# Patient Record
Sex: MALE | Race: BLACK OR AFRICAN AMERICAN | NOT HISPANIC OR LATINO | Employment: OTHER | URBAN - METROPOLITAN AREA
[De-identification: names, ages, dates, MRNs, and addresses within clinical notes are randomized per-mention and may not be internally consistent; named-entity substitution may affect disease eponyms.]

---

## 2017-01-11 ENCOUNTER — OFFICE VISIT (OUTPATIENT)
Dept: PODIATRY | Facility: CLINIC | Age: 72
End: 2017-01-11
Payer: MEDICARE

## 2017-01-11 VITALS
BODY MASS INDEX: 21.14 KG/M2 | HEIGHT: 70 IN | WEIGHT: 147.69 LBS | HEART RATE: 114 BPM | DIASTOLIC BLOOD PRESSURE: 77 MMHG | SYSTOLIC BLOOD PRESSURE: 138 MMHG

## 2017-01-11 DIAGNOSIS — B35.1 DERMATOPHYTOSIS OF NAIL: Primary | ICD-10-CM

## 2017-01-11 DIAGNOSIS — E11.42 DM TYPE 2 WITH DIABETIC PERIPHERAL NEUROPATHY: ICD-10-CM

## 2017-01-11 PROCEDURE — 99213 OFFICE O/P EST LOW 20 MIN: CPT | Mod: 25,S$PBB,, | Performed by: PODIATRIST

## 2017-01-11 PROCEDURE — 11721 DEBRIDE NAIL 6 OR MORE: CPT | Mod: Q9,S$PBB,, | Performed by: PODIATRIST

## 2017-01-11 PROCEDURE — 99213 OFFICE O/P EST LOW 20 MIN: CPT | Mod: PBBFAC,PO,25 | Performed by: PODIATRIST

## 2017-01-11 PROCEDURE — 99999 PR PBB SHADOW E&M-EST. PATIENT-LVL III: CPT | Mod: PBBFAC,,, | Performed by: PODIATRIST

## 2017-01-11 PROCEDURE — 11721 DEBRIDE NAIL 6 OR MORE: CPT | Mod: Q9,PBBFAC,PO | Performed by: PODIATRIST

## 2017-01-11 NOTE — MR AVS SNAPSHOT
[facility] - Podiatry  [address] [facility] [place] [address]  Phone: [phone]  Fax: [phone]                  Santy Anderson   2017 4:20 PM   Office Visit    Description:  Male : 1945   Provider:  Radhika Parr DPM   Department:  [facility] - Podiatry           Reason for Visit     Routine Foot Care                To Do List           Future Appointments        Provider Department Dept Phone    2017 7:55 AM LABORATORY, [facility] [phone]    2017 8:00 AM [facility] [phone]    2017 8:30 AM SPECIMEN, [facility] [phone]    2017 10:00 AM Otto Lara MD [facility] Hemotology Oncology [phone]    2017 7:40 AM Joanie Peterson MD [facility] Internal Medicine [phone]      Goals (5 Years of Data)     None      [facility]     [facility] -  Assistance  Registered nurses in the [facility] provide clinical advisement, health education, appointment booking, and other advisory services.  Call for this free service at [phone].             Medications           Message regarding Medications     Verify the changes and/or additions to your medication regime listed below are the same as discussed with your clinician today.  If any of these changes or additions are incorrect, please notify your healthcare provider.             Verify that the below list of medications is an accurate representation of the medications you are currently taking.  If none reported, the list may be blank. If incorrect, please contact your healthcare provider. Carry this list with you in case of emergency.           Current Medications     naproxen (NAPROSYN) 500 MG tablet Take 1 tablet (500 mg total) by mouth 2 (two) times daily with meals.    pantoprazole (PROTONIX) 20 MG tablet Take 2 tablets (40 mg total) by mouth 2 (two) times daily before meals.    potassium  "chloride SA (K-DUR,KLOR-CON) 20 MEQ tablet Take 1 tablet (20 mEq total) by mouth 2 (two) times daily.    sucralfate (CARAFATE) 1 gram tablet Take 1 tablet (1 g total) by mouth 4 (four) times daily before meals and nightly.    tramadol (ULTRAM) 50 mg tablet Take 50 mg by mouth every 6 (six) hours as needed for Pain.    VITAMIN D2 50,000 unit capsule TAKE ONE CAPSULE BY MOUTH EVERY WEEK           Clinical Reference Information           Vital Signs - Last Recorded  Most recent update: 1/11/2017  4:24 PM by Sara Sherwood MA    BP Pulse Ht Wt BMI    138/77 (BP Location: Left arm, Patient Position: Sitting, BP Method: Automatic) (!) 114 5' 9.5" (1.765 m) 67 kg (147 lb 11.3 oz) 21.5 kg/m2      Blood Pressure          Most Recent Value    BP  138/77      Allergies as of 1/11/2017     No Known Allergies      Immunizations Administered on Date of Encounter - 1/11/2017     None      "

## 2017-01-11 NOTE — PROGRESS NOTES
"PODIATRY NOTE    CHIEF COMPLAINT  Chief Complaint   Patient presents with    Routine Foot Care     at risk foot/nail care PCP Dr. Camacho 11/30/16        HPI  Santy Anderson is a 69 y.o. male w/ PMH of DM2, Malignant neoplasm, hx of traumatic tibial fracture RIGHT Leg, hx of Compartment syndrome w/ fasciotomy RIGHT , HPLD, and other medical problems, who presents today for follow up diabetic foot care. Pt denies any new problems. He relates he has painful toenails due to increased pressure aggravated by shoes and weight bearing. Pain is relieved with routine nail debridements.    REVIEW OF SYSTEMS  General: Denies any fever or chills  Chest: Denies shortness of breath, wheezing, coughing, or sputum production  Heart: Denies chest pain.  As noted above and per history of current illness above, otherwise negative in the remainder of the 14 systems.     PHYSICAL EXAM  Vitals:    01/11/17 1622   BP: 138/77   Pulse: (!) 114   Weight: 67 kg (147 lb 11.3 oz)   Height: 5' 9.5" (1.765 m)       GEN:  This patient is well-developed, well-nourished and appears stated age, well-oriented to person, place and time, and cooperative and pleasant on today's visit.      LOWER EXTREMITY  Vascular:   · DP pedal pulse 1/4 b/l, PT pedal pulse 1/4 b/l  · Skin temperature warm to COOL from prox to distally  · CFT <5 secs b/l  · There is generalized edema non pitting noted b/l       Dermatologic:   · Thickened, dystrophic, elongated toenails with subungal debris 1-5 b/l.   · No open skin lesions noted  · No erythema or drainage noted b/l.  · Webspaces are C/D/I B/L.  · There is diffuse or localized hyperkeratotic tissue noted plantar foot b/l.   · Skin texture and turgor dry/shiny  · There is no pedal hair growth noted    Neurologic:  · Vibratory sensation diminished at level of Hallux IPJ b/l    · Protective sensation absent at 9/10 sites upon examination with Nehalem Weinsten 5.07 g monofilament.   · Propioception intact at 1st MTPJ b/l. "   · Achilles and patellar deep tendon reflexes intact  · Babinski reflex absent b/l. Light touch and sharp/dull sensation intact b/l.    Musculoskeletal/Orthopedic:  · No symptomatic structural abnormalities noted.   · Muscle strength is 5/5 for foot inverters, everters, plantarflexors, and dorsiflexors. Muscle tone is normal.  · Ankle joint ROM decreased with knee extended, mild increase with knee flexed b/l, no pain or crepitus throughout ROM of AJ.    ASSESSMENT  1. Dermatophytosis of nail  2. DM with neurological manifestations      Plan  -Discuss presenting problems, etiology, pathologic processes and management options with patient today.   -With patient's permission, nails were aggressively reduced and debrided x 10 to their soft tissue attachment mechanically and with electric , removing all offending nail and debris. Patient relates relief following the procedure. Patient will continue to monitor the areas daily, inspect feet, wear protective shoe gear when ambulatory, moisturizer to maintain skin integrity  -RTC in 2-3 months for C      Future Appointments  Date Time Provider Department Center   1/25/2017 7:55 AM LABORATORY, [facility] LAB [facility]   1/25/2017 8:00 AM [facility] BMD1 [facility] DEXAB[facility]   1/25/2017 8:30 AM SPECIMEN, [facility] SPECLAB [facility]   1/25/2017 10:00 AM Otto Lara MD [facility] HEM ONC [facility]   2/1/2017 7:40 AM Joanie Peterson MD [facility] IM [facility]   4/12/2017 8:00 AM Radhika Parr DPM [facility] POD [facility]           Report Electronically Signed By:  Radhika Parr DPM   Podiatric Medicine & Surgery  [facility]  1/11/2017

## 2017-01-25 ENCOUNTER — OFFICE VISIT (OUTPATIENT)
Dept: HEMATOLOGY/ONCOLOGY | Facility: CLINIC | Age: 72
End: 2017-01-25
Payer: MEDICARE

## 2017-01-25 ENCOUNTER — LAB VISIT (OUTPATIENT)
Dept: LAB | Facility: HOSPITAL | Age: 72
End: 2017-01-25
Attending: INTERNAL MEDICINE
Payer: MEDICARE

## 2017-01-25 VITALS
OXYGEN SATURATION: 98 % | SYSTOLIC BLOOD PRESSURE: 110 MMHG | DIASTOLIC BLOOD PRESSURE: 74 MMHG | HEIGHT: 70 IN | HEART RATE: 98 BPM | BODY MASS INDEX: 21.27 KG/M2 | WEIGHT: 148.56 LBS | TEMPERATURE: 98 F

## 2017-01-25 DIAGNOSIS — E87.6 HYPOKALEMIA: ICD-10-CM

## 2017-01-25 DIAGNOSIS — M47.816 SPONDYLOSIS OF LUMBAR REGION WITHOUT MYELOPATHY OR RADICULOPATHY: ICD-10-CM

## 2017-01-25 DIAGNOSIS — Z29.9 PREVENTIVE MEASURE: ICD-10-CM

## 2017-01-25 DIAGNOSIS — Z12.5 ENCOUNTER FOR SCREENING FOR MALIGNANT NEOPLASM OF PROSTATE: ICD-10-CM

## 2017-01-25 DIAGNOSIS — M51.36 DDD (DEGENERATIVE DISC DISEASE), LUMBAR: ICD-10-CM

## 2017-01-25 DIAGNOSIS — C34.32 MALIGNANT NEOPLASM OF LOWER LOBE OF LEFT LUNG: Primary | ICD-10-CM

## 2017-01-25 DIAGNOSIS — E11.9 TYPE 2 DIABETES MELLITUS WITHOUT COMPLICATION, UNSPECIFIED LONG TERM INSULIN USE STATUS: ICD-10-CM

## 2017-01-25 DIAGNOSIS — E55.9 VITAMIN D DEFICIENCY: ICD-10-CM

## 2017-01-25 LAB
25(OH)D3+25(OH)D2 SERPL-MCNC: 20 NG/ML
ALBUMIN SERPL BCP-MCNC: 3.4 G/DL
ALP SERPL-CCNC: 64 U/L
ALT SERPL W/O P-5'-P-CCNC: 12 U/L
ANION GAP SERPL CALC-SCNC: 16 MMOL/L
ANION GAP SERPL CALC-SCNC: 16 MMOL/L
AST SERPL-CCNC: 39 U/L
BASOPHILS # BLD AUTO: 0.06 K/UL
BASOPHILS NFR BLD: 1 %
BILIRUB SERPL-MCNC: 1.5 MG/DL
BUN SERPL-MCNC: 7 MG/DL
BUN SERPL-MCNC: 7 MG/DL
CALCIUM SERPL-MCNC: 9.6 MG/DL
CALCIUM SERPL-MCNC: 9.6 MG/DL
CHLORIDE SERPL-SCNC: 98 MMOL/L
CHLORIDE SERPL-SCNC: 98 MMOL/L
CHOLEST/HDLC SERPL: 2.1 {RATIO}
CO2 SERPL-SCNC: 22 MMOL/L
CO2 SERPL-SCNC: 22 MMOL/L
COMPLEXED PSA SERPL-MCNC: 1.4 NG/ML
CREAT SERPL-MCNC: 0.8 MG/DL
CREAT SERPL-MCNC: 0.8 MG/DL
DIFFERENTIAL METHOD: ABNORMAL
EOSINOPHIL # BLD AUTO: 0.2 K/UL
EOSINOPHIL NFR BLD: 4 %
ERYTHROCYTE [DISTWIDTH] IN BLOOD BY AUTOMATED COUNT: 18.3 %
EST. GFR  (AFRICAN AMERICAN): >60 ML/MIN/1.73 M^2
EST. GFR  (AFRICAN AMERICAN): >60 ML/MIN/1.73 M^2
EST. GFR  (NON AFRICAN AMERICAN): >60 ML/MIN/1.73 M^2
EST. GFR  (NON AFRICAN AMERICAN): >60 ML/MIN/1.73 M^2
GLUCOSE SERPL-MCNC: 105 MG/DL
GLUCOSE SERPL-MCNC: 105 MG/DL
HCT VFR BLD AUTO: 41.2 %
HDL/CHOLESTEROL RATIO: 46.5 %
HDLC SERPL-MCNC: 101 MG/DL
HDLC SERPL-MCNC: 217 MG/DL
HGB BLD-MCNC: 13.8 G/DL
LDLC SERPL CALC-MCNC: 101.8 MG/DL
LYMPHOCYTES # BLD AUTO: 1.8 K/UL
LYMPHOCYTES NFR BLD: 30.3 %
MAGNESIUM SERPL-MCNC: 1.7 MG/DL
MCH RBC QN AUTO: 34.2 PG
MCHC RBC AUTO-ENTMCNC: 33.5 %
MCV RBC AUTO: 102 FL
MONOCYTES # BLD AUTO: 0.8 K/UL
MONOCYTES NFR BLD: 13.6 %
NEUTROPHILS # BLD AUTO: 3.1 K/UL
NEUTROPHILS NFR BLD: 51.1 %
NONHDLC SERPL-MCNC: 116 MG/DL
PLATELET # BLD AUTO: 150 K/UL
PMV BLD AUTO: 9.1 FL
POTASSIUM SERPL-SCNC: 3.7 MMOL/L
POTASSIUM SERPL-SCNC: 3.7 MMOL/L
PROT SERPL-MCNC: 7.7 G/DL
RBC # BLD AUTO: 4.04 M/UL
SODIUM SERPL-SCNC: 136 MMOL/L
SODIUM SERPL-SCNC: 136 MMOL/L
TRIGL SERPL-MCNC: 71 MG/DL
TSH SERPL DL<=0.005 MIU/L-ACNC: 2.38 UIU/ML
WBC # BLD AUTO: 5.97 K/UL

## 2017-01-25 PROCEDURE — 99214 OFFICE O/P EST MOD 30 MIN: CPT | Mod: S$PBB,,, | Performed by: INTERNAL MEDICINE

## 2017-01-25 PROCEDURE — 99999 PR PBB SHADOW E&M-EST. PATIENT-LVL III: CPT | Mod: PBBFAC,,, | Performed by: INTERNAL MEDICINE

## 2017-01-25 PROCEDURE — 99213 OFFICE O/P EST LOW 20 MIN: CPT | Mod: PBBFAC,PO | Performed by: INTERNAL MEDICINE

## 2017-01-25 NOTE — PROGRESS NOTES
Subjective:       Patient ID: Santy Anderson is a 71 y.o. male.    Chief Complaint: Results; Pain; and Lung Cancer    HPI 71-year-old male history of resected non-small cell lung carcinoma patient is complaining of back pain today he is using a walker he states that this is happened to him in the past his last MRI of his lumbar spine was in 2015 which revealed degenerative changes patient has difficulty ambulating states this occurs on and off    Past Medical History   Diagnosis Date    Diabetes mellitus, type 2     Gastric mass     Hyperlipidemia     Lung cancer      Family History   Problem Relation Age of Onset    Diabetes Mother     Hypertension Mother      Social History     Social History    Marital status:      Spouse name: N/A    Number of children: N/A    Years of education: N/A     Occupational History    Not on file.     Social History Main Topics    Smoking status: Former Smoker     Packs/day: 0.50     Years: 25.00     Quit date: 10/16/1988    Smokeless tobacco: Never Used    Alcohol use 9.0 oz/week     1 Glasses of wine, 14 Cans of beer per week      Comment: socailly    Drug use: No    Sexual activity: Not on file     Other Topics Concern    Not on file     Social History Narrative     Past Surgical History   Procedure Laterality Date    Knee surgery      Hand surgery      Femur surgery      Colonoscopy      Left lower lobectomy      Laparotomy for trauma (negative)  1979    Gastrectomy      Esophagogastroduodenoscopy         Labs:  Lab Results   Component Value Date    WBC 5.97 01/25/2017    HGB 13.8 (L) 01/25/2017    HCT 41.2 01/25/2017     (H) 01/25/2017     01/25/2017     BMP  Lab Results   Component Value Date     01/25/2017     01/25/2017    K 3.7 01/25/2017    K 3.7 01/25/2017    CL 98 01/25/2017    CL 98 01/25/2017    CO2 22 (L) 01/25/2017    CO2 22 (L) 01/25/2017    BUN 7 (L) 01/25/2017    BUN 7 (L) 01/25/2017    CREATININE 0.8  01/25/2017    CREATININE 0.8 01/25/2017    CALCIUM 9.6 01/25/2017    CALCIUM 9.6 01/25/2017    ANIONGAP 16 01/25/2017    ANIONGAP 16 01/25/2017    ESTGFRAFRICA >60 01/25/2017    ESTGFRAFRICA >60 01/25/2017    EGFRNONAA >60 01/25/2017    EGFRNONAA >60 01/25/2017     Lab Results   Component Value Date    ALT 12 01/25/2017    AST 39 01/25/2017    ALKPHOS 64 01/25/2017    BILITOT 1.5 (H) 01/25/2017       Lab Results   Component Value Date    IRON 194 (H) 05/22/2015    TIBC 312 05/22/2015    FERRITIN 128 05/22/2015     Lab Results   Component Value Date    [id number] 370 09/23/2016     Lab Results   Component Value Date    FOLATE 8.8 09/23/2016     Lab Results   Component Value Date    TSH 1.413 06/22/2016         Review of Systems   Constitutional: Positive for activity change and fatigue. Negative for appetite change, chills, diaphoresis, fever and unexpected weight change.   HENT: Negative for congestion, dental problem, drooling, ear discharge, ear pain, facial swelling, hearing loss, mouth sores, nosebleeds, postnasal drip, rhinorrhea, sinus pressure, sneezing, sore throat, tinnitus, trouble swallowing and voice change.    Eyes: Negative for photophobia, pain, discharge, redness, itching and visual disturbance.   Respiratory: Negative for apnea, cough, choking, chest tightness, shortness of breath, wheezing and stridor.    Cardiovascular: Negative for chest pain, palpitations and leg swelling.   Gastrointestinal: Negative for abdominal distention, abdominal pain, anal bleeding, blood in stool, constipation, diarrhea, nausea, rectal pain and vomiting.   Endocrine: Negative for cold intolerance, heat intolerance, polydipsia, polyphagia and polyuria.   Genitourinary: Negative for decreased urine volume, difficulty urinating, discharge, dysuria, enuresis, flank pain, frequency, genital sores, hematuria, penile pain, penile swelling, scrotal swelling, testicular pain and urgency.   Musculoskeletal: Positive for  arthralgias, back pain and gait problem. Negative for joint swelling, myalgias, neck pain and neck stiffness.   Skin: Negative for color change, pallor, rash and wound.   Allergic/Immunologic: Negative for environmental allergies, food allergies and immunocompromised state.   Neurological: Positive for weakness. Negative for dizziness, tremors, seizures, syncope, facial asymmetry, speech difficulty, light-headedness, numbness and headaches.   Hematological: Negative for adenopathy. Does not bruise/bleed easily.   Psychiatric/Behavioral: Negative for agitation, behavioral problems, confusion, decreased concentration, dysphoric mood, hallucinations, self-injury, sleep disturbance and suicidal ideas. The patient is not nervous/anxious and is not hyperactive.        Objective:      Physical Exam   Constitutional: He is oriented to person, place, and time. He appears well-developed and well-nourished. No distress.   HENT:   Head: Normocephalic.   Right Ear: External ear normal.   Left Ear: External ear normal.   Nose: Nose normal. Right sinus exhibits no maxillary sinus tenderness and no frontal sinus tenderness. Left sinus exhibits no maxillary sinus tenderness and no frontal sinus tenderness.   Mouth/Throat: Oropharynx is clear and moist. No oropharyngeal exudate.   Eyes: EOM and lids are normal. Pupils are equal, round, and reactive to light. Right eye exhibits no discharge. Left eye exhibits no discharge. Right conjunctiva is not injected. Right conjunctiva has no hemorrhage. Left conjunctiva is not injected. Left conjunctiva has no hemorrhage. No scleral icterus. Right eye exhibits normal extraocular motion. Left eye exhibits normal extraocular motion.   Neck: Normal range of motion. Neck supple. No JVD present. No tracheal deviation present. No thyromegaly present.   Cardiovascular: Normal rate, regular rhythm and normal heart sounds.    Pulmonary/Chest: Effort normal and breath sounds normal. No stridor. No  respiratory distress.   Abdominal: Soft. Bowel sounds are normal. He exhibits no mass. There is no hepatosplenomegaly, splenomegaly or hepatomegaly. There is no tenderness.   Musculoskeletal: Normal range of motion. He exhibits no edema or tenderness.   Lymphadenopathy:        Head (right side): No posterior auricular and no occipital adenopathy present.        Head (left side): No posterior auricular and no occipital adenopathy present.     He has no cervical adenopathy.        Right cervical: No superficial cervical, no deep cervical and no posterior cervical adenopathy present.       Left cervical: No superficial cervical, no deep cervical and no posterior cervical adenopathy present.     He has no axillary adenopathy.        Right: No supraclavicular adenopathy present.        Left: No supraclavicular adenopathy present.   Neurological: He is alert and oriented to person, place, and time. He has normal strength. No cranial nerve deficit. Coordination abnormal.   Skin: Skin is dry. No rash noted. He is not diaphoretic. No cyanosis or erythema. Nails show no clubbing.   Psychiatric: He has a normal mood and affect. His behavior is normal. Judgment and thought content normal. Cognition and memory are normal.   Vitals reviewed.          Assessment:       1. Malignant neoplasm of lower lobe of left lung    2. DDD (degenerative disc disease), lumbar    3. Spondylosis of lumbar region without myelopathy or radiculopathy            Plan:     very different findings patient has difficulty in terms of ambulation at this point recommend the patient have MRI of LS spine to rule out malignant cause as well as any nonmalignant cause return after MRI for review; back pain for the last several weeks and perhaps month with progression

## 2017-01-26 LAB
ESTIMATED AVG GLUCOSE: 82 MG/DL
HBA1C MFR BLD HPLC: 4.5 %

## 2017-01-31 ENCOUNTER — TELEPHONE (OUTPATIENT)
Dept: RADIOLOGY | Facility: HOSPITAL | Age: 72
End: 2017-01-31

## 2017-02-01 ENCOUNTER — OFFICE VISIT (OUTPATIENT)
Dept: HEMATOLOGY/ONCOLOGY | Facility: CLINIC | Age: 72
End: 2017-02-01
Payer: MEDICARE

## 2017-02-01 ENCOUNTER — HOSPITAL ENCOUNTER (OUTPATIENT)
Dept: RADIOLOGY | Facility: HOSPITAL | Age: 72
Discharge: HOME | End: 2017-02-01
Attending: INTERNAL MEDICINE
Payer: MEDICARE

## 2017-02-01 ENCOUNTER — OFFICE VISIT (OUTPATIENT)
Dept: INTERNAL MEDICINE | Facility: CLINIC | Age: 72
End: 2017-02-01
Payer: MEDICARE

## 2017-02-01 VITALS
OXYGEN SATURATION: 98 % | DIASTOLIC BLOOD PRESSURE: 62 MMHG | RESPIRATION RATE: 18 BRPM | HEIGHT: 70 IN | HEART RATE: 101 BPM | BODY MASS INDEX: 21.18 KG/M2 | WEIGHT: 147.94 LBS | TEMPERATURE: 98 F | SYSTOLIC BLOOD PRESSURE: 134 MMHG

## 2017-02-01 VITALS
SYSTOLIC BLOOD PRESSURE: 118 MMHG | TEMPERATURE: 97 F | DIASTOLIC BLOOD PRESSURE: 64 MMHG | HEIGHT: 70 IN | OXYGEN SATURATION: 97 % | WEIGHT: 148.56 LBS | BODY MASS INDEX: 21.27 KG/M2 | HEART RATE: 108 BPM

## 2017-02-01 DIAGNOSIS — E11.9 TYPE 2 DIABETES MELLITUS WITHOUT COMPLICATION, UNSPECIFIED LONG TERM INSULIN USE STATUS: Primary | ICD-10-CM

## 2017-02-01 DIAGNOSIS — K20.80 ESOPHAGITIS, LOS ANGELES GRADE D: ICD-10-CM

## 2017-02-01 DIAGNOSIS — E55.9 VITAMIN D DEFICIENCY: Primary | ICD-10-CM

## 2017-02-01 DIAGNOSIS — C34.32 MALIGNANT NEOPLASM OF LOWER LOBE OF LEFT LUNG: ICD-10-CM

## 2017-02-01 DIAGNOSIS — Z00.00 ENCOUNTER FOR PREVENTIVE HEALTH EXAMINATION: ICD-10-CM

## 2017-02-01 DIAGNOSIS — M47.816 SPONDYLOSIS OF LUMBAR REGION WITHOUT MYELOPATHY OR RADICULOPATHY: ICD-10-CM

## 2017-02-01 DIAGNOSIS — M47.816 SPONDYLOSIS OF LUMBAR REGION WITHOUT MYELOPATHY OR RADICULOPATHY: Primary | ICD-10-CM

## 2017-02-01 DIAGNOSIS — E55.9 VITAMIN D DEFICIENCY: ICD-10-CM

## 2017-02-01 DIAGNOSIS — M51.36 DDD (DEGENERATIVE DISC DISEASE), LUMBAR: ICD-10-CM

## 2017-02-01 DIAGNOSIS — M81.0 OP (OSTEOPOROSIS): ICD-10-CM

## 2017-02-01 PROCEDURE — 99214 OFFICE O/P EST MOD 30 MIN: CPT | Mod: S$PBB,,, | Performed by: INTERNAL MEDICINE

## 2017-02-01 PROCEDURE — 99999 PR PBB SHADOW E&M-EST. PATIENT-LVL III: CPT | Mod: PBBFAC,,, | Performed by: INTERNAL MEDICINE

## 2017-02-01 PROCEDURE — 72158 MRI LUMBAR SPINE W/O & W/DYE: CPT | Mod: 26,,, | Performed by: RADIOLOGY

## 2017-02-01 PROCEDURE — 99213 OFFICE O/P EST LOW 20 MIN: CPT | Mod: PBBFAC,27,PO | Performed by: INTERNAL MEDICINE

## 2017-02-01 RX ORDER — GADOBUTROL 604.72 MG/ML
6.5 INJECTION INTRAVENOUS
Status: COMPLETED | OUTPATIENT
Start: 2017-02-01 | End: 2017-02-01

## 2017-02-01 RX ORDER — ERGOCALCIFEROL 1.25 MG/1
50000 CAPSULE ORAL
Qty: 8 CAPSULE | Refills: 6 | Status: SHIPPED | OUTPATIENT
Start: 2017-02-02 | End: 2017-11-13 | Stop reason: SDUPTHER

## 2017-02-01 RX ORDER — SODIUM, POTASSIUM,MAG SULFATES 17.5-3.13G
1 SOLUTION, RECONSTITUTED, ORAL ORAL DAILY PRN
Qty: 1 BOTTLE | Refills: 0 | Status: SHIPPED | OUTPATIENT
Start: 2017-02-01 | End: 2017-03-06

## 2017-02-01 RX ORDER — ERGOCALCIFEROL 1.25 MG/1
50000 CAPSULE ORAL
Qty: 8 CAPSULE | Refills: 6 | Status: CANCELLED | OUTPATIENT
Start: 2017-02-02

## 2017-02-01 RX ADMIN — GADOBUTROL 6.5 ML: 604.72 INJECTION INTRAVENOUS at 02:02

## 2017-02-01 NOTE — PROGRESS NOTES
Subjective:       Patient ID: Santy Anderson is a 71 y.o. male.    Chief Complaint: Results and Pain    HPI 71-year-old male returns after MRI of spine previous history of early resected lung cancer persistent back pain with myelopathy    Past Medical History   Diagnosis Date    Diabetes mellitus, type 2     Gastric mass     Hyperlipidemia     Lung cancer      Family History   Problem Relation Age of Onset    Diabetes Mother     Hypertension Mother      Social History     Social History    Marital status:      Spouse name: N/A    Number of children: N/A    Years of education: N/A     Occupational History    Not on file.     Social History Main Topics    Smoking status: Former Smoker     Packs/day: 0.50     Years: 25.00     Quit date: 10/16/1988    Smokeless tobacco: Never Used    Alcohol use 9.0 oz/week     1 Glasses of wine, 14 Cans of beer per week      Comment: socailly    Drug use: No    Sexual activity: Not on file     Other Topics Concern    Not on file     Social History Narrative     Past Surgical History   Procedure Laterality Date    Knee surgery      Hand surgery      Femur surgery      Colonoscopy      Left lower lobectomy      Laparotomy for trauma (negative)  1979    Gastrectomy      Esophagogastroduodenoscopy         Labs:  Lab Results   Component Value Date    WBC 5.97 01/25/2017    HGB 13.8 (L) 01/25/2017    HCT 41.2 01/25/2017     (H) 01/25/2017     01/25/2017     BMP  Lab Results   Component Value Date     01/25/2017     01/25/2017    K 3.7 01/25/2017    K 3.7 01/25/2017    CL 98 01/25/2017    CL 98 01/25/2017    CO2 22 (L) 01/25/2017    CO2 22 (L) 01/25/2017    BUN 7 (L) 01/25/2017    BUN 7 (L) 01/25/2017    CREATININE 0.8 01/25/2017    CREATININE 0.8 01/25/2017    CALCIUM 9.6 01/25/2017    CALCIUM 9.6 01/25/2017    ANIONGAP 16 01/25/2017    ANIONGAP 16 01/25/2017    ESTGFRAFRICA >60 01/25/2017    ESTGFRAFRICA >60 01/25/2017    EGFRNONAA  >60 01/25/2017    EGFRNONAA >60 01/25/2017     Lab Results   Component Value Date    ALT 12 01/25/2017    AST 39 01/25/2017    ALKPHOS 64 01/25/2017    BILITOT 1.5 (H) 01/25/2017       Lab Results   Component Value Date    IRON 194 (H) 05/22/2015    TIBC 312 05/22/2015    FERRITIN 128 05/22/2015     Lab Results   Component Value Date    [id number] 370 09/23/2016     Lab Results   Component Value Date    FOLATE 8.8 09/23/2016     Lab Results   Component Value Date    TSH 2.376 01/25/2017         Review of Systems   Constitutional: Negative for activity change, appetite change, chills, diaphoresis, fatigue, fever and unexpected weight change.   HENT: Negative for congestion, dental problem, drooling, ear discharge, ear pain, facial swelling, hearing loss, mouth sores, nosebleeds, postnasal drip, rhinorrhea, sinus pressure, sneezing, sore throat, tinnitus, trouble swallowing and voice change.    Eyes: Negative for photophobia, pain, discharge, redness, itching and visual disturbance.   Respiratory: Negative for apnea, cough, choking, chest tightness, shortness of breath, wheezing and stridor.    Cardiovascular: Negative for chest pain, palpitations and leg swelling.   Gastrointestinal: Negative for abdominal distention, abdominal pain, anal bleeding, blood in stool, constipation, diarrhea, nausea, rectal pain and vomiting.   Endocrine: Negative for cold intolerance, heat intolerance, polydipsia, polyphagia and polyuria.   Genitourinary: Negative for decreased urine volume, difficulty urinating, discharge, dysuria, enuresis, flank pain, frequency, genital sores, hematuria, penile pain, penile swelling, scrotal swelling, testicular pain and urgency.   Musculoskeletal: Positive for back pain and gait problem. Negative for arthralgias, joint swelling, myalgias, neck pain and neck stiffness.   Skin: Negative for color change, pallor, rash and wound.   Allergic/Immunologic: Negative for environmental allergies, food  allergies and immunocompromised state.   Neurological: Negative for dizziness, tremors, seizures, syncope, facial asymmetry, speech difficulty, weakness, light-headedness, numbness and headaches.   Hematological: Negative for adenopathy. Does not bruise/bleed easily.   Psychiatric/Behavioral: Negative for agitation, behavioral problems, confusion, decreased concentration, dysphoric mood, hallucinations, self-injury, sleep disturbance and suicidal ideas. The patient is not nervous/anxious and is not hyperactive.        Objective:      Physical Exam   Constitutional: He is oriented to person, place, and time. He appears well-developed and well-nourished. No distress.   HENT:   Head: Normocephalic.   Right Ear: External ear normal.   Left Ear: External ear normal.   Nose: Nose normal. Right sinus exhibits no maxillary sinus tenderness and no frontal sinus tenderness. Left sinus exhibits no maxillary sinus tenderness and no frontal sinus tenderness.   Mouth/Throat: Oropharynx is clear and moist. No oropharyngeal exudate.   Eyes: EOM and lids are normal. Pupils are equal, round, and reactive to light. Right eye exhibits no discharge. Left eye exhibits no discharge. Right conjunctiva is not injected. Right conjunctiva has no hemorrhage. Left conjunctiva is not injected. Left conjunctiva has no hemorrhage. No scleral icterus. Right eye exhibits normal extraocular motion. Left eye exhibits normal extraocular motion.   Neck: Normal range of motion. Neck supple. No JVD present. No tracheal deviation present. No thyromegaly present.   Cardiovascular: Normal rate, regular rhythm and normal heart sounds.    Pulmonary/Chest: Effort normal and breath sounds normal. No stridor. No respiratory distress.   Abdominal: Soft. Bowel sounds are normal. He exhibits no mass. There is no hepatosplenomegaly, splenomegaly or hepatomegaly. There is no tenderness.   Musculoskeletal: Normal range of motion. He exhibits no edema or tenderness.    Lymphadenopathy:        Head (right side): No posterior auricular and no occipital adenopathy present.        Head (left side): No posterior auricular and no occipital adenopathy present.     He has no cervical adenopathy.        Right cervical: No superficial cervical, no deep cervical and no posterior cervical adenopathy present.       Left cervical: No superficial cervical, no deep cervical and no posterior cervical adenopathy present.     He has no axillary adenopathy.        Right: No supraclavicular adenopathy present.        Left: No supraclavicular adenopathy present.   Neurological: He is alert and oriented to person, place, and time. He has normal strength. No cranial nerve deficit. Coordination abnormal.   Skin: Skin is dry. No rash noted. He is not diaphoretic. No cyanosis or erythema. Nails show no clubbing.   Psychiatric: He has a normal mood and affect. His behavior is normal. Judgment and thought content normal. Cognition and memory are normal.   Vitals reviewed.          Assessment:       1. Spondylosis of lumbar region without myelopathy or radiculopathy    2. Malignant neoplasm of lower lobe of left lung            Plan:         reviewed results of MRI demonstrating no evidence of malignancy will refer to Dr. Thanh Johnson for further evaluation reviewed imaging studies personally with patient

## 2017-02-01 NOTE — PROGRESS NOTES
"Subjective:       Patient ID: Santy Anderson is a 71 y.o. male.    Chief Complaint: Annual Exam and Follow-up    HPI Comments: Here for f/u medical problems and preventive exam.  To have MRI spine due to pain, Dr. Lara.  Lost 10# in past couple years.  DM has resolved.  Energy good.  Exercise restricted due to pain low back.  No f/c/sw/cough.  Wound on RLE has healed, no more wound clinic.  No cp/sob/palp.  BMs and urine normal.  Taking weekly vit D.  No reflux sx.  Swallowing is good.  BMD hi fx risk hip, rec bphos.    HM: ref fluvax, 6/16 abenmh18, 6/16 TDaP, 1/17 BMD hi fx risk hip rec bisphos rep 2y, unk Cscope, 6/16 HCV neg, 7/16 Eye doc at [facility].            Review of Systems   Constitutional: Negative for appetite change, chills, diaphoresis, fatigue and fever.   HENT: Negative for congestion, ear pain, rhinorrhea and sinus pressure.    Respiratory: Negative for cough and shortness of breath.    Cardiovascular: Negative for chest pain and palpitations.   Gastrointestinal: Negative for abdominal distention, abdominal pain, blood in stool, constipation, diarrhea, nausea and vomiting.   Genitourinary: Negative for difficulty urinating, dysuria, frequency, hematuria and urgency.   Musculoskeletal: Negative for arthralgias.   Skin: Negative for rash.   Neurological: Negative for dizziness and headaches.   Psychiatric/Behavioral: The patient is not nervous/anxious.        Objective:     Visit Vitals    /64    Pulse 108    Temp 97.4 °F (36.3 °C) (Tympanic)    Ht 5' 9.5" (1.765 m)    Wt 67.4 kg (148 lb 9.4 oz)    SpO2 97%    BMI 21.63 kg/m2       Physical Exam   Constitutional: He is oriented to person, place, and time. He appears well-developed and well-nourished.   HENT:   Right Ear: External ear normal. Tympanic membrane is not injected.   Left Ear: External ear normal. Tympanic membrane is not injected.   Mouth/Throat: Oropharynx is clear and moist.   Eyes: Conjunctivae are normal.   Neck: Normal " range of motion. Neck supple. No thyromegaly present.   Cardiovascular: Normal rate, regular rhythm and intact distal pulses.  Exam reveals no gallop and no friction rub.    No murmur heard.  Pulses:       Dorsalis pedis pulses are 2+ on the right side, and 2+ on the left side.        Posterior tibial pulses are 2+ on the right side, and 2+ on the left side.   Pulmonary/Chest: Effort normal and breath sounds normal. He has no wheezes. He has no rales.   Abdominal: Soft. Bowel sounds are normal. He exhibits no mass. There is no tenderness.   Musculoskeletal: He exhibits no edema.   Feet:   Right Foot:   Protective Sensation: 10 sites tested. 10 sites sensed.   Skin Integrity: Negative for ulcer, blister, skin breakdown, erythema, warmth, callus or dry skin.   Left Foot:   Protective Sensation: 10 sites tested. 10 sites sensed.   Skin Integrity: Negative for ulcer, blister, skin breakdown, erythema, warmth, callus or dry skin.   Lymphadenopathy:     He has no cervical adenopathy.   Neurological: He is alert and oriented to person, place, and time.   Psychiatric: He has a normal mood and affect.     Results for JUAN TANG (MRN [id number]) as of 2/1/2017 12:48   Ref. Range 1/25/2017 07:37 1/25/2017 07:45 1/25/2017 07:45   WBC Latest Ref Range: 3.90 - 12.70 K/uL  5.97    RBC Latest Ref Range: 4.60 - 6.20 M/uL  4.04 (L)    Hemoglobin Latest Ref Range: 14.0 - 18.0 g/dL  13.8 (L)    Hematocrit Latest Ref Range: 40.0 - 54.0 %  41.2    MCV Latest Ref Range: 82 - 98 fL  102 (H)    MCH Latest Ref Range: 27.0 - 31.0 pg  34.2 (H)    MCHC Latest Ref Range: 32.0 - 36.0 %  33.5    RDW Latest Ref Range: 11.5 - 14.5 %  18.3 (H)    Platelets Latest Ref Range: 150 - 350 K/uL  150    MPV Latest Ref Range: 9.2 - 12.9 fL  9.1 (L)    Gran% Latest Ref Range: 38.0 - 73.0 %  51.1    Gran # Latest Ref Range: 1.8 - 7.7 K/uL  3.1    Lymph% Latest Ref Range: 18.0 - 48.0 %  30.3    Lymph # Latest Ref Range: 1.0 - 4.8 K/uL  1.8    Mono% Latest Ref  Range: 4.0 - 15.0 %  13.6    Mono # Latest Ref Range: 0.3 - 1.0 K/uL  0.8    Eosinophil% Latest Ref Range: 0.0 - 8.0 %  4.0    Eos # Latest Ref Range: 0.0 - 0.5 K/uL  0.2    Basophil% Latest Ref Range: 0.0 - 1.9 %  1.0    Baso # Latest Ref Range: 0.00 - 0.20 K/uL  0.06    Sodium Latest Ref Range: 136 - 145 mmol/L  136 136   Potassium Latest Ref Range: 3.5 - 5.1 mmol/L  3.7 3.7   Chloride Latest Ref Range: 95 - 110 mmol/L  98 98   CO2 Latest Ref Range: 23 - 29 mmol/L  22 (L) 22 (L)   Anion Gap Latest Ref Range: 8 - 16 mmol/L  16 16   BUN, Bld Latest Ref Range: 8 - 23 mg/dL  7 (L) 7 (L)   Creatinine Latest Ref Range: 0.5 - 1.4 mg/dL  0.8 0.8   eGFR if non African American Latest Ref Range: >60 mL/min/1.73 m^2  >60 >60   eGFR if  Latest Ref Range: >60 mL/min/1.73 m^2  >60 >60   Glucose Latest Ref Range: 70 - 110 mg/dL  105 105   Calcium Latest Ref Range: 8.7 - 10.5 mg/dL  9.6 9.6   Magnesium Latest Ref Range: 1.6 - 2.6 mg/dL  1.7    Alkaline Phosphatase Latest Ref Range: 55 - 135 U/L   64   Total Protein Latest Ref Range: 6.0 - 8.4 g/dL   7.7   Albumin Latest Ref Range: 3.5 - 5.2 g/dL   3.4 (L)   Total Bilirubin Latest Ref Range: 0.1 - 1.0 mg/dL   1.5 (H)   AST Latest Ref Range: 10 - 40 U/L   39   ALT Latest Ref Range: 10 - 44 U/L   12   Triglycerides Latest Ref Range: 30 - 150 mg/dL  71    Cholesterol Latest Ref Range: 120 - 199 mg/dL  217 (H)    HDL Latest Ref Range: 40 - 75 mg/dL  101 (H)    LDL Cholesterol Latest Ref Range: 63.0 - 159.0 mg/dL  101.8    Total Cholesterol/HDL Ratio Latest Ref Range: 2.0 - 5.0   2.1    Vit D, 25-Hydroxy Latest Ref Range: 30 - 96 ng/mL  20 (L)    Hemoglobin A1C Latest Ref Range: 4.5 - 6.2 %  4.5    Estimated Avg Glucose Latest Ref Range: 68 - 131 mg/dL  82    TSH Latest Ref Range: 0.400 - 4.000 uIU/mL  2.376    PSA, SCREEN Latest Ref Range: 0.00 - 4.00 ng/mL  1.4    Microalbum.,U,Random Latest Units: ug/mL 44.0     Microalb Creat Ratio Latest Ref Range: 0.0 - 30.0 ug/mg  22.4       Assessment:       1. Type 2 diabetes mellitus without complication, unspecified long term insulin use status    2. Vitamin D deficiency    3. Malignant neoplasm of lower lobe of left lung    4. OP (osteoporosis)    5. Esophagitis, Laclede grade D    6. Encounter for preventive health examination        Plan:       Santy was seen today for annual exam and follow-up.    Diagnoses and all orders for this visit:    Type 2 diabetes mellitus without complication, unspecified long term insulin use status- resolved with wt loss.    Vitamin D deficiency- incr to twice weekly, recheck 5mo.  -     ergocalciferol (VITAMIN D2) 50,000 unit Cap; Take 1 capsule (50,000 Units total) by mouth twice a week.  -     Vitamin D; Future    Malignant neoplasm of lower lobe of left lung- per Dr. Lara.    OP (osteoporosis)- discussed bisphos, needs vit D replaced first.  -     ergocalciferol (VITAMIN D2) 50,000 unit Cap; Take 1 capsule (50,000 Units total) by mouth twice a week.  -     Vitamin D; Future    Esophagitis, Laclede grade D- on PPI.    Encounter for preventive health examination- utd.  Sched Cscope.    RTC 5 mo.

## 2017-02-01 NOTE — MR AVS SNAPSHOT
[facility] Internal Medicine  [address]  [place] [address]  Phone: [phone]  Fax: [phone]                  Santy Anderson   2017 5:40 PM   Office Visit    Description:  Male : 1945   Provider:  Joanie Peterson MD   Department:  [facility] - Internal Medicine           Reason for Visit     Annual Exam     Follow-up           Diagnoses this Visit        Comments    Type 2 diabetes mellitus without complication, unspecified long term insulin use status    -  Primary     Vitamin D deficiency         Malignant neoplasm of lower lobe of left lung         OP (osteoporosis)         Esophagitis, Nelson grade D         Encounter for preventive health examination                To Do List           Future Appointments        Provider Department Dept Phone    2017 2:00 PM [facility] MRI [facility] [phone]    2017 3:40 PM Otto Lara MD [facility] Hemotology Oncology [phone]    2017 5:40 PM Joanie Peterson MD [facility] Internal Medicine [phone]    2017 8:00 AM Radhika Parr DPM [facility] Podiatry [phone]    2017 1:20 PM Joanie Peterson MD [facility] Internal Medicine [phone]      Goals (5 Years of Data)     None      Follow-Up and Disposition     Return in about 5 months (around 2017).    Follow-up and Disposition History       These Medications        Disp Refills Start End    sodium,potassium,mag sulfates (SUPREP BOWEL PREP KIT) 17.5-3.13-1.6 gram SolR 1 Bottle 0 2017     Take 1 kit by mouth daily as needed. - Oral    Pharmacy: [facility] [place] - [place]  [address] Ph #: [phone]       ergocalciferol (VITAMIN D2) 50,000 unit Cap 8 capsule 6 2017     Take 1 capsule (50,000 Units total) by mouth twice a week. - Oral    Pharmacy: [facility] - [place] - [address]  Ph #: [phone]       Notes to Pharmacy: Generic For:DRISDOL    [id number] [facility]     [facility] Nurse Care Line - 24/7 Assistance  Registered nurses in the [facility] provide clinical advisement, health education, appointment booking, and other advisory services.  Call for this free service at [phone].             Medications           Message regarding Medications     Verify the changes and/or additions to your medication regime listed below are the same as discussed with your clinician today.  If any of these changes or additions are incorrect, please notify your healthcare provider.        START taking these NEW medications        Refills    sodium,potassium,mag sulfates (SUPREP BOWEL PREP KIT) 17.5-3.13-1.6 gram SolR 0    Sig: Take 1 kit by mouth daily as needed.    Class: Normal    Route: Oral      CHANGE how you are taking these medications     Start Taking Instead of    ergocalciferol (VITAMIN D2) 50,000 unit Cap VITAMIN D2 50,000 unit capsule    Dosage:  Take 1 capsule (50,000 Units total) by mouth twice a week. Dosage:  TAKE ONE CAPSULE BY MOUTH EVERY WEEK    Reason for Change:  Reorder     Starting on: 2/2/2017            Verify that the below list of medications is an accurate representation of the medications you are currently taking.  If none reported, the list may be blank. If incorrect, please contact your healthcare provider. Carry this list with you in case of emergency.           Current Medications     ergocalciferol (VITAMIN D2) 50,000 unit Cap Starting on Feb 02, 2017. Take 1 capsule (50,000 Units total) by mouth twice a week.    naproxen (NAPROSYN) 500 MG tablet Take 1 tablet (500 mg total) by mouth 2 (two) times daily with meals.    pantoprazole (PROTONIX) 20 MG tablet Take 2 tablets (40 mg total) by mouth 2 (two) times daily before meals.    potassium chloride SA (K-DUR,KLOR-CON) 20 MEQ tablet Take 1 tablet (20 mEq total) by mouth 2 (two) times daily.    sodium,potassium,mag sulfates (SUPREP BOWEL PREP KIT) 17.5-3.13-1.6 gram SolR Take 1 kit  "by mouth daily as needed.    sucralfate (CARAFATE) 1 gram tablet Take 1 tablet (1 g total) by mouth 4 (four) times daily before meals and nightly.    tramadol (ULTRAM) 50 mg tablet Take 50 mg by mouth every 6 (six) hours as needed for Pain.           Clinical Reference Information           Vital Signs - Last Recorded  Most recent update: 2/1/2017 12:41 PM by Kat Cortes LPN    BP Pulse Temp Ht Wt SpO2    118/64 108 97.4 °F (36.3 °C) (Tympanic) 5' 9.5" (1.765 m) 67.4 kg (148 lb 9.4 oz) 97%    BMI                21.63 kg/m2          Blood Pressure          Most Recent Value    BP  118/64      Allergies as of 2/1/2017     No Known Allergies      Immunizations Administered on Date of Encounter - 2/1/2017     None      Orders Placed During Today's Visit      Normal Orders This Visit    Case request GI: COLONOSCOPY       "

## 2017-02-13 ENCOUNTER — OFFICE VISIT (OUTPATIENT)
Dept: PAIN MEDICINE | Facility: CLINIC | Age: 72
End: 2017-02-13
Payer: MEDICARE

## 2017-02-13 VITALS
SYSTOLIC BLOOD PRESSURE: 135 MMHG | HEIGHT: 69 IN | BODY MASS INDEX: 21.77 KG/M2 | HEART RATE: 99 BPM | WEIGHT: 147 LBS | DIASTOLIC BLOOD PRESSURE: 78 MMHG

## 2017-02-13 DIAGNOSIS — M51.36 DDD (DEGENERATIVE DISC DISEASE), LUMBAR: ICD-10-CM

## 2017-02-13 DIAGNOSIS — M54.16 BILATERAL LUMBAR RADICULOPATHY: Primary | ICD-10-CM

## 2017-02-13 DIAGNOSIS — M47.817 LUMBOSACRAL SPONDYLOSIS WITHOUT MYELOPATHY: Primary | ICD-10-CM

## 2017-02-13 DIAGNOSIS — M47.817 SPONDYLOSIS OF LUMBOSACRAL REGION WITHOUT MYELOPATHY OR RADICULOPATHY: ICD-10-CM

## 2017-02-13 PROCEDURE — 99999 PR PBB SHADOW E&M-EST. PATIENT-LVL III: CPT | Mod: PBBFAC,,, | Performed by: ANESTHESIOLOGY

## 2017-02-13 PROCEDURE — 99213 OFFICE O/P EST LOW 20 MIN: CPT | Mod: PBBFAC,PO | Performed by: ANESTHESIOLOGY

## 2017-02-13 PROCEDURE — 99204 OFFICE O/P NEW MOD 45 MIN: CPT | Mod: S$PBB,,, | Performed by: ANESTHESIOLOGY

## 2017-02-13 NOTE — PROGRESS NOTES
Chief Pain Complaint:  Low Back Pain, Bilateral Leg Pain    History of Present Illness:  This patient is a 71 y.o. male who presents today complaining of the above noted pain/s. The patient describes this pain as follows.    - duration of pain: pain increased x 2 weeks   - timing: intermittent   - character: stabbing, aching  - radiating, dermatomal: extends into bilateral lower extremities, not strictly dermatomal based on reporting  - antecedent trauma, prior spinal surgery: no prior trauma, no prior spinal surgery   - pertinent negatives: No fever, No chills, No weight loss, No bladder dysfunction, No bowel dysfunction, No saddle anesthesia  - pertinent positives: generalized nonspecific Lower Extremity weakness bilaterally, this is not acute   - medications, other therapies tried (physical therapy, injections):     >> Tramadol    >> Has previously undergone Physical Therapy, this was years prior    >> Has previously undergone spinal injection/s at [facility]      IMAGING / Labs / Studies (reviewed on 2/13/2017):      Results for orders placed during the hospital encounter of 03/11/15   MRI Lumbar Spine Without Contrast    Narrative Technique:  Multiplanar, multisequence MR images were performed of the lumbar spine obtained without contrast.   Comparison: None.   Results:  Lumbar spine alignment is within normal limits. The vertebral body heights are well maintained, with no fracture.  There are multiple fatty deposits versus benign bone hemangioma scattered throughout the lumbosacral spine.  There is disk desiccation   noted at the L3-4 and L4-5 levels.  No significant displacement are noted at these levels.    The conus is normal in appearance, and terminates at the L1-L2 level.  The adjacent soft tissue structures show no significant abnormalities.    L1-L2: There is no focal disc herniation. No significant central canal or neural foraminal narrowing.  L2-L3: There is no focal disc herniation. No  significant central canal or neural foraminal narrowing.  L3-L4: Mild diffuse disk bulge resulting in no central canal narrowing.  There is at least mild narrowing of the left neural foraminal canal and minimal narrowing of the inferior recess of the right neural foraminal canal.   L4-L5: There is no focal disc herniation. Mild bilateral facet arthropathy resulting in mild to moderate narrowing of the left neural foraminal canal.  There is minimal narrowing of the right neural foraminal canal.  L5-S1: There is bilateral facet arthropathy left greater than the right resulting in no significant canal narrowing.  There is mild narrowing of the left neural foraminal canal.              Results for orders placed during the hospital encounter of 02/01/17   MRI Lumbar Spine W WO Cont    Narrative Technique:  Multiplanar, multisequence MR images were performed of the lumbar spine obtained with and without contrast.  6.5 cc of Gadavist was administered.  Comparison: Study from 03/11/2015   Results:  Lumbar spine alignment is within normal limits. The vertebral body heights are well maintained, with no fracture.  There are multiple fatty deposits versus bone hemangiomas scattered throughout the lumbosacral spine.  No signs of osseous metastatic disease.    The conus medullaris terminates at approximately the mid body of L1.  There is a cyst extending off the lower pole of the left kidney that measures up to 1.6 cm.  There is disc desiccation noted throughout the lumbar spine and most prominent at the L3-L4 and L4-L5 levels.  Incidental note made of a small lipoma of the filum terminale.  L1-L2: No significant central canal or neural foraminal narrowing.  L2-L3: No significant central canal or neural foraminal narrowing.  L3-L4: Mild diffuse disc bulge resulting in no central canal narrowing.  The left neural foraminal canal appears to be moderately narrowed.  The right neural foraminal canal inferior recess is mildly  "narrowed.  L4-L5:  Mild bilateral facet arthropathy and mild diffuse disc bulge resulting in no significant central canal narrowing.  There is moderate to severe narrowing of the left neural foraminal canal with some effacement of the exiting left L4 nerve root and mild to moderate narrowing of the right neural foraminal canal with abutment of the exiting L4 nerve root.  No significant effacement.  L5-S1:  No significant canal narrowing.  There is mild narrowing of either neural foraminal canal.  Bilateral facet arthropathy is noted left greater than the right.           Results for orders placed during the hospital encounter of 02/11/15   X-Ray Lumbar Spine Complete 5 View    Narrative Comparison: None   Findings:  Generalized osteopenia and degenerative change noted.  Multilevel varying degrees of loss of disc height and facet arthropathy.  No acute fracture or subluxation.  No spondylolysis or spondylolisthesis.  Multilevel marginal spurring.  Pedicles and SI joints appear intact.  Vascular calcifications and postsurgical changes noted.         Review of Systems:  CONSTITUTIONAL: patient denies any fever, chills, or weight loss  SKIN: patient denies any rash or itching  RESPIRATORY: patient denies having any shortness of breath  GASTROINTESTINAL: patient denies having any diarrhea, constipation, or bowel incontinence  GENITOURINARY: patient denies having any abnormal bladder function    MUSCULOSKELETAL:  - patient complains of the above noted pain/s (see chief pain complaint)    NEUROLOGICAL:   - pain as above  - strength in Lower extremities is intact, BILATERALLY  - sensation in Upper extremities is intact, BILATERALLY  - patient denies any loss of bowel or bladder control      PSYCHIATRIC: patient denies any suicidal or homicidal ideations      Physical Exam:  Visit Vitals    /78    Pulse 99    Ht 5' 9" (1.753 m)    Wt 66.7 kg (147 lb)    BMI 21.71 kg/m2    (reviewed on 2/13/2017)  General: alert " and oriented, in no apparent distress  Gait: antalgic gait, using walker  Skin: No rashes, No discoloration, No obvious lesions  HEENT: EOMI  Respiratory: respirations nonlabored    Musculoskeletal:  - Any pain on flexion, extension, rotation:    >> pain on extension and rotation  - Straight Leg Raise:     >> LEFT :: negative    >> RIGHT :: negative    - Any tenderness to palpation across paraspinal muscles, joints, bursae:     >> across lumbar paraspinals    Neuro:  - Extremity Strength:     >> LEFT :: decreased globally    >> RIGHT :: decreased globally  - Extremity Reflexes:    >> LEFT  :: 2+    >> RIGHT :: 2+  - Sensory (sensation to light touch):    >> LEFT :: intact    >> RIGHT :: intact     Psych:  Mood and affect is appropriate      Assessment:  Lumbar Radiculopathy  Lumbar Spondylosis  Lumbar DDD    Plan:  Patient with a history of lung cancer status post resection who presents complaining of low back and leg pain, left > right.  MRI reviewed, L4/5 is the most problematic area, detailed above.  He has had success with spinal injections in the past which he had at the neuromedical center.  I will request these records today.  I will order a bilateral L4 tfesi.  I will also order physical therapy, patient certainly has once his pain is better controlled.  He takes tramadol infrequently.  I discussed medications, higher dosing, he does not want to take higher doses at this time.  Imaging / studies reviewed, detailed above.  I discussed in detail the risks, benefits, and alternatives to any and all potential treatment options.  All questions and concerns were fully addressed today in clinic.    >>PDI:   2/13/2017 :: 51

## 2017-02-21 ENCOUNTER — TELEPHONE (OUTPATIENT)
Dept: PAIN MEDICINE | Facility: CLINIC | Age: 72
End: 2017-02-21

## 2017-02-21 NOTE — TELEPHONE ENCOUNTER
----- Message from Ginger Wilburn sent at 2/21/2017  2:02 PM CST -----  Contact: Pt   Pt called and stated he was returning the nurse's call. He can be reached at [phone] ([email or web address]).       Thanks,  TF

## 2017-02-24 ENCOUNTER — HOSPITAL ENCOUNTER (OUTPATIENT)
Dept: RADIOLOGY | Facility: HOSPITAL | Age: 72
Discharge: HOME | End: 2017-02-24
Attending: ANESTHESIOLOGY | Admitting: ANESTHESIOLOGY
Payer: MEDICARE

## 2017-02-24 ENCOUNTER — HOSPITAL ENCOUNTER (OUTPATIENT)
Facility: HOSPITAL | Age: 72
Discharge: HOME | End: 2017-02-24
Attending: ANESTHESIOLOGY | Admitting: ANESTHESIOLOGY
Payer: MEDICARE

## 2017-02-24 ENCOUNTER — SURGERY (OUTPATIENT)
Age: 72
End: 2017-02-24

## 2017-02-24 VITALS
BODY MASS INDEX: 21.19 KG/M2 | OXYGEN SATURATION: 97 % | SYSTOLIC BLOOD PRESSURE: 122 MMHG | HEIGHT: 70 IN | DIASTOLIC BLOOD PRESSURE: 88 MMHG | WEIGHT: 148 LBS | HEART RATE: 93 BPM

## 2017-02-24 DIAGNOSIS — M54.16 BILATERAL LUMBAR RADICULOPATHY: Primary | ICD-10-CM

## 2017-02-24 DIAGNOSIS — M54.16 LUMBAR RADICULOPATHY: ICD-10-CM

## 2017-02-24 DIAGNOSIS — M54.16 BILATERAL LUMBAR RADICULOPATHY: ICD-10-CM

## 2017-02-24 PROCEDURE — 64483 NJX AA&/STRD TFRM EPI L/S 1: CPT | Mod: 50,,, | Performed by: ANESTHESIOLOGY

## 2017-02-24 PROCEDURE — 64483 NJX AA&/STRD TFRM EPI L/S 1: CPT | Mod: 50

## 2017-02-24 PROCEDURE — 25000003 PHARM REV CODE 250: Performed by: ANESTHESIOLOGY

## 2017-02-24 PROCEDURE — 99152 MOD SED SAME PHYS/QHP 5/>YRS: CPT | Mod: ,,, | Performed by: ANESTHESIOLOGY

## 2017-02-24 PROCEDURE — 25000003 PHARM REV CODE 250

## 2017-02-24 PROCEDURE — 63600175 PHARM REV CODE 636 W HCPCS: Performed by: ANESTHESIOLOGY

## 2017-02-24 PROCEDURE — 63600175 PHARM REV CODE 636 W HCPCS

## 2017-02-24 PROCEDURE — 25500020 PHARM REV CODE 255

## 2017-02-24 RX ORDER — LIDOCAINE HYDROCHLORIDE 20 MG/ML
INJECTION, SOLUTION INFILTRATION; PERINEURAL
Status: DISCONTINUED | OUTPATIENT
Start: 2017-02-24 | End: 2017-02-24 | Stop reason: HOSPADM

## 2017-02-24 RX ORDER — DEXAMETHASONE SODIUM PHOSPHATE 4 MG/ML
INJECTION, SOLUTION INTRA-ARTICULAR; INTRALESIONAL; INTRAMUSCULAR; INTRAVENOUS; SOFT TISSUE
Status: DISCONTINUED | OUTPATIENT
Start: 2017-02-24 | End: 2017-02-24 | Stop reason: HOSPADM

## 2017-02-24 RX ORDER — MIDAZOLAM HYDROCHLORIDE 1 MG/ML
INJECTION, SOLUTION INTRAMUSCULAR; INTRAVENOUS
Status: DISCONTINUED | OUTPATIENT
Start: 2017-02-24 | End: 2017-02-24 | Stop reason: HOSPADM

## 2017-02-24 RX ORDER — FENTANYL CITRATE 50 UG/ML
INJECTION, SOLUTION INTRAMUSCULAR; INTRAVENOUS
Status: DISCONTINUED | OUTPATIENT
Start: 2017-02-24 | End: 2017-02-24 | Stop reason: HOSPADM

## 2017-02-24 RX ADMIN — DEXAMETHASONE SODIUM PHOSPHATE 20 MG: 4 INJECTION, SOLUTION INTRA-ARTICULAR; INTRALESIONAL; INTRAMUSCULAR; INTRAVENOUS; SOFT TISSUE at 09:02

## 2017-02-24 RX ADMIN — MIDAZOLAM HYDROCHLORIDE 1 MG: 1 INJECTION, SOLUTION INTRAMUSCULAR; INTRAVENOUS at 09:02

## 2017-02-24 RX ADMIN — FENTANYL CITRATE 50 MCG: 50 INJECTION, SOLUTION INTRAMUSCULAR; INTRAVENOUS at 09:02

## 2017-02-24 RX ADMIN — LIDOCAINE HYDROCHLORIDE 10 ML: 20 INJECTION, SOLUTION INFILTRATION; PERINEURAL at 09:02

## 2017-02-24 NOTE — PLAN OF CARE
Problem: Patient Care Overview  Goal: Plan of Care Review  Outcome: Outcome(s) achieved Date Met:  02/24/17  Patient discharged home in stable condition via wheelchair with ride. Verbalized understanding of discharge instructions. Patient voiced no complaints at this time. Patient stood at side of bed, walked steps with no new motor or sensory deficits. Neurologically intact.

## 2017-02-24 NOTE — PLAN OF CARE
Problem: Pain, Acute (Adult)  Goal: Identify Related Risk Factors and Signs and Symptoms  Related risk factors and signs and symptoms are identified upon initiation of Human Response Clinical Practice Guideline (CPG)  Outcome: Ongoing (interventions implemented as appropriate)  Pt c/o r lower back radiating down leg. Pt friend in waiting area.

## 2017-02-24 NOTE — INTERVAL H&P NOTE
The patient has been examined and the H&P has been reviewed:    I concur with the findings and no changes have occurred since H&P was written.    Anesthesia/Surgery risks, benefits and alternative options discussed and understood by patient/family.          Active Hospital Problems    Diagnosis  POA    Lumbar radiculopathy [M54.16]  Yes     Priority: High      Resolved Hospital Problems    Diagnosis Date Resolved POA   No resolved problems to display.

## 2017-02-24 NOTE — H&P (VIEW-ONLY)
Chief Pain Complaint:  Low Back Pain, Bilateral Leg Pain    History of Present Illness:  This patient is a 71 y.o. male who presents today complaining of the above noted pain/s. The patient describes this pain as follows.    - duration of pain: pain increased x 2 weeks   - timing: intermittent   - character: stabbing, aching  - radiating, dermatomal: extends into bilateral lower extremities, not strictly dermatomal based on reporting  - antecedent trauma, prior spinal surgery: no prior trauma, no prior spinal surgery   - pertinent negatives: No fever, No chills, No weight loss, No bladder dysfunction, No bowel dysfunction, No saddle anesthesia  - pertinent positives: generalized nonspecific Lower Extremity weakness bilaterally, this is not acute   - medications, other therapies tried (physical therapy, injections):     >> Tramadol    >> Has previously undergone Physical Therapy, this was years prior    >> Has previously undergone spinal injection/s at [facility]      IMAGING / Labs / Studies (reviewed on 2/13/2017):      Results for orders placed during the hospital encounter of 03/11/15   MRI Lumbar Spine Without Contrast    Narrative Technique:  Multiplanar, multisequence MR images were performed of the lumbar spine obtained without contrast.   Comparison: None.   Results:  Lumbar spine alignment is within normal limits. The vertebral body heights are well maintained, with no fracture.  There are multiple fatty deposits versus benign bone hemangioma scattered throughout the lumbosacral spine.  There is disk desiccation   noted at the L3-4 and L4-5 levels.  No significant displacement are noted at these levels.    The conus is normal in appearance, and terminates at the L1-L2 level.  The adjacent soft tissue structures show no significant abnormalities.    L1-L2: There is no focal disc herniation. No significant central canal or neural foraminal narrowing.  L2-L3: There is no focal disc herniation. No  significant central canal or neural foraminal narrowing.  L3-L4: Mild diffuse disk bulge resulting in no central canal narrowing.  There is at least mild narrowing of the left neural foraminal canal and minimal narrowing of the inferior recess of the right neural foraminal canal.   L4-L5: There is no focal disc herniation. Mild bilateral facet arthropathy resulting in mild to moderate narrowing of the left neural foraminal canal.  There is minimal narrowing of the right neural foraminal canal.  L5-S1: There is bilateral facet arthropathy left greater than the right resulting in no significant canal narrowing.  There is mild narrowing of the left neural foraminal canal.              Results for orders placed during the hospital encounter of 02/01/17   MRI Lumbar Spine W WO Cont    Narrative Technique:  Multiplanar, multisequence MR images were performed of the lumbar spine obtained with and without contrast.  6.5 cc of Gadavist was administered.  Comparison: Study from 03/11/2015   Results:  Lumbar spine alignment is within normal limits. The vertebral body heights are well maintained, with no fracture.  There are multiple fatty deposits versus bone hemangiomas scattered throughout the lumbosacral spine.  No signs of osseous metastatic disease.    The conus medullaris terminates at approximately the mid body of L1.  There is a cyst extending off the lower pole of the left kidney that measures up to 1.6 cm.  There is disc desiccation noted throughout the lumbar spine and most prominent at the L3-L4 and L4-L5 levels.  Incidental note made of a small lipoma of the filum terminale.  L1-L2: No significant central canal or neural foraminal narrowing.  L2-L3: No significant central canal or neural foraminal narrowing.  L3-L4: Mild diffuse disc bulge resulting in no central canal narrowing.  The left neural foraminal canal appears to be moderately narrowed.  The right neural foraminal canal inferior recess is mildly  "narrowed.  L4-L5:  Mild bilateral facet arthropathy and mild diffuse disc bulge resulting in no significant central canal narrowing.  There is moderate to severe narrowing of the left neural foraminal canal with some effacement of the exiting left L4 nerve root and mild to moderate narrowing of the right neural foraminal canal with abutment of the exiting L4 nerve root.  No significant effacement.  L5-S1:  No significant canal narrowing.  There is mild narrowing of either neural foraminal canal.  Bilateral facet arthropathy is noted left greater than the right.           Results for orders placed during the hospital encounter of 02/11/15   X-Ray Lumbar Spine Complete 5 View    Narrative Comparison: None   Findings:  Generalized osteopenia and degenerative change noted.  Multilevel varying degrees of loss of disc height and facet arthropathy.  No acute fracture or subluxation.  No spondylolysis or spondylolisthesis.  Multilevel marginal spurring.  Pedicles and SI joints appear intact.  Vascular calcifications and postsurgical changes noted.         Review of Systems:  CONSTITUTIONAL: patient denies any fever, chills, or weight loss  SKIN: patient denies any rash or itching  RESPIRATORY: patient denies having any shortness of breath  GASTROINTESTINAL: patient denies having any diarrhea, constipation, or bowel incontinence  GENITOURINARY: patient denies having any abnormal bladder function    MUSCULOSKELETAL:  - patient complains of the above noted pain/s (see chief pain complaint)    NEUROLOGICAL:   - pain as above  - strength in Lower extremities is intact, BILATERALLY  - sensation in Upper extremities is intact, BILATERALLY  - patient denies any loss of bowel or bladder control      PSYCHIATRIC: patient denies any suicidal or homicidal ideations      Physical Exam:  Visit Vitals    /78    Pulse 99    Ht 5' 9" (1.753 m)    Wt 66.7 kg (147 lb)    BMI 21.71 kg/m2    (reviewed on 2/13/2017)  General: alert " and oriented, in no apparent distress  Gait: antalgic gait, using walker  Skin: No rashes, No discoloration, No obvious lesions  HEENT: EOMI  Respiratory: respirations nonlabored    Musculoskeletal:  - Any pain on flexion, extension, rotation:    >> pain on extension and rotation  - Straight Leg Raise:     >> LEFT :: negative    >> RIGHT :: negative    - Any tenderness to palpation across paraspinal muscles, joints, bursae:     >> across lumbar paraspinals    Neuro:  - Extremity Strength:     >> LEFT :: decreased globally    >> RIGHT :: decreased globally  - Extremity Reflexes:    >> LEFT  :: 2+    >> RIGHT :: 2+  - Sensory (sensation to light touch):    >> LEFT :: intact    >> RIGHT :: intact     Psych:  Mood and affect is appropriate      Assessment:  Lumbar Radiculopathy  Lumbar Spondylosis  Lumbar DDD    Plan:  Patient with a history of lung cancer status post resection who presents complaining of low back and leg pain, left > right.  MRI reviewed, L4/5 is the most problematic area, detailed above.  He has had success with spinal injections in the past which he had at the [facility].  I will request these records today.  I will order a bilateral L4 tfesi.  I will also order physical therapy, patient certainly has once his pain is better controlled.  He takes tramadol infrequently.  I discussed medications, higher dosing, he does not want to take higher doses at this time.  Imaging / studies reviewed, detailed above.  I discussed in detail the risks, benefits, and alternatives to any and all potential treatment options.  All questions and concerns were fully addressed today in clinic.    >>PDI:   2/13/2017 :: 51

## 2017-02-24 NOTE — OP NOTE
"Procedure: Lumbar Transforaminal Epidural Steroid Injection under Fluorsocopic Guidance (supraneural approach)    Level: L4/5     Side: Bilateral    PROCEDURE DATE: 2/24/2017    Pre-operative Diagnosis: Lumbar Radiculopathy  Post-operative Diagnosis: Lumbar Radiculopathy    Provider: Thanh Johnson MD  Assistant(s): None    Anesthesia: Local, IV Sedation    >> 1 mg of VERSED    >> 50 mcg of FENTANYL     Indication: Low back pain with radiculopathy consistent with distribution of targeted nerve. Symptoms unresponsive to conservative treatments. Fluoroscopy was used to optimize visualization of needle placement and to maximize safety.     Procedure Description / Technique:  The patient was seen and identified in the preoperative area. Risks, benefits, complications, and alternatives were discussed with the patient. The patient agreed to proceed with the procedure and signed the consent. The site and side of the procedure was identified and marked. An IV was started. The patient was taken to the procedural suite.    The patient was positioned in prone orientation on procedure table and a pillow was placed under the abdomen to reduce lumbar lordosis. A time out was performed prior to any intervention. The procedure, site, side, and allergies were stated and agreed to by all present. The lumbosacral area was widely prepped with ChloraPrep. The procedural site was draped in usual sterile fashion. Vital signs were closely monitored throughout this procedure. Conscious sedation was used for this procedure to decrease patient anxiety.    The target area was visualized under fluoroscopy. The cephalocaudal angle of the fluoroscope was adjusted as to align the vertebral end plates. The fluoroscopic arm was rotated ipsilaterally to an angle of approximately 30 degrees until the "nickie dog" outline came into view and the tip of the inferior superior articular process pointed towards the midline, 6:00 position of the above " "pedicle. A 25 gauge 3.5 inch spinal needle was directed towards the "chin" of the "nickie dog" (adjacent to the pars interarticularis and inferior to the pedicle). The needle was advanced until OS was met at the inferior border of the pedicle / pars interface. The needle was adjusted so that it would pass inferior to the osseous border. The fluoroscope was then placed in the lateral position and the needle was slowly advanced until it rested in the posterior 1/3rd of the vertebral foramen. AP fluoroscopy was checked and the needle tip rested at the 6:00 position under the pedicle. No paresthesia was elicited during needle placement. With the needle tip in its final position, gentle aspiration was negative for blood and CSF. Omnipaque 240 (1 to 2 mL) was injected under live fluoroscopy. Microbore tubing was used for injection. There was no pain or paresthesia on injection. The contrast clearly delineated the targeted nerve root on AP fluoroscopy. No vascular uptake was seen. A solution containing 3 mL of 1% PF Lidocaine and 2 mL of Dexamethasone (10 mg/mL) was mixed and 2 mL was injected slowly at each level targeted. There was minimal resistance on injection. No pain or paresthesia was elicited on injection. The stylet was replaced and the needle was withdrawn intact. This procedure was performed for each of the above indicated levels.     Description of Findings: Not applicable    Prosthetic devices, grafts, tissues, or devices implanted: None    Specimen Removed: No    Estimated Blood Loss: minimal    COMPLICATIONS: None    DISPOSITION / PLANS: The patient was transferred to the recovery area in a stable condition for observation. The patient was reexamined prior to discharge. There was no evidence of acute neurologic injury following the procedure.  Patient was discharged from the recovery room after meeting discharge criteria. Home discharge instructions were given to the patient by the staff.    "

## 2017-02-24 NOTE — DISCHARGE SUMMARY
[facility]  Discharge Note       Description of Procedure: Lumbar Transforaminal Epidural Steroid Injection under Fluoroscopic Guidance    Procedure Date: 2/24/2017    Admit Date: 2/24/2017  Discharge Date: 2/24/2017     Attending Physician: Thanh Johnson   Discharge Provider: Thanh Johnson    Preoperative Diagnosis:   Active Hospital Problems    Diagnosis  POA    Lumbar radiculopathy [M54.16]  Yes     Priority: High      Resolved Hospital Problems    Diagnosis Date Resolved POA   No resolved problems to display.     Postoperative Diagnosis:   Active Hospital Problems    Diagnosis  POA    Lumbar radiculopathy [M54.16]  Yes     Priority: High      Resolved Hospital Problems    Diagnosis Date Resolved POA   No resolved problems to display.       Discharged Condition: Stable    Hospital Course: Patient was admitted for an outpatient procedure. The procedure was tolerated well with no complications.    Final Diagnoses: Same as principal problem.    Disposition: Home, self-care.    Follow up/Patient Instructions:  Follow-up in clinic in 2-3 weeks or as needed if not having pain or any pain related issues.    Medications: No medications were prescribed today. The patient was advised to resume normal medication regimen without change.  Specific information was provided regarding restarting any anticoagulants.    Discharge Procedure Orders (must include Diet, Follow-up, Activity):  Light activity for the remainder of the day, resume normal activity tomorrow. Resume normal diet. Follow-up in clinic in 2-3 weeks or as needed.

## 2017-02-26 ENCOUNTER — HOSPITAL ENCOUNTER (EMERGENCY)
Facility: HOSPITAL | Age: 72
Discharge: HOME | End: 2017-02-26
Attending: EMERGENCY MEDICINE
Payer: MEDICARE

## 2017-02-26 VITALS
BODY MASS INDEX: 21.92 KG/M2 | WEIGHT: 148 LBS | RESPIRATION RATE: 18 BRPM | OXYGEN SATURATION: 100 % | HEART RATE: 94 BPM | DIASTOLIC BLOOD PRESSURE: 65 MMHG | HEIGHT: 69 IN | TEMPERATURE: 98 F | SYSTOLIC BLOOD PRESSURE: 104 MMHG

## 2017-02-26 DIAGNOSIS — E86.0 DEHYDRATION: ICD-10-CM

## 2017-02-26 DIAGNOSIS — R10.9 ABDOMINAL PAIN, UNSPECIFIED LOCATION: ICD-10-CM

## 2017-02-26 DIAGNOSIS — R31.9 URINARY TRACT INFECTION WITH HEMATURIA, SITE UNSPECIFIED: Primary | ICD-10-CM

## 2017-02-26 DIAGNOSIS — N39.0 URINARY TRACT INFECTION WITH HEMATURIA, SITE UNSPECIFIED: Primary | ICD-10-CM

## 2017-02-26 LAB
ALBUMIN SERPL BCP-MCNC: 3.6 G/DL
ALP SERPL-CCNC: 42 U/L
ALT SERPL W/O P-5'-P-CCNC: 14 U/L
ANION GAP SERPL CALC-SCNC: 10 MMOL/L
AST SERPL-CCNC: 35 U/L
BACTERIA #/AREA URNS HPF: ABNORMAL /HPF
BASOPHILS # BLD AUTO: 0.01 K/UL
BASOPHILS NFR BLD: 0.1 %
BILIRUB SERPL-MCNC: 1.4 MG/DL
BILIRUB UR QL STRIP: NEGATIVE
BUN SERPL-MCNC: 44 MG/DL
CALCIUM SERPL-MCNC: 9.2 MG/DL
CHLORIDE SERPL-SCNC: 99 MMOL/L
CLARITY UR: CLEAR
CO2 SERPL-SCNC: 30 MMOL/L
COLOR UR: YELLOW
CREAT SERPL-MCNC: 1 MG/DL
DIFFERENTIAL METHOD: ABNORMAL
EOSINOPHIL # BLD AUTO: 0 K/UL
EOSINOPHIL NFR BLD: 0 %
ERYTHROCYTE [DISTWIDTH] IN BLOOD BY AUTOMATED COUNT: 18.3 %
EST. GFR  (AFRICAN AMERICAN): >60 ML/MIN/1.73 M^2
EST. GFR  (NON AFRICAN AMERICAN): >60 ML/MIN/1.73 M^2
GLUCOSE SERPL-MCNC: 132 MG/DL
GLUCOSE UR QL STRIP: NEGATIVE
HCT VFR BLD AUTO: 34.7 %
HGB BLD-MCNC: 11.8 G/DL
HGB UR QL STRIP: NEGATIVE
KETONES UR QL STRIP: NEGATIVE
LEUKOCYTE ESTERASE UR QL STRIP: ABNORMAL
LIPASE SERPL-CCNC: 4 U/L
LYMPHOCYTES # BLD AUTO: 1.5 K/UL
LYMPHOCYTES NFR BLD: 8.2 %
MCH RBC QN AUTO: 34.3 PG
MCHC RBC AUTO-ENTMCNC: 34 %
MCV RBC AUTO: 101 FL
MICROSCOPIC COMMENT: ABNORMAL
MONOCYTES # BLD AUTO: 2.1 K/UL
MONOCYTES NFR BLD: 11.4 %
NEUTROPHILS # BLD AUTO: 14.6 K/UL
NEUTROPHILS NFR BLD: 80.7 %
NITRITE UR QL STRIP: POSITIVE
PH UR STRIP: 8 [PH] (ref 5–8)
PLATELET # BLD AUTO: 165 K/UL
PLATELET BLD QL SMEAR: ABNORMAL
PMV BLD AUTO: 9.5 FL
POTASSIUM SERPL-SCNC: 3.2 MMOL/L
PROT SERPL-MCNC: 7.4 G/DL
PROT UR QL STRIP: NEGATIVE
RBC # BLD AUTO: 3.44 M/UL
RBC #/AREA URNS HPF: 2 /HPF (ref 0–4)
SODIUM SERPL-SCNC: 139 MMOL/L
SP GR UR STRIP: 1.01 (ref 1–1.03)
URN SPEC COLLECT METH UR: ABNORMAL
UROBILINOGEN UR STRIP-ACNC: NEGATIVE EU/DL
WBC # BLD AUTO: 18.1 K/UL
WBC #/AREA URNS HPF: 2 /HPF (ref 0–5)

## 2017-02-26 PROCEDURE — 63600175 PHARM REV CODE 636 W HCPCS: Performed by: EMERGENCY MEDICINE

## 2017-02-26 PROCEDURE — 96374 THER/PROPH/DIAG INJ IV PUSH: CPT

## 2017-02-26 PROCEDURE — 99284 EMERGENCY DEPT VISIT MOD MDM: CPT | Mod: 25

## 2017-02-26 PROCEDURE — 80053 COMPREHEN METABOLIC PANEL: CPT

## 2017-02-26 PROCEDURE — 25000003 PHARM REV CODE 250: Performed by: EMERGENCY MEDICINE

## 2017-02-26 PROCEDURE — 96361 HYDRATE IV INFUSION ADD-ON: CPT

## 2017-02-26 PROCEDURE — C9113 INJ PANTOPRAZOLE SODIUM, VIA: HCPCS | Performed by: EMERGENCY MEDICINE

## 2017-02-26 PROCEDURE — 85025 COMPLETE CBC W/AUTO DIFF WBC: CPT

## 2017-02-26 PROCEDURE — 81000 URINALYSIS NONAUTO W/SCOPE: CPT

## 2017-02-26 PROCEDURE — 83690 ASSAY OF LIPASE: CPT

## 2017-02-26 RX ORDER — POTASSIUM CHLORIDE 20 MEQ/1
40 TABLET, EXTENDED RELEASE ORAL
Status: COMPLETED | OUTPATIENT
Start: 2017-02-26 | End: 2017-02-26

## 2017-02-26 RX ORDER — NITROFURANTOIN 25; 75 MG/1; MG/1
100 CAPSULE ORAL 2 TIMES DAILY
Qty: 10 CAPSULE | Refills: 0 | Status: SHIPPED | OUTPATIENT
Start: 2017-02-26 | End: 2017-03-03

## 2017-02-26 RX ORDER — PANTOPRAZOLE SODIUM 40 MG/10ML
40 INJECTION, POWDER, LYOPHILIZED, FOR SOLUTION INTRAVENOUS
Status: COMPLETED | OUTPATIENT
Start: 2017-02-26 | End: 2017-02-26

## 2017-02-26 RX ADMIN — POTASSIUM CHLORIDE 40 MEQ: 1500 TABLET, EXTENDED RELEASE ORAL at 04:02

## 2017-02-26 RX ADMIN — SODIUM CHLORIDE 1000 ML: 0.9 INJECTION, SOLUTION INTRAVENOUS at 04:02

## 2017-02-26 RX ADMIN — PANTOPRAZOLE SODIUM 40 MG: 40 INJECTION, POWDER, FOR SOLUTION INTRAVENOUS at 03:02

## 2017-02-26 RX ADMIN — LIDOCAINE HYDROCHLORIDE 50 ML: 20 SOLUTION ORAL; TOPICAL at 03:02

## 2017-02-26 NOTE — ED NOTES
Pt to bed # 15 ambulatory with walker. Pt with wife. Pt AAOx4. Skin warm and dry. Nails pink with cap refill <2sec. BBS Clear and =. Abd flat and nontender at present to palpation. States pain to abd moves depending on which side he lays on.

## 2017-02-26 NOTE — ED AVS SNAPSHOT
[facility] -   [address]  [place] [address]               Santy Anderson   2017  2:55 PM   ED    Description:  Male : 1945   Department:  [facility]           Your Care was Coordinated By:     Provider Role From To    Jay Schulz Jr., MD Attending Provider 17 1458 --      Reason for Visit     Back Pain           Diagnoses this Visit        Comments    Urinary tract infection with hematuria, site unspecified    -  Primary     Abdominal pain, unspecified location         Dehydration           ED Disposition     ED Disposition Condition Comment    Discharge             To Do List           Follow-up Information     Follow up with Joanie Simms MD. Call in 2 days.    Specialty:  Internal Medicine    Contact information:    [address]  [place] [address] [phone]         These Medications        Disp Refills Start End    nitrofurantoin, macrocrystal-monohydrate, (MACROBID) 100 MG capsule 10 capsule 0 2017 3/3/2017    Take 1 capsule (100 mg total) by mouth 2 (two) times daily. - Oral    Pharmacy: [facility] - [place], [place] - [address]  Ph #: [phone]         [facility]     [facility] -  Assistance  Registered nurses in the [facility] provide clinical advisement, health education, appointment booking, and other advisory services.  Call for this free service at [phone].             Medications           Message regarding Medications     Verify the changes and/or additions to your medication regime listed below are the same as discussed with your clinician today.  If any of these changes or additions are incorrect, please notify your healthcare provider.        START taking these NEW medications        Refills    nitrofurantoin, macrocrystal-monohydrate, (MACROBID) 100 MG capsule 0    Sig: Take 1 capsule (100 mg total) by mouth 2 (two)  times daily.    Class: Print    Route: Oral      These medications were administered today        Dose Freq    GI cocktail (mylanta 30 mL, lidocaine 2 % viscous 10 mL, dicyclomine 10 mL) 50 mL  ED 1 Time    Sig: Take by mouth ED 1 Time.    Class: Normal    Route: Oral    pantoprazole injection 40 mg 40 mg ED 1 Time    Sig: Inject 40 mg into the vein ED 1 Time.    Class: Normal    Route: Intravenous    sodium chloride 0.9% bolus 1,000 mL 1,000 mL ED 1 Time    Sig: Inject 1,000 mLs into the vein ED 1 Time.    Class: Normal    Route: Intravenous    potassium chloride SA CR tablet 40 mEq 40 mEq ED 1 Time    Sig: Take 2 tablets (40 mEq total) by mouth ED 1 Time.    Class: Normal    Route: Oral           Verify that the below list of medications is an accurate representation of the medications you are currently taking.  If none reported, the list may be blank. If incorrect, please contact your healthcare provider. Carry this list with you in case of emergency.           Current Medications     ergocalciferol (VITAMIN D2) 50,000 unit Cap Take 1 capsule (50,000 Units total) by mouth twice a week.    naproxen (NAPROSYN) 500 MG tablet Take 1 tablet (500 mg total) by mouth 2 (two) times daily with meals.    pantoprazole (PROTONIX) 20 MG tablet Take 2 tablets (40 mg total) by mouth 2 (two) times daily before meals.    potassium chloride SA (K-DUR,KLOR-CON) 20 MEQ tablet Take 1 tablet (20 mEq total) by mouth 2 (two) times daily.    sodium,potassium,mag sulfates (SUPREP BOWEL PREP KIT) 17.5-3.13-1.6 gram SolR Take 1 kit by mouth daily as needed.    sucralfate (CARAFATE) 1 gram tablet Take 1 tablet (1 g total) by mouth 4 (four) times daily before meals and nightly.    tramadol (ULTRAM) 50 mg tablet Take 50 mg by mouth every 6 (six) hours as needed for Pain.    nitrofurantoin, macrocrystal-monohydrate, (MACROBID) 100 MG capsule Take 1 capsule (100 mg total) by mouth 2 (two) times daily.    pantoprazole injection 40 mg Inject 40 mg  into the vein ED 1 Time.           Clinical Reference Information           Your Vitals Were     BP                   152/71 (BP Location: Right arm, Patient Position: Sitting)           Allergies as of 2/26/2017     No Known Allergies      Immunizations Administered on Date of Encounter - 2/26/2017     None      ED Micro, Lab, POCT     Start Ordered       Status Ordering Provider    02/26/17 1514 02/26/17 1513  CBC W/ AUTO DIFFERENTIAL  STAT      Final result     02/26/17 1514 02/26/17 1513  Comp. Metabolic Panel  STAT      Final result     02/26/17 1514 02/26/17 1513  Lipase  Once      Final result     02/26/17 1514 02/26/17 1513  Urinalysis - Clean Catch  STAT      Final result     02/26/17 1513 02/26/17 1513  Urinalysis Microscopic  Once      Final result       ED Imaging Orders     Start Ordered       Status Ordering Provider    02/26/17 1514 02/26/17 1513  X-ray chest PA and lateral  1 time imaging     Comments:  Abdominal pain    Final result     02/26/17 1514 02/26/17 1513  X-Ray Abdomen Flat And Erect  1 time imaging      Final result         Discharge Instructions         Abdominal Pain    Abdominal pain is pain in the stomach or belly area. Everyone has this pain from time to time. In many cases it goes away on its own. But abdominal pain can sometimes be due to a serious problem, such as appendicitis. So its important to know when to seek help.  Causes of abdominal pain  There are many possible causes of abdominal pain. Common causes in adults include:  · Constipation, diarrhea, or gas  · Stomach acid flowing back up into the esophagus (acid reflux or heartburn)  · Severe acid reflux, called GERD (gastroesophageal reflux disease)  · A sore in the lining of the stomach or small intestine (peptic ulcer)  · Inflammation of the gallbladder, liver, or pancreas  · Gallstones or kidney stones  · Appendicitis   · Intestinal blockage   · An internal organ pushing through a muscle or other tissue  (hernia)  · Urinary tract infections  · In women, menstrual cramps, fibroids, or endometriosis  · Inflammation or infection of the intestines  Diagnosing the cause of abdominal pain  Your healthcare provider will do a physical exam help find the cause of your pain. If needed, tests will be ordered. Belly pain has many possible causes. So it can be hard to find the reason for your pain. Giving details about your pain can help. Tell your provider where and when you feel the pain, and what makes it better or worse. Also let your provider know if you have other symptoms such as:  · Fever  · Tiredness  · Upset stomach (nausea)  · Vomiting  · Changes in bathroom habits  Treating abdominal pain  Some causes of pain need emergency medical treatment right away. These include appendicitis or a bowel blockage. Other problems can be treated with rest, fluids, or medicines. Your healthcare provider can give you specific instructions for treatment or self-care based on what is causing your pain.  If you have vomiting or diarrhea, sip water or other clear fluids. When you are ready to eat solid foods again, start with small amounts of easy-to-digest, low-fat foods. These include apple sauce, toast, or crackers.   When to seek medical care  Call 911 or go to the hospital right away if you:  · Cant pass stool and are vomiting  · Are vomiting blood or have bloody diarrhea or black, tarry diarrhea  · Have chest, neck, or shoulder pain  · Feel like you might pass out  · Have pain in your shoulder blades with nausea  · Have sudden, severe belly pain  · Have new, severe pain unlike any you have felt before  · Have a belly that is rigid, hard, and tender to touch  Call your healthcare provider if you have:  · Pain for more than 5 days  · Bloating for more than 2 days  · Diarrhea for more than 5 days  · A fever of 100.4°F (38.0°C) or higher, or as directed by your provider  · Pain that gets worse  · Weight loss for no reason  · Continued  lack of appetite  · Blood in your stool  How to prevent abdominal pain  Here are some tips to help prevent abdominal pain:  · Eat smaller amounts of food at one time.  · Avoid greasy, fried, or other high-fat foods.  · Avoid foods that give you gas.  · Exercise regularly.  · Drink plenty of fluids.  To help prevent GERD symptoms:  · Quit smoking.  · Reduce alcohol and certain foods that increase stomach acid.  · Avoid aspirin and over-the-counter pain and fever medicines (NSAIDS or nonsteroidal anti-inflammatory drugs), if possible  · Lose extra weight.  · Finish eating at least 2 hours before you go to bed or lie down.  · Raise the head of your bed.  Date Last Reviewed: 7/1/2016 © 2000-2016 [employer]. [address] [place] [address], [place] [address]. All rights reserved. This information is not intended as a substitute for professional medical care. Always follow your healthcare professional's instructions.          Dehydration    The human body is comprised largely of water. If you lose more fluids than you take in, you can become dehydrated. This means there are not enough fluids in your body for it to function right. Mild dehydration can cause weakness, confusion, or muscle cramps. In extreme cases, it can lead to brain damage and even death. That's why prompt treatment is crucial.  Risk factors  Anyone can become dehydrated. But infants, children, and older adults are at greatest risk. You are most likely to lose fluids with severe vomiting, diarrhea, or a fever. Exercising or working hard--especially in hot weather--can also cause excess fluid loss.  What to do  Drinking liquids is the best way to prevent dehydration. Water is best, but juice or frozen pops can also help. Your doctor may suggest electrolyte solutions for sick infants and young children.  When to go to the emergency room (ER)  Go to an ER right away for these symptoms:  Adults  · Very dark urine and little urine output  · Dizziness,  weakness, confusion, fainting  Children  · Sunken eyes  · Little or no urine output (for infants, no wet diaper in 8 hours)  · Very dark urine  · Skin that doesn't bounce back quickly when pinched  · Crying without tears  What to expect in the ER  Your blood pressure, temperature, and heart rate will be checked. You may have blood or urine tests. The main treatment for dehydration is fluids. You may be given these to drink. Or, you may receive them through a vein in your arm. You also may be treated for diarrhea, vomiting, or a high fever.   Date Last Reviewed: 7/18/2015 © 2000-2016 [employer]. [address] [place] [address], [place] [address]. All rights reserved. This information is not intended as a substitute for professional medical care. Always follow your healthcare professional's instructions.          Urinary Tract Infections in Men    Urinary tract infections (UTIs) are most often caused by bacteria (germs) that invade the urinary tract. The bacteria may come from outside the body. Or they may travel from the skin outside of rectum into the urethra. Pain in or around the urinary tract is a common symptom for most UTIs. But the only way to know for sure if you have a UTI is to have a urinalysis and urine culture.   Types of UTIs  · Cystitis: This is a bladder infection and is often linked to a blockage from an enlarged prostate. You may have an urgent or frequent need to urinate, and bloody urine. Treatment includes antibiotics and medications to relax or shrink the prostate. Sometimes, surgery is needed.  · Urethritis: This is an infection of the urethra. You may have a discharge from the urethra or burning when you urinate. You may also have pain in the urethra or penis. It is treated with antibiotics.  · Prostatitis: This is an inflammation or infection of the prostate. You may have an urgent or frequent need to urinate, fever, or burning when you urinate. Or you may have a tender prostate, or a  vague feeling of pressure. Prostatitis is treated with a range of medications, depending on the cause.  · Pyelonephritis: This is a kidney infection. If not treated, it can be serious and damage your kidneys. In severe cases you may be hospitalized. You may have a fever and upper back pain.  Treating a UTI  · Medications: Most UTIs are treated with antibiotics. These kill the bacteria. The length of time you need to take them depends on the type of infection. Take antibiotics exactly as directed until all of the medication is gone. If you don't, the infection may not go away and may become harder to treat. For certain types of UTIs, you may be given other medications to help treat your symptoms.  · Lifestyle changes: The lifestyle changes below will help get rid of your current infection. They may also help prevent future UTIs.  ¨ Drink plenty of fluids such as water, juice, or other caffeine-free drinks. This helps flush bacteria out of your system.  ¨ Empty your bladder when you feel the urge to urinate and before going to sleep. Urine that stays in your bladder promotes infection.  ¨ Use condoms during sex. These help prevent UTIs caused by sexually transmitted bacteria.  ¨ Keep follow-up appointments with your health care provider. He or she can may do tests to make sure the infection has cleared. If necessary, additional treatment can be started.  · Other treatment: Most UTIs respond to medication. But sometimes a procedure or surgery is needed. This can treat an enlarged prostate, or remove a kidney stone or other blockage. Surgery may also treat problems caused by scarring or long-term infections.  Date Last Reviewed: 9/8/2014 © 2000-2016 [employer]. [address] [place] [address], [place] [address]. All rights reserved. This information is not intended as a substitute for professional medical care. Always follow your healthcare professional's instructions.          Your Scheduled Appointments     Mar  20, 2017  9:20 AM CDT   Established Patient Visit with Lulú Estes PA-C   [facility] ([facility])    [address] [id number] [phone]            Mar 21, 2017  2:00 PM CDT   Established Patient Visit with Otto Lara MD   [facility]ot[facility] ([facility])    [address] [address] [phone]            Apr 12, 2017  8:00 AM CDT   Established Patient Visit with Radhika Parr DPM   [facility] - Podiatry ([facility])    [address] [address] [phone]            Jul 05, 2017  1:20 PM CDT   Established Patient Visit with Joanie Peterson MD   [facility] Internal Medicine ([facility])    [address] [id number] [phone]              Your Future Surgeries/Procedures     Mar 06, 2017   Surgery with Stoney Hilario MD   [facility] ([facility])    [address]  [facility] [address] [phone]              Smoking Cessation     If you would like to quit smoking:   You may be eligible for free services if you are a [place] resident and started smoking cigarettes before September 1, 1988.  Call the Smoking Cessation Trust ([facility]) toll free at (127) 338-0155 or (552) 956-9674.   Call 1-800-QUIT-NOW if you do not meet the above criteria.             [facility] complies with applicable Federal civil rights laws and does not discriminate on the basis of race, color, national origin, age, disability, or sex.        Language Assistance Services     ATTENTION: Language assistance services are available, free of charge. Please call [phone].      ATENCIÓN: Si habla español, tiene a vick disposición servicios gratuitos de asistencia lingüística. Llame al [phone].     CHÚ Ý: N?u b?n nói Ti?ng Vi?t, có các d?ch v? h? tr? ngôn ng? mi?n phí dành cho b?n. G?i s? [phone].

## 2017-02-26 NOTE — ED PROVIDER NOTES
SCRIBE #1 NOTE: I, Les Yeboah, am scribing for, and in the presence of, Jay Schulz Jr., MD. I have scribed the entire note.      History      Chief Complaint   Patient presents with    Back Pain     back pain, weakness and chills after a back procedure friday       Review of patient's allergies indicates:  No Known Allergies     HPI   HPI    2/26/2017, 3:23 PM   History obtained from the relative and patient      History of Present Illness: Santy Anderson is a 71 y.o. male patient who presents to the Emergency Department for abdominal pain which onset today. The pain is generalized, described as occasional radiating upwards. It is constant and moderate in severity. The pain is worsened positionally. The pt also reports lumbar back pain s/p Lumbar Transforaminal Epidural Steroid Injection performed on 2/24/17. The pain is constant and moderate in severity. He denies any radiation of pain. He c/o associated chills and fatigue following the procedure. Patient denies any headache, dizziness, light-headedness, weakness, numbness, CP, SOB, fever, N/V/D, constipation, bloody stool or any other sx at this time. No further complaints or concerns at this time.     Arrival mode: Personal vehicle    PCP: Joanie Simms MD       Past Medical History:  Past Medical History:   Diagnosis Date    Arthritis     Diabetes mellitus, type 2     Gastric mass     GERD (gastroesophageal reflux disease)     Hyperlipidemia     Lung cancer        Past Surgical History:  Past Surgical History:   Procedure Laterality Date    COLONOSCOPY      ESOPHAGOGASTRODUODENOSCOPY      FEMUR SURGERY      GASTRECTOMY      HAND SURGERY      KNEE SURGERY      laparotomy for trauma (negative)  1979    left lower lobectomy           Family History:  Family History   Problem Relation Age of Onset    Diabetes Mother     Hypertension Mother        Social History:  Social History     Social History Main Topics    Smoking status: Former  Smoker     Packs/day: 0.50     Years: 25.00     Quit date: 10/16/1988    Smokeless tobacco: Never Used    Alcohol use 9.0 oz/week     1 Glasses of wine, 14 Cans of beer per week      Comment: socailly    Drug use: No    Sexual activity: Not on file       ROS   Review of Systems   Constitutional: Positive for chills and fatigue. Negative for fever.   HENT: Negative for congestion and sore throat.    Respiratory: Negative for cough and shortness of breath.    Cardiovascular: Negative for chest pain.   Gastrointestinal: Positive for abdominal pain (diffuse). Negative for blood in stool, constipation, diarrhea, nausea and vomiting.   Genitourinary: Negative for dysuria.   Musculoskeletal: Positive for back pain (lumbar).   Skin: Negative for rash.   Neurological: Negative for dizziness, weakness, light-headedness, numbness and headaches.   Hematological: Does not bruise/bleed easily.     Physical Exam    Initial Vitals   BP Pulse Resp Temp SpO2   02/26/17 1454 02/26/17 1454 02/26/17 1454 02/26/17 1454 02/26/17 1454   152/71 115 18 98 °F (36.7 °C) 100 %      Physical Exam  Nursing Notes and Vital Signs Reviewed.  Constitutional: Patient is in no acute distress. Awake and alert. Well-developed and well-nourished.  Head: Atraumatic. Normocephalic.  Eyes: PERRL. EOM intact. Conjunctivae are not pale. No scleral icterus.  ENT: Mucous membranes are moist. Oropharynx is clear and symmetric.    Neck: Supple. Full ROM. No lymphadenopathy.  Cardiovascular: Regular rate. Regular rhythm. No murmurs, rubs, or gallops. Distal pulses are 2+ and symmetric.  Pulmonary/Chest: No respiratory distress. Clear to auscultation bilaterally. No wheezing, rales, or rhonchi.  Abdominal: Soft and non-distended.  There is mild suprapubic tenderness.  No rebound, guarding, or rigidity. Good bowel sounds.  Genitourinary: No CVA tenderness  Musculoskeletal: Moves all extremities. No obvious deformities. No edema. No calf tenderness.  Back: No  "obvious deformities. No T or L spine tenderness.   Skin: Warm and dry.  Neurological:  Alert, awake, and appropriate.  Normal speech.  No acute focal neurological deficits are appreciated.  Psychiatric: Normal affect. Good eye contact. Appropriate in content.    ED Course    Procedures  ED Vital Signs:  Vitals:    02/26/17 1454   BP: (!) 152/71   Pulse: (!) 115   Resp: 18   Temp: 98 °F (36.7 °C)   TempSrc: Oral   SpO2: 100%   Weight: 67.1 kg (148 lb)   Height: 5' 9" (1.753 m)       Abnormal Lab Results:  Labs Reviewed   CBC W/ AUTO DIFFERENTIAL - Abnormal; Notable for the following:        Result Value    WBC 18.10 (*)     RBC 3.44 (*)     Hemoglobin 11.8 (*)     Hematocrit 34.7 (*)      (*)     MCH 34.3 (*)     RDW 18.3 (*)     Gran # 14.6 (*)     Mono # 2.1 (*)     Gran% 80.7 (*)     Lymph% 8.2 (*)     All other components within normal limits   COMPREHENSIVE METABOLIC PANEL - Abnormal; Notable for the following:     Potassium 3.2 (*)     CO2 30 (*)     Glucose 132 (*)     BUN, Bld 44 (*)     Total Bilirubin 1.4 (*)     Alkaline Phosphatase 42 (*)     All other components within normal limits   URINALYSIS - Abnormal; Notable for the following:     Nitrite, UA Positive (*)     Leukocytes, UA Trace (*)     All other components within normal limits   URINALYSIS MICROSCOPIC - Abnormal; Notable for the following:     Bacteria, UA Few (*)     All other components within normal limits   LIPASE        All Lab Results:  Results for orders placed or performed during the hospital encounter of 02/26/17   CBC W/ AUTO DIFFERENTIAL   Result Value Ref Range    WBC 18.10 (H) 3.90 - 12.70 K/uL    RBC 3.44 (L) 4.60 - 6.20 M/uL    Hemoglobin 11.8 (L) 14.0 - 18.0 g/dL    Hematocrit 34.7 (L) 40.0 - 54.0 %     (H) 82 - 98 fL    MCH 34.3 (H) 27.0 - 31.0 pg    MCHC 34.0 32.0 - 36.0 %    RDW 18.3 (H) 11.5 - 14.5 %    Platelets 165 150 - 350 K/uL    MPV 9.5 9.2 - 12.9 fL    Gran # 14.6 (H) 1.8 - 7.7 K/uL    Lymph # 1.5 1.0 - " 4.8 K/uL    Mono # 2.1 (H) 0.3 - 1.0 K/uL    Eos # 0.0 0.0 - 0.5 K/uL    Baso # 0.01 0.00 - 0.20 K/uL    Gran% 80.7 (H) 38.0 - 73.0 %    Lymph% 8.2 (L) 18.0 - 48.0 %    Mono% 11.4 4.0 - 15.0 %    Eosinophil% 0.0 0.0 - 8.0 %    Basophil% 0.1 0.0 - 1.9 %    Platelet Estimate Appears normal     Differential Method Automated    Comp. Metabolic Panel   Result Value Ref Range    Sodium 139 136 - 145 mmol/L    Potassium 3.2 (L) 3.5 - 5.1 mmol/L    Chloride 99 95 - 110 mmol/L    CO2 30 (H) 23 - 29 mmol/L    Glucose 132 (H) 70 - 110 mg/dL    BUN, Bld 44 (H) 8 - 23 mg/dL    Creatinine 1.0 0.5 - 1.4 mg/dL    Calcium 9.2 8.7 - 10.5 mg/dL    Total Protein 7.4 6.0 - 8.4 g/dL    Albumin 3.6 3.5 - 5.2 g/dL    Total Bilirubin 1.4 (H) 0.1 - 1.0 mg/dL    Alkaline Phosphatase 42 (L) 55 - 135 U/L    AST 35 10 - 40 U/L    ALT 14 10 - 44 U/L    Anion Gap 10 8 - 16 mmol/L    eGFR if African American >60 >60 mL/min/1.73 m^2    eGFR if non African American >60 >60 mL/min/1.73 m^2   Lipase   Result Value Ref Range    Lipase 4 4 - 60 U/L   Urinalysis - Clean Catch   Result Value Ref Range    Specimen UA Urine, Catheterized     Color, UA Yellow Yellow, Straw, Tresa    Appearance, UA Clear Clear    pH, UA 8.0 5.0 - 8.0    Specific Gravity, UA 1.010 1.005 - 1.030    Protein, UA Negative Negative    Glucose, UA Negative Negative    Ketones, UA Negative Negative    Bilirubin (UA) Negative Negative    Occult Blood UA Negative Negative    Nitrite, UA Positive (A) Negative    Urobilinogen, UA Negative <2.0 EU/dL    Leukocytes, UA Trace (A) Negative   Urinalysis Microscopic   Result Value Ref Range    RBC, UA 2 0 - 4 /hpf    WBC, UA 2 0 - 5 /hpf    Bacteria, UA Few (A) None-Occ /hpf    Microscopic Comment SEE COMMENT          Imaging Results:  Imaging Results         X-Ray Abdomen Flat And Erect (Final result) Result time:  02/26/17 15:43:12    Final result by Thanh Greenberg MD (02/26/17 15:43:12)    Impression:     Nonobstructive bowel gas  pattern.          Electronically signed by: RICK GREENBERG  Date:     02/26/17  Time:    15:43     Narrative:    Flat and erect abdominal x-ray 2 views    Indication: Generalized abdominal pain.    Findings: Nonobstructive bowel gas pattern with air-filled nondilated colonic loops with a few colonic air-fluid levels without differentials following a upright imaging.  Postoperative left chest.  Right lung base is clear.  Surgical clips mid left abdomen.  Pelvic phleboliths.  Atherosclerosis.            X-ray chest PA and lateral (Final result) Result time:  02/26/17 15:40:04    Final result by Rick Greenberg MD (02/26/17 15:40:04)    Impression:     Stable postoperative chest.          Electronically signed by: RICK GREENBERG  Date:     02/26/17  Time:    15:40     Narrative:    Chest x-ray 2 views    Indication: Lung cancer.  Generalized abdominal pain.    Findings: Comparison study 9/20/2016.  No change.  Postoperative left chest with lobectomy, chronic consolidation upper left chest with elevated hemidiaphragm and overall decreased volume of hemithorax relative to the right.  The right lung is clear.  Mediastinal structures are mildly shifted towards the left.  Normal size heart.  No vascular congestion.  Left chest surgical clips.  Mild degenerative spine with leftward curvature upper thoracic spine.             MRI Lumbar Spine W WO Cont, reviewed by Otto Lara on 2/1/2017 4:00 PM    Impression: 1.) No evidence to suggest metastatic disease.              2.) Degenerative changes as discussed above.          The Emergency Provider reviewed the vital signs and test results, which are outlined above.    ED Discussion     6:28 PM: Discussed with pt all pertinent ED information and results. Discussed pt dx and plan of tx. Gave pt all f/u and return to the ED instructions. All questions and concerns were addressed at this time. Pt expresses understanding of information and instructions, and is comfortable with plan to  discharge. Pt is stable for discharge.    ED Medication(s):  Medications   GI cocktail (mylanta 30 mL, lidocaine 2 % viscous 10 mL, dicyclomine 10 mL) 50 mL (50 mLs Oral Given 2/26/17 1535)   pantoprazole injection 40 mg (40 mg Intravenous Given 2/26/17 1536)   sodium chloride 0.9% bolus 1,000 mL (0 mLs Intravenous Stopped 2/26/17 1805)   potassium chloride SA CR tablet 40 mEq (40 mEq Oral Given 2/26/17 1637)       New Prescriptions    NITROFURANTOIN, MACROCRYSTAL-MONOHYDRATE, (MACROBID) 100 MG CAPSULE    Take 1 capsule (100 mg total) by mouth 2 (two) times daily.       Follow-up Information     Follow up with Joanie Simms MD. Call in 2 days.    Specialty:  Internal Medicine    Contact information:    [address]  [place] [address] [phone]              Medical Decision Making    Medical Decision Making:   Clinical Tests:   Lab Tests: Ordered and Reviewed  The following lab test(s) were unremarkable: Lipase  Radiological Study: Ordered and Reviewed           Scribe Attestation:   Scribe #1: I performed the above scribed service and the documentation accurately describes the services I performed. I attest to the accuracy of the note.    Attending:   Physician Attestation Statement for Scribe #1: I, Jay Schulz Jr., MD, personally performed the services described in this documentation, as scribed by Les Yeboah, in my presence, and it is both accurate and complete.          Clinical Impression       ICD-10-CM ICD-9-CM   1. Urinary tract infection with hematuria, site unspecified N39.0 599.0    R31.9    2. Abdominal pain, unspecified location R10.9 789.00   3. Dehydration E86.0 276.51       Disposition:   Disposition: Discharged  Condition: Stable       Jay Scuhlz Jr., MD  02/26/17 [id number]

## 2017-02-27 ENCOUNTER — TELEPHONE (OUTPATIENT)
Dept: INTERNAL MEDICINE | Facility: CLINIC | Age: 72
End: 2017-02-27

## 2017-02-27 ENCOUNTER — TELEPHONE (OUTPATIENT)
Dept: GASTROENTEROLOGY | Facility: CLINIC | Age: 72
End: 2017-02-27

## 2017-02-27 NOTE — DISCHARGE INSTRUCTIONS
Abdominal Pain    Abdominal pain is pain in the stomach or belly area. Everyone has this pain from time to time. In many cases it goes away on its own. But abdominal pain can sometimes be due to a serious problem, such as appendicitis. So its important to know when to seek help.  Causes of abdominal pain  There are many possible causes of abdominal pain. Common causes in adults include:  · Constipation, diarrhea, or gas  · Stomach acid flowing back up into the esophagus (acid reflux or heartburn)  · Severe acid reflux, called GERD (gastroesophageal reflux disease)  · A sore in the lining of the stomach or small intestine (peptic ulcer)  · Inflammation of the gallbladder, liver, or pancreas  · Gallstones or kidney stones  · Appendicitis   · Intestinal blockage   · An internal organ pushing through a muscle or other tissue (hernia)  · Urinary tract infections  · In women, menstrual cramps, fibroids, or endometriosis  · Inflammation or infection of the intestines  Diagnosing the cause of abdominal pain  Your healthcare provider will do a physical exam help find the cause of your pain. If needed, tests will be ordered. Belly pain has many possible causes. So it can be hard to find the reason for your pain. Giving details about your pain can help. Tell your provider where and when you feel the pain, and what makes it better or worse. Also let your provider know if you have other symptoms such as:  · Fever  · Tiredness  · Upset stomach (nausea)  · Vomiting  · Changes in bathroom habits  Treating abdominal pain  Some causes of pain need emergency medical treatment right away. These include appendicitis or a bowel blockage. Other problems can be treated with rest, fluids, or medicines. Your healthcare provider can give you specific instructions for treatment or self-care based on what is causing your pain.  If you have vomiting or diarrhea, sip water or other clear fluids. When you are ready to eat solid foods again,  start with small amounts of easy-to-digest, low-fat foods. These include apple sauce, toast, or crackers.   When to seek medical care  Call 911 or go to the hospital right away if you:  · Cant pass stool and are vomiting  · Are vomiting blood or have bloody diarrhea or black, tarry diarrhea  · Have chest, neck, or shoulder pain  · Feel like you might pass out  · Have pain in your shoulder blades with nausea  · Have sudden, severe belly pain  · Have new, severe pain unlike any you have felt before  · Have a belly that is rigid, hard, and tender to touch  Call your healthcare provider if you have:  · Pain for more than 5 days  · Bloating for more than 2 days  · Diarrhea for more than 5 days  · A fever of 100.4°F (38.0°C) or higher, or as directed by your provider  · Pain that gets worse  · Weight loss for no reason  · Continued lack of appetite  · Blood in your stool  How to prevent abdominal pain  Here are some tips to help prevent abdominal pain:  · Eat smaller amounts of food at one time.  · Avoid greasy, fried, or other high-fat foods.  · Avoid foods that give you gas.  · Exercise regularly.  · Drink plenty of fluids.  To help prevent GERD symptoms:  · Quit smoking.  · Reduce alcohol and certain foods that increase stomach acid.  · Avoid aspirin and over-the-counter pain and fever medicines (NSAIDS or nonsteroidal anti-inflammatory drugs), if possible  · Lose extra weight.  · Finish eating at least 2 hours before you go to bed or lie down.  · Raise the head of your bed.  Date Last Reviewed: 7/1/2016  © 9454-0919 [employer]. [address] [place] [address], [place] [address]. All rights reserved. This information is not intended as a substitute for professional medical care. Always follow your healthcare professional's instructions.          Dehydration    The human body is comprised largely of water. If you lose more fluids than you take in, you can become dehydrated. This means there are not enough fluids  in your body for it to function right. Mild dehydration can cause weakness, confusion, or muscle cramps. In extreme cases, it can lead to brain damage and even death. That's why prompt treatment is crucial.  Risk factors  Anyone can become dehydrated. But infants, children, and older adults are at greatest risk. You are most likely to lose fluids with severe vomiting, diarrhea, or a fever. Exercising or working hard--especially in hot weather--can also cause excess fluid loss.  What to do  Drinking liquids is the best way to prevent dehydration. Water is best, but juice or frozen pops can also help. Your doctor may suggest electrolyte solutions for sick infants and young children.  When to go to the emergency room (ER)  Go to an ER right away for these symptoms:  Adults  · Very dark urine and little urine output  · Dizziness, weakness, confusion, fainting  Children  · Sunken eyes  · Little or no urine output (for infants, no wet diaper in 8 hours)  · Very dark urine  · Skin that doesn't bounce back quickly when pinched  · Crying without tears  What to expect in the ER  Your blood pressure, temperature, and heart rate will be checked. You may have blood or urine tests. The main treatment for dehydration is fluids. You may be given these to drink. Or, you may receive them through a vein in your arm. You also may be treated for diarrhea, vomiting, or a high fever.   Date Last Reviewed: 7/18/2015  © 8990-9013 [employer]. [address] [place] [address]. All rights reserved. This information is not intended as a substitute for professional medical care. Always follow your healthcare professional's instructions.          Urinary Tract Infections in Men    Urinary tract infections (UTIs) are most often caused by bacteria (germs) that invade the urinary tract. The bacteria may come from outside the body. Or they may travel from the skin outside of rectum into the urethra. Pain in or around the  urinary tract is a common symptom for most UTIs. But the only way to know for sure if you have a UTI is to have a urinalysis and urine culture.   Types of UTIs  · Cystitis: This is a bladder infection and is often linked to a blockage from an enlarged prostate. You may have an urgent or frequent need to urinate, and bloody urine. Treatment includes antibiotics and medications to relax or shrink the prostate. Sometimes, surgery is needed.  · Urethritis: This is an infection of the urethra. You may have a discharge from the urethra or burning when you urinate. You may also have pain in the urethra or penis. It is treated with antibiotics.  · Prostatitis: This is an inflammation or infection of the prostate. You may have an urgent or frequent need to urinate, fever, or burning when you urinate. Or you may have a tender prostate, or a vague feeling of pressure. Prostatitis is treated with a range of medications, depending on the cause.  · Pyelonephritis: This is a kidney infection. If not treated, it can be serious and damage your kidneys. In severe cases you may be hospitalized. You may have a fever and upper back pain.  Treating a UTI  · Medications: Most UTIs are treated with antibiotics. These kill the bacteria. The length of time you need to take them depends on the type of infection. Take antibiotics exactly as directed until all of the medication is gone. If you don't, the infection may not go away and may become harder to treat. For certain types of UTIs, you may be given other medications to help treat your symptoms.  · Lifestyle changes: The lifestyle changes below will help get rid of your current infection. They may also help prevent future UTIs.  ¨ Drink plenty of fluids such as water, juice, or other caffeine-free drinks. This helps flush bacteria out of your system.  ¨ Empty your bladder when you feel the urge to urinate and before going to sleep. Urine that stays in your bladder promotes  infection.  ¨ Use condoms during sex. These help prevent UTIs caused by sexually transmitted bacteria.  ¨ Keep follow-up appointments with your health care provider. He or she can may do tests to make sure the infection has cleared. If necessary, additional treatment can be started.  · Other treatment: Most UTIs respond to medication. But sometimes a procedure or surgery is needed. This can treat an enlarged prostate, or remove a kidney stone or other blockage. Surgery may also treat problems caused by scarring or long-term infections.  Date Last Reviewed: 9/8/2014  © 4519-1204 [employer]. [address] [place] [address]. All rights reserved. This information is not intended as a substitute for professional medical care. Always follow your healthcare professional's instructions.

## 2017-02-27 NOTE — TELEPHONE ENCOUNTER
Pt wanted to make you aware that he went to the ER and is being tx for a UTI. He would like for you to look at his labs.  Also scheduled a f/u on 3/6/17 with Saba per pt request./rpr

## 2017-02-27 NOTE — TELEPHONE ENCOUNTER
----- Message from Dahlia Marino sent at 2/27/2017  2:42 PM CST -----  Contact: pt  Pt would like to know if the Dr got his test results he did on 2/26 at the [facility].....[phone]

## 2017-03-06 ENCOUNTER — LAB VISIT (OUTPATIENT)
Dept: LAB | Facility: HOSPITAL | Age: 72
End: 2017-03-06
Attending: INTERNAL MEDICINE
Payer: MEDICARE

## 2017-03-06 ENCOUNTER — OFFICE VISIT (OUTPATIENT)
Dept: GASTROENTEROLOGY | Facility: CLINIC | Age: 72
End: 2017-03-06
Payer: MEDICARE

## 2017-03-06 ENCOUNTER — OFFICE VISIT (OUTPATIENT)
Dept: INTERNAL MEDICINE | Facility: CLINIC | Age: 72
End: 2017-03-06
Payer: MEDICARE

## 2017-03-06 ENCOUNTER — TELEPHONE (OUTPATIENT)
Dept: INTERNAL MEDICINE | Facility: CLINIC | Age: 72
End: 2017-03-06

## 2017-03-06 VITALS
BODY MASS INDEX: 22 KG/M2 | SYSTOLIC BLOOD PRESSURE: 124 MMHG | HEIGHT: 69 IN | OXYGEN SATURATION: 98 % | DIASTOLIC BLOOD PRESSURE: 62 MMHG | HEART RATE: 107 BPM | WEIGHT: 148.56 LBS | TEMPERATURE: 97 F

## 2017-03-06 VITALS
DIASTOLIC BLOOD PRESSURE: 64 MMHG | BODY MASS INDEX: 21.87 KG/M2 | WEIGHT: 147.69 LBS | HEIGHT: 69 IN | HEART RATE: 100 BPM | SYSTOLIC BLOOD PRESSURE: 124 MMHG

## 2017-03-06 DIAGNOSIS — E87.6 HYPOKALEMIA: ICD-10-CM

## 2017-03-06 DIAGNOSIS — D72.829 LEUKOCYTOSIS, UNSPECIFIED TYPE: ICD-10-CM

## 2017-03-06 DIAGNOSIS — E83.52 SERUM CALCIUM ELEVATED: Primary | ICD-10-CM

## 2017-03-06 DIAGNOSIS — N39.0 URINARY TRACT INFECTION WITHOUT HEMATURIA, SITE UNSPECIFIED: ICD-10-CM

## 2017-03-06 DIAGNOSIS — C34.32 MALIGNANT NEOPLASM OF LOWER LOBE OF LEFT LUNG: ICD-10-CM

## 2017-03-06 DIAGNOSIS — E11.9 TYPE 2 DIABETES MELLITUS WITHOUT COMPLICATION, WITHOUT LONG-TERM CURRENT USE OF INSULIN: ICD-10-CM

## 2017-03-06 DIAGNOSIS — K21.9 GASTROESOPHAGEAL REFLUX DISEASE, ESOPHAGITIS PRESENCE NOT SPECIFIED: ICD-10-CM

## 2017-03-06 DIAGNOSIS — R10.13 EPIGASTRIC ABDOMINAL PAIN: ICD-10-CM

## 2017-03-06 DIAGNOSIS — M51.36 DDD (DEGENERATIVE DISC DISEASE), LUMBAR: ICD-10-CM

## 2017-03-06 DIAGNOSIS — N39.0 URINARY TRACT INFECTION WITHOUT HEMATURIA, SITE UNSPECIFIED: Primary | ICD-10-CM

## 2017-03-06 DIAGNOSIS — R07.89 ATYPICAL CHEST PAIN: Primary | ICD-10-CM

## 2017-03-06 LAB
ANION GAP SERPL CALC-SCNC: 8 MMOL/L
BASOPHILS # BLD AUTO: 0.02 K/UL
BASOPHILS NFR BLD: 0.3 %
BUN SERPL-MCNC: 5 MG/DL
CALCIUM SERPL-MCNC: 12.1 MG/DL
CHLORIDE SERPL-SCNC: 104 MMOL/L
CO2 SERPL-SCNC: 30 MMOL/L
CREAT SERPL-MCNC: 0.9 MG/DL
DIFFERENTIAL METHOD: ABNORMAL
EOSINOPHIL # BLD AUTO: 0.1 K/UL
EOSINOPHIL NFR BLD: 1.7 %
ERYTHROCYTE [DISTWIDTH] IN BLOOD BY AUTOMATED COUNT: 16.6 %
EST. GFR  (AFRICAN AMERICAN): >60 ML/MIN/1.73 M^2
EST. GFR  (NON AFRICAN AMERICAN): >60 ML/MIN/1.73 M^2
GLUCOSE SERPL-MCNC: 106 MG/DL
HCT VFR BLD AUTO: 31.5 %
HGB BLD-MCNC: 10.4 G/DL
LYMPHOCYTES # BLD AUTO: 1.2 K/UL
LYMPHOCYTES NFR BLD: 18.4 %
MCH RBC QN AUTO: 34.8 PG
MCHC RBC AUTO-ENTMCNC: 33 %
MCV RBC AUTO: 105 FL
MONOCYTES # BLD AUTO: 1.4 K/UL
MONOCYTES NFR BLD: 21.2 %
NEUTROPHILS # BLD AUTO: 3.8 K/UL
NEUTROPHILS NFR BLD: 58.4 %
PLATELET # BLD AUTO: 224 K/UL
PMV BLD AUTO: 9.1 FL
POTASSIUM SERPL-SCNC: 4.2 MMOL/L
RBC # BLD AUTO: 2.99 M/UL
SODIUM SERPL-SCNC: 142 MMOL/L
WBC # BLD AUTO: 6.42 K/UL

## 2017-03-06 PROCEDURE — 99999 PR PBB SHADOW E&M-EST. PATIENT-LVL III: CPT | Mod: PBBFAC,,, | Performed by: NURSE PRACTITIONER

## 2017-03-06 PROCEDURE — 99214 OFFICE O/P EST MOD 30 MIN: CPT | Mod: S$PBB,,, | Performed by: PHYSICIAN ASSISTANT

## 2017-03-06 PROCEDURE — 99214 OFFICE O/P EST MOD 30 MIN: CPT | Mod: S$PBB,,, | Performed by: NURSE PRACTITIONER

## 2017-03-06 PROCEDURE — 99213 OFFICE O/P EST LOW 20 MIN: CPT | Mod: PBBFAC,27,PO | Performed by: NURSE PRACTITIONER

## 2017-03-06 PROCEDURE — 99999 PR PBB SHADOW E&M-EST. PATIENT-LVL III: CPT | Mod: PBBFAC,,, | Performed by: PHYSICIAN ASSISTANT

## 2017-03-06 NOTE — MR AVS SNAPSHOT
[facility] Internal Medicine  [address] [address]  Phone: [phone]  Fax: [phone]                  Santy Anderson   3/6/2017 7:40 AM   Office Visit    Description:  Male : 1945   Provider:  EMMANUELLE Frederick   Department:  [facility] - Internal Medicine           Reason for Visit     Follow-up           Diagnoses this Visit        Comments    Urinary tract infection without hematuria, site unspecified    -  Primary     Leukocytosis, unspecified type         Hypokalemia                To Do List           Future Appointments        Provider Department Dept Phone    3/6/2017 9:00 AM Vilma Montaño [facility] Gastroenterology [phone]    3/6/2017 9:45 AM LABORATORY, [facility] [phone]    3/6/2017 9:50 AM SPECIMEN, [facility] [phone]    3/20/2017 8:40 AM Otto Lara MD [facility] Hemotology Oncology [phone]    3/20/2017 9:20 AM Lulú Estes PA-C [facility] [phone]      Your Future Surgeries/Procedures     2017   Surgery with Davis Pierre III, MD   [facility] -  ([facility])    [address] [place] [address] [phone]              Goals (5 Years of Data)     None      [facility]     [facility] Care [facility] -  Assistance  Registered nurses in the [facility] provide clinical advisement, health education, appointment booking, and other advisory services.  Call for this free service at [phone].             Medications           Message regarding Medications     Verify the changes and/or additions to your medication regime listed below are the same as discussed with your clinician today.  If any of these changes or additions are incorrect, please notify your healthcare provider.        STOP taking these medications     sodium,potassium,mag sulfates (SUPREP BOWEL PREP KIT) 17.5-3.13-1.6 gram  "SolR Take 1 kit by mouth daily as needed.           Verify that the below list of medications is an accurate representation of the medications you are currently taking.  If none reported, the list may be blank. If incorrect, please contact your healthcare provider. Carry this list with you in case of emergency.           Current Medications     ergocalciferol (VITAMIN D2) 50,000 unit Cap Take 1 capsule (50,000 Units total) by mouth twice a week.    naproxen (NAPROSYN) 500 MG tablet Take 1 tablet (500 mg total) by mouth 2 (two) times daily with meals.    pantoprazole (PROTONIX) 20 MG tablet Take 2 tablets (40 mg total) by mouth 2 (two) times daily before meals.    potassium chloride SA (K-DUR,KLOR-CON) 20 MEQ tablet Take 1 tablet (20 mEq total) by mouth 2 (two) times daily.    sucralfate (CARAFATE) 1 gram tablet Take 1 tablet (1 g total) by mouth 4 (four) times daily before meals and nightly.    tramadol (ULTRAM) 50 mg tablet Take 50 mg by mouth every 6 (six) hours as needed for Pain.           Clinical Reference Information           Your Vitals Were     BP Pulse Temp Height    124/62 (BP Location: Right arm, Patient Position: Sitting, BP Method: Manual) 107 96.9 °F (36.1 °C) (Tympanic) 5' 9" (1.753 m)    Weight SpO2 BMI    67.4 kg (148 lb 9.4 oz) 98% 21.94 kg/m2      Blood Pressure          Most Recent Value    BP  124/62      Allergies as of 3/6/2017     No Known Allergies      Immunizations Administered on Date of Encounter - 3/6/2017     None      Orders Placed During Today's Visit     Future Labs/Procedures Expected by Expires    Basic metabolic panel  3/6/2017 5/5/2018    CBC auto differential  3/6/2017 5/5/2018    Urinalysis  3/6/2017 5/5/2018    Urine culture  3/6/2017 5/5/2018      Language Assistance Services     ATTENTION: Language assistance services are available, free of charge. Please call [phone].      ATENCIÓN: Si habla español, tiene a vick disposición servicios gratuitos de asistencia " lingüística. Tatianna al [phone].     LAURA Ý: N?u b?n nói Ti?ng Vi?t, có các d?ch v? h? tr? ngôn ng? mi?n phí dành cho b?n. G?i s? [phone].         [facility] - Internal Medicine complies with applicable Federal civil rights laws and does not discriminate on the basis of race, color, national origin, age, disability, or sex.

## 2017-03-06 NOTE — PROGRESS NOTES
Subjective:       Patient ID: Santy Anderson is a 71 y.o. male.    Chief Complaint: Follow-up    HPI Comments: 71 year old male presents to clinic for 2/26/17 ER f/u. PCP is Dr. Peterson. He reports feeling weak 2/26/17, so that he could barely walk, so he used his cane and went to the [facility] that day. He reports having chronic back and LE symptoms and sees physician at [facility] with back injections and most recently had injection with Dr. Johnson 2/24/17. He also reports having the inability to urinate and cath at ER resolved that. He had a workup at ER and was diagnosed with UTI, treated with 5 days of Macrobid. WBC was elevated at 18 and BUN was elevated at 44. K was 3.2 and he was given IV saline and K, as well as GI cocktail and pantoprazole. He reports he has not taken his daily pantoprazole since the ER visit and has had some burning sensation to upper abdomen (radiates into diffuse abdomen) with drinking liquids, which he says resolves on its own after seconds (without radiation of the pain). He reports no fever, chills, other urinary symptoms, swelling, N/V, recent injury, or other medical complaints. He reports weakness has greatly improved since his antibiotic (he has completed the Macrobid) and he can now walk without assistance. DM is controlled. Pt recently saw his oncologist Dr. Lara (2/1/17) for back pain and was referred to Dr. Johnson for back pain after L-spine MRI showed no malignancy. Pt had chest and abd CTs in Sep 2016 - esophagitis and gallstones.    Past Medical History:  No date: Arthritis  No date: Diabetes mellitus, type 2  No date: Gastric mass  No date: GERD (gastroesophageal reflux disease)  No date: Hyperlipidemia  No date: Lung cancer        Review of Systems   Constitutional: Negative for chills and fever.   HENT: Negative for trouble swallowing.    Respiratory: Negative for cough and shortness of breath.    Cardiovascular: Negative for chest pain, palpitations and  leg swelling.   Gastrointestinal: Positive for abdominal pain. Negative for blood in stool, diarrhea, nausea and vomiting.   Genitourinary: Positive for difficulty urinating (resolved). Negative for dysuria, flank pain, frequency, hematuria and urgency.   Skin: Negative for rash.   Neurological: Negative for dizziness, weakness, numbness and headaches.   Psychiatric/Behavioral: Negative for confusion.       Objective:      Physical Exam   Constitutional: He is oriented to person, place, and time. He appears well-developed and well-nourished. No distress.   HENT:   Head: Normocephalic and atraumatic.   Eyes: EOM are normal. No scleral icterus.   Neck: Neck supple.   Cardiovascular: Normal rate and regular rhythm.    Pulmonary/Chest: Effort normal and breath sounds normal. No respiratory distress. He has no decreased breath sounds. He has no wheezes. He has no rhonchi. He has no rales.   Abdominal: Soft. He exhibits no distension and no mass. There is tenderness (mild) in the epigastric area. There is no rigidity, no rebound, no guarding and no CVA tenderness.   Musculoskeletal: Normal range of motion. He exhibits no edema.   Neurological: He is alert and oriented to person, place, and time. No cranial nerve deficit.   Skin: Skin is dry. No rash noted. He is not diaphoretic.   Psychiatric: He has a normal mood and affect. His speech is normal and behavior is normal. Thought content normal.       Assessment:       1. Urinary tract infection without hematuria, site unspecified    2. Leukocytosis, unspecified type    3. Hypokalemia    4. Type 2 diabetes mellitus without complication, without long-term current use of insulin    5. DDD (degenerative disc disease), lumbar    6. Malignant neoplasm of lower lobe of left lung        Plan:         1. CBC, BMP, and UA with C&S today with review following.  2. Restart Protonix. Pt would like to see GI for eval - appt scheduled for pt.  3. F/u with specialists as recommended.  Monitor for new or worsening symptoms.  4. F/u with PCP as scheduled in 4 months for further management. RTC sooner if needed. ER if severe sxs occur.

## 2017-03-06 NOTE — PROGRESS NOTES
Clinic Follow Up:  [facility] Follow Up Note    Reason for Follow Up:  The primary encounter diagnosis was Atypical chest pain. Diagnoses of Epigastric abdominal pain and Gastroesophageal reflux disease, esophagitis presence not specified were also pertinent to this visit.    PCP: Joanie Simms       HPI:  This is a 71 y.o. male here for follow up of the above  He reports that over the last 2-3 weeks, he has had progressively worsening epigastric abdominal burning with radiation into the chest and throat.  He reports that symtposm are worse after meals.  No known reliving factors.   His PMH includes GERD and gastric polyps.  Last EGD 10/16.  He has PPI on his medication list but he is unsure if he is taking it.   He reports that since his visit to the ER, his symtpoms have improved some.    He denies any other GI complaitns.  No melena or hematochezia.  No weight loss    Review of Systems   Constitutional: Negative for chills, fever, malaise/fatigue and weight loss.   Respiratory: Negative for cough.    Cardiovascular: Negative for chest pain.   Gastrointestinal:        Per HPI   Musculoskeletal: Negative for myalgias.   Skin: Negative for itching and rash.   Neurological: Negative for headaches.   Psychiatric/Behavioral: The patient is not nervous/anxious.        Medical History:  Past Medical History:   Diagnosis Date    Arthritis     Diabetes mellitus, type 2     Gastric mass     GERD (gastroesophageal reflux disease)     Hyperlipidemia     Lung cancer        Surgical History:   Past Surgical History:   Procedure Laterality Date    COLONOSCOPY      ESOPHAGOGASTRODUODENOSCOPY      FEMUR SURGERY      GASTRECTOMY      HAND SURGERY      KNEE SURGERY      laparotomy for trauma (negative)  1979    left lower lobectomy         Family History:   Family History   Problem Relation Age of Onset    Diabetes Mother     Hypertension Mother        Social History:   Social History  "  Substance Use Topics    Smoking status: Former Smoker     Packs/day: 0.50     Years: 25.00     Quit date: 10/16/1988    Smokeless tobacco: Never Used    Alcohol use 9.0 oz/week     1 Glasses of wine, 14 Cans of beer per week      Comment: socailly       Allergies: Reviewed    Home Medications:  Current Outpatient Prescriptions on File Prior to Visit   Medication Sig Dispense Refill    ergocalciferol (VITAMIN D2) 50,000 unit Cap Take 1 capsule (50,000 Units total) by mouth twice a week. 8 capsule 6    naproxen (NAPROSYN) 500 MG tablet Take 1 tablet (500 mg total) by mouth 2 (two) times daily with meals. 60 tablet 3    pantoprazole (PROTONIX) 20 MG tablet Take 2 tablets (40 mg total) by mouth 2 (two) times daily before meals. 60 tablet 3    potassium chloride SA (K-DUR,KLOR-CON) 20 MEQ tablet Take 1 tablet (20 mEq total) by mouth 2 (two) times daily. 60 tablet 3    sucralfate (CARAFATE) 1 gram tablet Take 1 tablet (1 g total) by mouth 4 (four) times daily before meals and nightly. 30 tablet 3    tramadol (ULTRAM) 50 mg tablet Take 50 mg by mouth every 6 (six) hours as needed for Pain.      [DISCONTINUED] sodium,potassium,mag sulfates (SUPREP BOWEL PREP KIT) 17.5-3.13-1.6 gram SolR Take 1 kit by mouth daily as needed. 1 Bottle 0     No current facility-administered medications on file prior to visit.        Physical Exam:  Vital Signs:  /64  Pulse 100  Ht 5' 9" (1.753 m)  Wt 67 kg (147 lb 11.3 oz)  BMI 21.81 kg/m2  Body mass index is 21.81 kg/(m^2).  Physical Exam   Constitutional: He is oriented to person, place, and time. He appears well-developed.   HENT:   Head: Normocephalic.   Eyes: No scleral icterus.   Neck: Normal range of motion.   Cardiovascular: Normal rate and regular rhythm.    Pulmonary/Chest: Effort normal and breath sounds normal.   Abdominal: Soft. Bowel sounds are normal. He exhibits no distension. There is no tenderness.   Musculoskeletal: Normal range of motion. "   Neurological: He is alert and oriented to person, place, and time.   Skin: Skin is warm and dry.   Psychiatric: He has a normal mood and affect.   Vitals reviewed.      Labs: Pertinent labs reviewed.      Assessment:  1. Atypical chest pain    2. Epigastric abdominal pain    3. Gastroesophageal reflux disease, esophagitis presence not specified        Recommendations:  Atypical chest pain  Epigastric abdominal pain  Gastroesophageal reflux disease, esophagitis presence not specified  - Symptoms are likely related to GERD.  Unclear if he is taking PPI  - Pt is to call today with his medication list.  If not on PPI, will start  - GERD diet and lifestyle discussed.  - If no improvement in his symptoms after 4 weeks, will plan for repeat EGD      Return to Clinic:    Return in about 4 weeks (around 4/3/2017).

## 2017-03-06 NOTE — TELEPHONE ENCOUNTER
----- Message from EMMANUELLE Frederick sent at 3/6/2017  1:59 PM CST -----  Urinalysis shows no apparent infection. Urine culture still pending. Blood results show high calcium. I recommend he RTC later this week to repeat this. If still elevated, needs further eval. Calcium, PTH orders placed. Kidney numbers are now normal.

## 2017-03-06 NOTE — MR AVS SNAPSHOT
[facility]  Gastroenterology  [address]  [facility] [address]  Phone: [phone]  Fax: [phone]                  Santy Anderson   3/6/2017 9:00 AM   Office Visit    Description:  Male : 1945   Provider:  PAZ Smith   Department:  [facility] - Gastroenterology           Reason for Visit     burning in esophagus                To Do List           Future Appointments        Provider Department Dept Phone    3/6/2017 9:45 AM LABORATORY, [facility] [phone]    3/6/2017 9:50 AM SPECIMEN, [facility] [phone]    3/20/2017 8:40 AM Otto Lara MD [facility] Hemotology Oncology [phone]    3/20/2017 9:20 AM Lulú Estes PA-C [facility] [phone]    4/3/2017 9:00 AM PAZ Smith [facility] Gastroenterology [phone]      Your Future Surgeries/Procedures     2017   Surgery with Davis Pierre III, MD   [facility]  ([facility])    [address] [facility] [address] [phone]              Goals (5 Years of Data)     None      Follow-Up and Disposition     Return in about 4 weeks (around 4/3/2017).      [facility]     [facility] Care [facility] -  Assistance  Registered nurses in the [facility] provide clinical advisement, health education, appointment booking, and other advisory services.  Call for this free service at [phone].             Medications           Message regarding Medications     Verify the changes and/or additions to your medication regime listed below are the same as discussed with your clinician today.  If any of these changes or additions are incorrect, please notify your healthcare provider.             Verify that the below list of medications is an accurate representation of the medications you are currently taking.  If none reported, the list may be blank. If incorrect, please contact your  "healthcare provider. Carry this list with you in case of emergency.           Current Medications     ergocalciferol (VITAMIN D2) 50,000 unit Cap Take 1 capsule (50,000 Units total) by mouth twice a week.    naproxen (NAPROSYN) 500 MG tablet Take 1 tablet (500 mg total) by mouth 2 (two) times daily with meals.    pantoprazole (PROTONIX) 20 MG tablet Take 2 tablets (40 mg total) by mouth 2 (two) times daily before meals.    potassium chloride SA (K-DUR,KLOR-CON) 20 MEQ tablet Take 1 tablet (20 mEq total) by mouth 2 (two) times daily.    sucralfate (CARAFATE) 1 gram tablet Take 1 tablet (1 g total) by mouth 4 (four) times daily before meals and nightly.    tramadol (ULTRAM) 50 mg tablet Take 50 mg by mouth every 6 (six) hours as needed for Pain.           Clinical Reference Information           Your Vitals Were     BP Pulse Height Weight BMI    124/64 100 5' 9" (1.753 m) 67 kg (147 lb 11.3 oz) 21.81 kg/m2      Blood Pressure          Most Recent Value    BP  124/64      Allergies as of 3/6/2017     No Known Allergies      Immunizations Administered on Date of Encounter - 3/6/2017     None      Language Assistance Services     ATTENTION: Language assistance services are available, free of charge. Please call [phone].      ATENCIÓN: Si habla kingsleyañol, tiene a vick disposición servicios gratuitos de asistencia lingüística. Llame al [phone].     [facility] Ý: N?u b?n nói Ti?ng Vi?t, có các d?ch v? h? tr? ngôn ng? mi?n phí dành cho b?n. G?i s? [phone].         [facility] - G[facility]ology complies with applicable Federal civil rights laws and does not discriminate on the basis of race, color, national origin, age, disability, or sex.        "

## 2017-03-07 DIAGNOSIS — E83.52 SERUM CALCIUM ELEVATED: Primary | ICD-10-CM

## 2017-03-07 NOTE — LETTER
[facility] Internal Medicine  [address] [place] [address]  Phone: [phone]  Fax: [phone]

## 2017-03-10 ENCOUNTER — LAB VISIT (OUTPATIENT)
Dept: LAB | Facility: HOSPITAL | Age: 72
End: 2017-03-10
Attending: INTERNAL MEDICINE
Payer: MEDICARE

## 2017-03-10 DIAGNOSIS — E83.52 SERUM CALCIUM ELEVATED: ICD-10-CM

## 2017-03-10 LAB
CA-I BLDV-SCNC: 1.7 MMOL/L
PTH-INTACT SERPL-MCNC: 21 PG/ML

## 2017-03-10 PROCEDURE — 82330 ASSAY OF CALCIUM: CPT

## 2017-03-10 PROCEDURE — 83970 ASSAY OF PARATHORMONE: CPT

## 2017-03-10 PROCEDURE — 36415 COLL VENOUS BLD VENIPUNCTURE: CPT | Mod: PO

## 2017-03-14 ENCOUNTER — TELEPHONE (OUTPATIENT)
Dept: INTERNAL MEDICINE | Facility: CLINIC | Age: 72
End: 2017-03-14

## 2017-03-14 DIAGNOSIS — E83.52 SERUM CALCIUM ELEVATED: Primary | ICD-10-CM

## 2017-03-14 NOTE — TELEPHONE ENCOUNTER
Referral placed. It Dr. Hodge is not in network for him, he needs to check with his insurance to find endo that is in network.

## 2017-03-14 NOTE — TELEPHONE ENCOUNTER
----- Message from EMMANUELLE Frederick sent at 3/13/2017  3:48 PM CDT -----  Calcium is still elevated. He had a hospital stay for high calcium in the past and followed up with Dr. Peterson after that (Sep2016). It was recommended he see endocrinology at that time for further eval of calcium level. If he has not yet done this, I recommend he see endocrinology at this time. Please let me know if referral is needed.

## 2017-03-20 ENCOUNTER — OFFICE VISIT (OUTPATIENT)
Dept: HEMATOLOGY/ONCOLOGY | Facility: CLINIC | Age: 72
End: 2017-03-20
Payer: MEDICARE

## 2017-03-20 ENCOUNTER — OFFICE VISIT (OUTPATIENT)
Dept: PAIN MEDICINE | Facility: CLINIC | Age: 72
End: 2017-03-20
Payer: MEDICARE

## 2017-03-20 VITALS
OXYGEN SATURATION: 100 % | RESPIRATION RATE: 18 BRPM | SYSTOLIC BLOOD PRESSURE: 132 MMHG | TEMPERATURE: 98 F | DIASTOLIC BLOOD PRESSURE: 80 MMHG | WEIGHT: 153.44 LBS | BODY MASS INDEX: 22.73 KG/M2 | HEIGHT: 69 IN | HEART RATE: 88 BPM

## 2017-03-20 VITALS
HEART RATE: 94 BPM | BODY MASS INDEX: 22.66 KG/M2 | WEIGHT: 153 LBS | SYSTOLIC BLOOD PRESSURE: 128 MMHG | DIASTOLIC BLOOD PRESSURE: 71 MMHG | HEIGHT: 69 IN

## 2017-03-20 DIAGNOSIS — M47.817 SPONDYLOSIS OF LUMBOSACRAL REGION WITHOUT MYELOPATHY OR RADICULOPATHY: ICD-10-CM

## 2017-03-20 DIAGNOSIS — M51.36 DDD (DEGENERATIVE DISC DISEASE), LUMBAR: ICD-10-CM

## 2017-03-20 DIAGNOSIS — C34.32 MALIGNANT NEOPLASM OF LOWER LOBE OF LEFT LUNG: Primary | ICD-10-CM

## 2017-03-20 DIAGNOSIS — M54.16 BILATERAL LUMBAR RADICULOPATHY: ICD-10-CM

## 2017-03-20 DIAGNOSIS — M47.817 LUMBOSACRAL SPONDYLOSIS WITHOUT MYELOPATHY: Primary | ICD-10-CM

## 2017-03-20 PROCEDURE — 99999 PR PBB SHADOW E&M-EST. PATIENT-LVL III: CPT | Mod: PBBFAC,,, | Performed by: INTERNAL MEDICINE

## 2017-03-20 PROCEDURE — 99213 OFFICE O/P EST LOW 20 MIN: CPT | Mod: PBBFAC,27,PO | Performed by: PHYSICIAN ASSISTANT

## 2017-03-20 PROCEDURE — 99999 PR PBB SHADOW E&M-EST. PATIENT-LVL III: CPT | Mod: PBBFAC,,, | Performed by: PHYSICIAN ASSISTANT

## 2017-03-20 PROCEDURE — 99213 OFFICE O/P EST LOW 20 MIN: CPT | Mod: S$PBB,,, | Performed by: INTERNAL MEDICINE

## 2017-03-20 PROCEDURE — 99214 OFFICE O/P EST MOD 30 MIN: CPT | Mod: S$PBB,,, | Performed by: PHYSICIAN ASSISTANT

## 2017-03-20 NOTE — PROGRESS NOTES
Subjective:       Patient ID: Santy Anderson is a 71 y.o. male.    Chief Complaint: Follow-up; Hx of Lung Ca; Results; and Lung Cancer    HPI 71-year-old male history of resected non-small cell lung carcinoma with back pain patient is been seen by pain management with epidural injections feeling much better    Past Medical History:   Diagnosis Date    Arthritis     Diabetes mellitus, type 2     Gastric mass     GERD (gastroesophageal reflux disease)     Hyperlipidemia     Lung cancer      Family History   Problem Relation Age of Onset    Diabetes Mother     Hypertension Mother      Social History     Social History    Marital status:      Spouse name: N/A    Number of children: N/A    Years of education: N/A     Occupational History    Not on file.     Social History Main Topics    Smoking status: Former Smoker     Packs/day: 0.50     Years: 25.00     Quit date: 10/16/1988    Smokeless tobacco: Never Used    Alcohol use 9.0 oz/week     1 Glasses of wine, 14 Cans of beer per week      Comment: socailly    Drug use: No    Sexual activity: Not on file     Other Topics Concern    Not on file     Social History Narrative     Past Surgical History:   Procedure Laterality Date    COLONOSCOPY      ESOPHAGOGASTRODUODENOSCOPY      FEMUR SURGERY      GASTRECTOMY      HAND SURGERY      KNEE SURGERY      laparotomy for trauma (negative)  1979    left lower lobectomy         Labs:  Lab Results   Component Value Date    WBC 6.42 03/06/2017    HGB 10.4 (L) 03/06/2017    HCT 31.5 (L) 03/06/2017     (H) 03/06/2017     03/06/2017     BMP  Lab Results   Component Value Date     03/06/2017    K 4.2 03/06/2017     03/06/2017    CO2 30 (H) 03/06/2017    BUN 5 (L) 03/06/2017    CREATININE 0.9 03/06/2017    CALCIUM 12.1 (HH) 03/06/2017    ANIONGAP 8 03/06/2017    ESTGFRAFRICA >60 03/06/2017    EGFRNONAA >60 03/06/2017     Lab Results   Component Value Date    ALT 14 02/26/2017     AST 35 02/26/2017    ALKPHOS 42 (L) 02/26/2017    BILITOT 1.4 (H) 02/26/2017       Lab Results   Component Value Date    IRON 194 (H) 05/22/2015    TIBC 312 05/22/2015    FERRITIN 128 05/22/2015     Lab Results   Component Value Date    WPOMXFKQ92 370 09/23/2016     Lab Results   Component Value Date    FOLATE 8.8 09/23/2016     Lab Results   Component Value Date    TSH 2.376 01/25/2017         Review of Systems   Constitutional: Negative for activity change, appetite change, chills, diaphoresis, fatigue, fever and unexpected weight change.   HENT: Negative for congestion, dental problem, drooling, ear discharge, ear pain, facial swelling, hearing loss, mouth sores, nosebleeds, postnasal drip, rhinorrhea, sinus pressure, sneezing, sore throat, tinnitus, trouble swallowing and voice change.    Eyes: Negative for photophobia, pain, discharge, redness, itching and visual disturbance.   Respiratory: Negative for apnea, cough, choking, chest tightness, shortness of breath, wheezing and stridor.    Cardiovascular: Negative for chest pain, palpitations and leg swelling.   Gastrointestinal: Negative for abdominal distention, abdominal pain, anal bleeding, blood in stool, constipation, diarrhea, nausea, rectal pain and vomiting.   Endocrine: Negative for cold intolerance, heat intolerance, polydipsia, polyphagia and polyuria.   Genitourinary: Negative for decreased urine volume, difficulty urinating, discharge, dysuria, enuresis, flank pain, frequency, genital sores, hematuria, penile pain, penile swelling, scrotal swelling, testicular pain and urgency.   Musculoskeletal: Positive for arthralgias and back pain. Negative for gait problem, joint swelling, myalgias, neck pain and neck stiffness.   Skin: Negative for color change, pallor, rash and wound.   Allergic/Immunologic: Negative for environmental allergies, food allergies and immunocompromised state.   Neurological: Negative for dizziness, tremors, seizures, syncope,  facial asymmetry, speech difficulty, weakness, light-headedness, numbness and headaches.   Hematological: Negative for adenopathy. Does not bruise/bleed easily.   Psychiatric/Behavioral: Negative for agitation, behavioral problems, confusion, decreased concentration, dysphoric mood, hallucinations, self-injury, sleep disturbance and suicidal ideas. The patient is not nervous/anxious and is not hyperactive.        Objective:      Physical Exam   Constitutional: He is oriented to person, place, and time. He appears well-developed and well-nourished. No distress.   HENT:   Head: Normocephalic.   Right Ear: External ear normal.   Left Ear: External ear normal.   Nose: Nose normal. Right sinus exhibits no maxillary sinus tenderness and no frontal sinus tenderness. Left sinus exhibits no maxillary sinus tenderness and no frontal sinus tenderness.   Mouth/Throat: Oropharynx is clear and moist. No oropharyngeal exudate.   Eyes: EOM and lids are normal. Pupils are equal, round, and reactive to light. Right eye exhibits no discharge. Left eye exhibits no discharge. Right conjunctiva is not injected. Right conjunctiva has no hemorrhage. Left conjunctiva is not injected. Left conjunctiva has no hemorrhage. No scleral icterus. Right eye exhibits normal extraocular motion. Left eye exhibits normal extraocular motion.   Neck: Normal range of motion. Neck supple. No JVD present. No tracheal deviation present. No thyromegaly present.   Cardiovascular: Normal rate and regular rhythm.    Pulmonary/Chest: Effort normal. No stridor. No respiratory distress.   Abdominal: Soft. He exhibits no mass. There is no hepatosplenomegaly, splenomegaly or hepatomegaly. There is no tenderness.   Musculoskeletal: Normal range of motion. He exhibits no edema or tenderness.   Lymphadenopathy:        Head (right side): No posterior auricular and no occipital adenopathy present.        Head (left side): No posterior auricular and no occipital adenopathy  present.     He has no cervical adenopathy.        Right cervical: No superficial cervical, no deep cervical and no posterior cervical adenopathy present.       Left cervical: No superficial cervical, no deep cervical and no posterior cervical adenopathy present.     He has no axillary adenopathy.        Right: No supraclavicular adenopathy present.        Left: No supraclavicular adenopathy present.   Neurological: He is alert and oriented to person, place, and time. He has normal strength. No cranial nerve deficit. Coordination normal.   Skin: Skin is dry. No rash noted. He is not diaphoretic. No cyanosis or erythema. Nails show no clubbing.   Psychiatric: He has a normal mood and affect. His behavior is normal. Judgment and thought content normal. Cognition and memory are normal.   Vitals reviewed.          Assessment:       1. Malignant neoplasm of lower lobe of left lung            Plan:     resolved back pain no evidence of malignancy reassurance given will return in 3 months for clinical follow-up last CT of chest in September 2016 we'll reimage later this year for long-term survivor of non-small cell lung cancer

## 2017-03-20 NOTE — MR AVS SNAPSHOT
[facility]  [address]  [place] [address]  Phone: [phone]  Fax: [phone]                  Santy Anderson   3/20/2017 9:20 AM   Office Visit    Description:  Male : 1945   Provider:  Lulú Estes PA-C   Department:  [facility]           Reason for Visit     Follow-up           Diagnoses this Visit        Comments    Lumbosacral spondylosis without myelopathy    -  Primary     Bilateral lumbar radiculopathy         Spondylosis of lumbosacral region without myelopathy or radiculopathy         DDD (degenerative disc disease), lumbar                To Do List           Future Appointments        Provider Department Dept Phone    4/3/2017 9:00 AM PAZ Smith [facility] - Gastroenterology [phone]    2017 8:00 AM Radhika Parr DPM [facility] - Podiatry [phone]    2017 1:20 PM Joanie Peterson MD [facility] - Internal Medicine [phone]    2017 7:40 AM Otto Lara MD [facility] - Hemotology Oncology [phone]      Your Future Surgeries/Procedures     2017   Surgery with Davis Pierre III, MD   [facility])    [address] [place] [address] [phone]              Goals (5 Years of Data)     None      Follow-Up and Disposition     Return if symptoms worsen or fail to improve.      [facility]     [facility] -  Assistance  Registered nurses in the [facility] provide clinical advisement, health education, appointment booking, and other advisory services.  Call for this free service at [phone].             Medications           Message regarding Medications     Verify the changes and/or additions to your medication regime listed below are the same as discussed with your clinician today.  If any of these changes or additions are incorrect, please notify your healthcare provider.             Verify that the  "below list of medications is an accurate representation of the medications you are currently taking.  If none reported, the list may be blank. If incorrect, please contact your healthcare provider. Carry this list with you in case of emergency.           Current Medications     ergocalciferol (VITAMIN D2) 50,000 unit Cap Take 1 capsule (50,000 Units total) by mouth twice a week.    naproxen (NAPROSYN) 500 MG tablet Take 1 tablet (500 mg total) by mouth 2 (two) times daily with meals.    pantoprazole (PROTONIX) 20 MG tablet Take 2 tablets (40 mg total) by mouth 2 (two) times daily before meals.    potassium chloride SA (K-DUR,KLOR-CON) 20 MEQ tablet Take 1 tablet (20 mEq total) by mouth 2 (two) times daily.    sucralfate (CARAFATE) 1 gram tablet Take 1 tablet (1 g total) by mouth 4 (four) times daily before meals and nightly.    tramadol (ULTRAM) 50 mg tablet Take 50 mg by mouth every 6 (six) hours as needed for Pain.           Clinical Reference Information           Your Vitals Were     BP Pulse Height Weight BMI    128/71 94 5' 9" (1.753 m) 69.4 kg (153 lb) 22.59 kg/m2      Blood Pressure          Most Recent Value    BP  128/71      Allergies as of 3/20/2017     No Known Allergies      Immunizations Administered on Date of Encounter - 3/20/2017     None      Language Assistance Services     ATTENTION: Language assistance services are available, free of charge. Please call [phone].      ATENCIÓN: Si habla lila, tiene a vick disposición servicios gratuitos de asistencia lingüística. Llame al [phone].     [facility] Ý: N?u b?n nói Ti?ng Vi?t, có các d?ch v? h? tr? ngôn ng? mi?n phí dành cho b?n. G?i s? [phone].         [facility] complies with applicable Federal civil rights laws and does not discriminate on the basis of race, color, national origin, age, disability, or sex.        "

## 2017-03-20 NOTE — PROGRESS NOTES
Chief Pain Complaint:  Low Back Pain, Bilateral Leg Pain    History of Present Illness:  This patient is a 71 y.o. male who presents today complaining of the above noted pain/s. The patient describes this pain as follows.    - duration of pain: ~ 2 months   - timing: intermittent   - character: stabbing, aching  - radiating, dermatomal: extends into bilateral lower extremities, not strictly dermatomal based on reporting  - antecedent trauma, prior spinal surgery: patient reports prior trauma (had a bowling ball accident in 2007 requiring right tib/fib ORIF), no prior spinal surgery   - pertinent negatives: No fever, No chills, No weight loss, No bladder dysfunction, No bowel dysfunction, No saddle anesthesia  - pertinent positives: generalized nonspecific Lower Extremity weakness bilaterally, this is not acute   - medications, other therapies tried (physical therapy, injections):     >> Tramadol    >> Has previously undergone Physical Therapy (years prior)    >> Has previously undergone spinal injection/s at [facility] with success      IMAGING / Labs / Studies (reviewed on 3/20/2017):    3/11/15 MRI Lumbar Spine Without Contrast    Narrative Technique:  Multiplanar, multisequence MR images were performed of the lumbar spine obtained without contrast.   Comparison: None.   Results:  Lumbar spine alignment is within normal limits. The vertebral body heights are well maintained, with no fracture.  There are multiple fatty deposits versus benign bone hemangioma scattered throughout the lumbosacral spine.  There is disk desiccation   noted at the L3-4 and L4-5 levels.  No significant displacement are noted at these levels.    The conus is normal in appearance, and terminates at the L1-L2 level.  The adjacent soft tissue structures show no significant abnormalities.    L1-L2: There is no focal disc herniation. No significant central canal or neural foraminal narrowing.  L2-L3: There is no focal disc herniation.  No significant central canal or neural foraminal narrowing.  L3-L4: Mild diffuse disk bulge resulting in no central canal narrowing.  There is at least mild narrowing of the left neural foraminal canal and minimal narrowing of the inferior recess of the right neural foraminal canal.   L4-L5: There is no focal disc herniation. Mild bilateral facet arthropathy resulting in mild to moderate narrowing of the left neural foraminal canal.  There is minimal narrowing of the right neural foraminal canal.  L5-S1: There is bilateral facet arthropathy left greater than the right resulting in no significant canal narrowing.  There is mild narrowing of the left neural foraminal canal.       2/01/17 MRI Lumbar Spine W WO Cont    Narrative Technique:  Multiplanar, multisequence MR images were performed of the lumbar spine obtained with and without contrast.  6.5 cc of Gadavist was administered.  Comparison: Study from 03/11/2015   Results:  Lumbar spine alignment is within normal limits. The vertebral body heights are well maintained, with no fracture.  There are multiple fatty deposits versus bone hemangiomas scattered throughout the lumbosacral spine.  No signs of osseous metastatic disease.    The conus medullaris terminates at approximately the mid body of L1.  There is a cyst extending off the lower pole of the left kidney that measures up to 1.6 cm.  There is disc desiccation noted throughout the lumbar spine and most prominent at the L3-L4 and L4-L5 levels.  Incidental note made of a small lipoma of the filum terminale.  L1-L2: No significant central canal or neural foraminal narrowing.  L2-L3: No significant central canal or neural foraminal narrowing.  L3-L4: Mild diffuse disc bulge resulting in no central canal narrowing.  The left neural foraminal canal appears to be moderately narrowed.  The right neural foraminal canal inferior recess is mildly narrowed.  L4-L5:  Mild bilateral facet arthropathy and mild diffuse disc  "bulge resulting in no significant central canal narrowing.  There is moderate to severe narrowing of the left neural foraminal canal with some effacement of the exiting left L4 nerve root and mild to moderate narrowing of the right neural foraminal canal with abutment of the exiting L4 nerve root.  No significant effacement.  L5-S1:  No significant canal narrowing.  There is mild narrowing of either neural foraminal canal.  Bilateral facet arthropathy is noted left greater than the right.       2/11/15 X-Ray Lumbar Spine Complete 5 View    Narrative Comparison: None   Findings:  Generalized osteopenia and degenerative change noted.  Multilevel varying degrees of loss of disc height and facet arthropathy.  No acute fracture or subluxation.  No spondylolysis or spondylolisthesis.  Multilevel marginal spurring.  Pedicles and SI joints appear intact.  Vascular calcifications and postsurgical changes noted.         Review of Systems:  CONSTITUTIONAL: patient denies any fever, chills, or weight loss  SKIN: patient denies any rash or itching  RESPIRATORY: patient denies having any shortness of breath  GASTROINTESTINAL: patient denies having any diarrhea, constipation, or bowel incontinence  GENITOURINARY: patient denies having any abnormal bladder function    MUSCULOSKELETAL:  - patient complains of the above noted pain/s (see chief pain complaint)    NEUROLOGICAL:   - pain as above  - strength in Lower extremities is intact, BILATERALLY  - sensation in Upper extremities is intact, BILATERALLY  - patient denies any loss of bowel or bladder control      PSYCHIATRIC: patient denies any suicidal or homicidal ideations      Physical Exam:    Vitals:  /71  Pulse 94  Ht 5' 9" (1.753 m)  Wt 69.4 kg (153 lb)  BMI 22.59 kg/m2   (reviewed on 3/20/2017)    General: alert and oriented, in no apparent distress  Gait: antalgic gait, using walker  Skin: No rashes, No discoloration, No obvious lesions  HEENT: EOMI  Respiratory: " respirations nonlabored    Musculoskeletal:  - Any pain on flexion, extension, rotation:    >> pain on extension and rotation  - Straight Leg Raise:     >> LEFT :: negative    >> RIGHT :: negative  - Any tenderness to palpation across paraspinal muscles, joints, bursae:     >> across lumbar paraspinals    Right knee:  - Surgical scars, color changes  - Pain with flexion/extension  - Tender over posterior surface of knee and calf    Neuro:  - Extremity Strength:     >> LEFT :: decreased globally    >> RIGHT :: decreased globally  - Extremity Reflexes:    >> LEFT  :: 2+    >> RIGHT :: 2+  - Sensory (sensation to light touch):    >> LEFT :: intact    >> RIGHT :: intact     Psych:  Mood and affect is appropriate      Assessment:  Lumbar Radiculopathy  Lumbar Spondylosis  Lumbar DDD    Plan:  Patient returns for follow-up. He complains of back and bilateral leg pain (left > right). MRI shows L4/5 being the most problematic area. He also has a history of lung cancer status post resection.  - S/p bilateral L4/5 TF RINA on 2-24-17 with great relief (~75%). He feels he can get up better out of the chair and is getting around much better. His PDI score decreased from 51 to 0 post-injection.  - He had injections [facility] with good relief. We requested records last visit, but we have not gotten them. We will re-request today.  - Tramadol helps him, which he takes infrequently. He is not interested in higher dosing.  RTC PRN. I discussed the risks, benefits, and alternatives to potential treatment options. All questions and concerns were fully addressed today in clinic. Dr. Johnson was consulted regarding the patient plan and agrees.              >> Pain Disability Index:  2/13/2017 :: 51  3/20/2017 :: 0

## 2017-03-22 ENCOUNTER — OFFICE VISIT (OUTPATIENT)
Dept: INTERNAL MEDICINE | Facility: CLINIC | Age: 72
End: 2017-03-22
Payer: MEDICARE

## 2017-03-22 VITALS
DIASTOLIC BLOOD PRESSURE: 58 MMHG | TEMPERATURE: 98 F | HEART RATE: 60 BPM | SYSTOLIC BLOOD PRESSURE: 138 MMHG | HEIGHT: 69 IN | BODY MASS INDEX: 22.73 KG/M2 | WEIGHT: 153.44 LBS | OXYGEN SATURATION: 95 %

## 2017-03-22 DIAGNOSIS — E55.9 VITAMIN D DEFICIENCY: ICD-10-CM

## 2017-03-22 DIAGNOSIS — C34.32 MALIGNANT NEOPLASM OF LOWER LOBE OF LEFT LUNG: ICD-10-CM

## 2017-03-22 DIAGNOSIS — K20.80 ESOPHAGITIS, LOS ANGELES GRADE D: ICD-10-CM

## 2017-03-22 DIAGNOSIS — M81.0 OP (OSTEOPOROSIS): Primary | ICD-10-CM

## 2017-03-22 PROCEDURE — 99213 OFFICE O/P EST LOW 20 MIN: CPT | Mod: S$PBB,,, | Performed by: INTERNAL MEDICINE

## 2017-03-22 PROCEDURE — 99999 PR PBB SHADOW E&M-EST. PATIENT-LVL III: CPT | Mod: PBBFAC,,, | Performed by: INTERNAL MEDICINE

## 2017-03-22 PROCEDURE — 99213 OFFICE O/P EST LOW 20 MIN: CPT | Mod: PBBFAC,PO | Performed by: INTERNAL MEDICINE

## 2017-03-22 RX ORDER — NITROFURANTOIN (MACROCRYSTALS) 100 MG/1
100 CAPSULE ORAL 4 TIMES DAILY
COMMUNITY
End: 2017-03-22

## 2017-03-22 RX ORDER — CEPHALEXIN 500 MG/1
500 CAPSULE ORAL 4 TIMES DAILY
COMMUNITY
End: 2017-03-22

## 2017-03-22 RX ORDER — GABAPENTIN 300 MG/1
300 CAPSULE ORAL 3 TIMES DAILY
COMMUNITY
End: 2017-03-22

## 2017-03-22 RX ORDER — OXYCODONE HYDROCHLORIDE 5 MG/1
5 CAPSULE ORAL EVERY 4 HOURS PRN
COMMUNITY
End: 2017-03-22

## 2017-03-22 NOTE — MR AVS SNAPSHOT
[facility] - Internal Medicine  [address] [address]  Phone: [phone]  Fax: [phone]                  Santy Anderson   3/22/2017 4:20 PM   Office Visit    Description:  Male : 1945   Provider:  Joanie Peterson MD   Department:  [facility] - Internal Medicine           Reason for Visit     Medication Problem           Diagnoses this Visit        Comments    OP (osteoporosis)    -  Primary     Vitamin D deficiency         Malignant neoplasm of lower lobe of left lung         Esophagitis, Kod[employer] grade D                To Do List           Future Appointments        Provider Department Dept Phone    4/3/2017 9:00 AM PAZ Smith [facility] - Gastroenterology [phone]    2017 8:00 AM Radhika Parr DPM [facility] Podiatry [phone]    2017 1:20 PM Joanie Peterson MD [facility] Internal Medicine [phone]    2017 7:40 AM Otto Lara MD [facility] Hemotology Oncology [phone]      Your Future Surgeries/Procedures     2017   Surgery with Davis Pierre III, MD   [facility]    [address] [facility] [address] [phone]              Goals (5 Years of Data)     None      Follow-Up and Disposition     Follow-up and Disposition History      [facility]     [facility] Care [facility] -  Assistance  Registered nurses in the [facility] provide clinical advisement, health education, appointment booking, and other advisory services.  Call for this free service at [phone].             Medications           Message regarding Medications     Verify the changes and/or additions to your medication regime listed below are the same as discussed with your clinician today.  If any of these changes or additions are incorrect, please notify your healthcare provider.        STOP taking these medications     gabapentin (NEURONTIN) 300 MG capsule Take 300 mg by mouth 3 (three)  "times daily.    cephALEXin (KEFLEX) 500 MG capsule Take 500 mg by mouth 4 (four) times daily.    oxycodone (OXY-IR) 5 mg Cap Take 5 mg by mouth every 4 (four) hours as needed for Pain.    naproxen (NAPROSYN) 500 MG tablet Take 1 tablet (500 mg total) by mouth 2 (two) times daily with meals.    nitrofurantoin (MACRODANTIN) 100 MG capsule Take 100 mg by mouth 4 (four) times daily.    potassium chloride SA (K-DUR,KLOR-CON) 20 MEQ tablet Take 1 tablet (20 mEq total) by mouth 2 (two) times daily.    sucralfate (CARAFATE) 1 gram tablet Take 1 tablet (1 g total) by mouth 4 (four) times daily before meals and nightly.    tramadol (ULTRAM) 50 mg tablet Take 50 mg by mouth every 6 (six) hours as needed for Pain.           Verify that the below list of medications is an accurate representation of the medications you are currently taking.  If none reported, the list may be blank. If incorrect, please contact your healthcare provider. Carry this list with you in case of emergency.           Current Medications     ergocalciferol (VITAMIN D2) 50,000 unit Cap Take 1 capsule (50,000 Units total) by mouth twice a week.    pantoprazole (PROTONIX) 20 MG tablet Take 2 tablets (40 mg total) by mouth 2 (two) times daily before meals.           Clinical Reference Information           Your Vitals Were     BP Pulse Temp Height Weight SpO2    138/58 (BP Location: Right arm) 60 98.1 °F (36.7 °C) (Tympanic) 5' 9" (1.753 m) 69.6 kg (153 lb 7 oz) 95%    BMI                22.66 kg/m2          Blood Pressure          Most Recent Value    BP  (!)  138/58      Allergies as of 3/22/2017     No Known Allergies      Immunizations Administered on Date of Encounter - 3/22/2017     None      Language Assistance Services     ATTENTION: Language assistance services are available, free of charge. Please call [phone].      ATENCIÓN: Si habla lila, tiene a vick disposición servicios gratuitos de asistencia lingüística. Llame al " [phone].     [facility] Ý: N?u b?n nói Ti?ng Vi?t, có các d?ch v? h? tr? ngôn ng? mi?n phí dành cho b?n. G?i s? [phone].         [facility] complies with applicable Federal civil rights laws and does not discriminate on the basis of race, color, national origin, age, disability, or sex.

## 2017-03-22 NOTE — PROGRESS NOTES
"Subjective:       Patient ID: Santy Anderson is a 71 y.o. male.    Chief Complaint: Medication Problem    HPI Comments: Here for follow up of medical problems.  Back pain is much improved after injections.  DM resolved with wt loss.  Here to discuss all medications and eliminate what not needed.  Taking tylenol pm only for back pain now, good relief and sleeping well.  No dysuria.    Updated/ annual due 2/18:  HM: ref fluvax, 6/16 [id number], 6/16 TDaP, 1/17 BMD hi fx risk hip rec bisphos rep 2y, unk Cscope, 6/16 HCV neg, 7/16 Eye doc at [facility].        Review of Systems   Constitutional: Negative for chills, diaphoresis, fatigue and fever.   Respiratory: Negative for cough, chest tightness and shortness of breath.    Cardiovascular: Negative for chest pain, palpitations and leg swelling.   Gastrointestinal: Negative for blood in stool, constipation, diarrhea, nausea and vomiting.   Genitourinary: Negative for difficulty urinating and frequency.   Musculoskeletal: Negative for arthralgias.       Objective:   BP (!) 138/58 (BP Location: Right arm)  Pulse 60  Temp 98.1 °F (36.7 °C) (Tympanic)   Ht 5' 9" (1.753 m)  Wt 69.6 kg (153 lb 7 oz)  SpO2 95%  BMI 22.66 kg/m2    Physical Exam   Constitutional: He is oriented to person, place, and time. He appears well-developed and well-nourished.   HENT:   Mouth/Throat: Oropharynx is clear and moist.   Neck: Normal range of motion. Neck supple.   Cardiovascular: Normal rate, regular rhythm and intact distal pulses.  Exam reveals no gallop and no friction rub.    No murmur heard.  Pulmonary/Chest: Effort normal and breath sounds normal. He has no wheezes. He has no rales.   Abdominal: Soft. Bowel sounds are normal. He exhibits no mass. There is no tenderness.   Musculoskeletal: He exhibits no edema.   Lymphadenopathy:     He has no cervical adenopathy.   Neurological: He is alert and oriented to person, place, and time.   Psychiatric: He has a normal mood and affect.     "   Assessment:       1. OP (osteoporosis)    2. Vitamin D deficiency    3. Malignant neoplasm of lower lobe of left lung    4. Esophagitis, [place]iddie grade D        Plan:       Santy was seen today for medication problem.    Diagnoses and all orders for this visit:    OP (osteoporosis)    Vitamin D deficiency- cont twice weekly rx, recheck 7/17.    Malignant neoplasm of lower lobe of left lung    Esophagitis, [place]ie grade D- cont PPI.

## 2017-04-04 ENCOUNTER — OFFICE VISIT (OUTPATIENT)
Dept: GASTROENTEROLOGY | Facility: CLINIC | Age: 72
End: 2017-04-04
Payer: MEDICARE

## 2017-04-04 VITALS
HEIGHT: 70 IN | BODY MASS INDEX: 21.47 KG/M2 | DIASTOLIC BLOOD PRESSURE: 60 MMHG | WEIGHT: 149.94 LBS | SYSTOLIC BLOOD PRESSURE: 132 MMHG | HEART RATE: 60 BPM

## 2017-04-04 DIAGNOSIS — K21.00 GASTROESOPHAGEAL REFLUX DISEASE WITH ESOPHAGITIS: Primary | ICD-10-CM

## 2017-04-04 PROCEDURE — 99999 PR PBB SHADOW E&M-EST. PATIENT-LVL II: CPT | Mod: PBBFAC,,, | Performed by: NURSE PRACTITIONER

## 2017-04-04 PROCEDURE — 99214 OFFICE O/P EST MOD 30 MIN: CPT | Mod: S$PBB,,, | Performed by: NURSE PRACTITIONER

## 2017-04-04 PROCEDURE — 99212 OFFICE O/P EST SF 10 MIN: CPT | Mod: PBBFAC,PO | Performed by: NURSE PRACTITIONER

## 2017-04-04 NOTE — PROGRESS NOTES
Clinic Follow Up:  [facility] Follow Up Note    Reason for Follow Up:  The encounter diagnosis was Gastroesophageal reflux disease with esophagitis.    PCP: Joanie Simms       HPI:  This is a 71 y.o. male here for follow up of the above  He reports that since his last visit, he has had some improvement in his pain.  He is taking his PPI daily, and occasionally will take BID.  He does notice good improvement with the BID dosing, but admits he struggles with remembering to take it.   He denies any melena or hematochezia.  He is scheduled for routine colonoscopy on 4/7    Review of Systems   Constitutional: Negative for chills, fever, malaise/fatigue and weight loss.   Respiratory: Negative for cough.    Cardiovascular: Negative for chest pain.   Gastrointestinal:        Per HPI   Musculoskeletal: Negative for myalgias.   Skin: Negative for itching and rash.   Neurological: Negative for headaches.   Psychiatric/Behavioral: The patient is not nervous/anxious.        Medical History:  Past Medical History:   Diagnosis Date    Arthritis     Diabetes mellitus, type 2     Gastric mass     GERD (gastroesophageal reflux disease)     Hyperlipidemia     Lung cancer        Surgical History:   Past Surgical History:   Procedure Laterality Date    COLONOSCOPY      ESOPHAGOGASTRODUODENOSCOPY      FEMUR SURGERY      GASTRECTOMY      HAND SURGERY      KNEE SURGERY      laparotomy for trauma (negative)  1979    left lower lobectomy         Family History:   Family History   Problem Relation Age of Onset    Diabetes Mother     Hypertension Mother        Social History:   Social History   Substance Use Topics    Smoking status: Former Smoker     Packs/day: 0.50     Years: 25.00     Quit date: 10/16/1988    Smokeless tobacco: Never Used    Alcohol use 9.0 oz/week     1 Glasses of wine, 14 Cans of beer per week      Comment: socailly       Allergies: Reviewed    Home Medications:  Current  "Outpatient Prescriptions on File Prior to Visit   Medication Sig Dispense Refill    ergocalciferol (VITAMIN D2) 50,000 unit Cap Take 1 capsule (50,000 Units total) by mouth twice a week. 8 capsule 6    pantoprazole (PROTONIX) 20 MG tablet Take 2 tablets (40 mg total) by mouth 2 (two) times daily before meals. 60 tablet 3     No current facility-administered medications on file prior to visit.        Physical Exam:  Vital Signs:  /60  Pulse 60  Ht 5' 9.5" (1.765 m)  Wt 68 kg (149 lb 14.6 oz)  BMI 21.82 kg/m2  Body mass index is 21.82 kg/(m^2).  Physical Exam   Constitutional: He is oriented to person, place, and time. He appears well-developed.   HENT:   Head: Normocephalic.   Neck: Normal range of motion.   Cardiovascular: Normal rate and regular rhythm.    Pulmonary/Chest: Effort normal and breath sounds normal.   Abdominal: Soft. Bowel sounds are normal. He exhibits no distension. There is no tenderness.   Musculoskeletal: Normal range of motion.   Neurological: He is alert and oriented to person, place, and time.   Skin: Skin is warm and dry.   Psychiatric: He has a normal mood and affect.   Vitals reviewed.      Labs: Pertinent labs reviewed.      Assessment:  1. Gastroesophageal reflux disease with esophagitis        Recommendations:  Gastroesophageal reflux disease with esophagitis  - Continue PPI BID  - Can add zantac 150mg BID PRN for breakthrough  - GERD diet and lifestyle discussed.         Return to Clinic:    Return in about 1 year (around 4/4/2018).      "

## 2017-04-11 ENCOUNTER — ANESTHESIA EVENT (OUTPATIENT)
Dept: ENDOSCOPY | Facility: HOSPITAL | Age: 72
End: 2017-04-11
Payer: MEDICARE

## 2017-04-11 ENCOUNTER — SURGERY (OUTPATIENT)
Age: 72
End: 2017-04-11

## 2017-04-11 ENCOUNTER — ANESTHESIA (OUTPATIENT)
Dept: ENDOSCOPY | Facility: HOSPITAL | Age: 72
End: 2017-04-11
Payer: MEDICARE

## 2017-04-11 ENCOUNTER — HOSPITAL ENCOUNTER (OUTPATIENT)
Facility: HOSPITAL | Age: 72
Discharge: HOME | End: 2017-04-11
Attending: INTERNAL MEDICINE | Admitting: INTERNAL MEDICINE
Payer: MEDICARE

## 2017-04-11 VITALS
DIASTOLIC BLOOD PRESSURE: 77 MMHG | WEIGHT: 149 LBS | BODY MASS INDEX: 22.07 KG/M2 | SYSTOLIC BLOOD PRESSURE: 130 MMHG | RESPIRATION RATE: 18 BRPM | HEIGHT: 69 IN | HEART RATE: 78 BPM | OXYGEN SATURATION: 99 % | TEMPERATURE: 98 F

## 2017-04-11 VITALS — RESPIRATION RATE: 15 BRPM

## 2017-04-11 DIAGNOSIS — Z12.11 SCREEN FOR COLON CANCER: ICD-10-CM

## 2017-04-11 LAB — POCT GLUCOSE: 103 MG/DL (ref 70–110)

## 2017-04-11 PROCEDURE — 37000009 HC ANESTHESIA EA ADD 15 MINS: Performed by: INTERNAL MEDICINE

## 2017-04-11 PROCEDURE — 25000003 PHARM REV CODE 250: Performed by: INTERNAL MEDICINE

## 2017-04-11 PROCEDURE — 45385 COLONOSCOPY W/LESION REMOVAL: CPT | Performed by: INTERNAL MEDICINE

## 2017-04-11 PROCEDURE — 63600175 PHARM REV CODE 636 W HCPCS: Performed by: NURSE ANESTHETIST, CERTIFIED REGISTERED

## 2017-04-11 PROCEDURE — 82962 GLUCOSE BLOOD TEST: CPT | Performed by: INTERNAL MEDICINE

## 2017-04-11 PROCEDURE — 25000003 PHARM REV CODE 250: Performed by: NURSE ANESTHETIST, CERTIFIED REGISTERED

## 2017-04-11 PROCEDURE — 45385 COLONOSCOPY W/LESION REMOVAL: CPT | Mod: PT,,, | Performed by: INTERNAL MEDICINE

## 2017-04-11 PROCEDURE — 27201089 HC SNARE, DISP (ANY): Performed by: INTERNAL MEDICINE

## 2017-04-11 PROCEDURE — 88305 TISSUE EXAM BY PATHOLOGIST: CPT | Performed by: PATHOLOGY

## 2017-04-11 PROCEDURE — 37000008 HC ANESTHESIA 1ST 15 MINUTES: Performed by: INTERNAL MEDICINE

## 2017-04-11 PROCEDURE — 88305 TISSUE EXAM BY PATHOLOGIST: CPT | Mod: 26,,, | Performed by: PATHOLOGY

## 2017-04-11 PROCEDURE — 45380 COLONOSCOPY AND BIOPSY: CPT | Mod: 59,,, | Performed by: INTERNAL MEDICINE

## 2017-04-11 PROCEDURE — 27201012 HC FORCEPS, HOT/COLD, DISP: Performed by: INTERNAL MEDICINE

## 2017-04-11 PROCEDURE — 45380 COLONOSCOPY AND BIOPSY: CPT | Performed by: INTERNAL MEDICINE

## 2017-04-11 RX ORDER — LIDOCAINE HCL/PF 100 MG/5ML
SYRINGE (ML) INTRAVENOUS
Status: DISCONTINUED | OUTPATIENT
Start: 2017-04-11 | End: 2017-04-11

## 2017-04-11 RX ORDER — PROPOFOL 10 MG/ML
INJECTION, EMULSION INTRAVENOUS
Status: DISCONTINUED | OUTPATIENT
Start: 2017-04-11 | End: 2017-04-11

## 2017-04-11 RX ORDER — SODIUM CHLORIDE, SODIUM LACTATE, POTASSIUM CHLORIDE, CALCIUM CHLORIDE 600; 310; 30; 20 MG/100ML; MG/100ML; MG/100ML; MG/100ML
INJECTION, SOLUTION INTRAVENOUS CONTINUOUS
Status: DISCONTINUED | OUTPATIENT
Start: 2017-04-11 | End: 2017-04-11 | Stop reason: HOSPADM

## 2017-04-11 RX ADMIN — PROPOFOL 50 MG: 10 INJECTION, EMULSION INTRAVENOUS at 01:04

## 2017-04-11 RX ADMIN — PROPOFOL 20 MG: 10 INJECTION, EMULSION INTRAVENOUS at 01:04

## 2017-04-11 RX ADMIN — LIDOCAINE HYDROCHLORIDE 50 MG: 20 INJECTION, SOLUTION INTRAVENOUS at 01:04

## 2017-04-11 RX ADMIN — SODIUM CHLORIDE, SODIUM LACTATE, POTASSIUM CHLORIDE, AND CALCIUM CHLORIDE: 600; 310; 30; 20 INJECTION, SOLUTION INTRAVENOUS at 01:04

## 2017-04-11 NOTE — TRANSFER OF CARE
"Anesthesia Transfer of Care Note    Patient: Santy Anderson    Procedure(s) Performed: Procedure(s) (LRB):  COLONOSCOPY (N/A)    Patient location: PACU    Anesthesia Type: MAC    Transport from OR: Transported from OR on room air with adequate spontaneous ventilation    Post pain: adequate analgesia    Post assessment: no apparent anesthetic complications    Post vital signs: stable    Level of consciousness: awake and alert    Nausea/Vomiting: no nausea/vomiting    Complications: none          Last vitals:   Visit Vitals    BP (!) 147/91 (BP Location: Left arm, Patient Position: Lying, BP Method: Automatic)    Pulse 94    Temp 36.6 °C (97.8 °F) (Oral)    Resp 18    Ht 5' 9" (1.753 m)    Wt 67.6 kg (149 lb)    SpO2 100%    BMI 22 kg/m2     "

## 2017-04-11 NOTE — PLAN OF CARE
Dr Wise came to bedside and discussed findings. NO N/V,  no abdominal pain, no GI bleeding, and vitals stable.  Pt discharged from unit.

## 2017-04-11 NOTE — ANESTHESIA RELEASE NOTE
"Anesthesia Release from PACU Note    Patient: Santy Anderson    Procedure(s) Performed: Procedure(s) (LRB):  COLONOSCOPY (N/A)    Anesthesia type: MAC    Post pain: Adequate analgesia    Post assessment: no apparent anesthetic complications, tolerated procedure well and no evidence of recall    Last Vitals:   Visit Vitals    BP (!) 147/91 (BP Location: Left arm, Patient Position: Lying, BP Method: Automatic)    Pulse 94    Temp 36.6 °C (97.8 °F) (Oral)    Resp 18    Ht 5' 9" (1.753 m)    Wt 67.6 kg (149 lb)    SpO2 100%    BMI 22 kg/m2       Post vital signs: stable    Level of consciousness: awake, alert  and oriented    Nausea/Vomiting: no nausea/no vomiting    Complications: none    Airway Patency: patent    Respiratory: unassisted, spontaneous ventilation, room air    Cardiovascular: stable    Hydration: euvolemic  "

## 2017-04-11 NOTE — IP AVS SNAPSHOT
[facility]  [id number] [place] [address] [address] [address]           Patient Discharge Instructions   Our goal is to set you up for success. This packet includes information on your condition, medications, and your home care.  It will help you care for yourself to prevent having to return to the hospital.     Please ask your nurse if you have any questions.      There are many details to remember when preparing to leave the hospital. Here is what you will need to do:    1. Take your medicine. If you are prescribed medications, review your Medication List on the following pages. You may have new medications to  at the pharmacy and others that you'll need to stop taking. Review the instructions for how and when to take your medications. Talk with your doctor or nurses if you are unsure of what to do.     2. Go to your follow-up appointments. Specific follow-up information is listed in the following pages. Your may be contacted by a nurse or clinical provider about future appointments. Be sure we have all of the phone numbers to reach you. Please contact your provider's office if you are unable to make an appointment.     3. Watch for warning signs. Your doctor or nurse will give you detailed warning signs to watch for and when to call for assistance. These instructions may also include educational information about your condition. If you experience any of warning signs to your health, call your doctor.               ** Verify the list of medication(s) below is accurate and up to date. Carry this with you in case of emergency. If your medications have changed, please notify your healthcare provider.             Medication List      CONTINUE taking these medications        Additional Info                      ergocalciferol 50,000 unit Cap   Commonly known as:  VITAMIN D2   Quantity:  8 capsule   Refills:  6   Dose:  73766 Units   Comments:  Generic For:DRISDOL    [id number]    Instructions:   Take 1 capsule (50,000 Units total) by mouth twice a week.     Begin Date    AM    Noon    PM    Bedtime       pantoprazole 20 MG tablet   Commonly known as:  PROTONIX   Quantity:  60 tablet   Refills:  3   Dose:  40 mg    Instructions:  Take 2 tablets (40 mg total) by mouth 2 (two) times daily before meals.     Begin Date    AM    Noon    PM    Bedtime                  Please bring to all follow up appointments:    1. A copy of your discharge instructions.  2. All medicines you are currently taking in their original bottles.  3. Identification and insurance card.    Please arrive 15 minutes ahead of scheduled appointment time.    Please call 24 hours in advance if you must reschedule your appointment and/or time.        Your Scheduled Appointments     Apr 12, 2017  8:00 AM CDT   Established Patient Visit with Radhika Parr DPM   [facility] - Podiatry ([facility])    [address] [facility] [place] [address]   [phone]            Jul 05, 2017  1:20 PM CDT   Established Patient Visit with Joanie Peterson MD   [facility] - Internal Medicine ([facility])    [address] [facility] [address]  [place] [address]   [phone]            Jul 18, 2017  7:40 AM CDT   Established Patient Visit with Otto Lara MD   [facility] - Hemotology Oncology ([facility])    [address] [place] [address]   [phone]              Follow-up Information     Follow up with Joanie Peterson MD.    Specialty:  Internal Medicine    Contact information:    [address]  [place] [address]  [phone]          Discharge Instructions     Future Orders    Activity as tolerated     Diet general     Questions:    Total calories:      Fat restriction, if any:      Protein restriction, if any:      Na restriction, if any:      Fluid restriction:      Additional restrictions:          Admission Information     Date & Time Provider Department CSN    4/11/2017 11:04 AM Brianne Wise MD [facility] -  [id number]     "  Care Providers     Provider Role Specialty Primary office phone    Brianne Wise MD Attending Provider Gastroenterology [phone]    Brianne Wise MD Surgeon  Gastroenterology [phone]      Your Vitals Were     BP Pulse Temp Resp Height Weight    147/91 (BP Location: Left arm, Patient Position: Lying, BP Method: Automatic) 94 97.8 °F (36.6 °C) (Oral) 18 5' 9" (1.753 m) 67.6 kg (149 lb)    SpO2 BMI             100% 22 kg/m2         Recent Lab Values        10/30/2013 6/22/2016 1/25/2017                     6:20 PM  8:14 AM  7:45 AM         A1C 4.7 4.9 4.5         Comment for A1C at  7:45 AM on 1/25/2017:  According to ADA guidelines, hemoglobin A1C <7.0% represents  optimal control in non-pregnant diabetic patients.  Different  metrics may apply to specific populations.   Standards of Medical Care in Diabetes - 2016.  For the purpose of screening for the presence of diabetes:  <5.7%     Consistent with the absence of diabetes  5.7-6.4%  Consistent with increasing risk for diabetes   (prediabetes)  >or=6.5%  Consistent with diabetes  Currently no consensus exists for use of hemoglobin A1C  for diagnosis of diabetes for children.        Pending Labs     Order Current Status    Specimen to Pathology - Surgery Collected (04/11/17 1325)      Allergies as of 4/11/2017     No Known Allergies      [facility]     [facility] Nurse Care Line - 24/7 Assistance  Unless otherwise directed by your provider, please contact [facility], our nurse care line that is available for 24/7 assistance.     Registered nurses in the [facility] provide clinical advisement, health education, appointment booking, and other advisory services.  Call for this free service at [phone].        Advance Directives     An advance directive is a document which, in the event you are no longer able to make decisions for yourself, tells your healthcare team what kind of treatment you do or do not want to " receive, or who you would like to make those decisions for you.  If you do not currently have an advance directive, [facility] encourages you to create one.  For more information call:  [phone]), [phone] ([phone]),  or log on to [email or web address].        Smoking Cessation     If you would like to quit smoking:   You may be eligible for free services if you are a [place] resident and started smoking cigarettes before September 1, 1988.  Call the Smoking Cessation Trust (SCT) toll free at (410) 288-5064 or (201) 079-7481.   Call 4-046-QUIT-NOW if you do not meet the above criteria.   Contact us via email: [email or web address]   View our website for more information: [email or web address]        Language Assistance Services     ATTENTION: Language assistance services are available, free of charge. Please call [phone].      ATENCIÓN: Si habla kingsleyañol, tiene a vick disposición servicios gratuitos de asistencia lingüística. Llame al [phone].     CHÚ Ý: N?u b?n nói Ti?ng Vi?t, có các d?ch v? h? tr? ngôn ng? mi?n phí dành cho b?n. G?i s? [phone].         [facility] complies with applicable Federal civil rights laws and does not discriminate on the basis of race, color, national origin, age, disability, or sex.

## 2017-04-11 NOTE — ANESTHESIA POSTPROCEDURE EVALUATION
"Anesthesia Post Evaluation    Patient: Santy Anderson    Procedure(s) Performed: Procedure(s) (LRB):  COLONOSCOPY (N/A)    Final Anesthesia Type: MAC  Patient location during evaluation: PACU  Patient participation: Yes- Able to Participate  Level of consciousness: awake and alert and oriented  Post-procedure vital signs: reviewed and stable  Pain management: adequate  Airway patency: patent  PONV status at discharge: No PONV  Anesthetic complications: no      Cardiovascular status: blood pressure returned to baseline  Respiratory status: unassisted, spontaneous ventilation and room air  Hydration status: euvolemic  Follow-up not needed.        Visit Vitals    BP (!) 147/91 (BP Location: Left arm, Patient Position: Lying, BP Method: Automatic)    Pulse 94    Temp 36.6 °C (97.8 °F) (Oral)    Resp 18    Ht 5' 9" (1.753 m)    Wt 67.6 kg (149 lb)    SpO2 100%    BMI 22 kg/m2       Pain/Roberto Score: Pain Assessment Performed: Yes (4/11/2017 11:56 AM)      "

## 2017-04-11 NOTE — H&P
Short Stay Endoscopy History and Physical    PCP - Joanie Simms MD    Procedure - Colonoscopy  ASA - II  Mallampati - per anesthesia  History of Anesthesia problems - no  Family history Anesthesia problems -  no     HPI:  This is a 71 y.o. male here for evaluation of :  Screening for colon cancer    Average Risk Screening:Yes  Family history of colon cancer: No  History of polyps: No  Anemia: No  Blood in stools: No  Diarrhea: No  Abdominal Pain: No    Review of Systems:  CONSTITUTIONAL: Denies weight change,  fatigue, fevers, chills, night sweats.  CARDIOVASCULAR: Denies chest pain, shortness of breath, orthopnea and edema.  RESPIRATORY: Denies cough, hemoptysis, dyspnea, and wheezing.  GI: See HPI.    Medical History:  Past Medical History:   Diagnosis Date    Arthritis     Diabetes mellitus, type 2     Gastric mass     GERD (gastroesophageal reflux disease)     Hyperlipidemia     Lung cancer        Surgical History:   Past Surgical History:   Procedure Laterality Date    COLONOSCOPY      ESOPHAGOGASTRODUODENOSCOPY      FEMUR SURGERY      GASTRECTOMY      HAND SURGERY      KNEE SURGERY      laparotomy for trauma (negative)  1979    left lower lobectomy         Family History:   Family History   Problem Relation Age of Onset    Diabetes Mother     Hypertension Mother        Social History:   Social History   Substance Use Topics    Smoking status: Former Smoker     Packs/day: 0.50     Years: 25.00     Quit date: 10/16/1988    Smokeless tobacco: Never Used    Alcohol use 9.0 oz/week     1 Glasses of wine, 14 Cans of beer per week      Comment: socailly       Allergies: Reviewed.    Medications:  No current facility-administered medications on file prior to encounter.      Current Outpatient Prescriptions on File Prior to Encounter   Medication Sig Dispense Refill    ergocalciferol (VITAMIN D2) 50,000 unit Cap Take 1 capsule (50,000 Units total) by mouth twice a week. 8 capsule 6     pantoprazole (PROTONIX) 20 MG tablet Take 2 tablets (40 mg total) by mouth 2 (two) times daily before meals. 60 tablet 3       Physical Exam:  Vital Signs:   Vitals:    04/11/17 1156   BP: (!) 147/91   Pulse: 94   Resp: 18   Temp: 97.8 °F (36.6 °C)     General Appearance: Well appearing in no acute distress  ENT: OP clear  Chest: CTA B  CV: RRR, no m/r/g  Abd: s/nt/nd/nabs  Ext: no edema    Labs:  Lab Results   Component Value Date    WBC 6.42 03/06/2017    HGB 10.4 (L) 03/06/2017    HCT 31.5 (L) 03/06/2017     (H) 03/06/2017     03/06/2017     Lab Results   Component Value Date    INR 1.0 08/03/2010    INR 1.0 07/08/2010     Lab Results   Component Value Date    IRON 194 (H) 05/22/2015    TIBC 312 05/22/2015    FERRITIN 128 05/22/2015         IMPRESSION:  Patient Active Problem List   Diagnosis    Malignant neoplasm of lower lobe of left lung    Localized osteoarthrosis, lower leg    Chondromalacia    Lumbar spondylosis    Bilateral hip joint arthritis    DDD (degenerative disc disease), lumbar    Post-traumatic osteoarthritis of right knee    Esophagitis, [place] grade D    Dysphagia    Vitamin D deficiency    OP (osteoporosis)    Lumbar radiculopathy    Screen for colon cancer       Colon cancer screening    Plan:  I have explained the risks and benefits of colonoscopy to the patient including but not limited to bleeding, perforation, infection, and death. The patient wishes to proceed with colonoscopy.

## 2017-04-11 NOTE — ANESTHESIA PREPROCEDURE EVALUATION
04/11/2017  Santy Anderson is a 71 y.o., male.    [facility] Anesthesia Evaluation    I have reviewed the Patient Summary Reports.    I have reviewed the Nursing Notes.   I have reviewed the Medications.     Review of Systems  Anesthesia Hx:  No problems with previous Anesthesia    Social:  Former Smoker, Alcohol Use    Hematology/Oncology:  Hematology Normal       -- Cancer in past history:    EENT/Dental:EENT/Dental Normal   Cardiovascular:   Exercise tolerance: good Sinus tachycardia with Premature supraventricular complexes  Low voltage QRS  Borderline Abnormal ECG   Renal/:  Renal/ Normal     Hepatic/GI:   GERD, well controlled 0730 last drink of fluid.   Musculoskeletal:   Arthritis     Neurological:   Neuromuscular Disease,    Endocrine:   Diabetes, well controlled, type 2    Dermatological:  Skin Normal    Psych:  Psychiatric Normal           Physical Exam  General:  Well nourished    Airway/Jaw/Neck:  Airway Findings: Mallampati: III                Anesthesia Plan  Type of Anesthesia, risks & benefits discussed:  Anesthesia Type:  MAC  Patient's Preference:   Intra-op Monitoring Plan:   Intra-op Monitoring Plan Comments:   Post Op Pain Control Plan:   Post Op Pain Control Plan Comments:   Induction:   IV  Beta Blocker:  Patient is not currently on a Beta-Blocker (No further documentation required).       Informed Consent: Patient understands risks and agrees with Anesthesia plan.  Questions answered. Anesthesia consent signed with patient.  ASA Score: 3     Day of Surgery Review of History & Physical: I have interviewed and examined the patient. I have reviewed the patient's H&P dated: 04/11/17. There are no significant changes.  H&P update referred to the surgeon.         Ready For Surgery From Anesthesia Perspective.

## 2017-04-11 NOTE — BRIEF OP NOTE
[facility]  Brief Operative Note     SUMMARY     Surgery Date: 4/11/2017     Surgeon(s) and Role:     * Brianne Wise MD - Primary    Assisting Surgeon: None    Pre-op Diagnosis:  Encounter for preventive health examination [Z00.00]    Post-op Diagnosis:  Post-Op Diagnosis Codes:     * Encounter for preventive health examination [Z00.00]    Procedure(s) (LRB):  COLONOSCOPY (N/A)    Anesthesia: Choice    Description of the findings of the procedure: Procedure completed. See Procedure note for details.     Findings/Key Components:  Procedure completed. See Procedure note for details.     Prosthetic/Devices: None    Estimated Blood Loss: None         Specimens:   Specimen (12h ago through future)    Start     Ordered    04/11/17 1323  Specimen to Pathology - Surgery  Once     Comments:  #1 Sigmoid polyps x 3    04/11/17 1325          Discharge Note    SUMMARY     Admit Date: 4/11/2017    Discharge Date and Time: 4/11/2017    Hospital Course (synopsis of major diagnoses, care, treatment, and services provided during the course of the hospital stay): Procedure completed. See Procedure note for details.     Final Diagnosis: Post-Op Diagnosis Codes:     * Encounter for preventive health examination [Z00.00]    Disposition: Discharge home when discharge criteria met.    Follow Up/Patient Instructions: With primary care physician as previously scheduled.    Medications:  Reconciled Home Medications: Current Discharge Medication List      CONTINUE these medications which have NOT CHANGED    Details   ergocalciferol (VITAMIN D2) 50,000 unit Cap Take 1 capsule (50,000 Units total) by mouth twice a week.  Qty: 8 capsule, Refills: 6    Comments: Generic For:DRISDOL    [id number]      pantoprazole (PROTONIX) 20 MG tablet Take 2 tablets (40 mg total) by mouth 2 (two) times daily before meals.  Qty: 60 tablet, Refills: 3    Associated Diagnoses: Intractable vomiting with nausea, vomiting of unspecified type;  Epigastric pain             Discharge Procedure Orders  Diet general     Activity as tolerated       Follow-up Information     Follow up with Joanie Simms MD.    Specialty:  Internal Medicine    Contact information:    [address] [address] [phone]

## 2017-04-12 ENCOUNTER — OFFICE VISIT (OUTPATIENT)
Dept: PODIATRY | Facility: CLINIC | Age: 72
End: 2017-04-12
Payer: MEDICARE

## 2017-04-12 VITALS
WEIGHT: 149.5 LBS | BODY MASS INDEX: 22.14 KG/M2 | HEART RATE: 100 BPM | DIASTOLIC BLOOD PRESSURE: 84 MMHG | SYSTOLIC BLOOD PRESSURE: 151 MMHG | HEIGHT: 69 IN

## 2017-04-12 DIAGNOSIS — B35.1 DERMATOPHYTOSIS OF NAIL: Primary | ICD-10-CM

## 2017-04-12 DIAGNOSIS — E11.42 DM TYPE 2 WITH DIABETIC PERIPHERAL NEUROPATHY: ICD-10-CM

## 2017-04-12 PROCEDURE — 99999 PR PBB SHADOW E&M-EST. PATIENT-LVL III: CPT | Mod: PBBFAC,,, | Performed by: PODIATRIST

## 2017-04-12 PROCEDURE — 11721 DEBRIDE NAIL 6 OR MORE: CPT | Mod: Q9,PBBFAC,PO | Performed by: PODIATRIST

## 2017-04-12 PROCEDURE — 99213 OFFICE O/P EST LOW 20 MIN: CPT | Mod: PBBFAC,PO | Performed by: PODIATRIST

## 2017-04-12 PROCEDURE — 11721 DEBRIDE NAIL 6 OR MORE: CPT | Mod: Q9,S$PBB,, | Performed by: PODIATRIST

## 2017-04-12 PROCEDURE — 99213 OFFICE O/P EST LOW 20 MIN: CPT | Mod: 25,S$PBB,, | Performed by: PODIATRIST

## 2017-04-12 NOTE — MR AVS SNAPSHOT
[facility]  [address] [place] [address]  Phone: [phone]  Fax: [phone]                  Santy Anderson   2017 8:00 AM   Office Visit    Description:  Male : 1945   Provider:  Radhika Parr DPM   Department:  [facility]           Reason for Visit     Routine Foot Care           Diagnoses this Visit        Comments    Dermatophytosis of nail    -  Primary     DM type 2 with diabetic peripheral neuropathy                To Do List           Future Appointments        Provider Department Dept Phone    2017 1:20 PM Joanie Peterson MD [facility] [phone]    2017 8:00 AM Radhika Parr DPM [facility] [phone]    2017 7:40 AM Otto Lara MD [facility] Hemot[facility] Oncology [phone]      Goals (5 Years of Data)     None      [facility]     [facility] Nurse Care Line -  Assistance  Unless otherwise directed by your provider, please contact [facility], our nurse care line that is available for  assistance.     Registered nurses in the [facility] provide: appointment scheduling, clinical advisement, health education, and other advisory services.  Call: [phone] (toll free)               Medications           Message regarding Medications     Verify the changes and/or additions to your medication regime listed below are the same as discussed with your clinician today.  If any of these changes or additions are incorrect, please notify your healthcare provider.             Verify that the below list of medications is an accurate representation of the medications you are currently taking.  If none reported, the list may be blank. If incorrect, please contact your healthcare provider. Carry this list with you in case of emergency.           Current Medications     ergocalciferol (VITAMIN D2) 50,000 unit Cap Take 1 capsule (50,000 Units total) by mouth twice a week.    pantoprazole (PROTONIX)  "20 MG tablet Take 2 tablets (40 mg total) by mouth 2 (two) times daily before meals.           Clinical Reference Information           Your Vitals Were     BP Pulse Height Weight BMI    151/84 (BP Location: Right arm, Patient Position: Sitting, BP Method: Automatic) 100 5' 9" (1.753 m) 67.8 kg (149 lb 7.6 oz) 22.07 kg/m2      Blood Pressure          Most Recent Value    BP  (!)  151/84      Allergies as of 4/12/2017     No Known Allergies      Immunizations Administered on Date of Encounter - 4/12/2017     None      Language Assistance Services     ATTENTION: Language assistance services are available, free of charge. Please call [phone].      ATENCIÓN: Si habla kingsleythea, tiene a vick disposición servicios gratuitos de asistencia lingüística. Llame al [phone].     CHÚ Ý: N?u b?n nói Ti?ng Vi?t, có các d?ch v? h? tr? ngôn ng? mi?n phí dành cho b?n. G?i s? [phone].         [facility] complies with applicable Federal civil rights laws and does not discriminate on the basis of race, color, national origin, age, disability, or sex.        "

## 2017-04-12 NOTE — PROGRESS NOTES
"PODIATRY NOTE    CHIEF COMPLAINT  Chief Complaint   Patient presents with    Routine Foot Care     Last visit with PCP Dr. Simms 3/22/17        HPI  Santy Anderson is a 69 y.o. male w/ PMH of DM2, Malignant neoplasm, hx of traumatic tibial fracture RIGHT Leg, hx of Compartment syndrome w/ fasciotomy RIGHT , HPLD, and other medical problems, who presents today for follow up diabetic foot care. Pt denies any new problems. He relates he has painful toenails due to increased pressure aggravated by shoes and weight bearing. Pain is relieved with routine nail debridements.    REVIEW OF SYSTEMS  General: Denies any fever or chills  Chest: Denies shortness of breath, wheezing, coughing, or sputum production  Heart: Denies chest pain.  As noted above and per history of current illness above, otherwise negative in the remainder of the 14 systems.     PHYSICAL EXAM  Vitals:    04/12/17 0812   BP: (!) 151/84   Pulse: 100   Weight: 67.8 kg (149 lb 7.6 oz)   Height: 5' 9" (1.753 m)   PainSc:   5   PainLoc: Foot       GEN:  This patient is well-developed, well-nourished and appears stated age, well-oriented to person, place and time, and cooperative and pleasant on today's visit.      LOWER EXTREMITY  Vascular:   · DP pedal pulse 1/4 b/l, PT pedal pulse 1/4 b/l  · Skin temperature warm to COOL from prox to distally  · CFT <5 secs b/l  · There is generalized edema non pitting noted b/l       Dermatologic:   · Thickened, dystrophic, elongated toenails with subungal debris 1-5 b/l.   · No open skin lesions noted  · No erythema or drainage noted b/l.  · Webspaces are C/D/I B/L.  · There is diffuse or localized hyperkeratotic tissue noted plantar foot b/l.   · Skin texture and turgor dry/shiny  · There is no pedal hair growth noted    Neurologic:  · Vibratory sensation diminished at level of Hallux IPJ b/l    · Protective sensation absent at 9/10 sites upon examination with [place] Weinsten 5.07 g monofilament.   · Propioception " intact at 1st MTPJ b/l.   · Achilles and patellar deep tendon reflexes intact  · Babinski reflex absent b/l. Light touch and sharp/dull sensation intact b/l.    Musculoskeletal/Orthopedic:  · No symptomatic structural abnormalities noted.   · Muscle strength is 5/5 for foot inverters, everters, plantarflexors, and dorsiflexors. Muscle tone is normal.  · Ankle joint ROM decreased with knee extended, mild increase with knee flexed b/l, no pain or crepitus throughout ROM of AJ.    ASSESSMENT  1. Dermatophytosis of nail  2. DM with neurological manifestations      Plan  -Discuss presenting problems, etiology, pathologic processes and management options with patient today.   -With patient's permission, nails were aggressively reduced and debrided x 10 to their soft tissue attachment mechanically and with electric , removing all offending nail and debris. Patient relates relief following the procedure. Patient will continue to monitor the areas daily, inspect feet, wear protective shoe gear when ambulatory, moisturizer to maintain skin integrity  -RTC in 2-3 months for RFC      Future Appointments  Date Time Provider Department Center   7/5/2017 1:20 PM Joanie Peterson MD [facility] IM [facility]   7/12/2017 8:00 AM Radhika Parr DPM [facility] POD [facility]   7/18/2017 7:40 AM Otto Lara MD [facility] HEM ONC [facility]           Report Electronically Signed By:  Radhika Parr DPM   Podiatric Medicine & Surgery  [facility]  4/12/2017

## 2017-05-03 DIAGNOSIS — M25.569 KNEE PAIN, UNSPECIFIED CHRONICITY, UNSPECIFIED LATERALITY: Primary | ICD-10-CM

## 2017-05-08 ENCOUNTER — HOSPITAL ENCOUNTER (OUTPATIENT)
Dept: RADIOLOGY | Facility: HOSPITAL | Age: 72
Discharge: HOME | End: 2017-05-08
Attending: PHYSICIAN ASSISTANT
Payer: MEDICARE

## 2017-05-08 ENCOUNTER — OFFICE VISIT (OUTPATIENT)
Dept: ORTHOPEDICS | Facility: CLINIC | Age: 72
End: 2017-05-08
Payer: MEDICARE

## 2017-05-08 VITALS
DIASTOLIC BLOOD PRESSURE: 83 MMHG | HEART RATE: 94 BPM | BODY MASS INDEX: 21.19 KG/M2 | SYSTOLIC BLOOD PRESSURE: 133 MMHG | HEIGHT: 70 IN | WEIGHT: 148 LBS

## 2017-05-08 DIAGNOSIS — Z87.81 HISTORY OF TIBIAL FRACTURE: ICD-10-CM

## 2017-05-08 DIAGNOSIS — M25.569 KNEE PAIN, UNSPECIFIED CHRONICITY, UNSPECIFIED LATERALITY: ICD-10-CM

## 2017-05-08 DIAGNOSIS — M62.81 MUSCLE WEAKNESS (GENERALIZED): ICD-10-CM

## 2017-05-08 DIAGNOSIS — M17.31 POST-TRAUMATIC OSTEOARTHRITIS OF ONE KNEE, RIGHT: ICD-10-CM

## 2017-05-08 DIAGNOSIS — R22.41 MASS OF THIGH, RIGHT: Primary | ICD-10-CM

## 2017-05-08 PROCEDURE — 99214 OFFICE O/P EST MOD 30 MIN: CPT | Mod: S$PBB,,, | Performed by: PHYSICIAN ASSISTANT

## 2017-05-08 PROCEDURE — 73562 X-RAY EXAM OF KNEE 3: CPT | Mod: 26,50,, | Performed by: RADIOLOGY

## 2017-05-08 PROCEDURE — 99213 OFFICE O/P EST LOW 20 MIN: CPT | Mod: PBBFAC,25 | Performed by: PHYSICIAN ASSISTANT

## 2017-05-08 PROCEDURE — 99999 PR PBB SHADOW E&M-EST. PATIENT-LVL III: CPT | Mod: PBBFAC,,, | Performed by: PHYSICIAN ASSISTANT

## 2017-05-08 NOTE — PROGRESS NOTES
Subjective:      Patient ID: Santy Anderson is a 71 y.o. male.    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 Lara      Date of Injury: 6/7/07      Problem Description: He has continued right knee discomfort.  He has been seeing his interventional spine position and has had 3 injections.  They have helped his lower back discomfort.  He has stiffness with standing and a new swollen area/mass on the anterior mid thigh on the right that he is concerned with.  It has grown slightly.  He has no discomfort with this mass.  There are no new fevers, chills, or weight loss associated.    Pain   This is a recurrent problem. The current episode started 1 to 4 weeks ago (recent pain since late April 2017 ). The problem occurs constantly. The problem has been unchanged. Pertinent negatives include no abdominal pain, chest pain, chills, congestion, coughing, fever, joint swelling, nausea, numbness, rash or vomiting. Nothing aggravates the symptoms. Treatments tried: Pain Cream. The treatment provided mild relief.   Knee Pain    Associated symptoms include stiffness. Pertinent negatives include no fever, itching or numbness.       Review of Systems   Constitution: Negative for chills, fever and weight loss.   HENT: Negative for congestion and hearing loss.    Eyes: Negative for double vision and pain.   Cardiovascular: Negative for chest pain and irregular heartbeat.   Respiratory: Negative for cough and shortness of breath.    Endocrine: Negative for polyuria.   Hematologic/Lymphatic: Does not bruise/bleed easily.   Skin: Negative for itching, poor wound healing, rash and suspicious lesions.   Musculoskeletal: Positive for arthritis, joint pain, muscle weakness and stiffness. Negative for joint swelling.   Gastrointestinal: Negative for abdominal pain, nausea and vomiting.   Genitourinary:  Negative for bladder incontinence and frequency.   Neurological: Negative for loss of balance, numbness, paresthesias, sensory change and tremors.   Psychiatric/Behavioral: Negative for depression. The patient is not nervous/anxious.    Allergic/Immunologic: Negative for hives.         Objective:            General    Nursing note and vitals reviewed.  Constitutional: He is oriented to person, place, and time. He appears well-developed and well-nourished. No distress.   Neurological: He is alert and oriented to person, place, and time.   Psychiatric: He has a normal mood and affect. His behavior is normal. Judgment and thought content normal.     General Musculoskeletal Exam   Gait: abnormal     Right Ankle/Foot Exam     Inspection   Deformity: present (calf deformity)      Right Knee Exam     Inspection   Alignment:  abnormal  Erythema: absent  Scars: present  Swelling: present  Effusion: effusion  Deformity: deformity  Bruising: absent    Tenderness   The patient is experiencing no tenderness.         Crepitus   The patient has crepitus of the patella.    Range of Motion   Extension: -5   Flexion: 100     Tests   Ligament Examination   MCL - Valgus: normal (0 to 2mm)  LCL - Varus: normal    Other   Muscle Tightness: hamstring tightness and quadriceps tightness    Comments:  Extensive scarring and vascular changes noted the right lower extremity.  Patient using cane to ambulate today.  Very mild effusion.      Generalized atrophy of the quads.  He has a lesion suspicious for lipoma at the anterior mid shaft femur on the right.  It is approximately 7 cm x 4 cm, fluid-filled, and slightly mobile.  It is nontender.    Left Knee Exam     Inspection   Swelling: absent  Effusion: absent  Deformity: deformity  Bruising: absent    Tenderness   The patient is experiencing no tenderness.         Range of Motion   The patient has normal left knee ROM.    Right Hip Exam     Range of Motion   Flexion: abnormal   Internal  Rotation: abnormal   External Rotation: abnormal     Comments:  Limited flexion, IR, ER of the R hip. Weakness noted bilaterally with difficulty standing from a seated position.  Patient compensates using arms.  Left Hip Exam     Range of Motion   Flexion: abnormal   Internal Rotation: abnormal   External Rotation: abnormal           Muscle Strength   Right Lower Extremity   Hip Abduction: 4/5   Hip Adduction: 4/5   Hip Flexion: 3/5   Quadriceps:  5/5 and 4/5   Hamstrin/5 and 4/5   Left Lower Extremity   Hip Abduction: 4/5   Hip Adduction: 4/5   Hip Flexion: 4/5   Quadriceps:  5/5   Hamstrin/5 and 4/5     Vascular Exam       Edema  Right Lower Leg: absent      I have reviewed the films and report. I agree with the radiologist interpretation of the radiographic findings:  There are postoperative changes in the proximal right tibia with multiple threaded screws and stabilization plate in place.  No evidence of hardware failure or other competent process.  There is degenerative narrowing of the right greater than left medial tibiofemoral joints.  Superior pole patellar enthesophytes noted bilaterally.  No joint effusion.  No acute fracture or dislocation.        Assessment:       Encounter Diagnoses   Name Primary?    Muscle weakness (generalized)     History of tibial fracture     Mass of thigh, right Yes    Post-traumatic osteoarthritis of one knee, right           Plan:       Santy was seen today for pain.    Diagnoses and all orders for this visit:    Mass of thigh, right  -     X-Ray Femur 2 View Right; Future  -     US Soft Tissue Misc; Future    Muscle weakness (generalized)  -     X-Ray Femur 2 View Right; Future  -     US Soft Tissue Misc; Future    History of tibial fracture  -     X-Ray Femur 2 View Right; Future  -     US Soft Tissue Misc; Future    Post-traumatic osteoarthritis of one knee, right  -     X-Ray Femur 2 View Right; Future  -     US Soft Tissue Misc; Future      He will have an  ultrasound of the cystlike lesion that is suspicious for lipoma.  Concerning the degenerative arthrosis of the knee, this is stable and unchanged.  Think his weakness of the lower extremities secondary to core weakness versus his lower back.  He is under the care of a spine physician already.  We discussed keeping the core/hips toned in order to stand from seated position and ambulate safely.      The patient understands, chooses and consents to this plan and accepts all   the risks which include but are not limited to the risks mentioned above.   Pt understands the alternative of having no testing, intervention or       treatment at this time. Pt left content and without questions.     Disclaimer: This note was prepared using a voice recognition system and is likely to have sound alike errors within the text.

## 2017-05-08 NOTE — LETTER
May 8, 2017      Otto Lara MD  [address] [facility] [address]           [facility]  [address] [place] [address]  Phone: [phone]  Fax: [phone]          Patient: Santy Anderson   MR Number: [id number]   YOB: 1945   Date of Visit: 5/8/2017       Dear Dr. Otto Lara:    Thank you for referring Santy Anderson to me for evaluation. Attached you will find relevant portions of my assessment and plan of care.    If you have questions, please do not hesitate to call me. I look forward to following Santy Anderson along with you.    Sincerely,    Leon Benton PA-C    Enclosure  CC:  No Recipients    If you would like to receive this communication electronically, please contact [email or web address] or [phone] to request more information on [employer] access.    For providers and/or their staff who would like to refer a patient to [facility], please contact us through our one-stop-shop provider referral line, [facility] , at [phone].    If you feel you have received this communication in error or would no longer like to receive these types of communications, please e-mail [email or web address]

## 2017-05-08 NOTE — MR AVS SNAPSHOT
[facility]  [address] [address]  Phone: [phone]  Fax: [phone]                  Santy Anderson   2017 8:15 AM   Office Visit    Description:  Male : 1945   Provider:  Leon Benton PA-C   Department:  [facility]           Reason for Visit     Right Knee - Pain           Diagnoses this Visit        Comments    Mass of thigh, right    -  Primary     Muscle weakness (generalized)         History of tibial fracture         Post-traumatic osteoarthritis of one knee, right                To Do List           Future Appointments        Provider Department Dept Phone    2017 8:00 AM BR US1 [facility] - [phone]    2017 9:30 AM ONL XR1- [facility] [phone]    2017 1:20 PM Joanie Peterson MD [facility] Internal Medicine [phone]    2017 7:40 AM Otto Lara MD [facility]ot[facility] Oncology [phone]      Goals (5 Years of Data)     None      [facility]     [facility] Nurse Care Line -  Assistance  Unless otherwise directed by your provider, please contact [facility], our nurse care line that is available for  assistance.     Registered nurses in the [facility] provide: appointment scheduling, clinical advisement, health education, and other advisory services.  Call: [phone] (toll free)               Medications           Message regarding Medications     Verify the changes and/or additions to your medication regime listed below are the same as discussed with your clinician today.  If any of these changes or additions are incorrect, please notify your healthcare provider.             Verify that the below list of medications is an accurate representation of the medications you are currently taking.  If none reported, the list may be blank. If incorrect, please contact your healthcare provider. Carry this list with you in case of  "emergency.           Current Medications     ergocalciferol (VITAMIN D2) 50,000 unit Cap Take 1 capsule (50,000 Units total) by mouth twice a week.    pantoprazole (PROTONIX) 20 MG tablet Take 2 tablets (40 mg total) by mouth 2 (two) times daily before meals.           Clinical Reference Information           Your Vitals Were     BP Pulse Height Weight BMI    133/83 (BP Location: Right arm, Patient Position: Sitting, BP Method: Automatic) 94 5' 9.5" (1.765 m) 67.1 kg (148 lb) 21.54 kg/m2      Blood Pressure          Most Recent Value    BP  133/83      Allergies as of 5/8/2017     No Known Allergies      Immunizations Administered on Date of Encounter - 5/8/2017     None      Orders Placed During Today's Visit     Future Labs/Procedures Expected by Expires    US Soft Tissue Misc  5/8/2017 5/8/2018    X-Ray Femur 2 View Right  5/8/2017 5/8/2018      Language Assistance Services     ATTENTION: Language assistance services are available, free of charge. Please call [phone].      ATENCIÓN: Si julia sharp, tiene a vick disposición servicios gratuitos de asistencia lingüística. Llame al [phone].     CHÚ Ý: N?u b?n nói Ti?ng Vi?t, có các d?ch v? h? tr? ngôn ng? mi?n phí dành cho b?n. G?i s? [phone].         [facility] complies with applicable Federal civil rights laws and does not discriminate on the basis of race, color, national origin, age, disability, or sex.        "

## 2017-05-09 ENCOUNTER — HOSPITAL ENCOUNTER (OUTPATIENT)
Dept: RADIOLOGY | Facility: HOSPITAL | Age: 72
Discharge: HOME | End: 2017-05-09
Attending: ORTHOPAEDIC SURGERY
Payer: MEDICARE

## 2017-05-09 DIAGNOSIS — R22.41 MASS OF THIGH, RIGHT: ICD-10-CM

## 2017-05-09 DIAGNOSIS — M62.81 MUSCLE WEAKNESS (GENERALIZED): ICD-10-CM

## 2017-05-09 DIAGNOSIS — M17.31 POST-TRAUMATIC OSTEOARTHRITIS OF ONE KNEE, RIGHT: ICD-10-CM

## 2017-05-09 DIAGNOSIS — Z87.81 HISTORY OF TIBIAL FRACTURE: ICD-10-CM

## 2017-05-09 PROCEDURE — 73552 X-RAY EXAM OF FEMUR 2/>: CPT | Mod: 26,RT,, | Performed by: ORTHOPAEDIC SURGERY

## 2017-05-09 PROCEDURE — 73552 X-RAY EXAM OF FEMUR 2/>: CPT | Mod: TC,RT

## 2017-05-09 PROCEDURE — 76999 ECHO EXAMINATION PROCEDURE: CPT | Mod: TC

## 2017-05-18 DIAGNOSIS — D17.20 LIPOMA OF THIGH: Primary | ICD-10-CM

## 2017-05-18 DIAGNOSIS — M62.81 MUSCLE WEAKNESS (GENERALIZED): ICD-10-CM
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
## 2017-05-18 DIAGNOSIS — M17.31 POST-TRAUMATIC OSTEOARTHRITIS OF ONE KNEE, RIGHT: ICD-10-CM

## 2017-05-19 ENCOUNTER — TELEPHONE (OUTPATIENT)
Dept: INTERNAL MEDICINE | Facility: CLINIC | Age: 72
End: 2017-05-19

## 2017-05-19 ENCOUNTER — TELEPHONE (OUTPATIENT)
Dept: PHYSICAL MEDICINE AND REHAB | Facility: CLINIC | Age: 72
End: 2017-05-19

## 2017-05-19 ENCOUNTER — TELEPHONE (OUTPATIENT)
Dept: ORTHOPEDICS | Facility: CLINIC | Age: 72
End: 2017-05-19

## 2017-05-19 NOTE — TELEPHONE ENCOUNTER
Phoned patient back and patient wanted to know the results of his recent Xrays but patient had already scheduled an appointment on Monday in the Ortho clinic to discuss results. Patient seemed confused on if the MRI was being done on Monday or not. I explained to patient that his appointment for his MRI has not been scheduled as of yet and patient will need to have it scheduled after his appointment on Monday. Patient verbalized understanding and the call ended well. Leon Bar was made aware.

## 2017-05-19 NOTE — TELEPHONE ENCOUNTER
----- Message from Charo Blum sent at 5/18/2017  8:24 AM CDT -----  Patient would like the results of his tests.  Call him at [phone].                                [email or web address]

## 2017-05-19 NOTE — TELEPHONE ENCOUNTER
Spoke with pt regarding us and xray results per zander. Told pt that he would need to be scheduled for an MRI and then with a surgeon. All appts made. Pt verbalized understanding.

## 2017-05-19 NOTE — TELEPHONE ENCOUNTER
----- Message from Ruperto Liu sent at 5/19/2017  8:38 AM CDT -----  Contact: pt   States he is calling rg wanting to know if his xray results are back and can be reached at [phone]//[email or web address]

## 2017-05-26 ENCOUNTER — OFFICE VISIT (OUTPATIENT)
Dept: ORTHOPEDICS | Facility: CLINIC | Age: 72
End: 2017-05-26
Payer: MEDICARE

## 2017-05-26 VITALS
DIASTOLIC BLOOD PRESSURE: 73 MMHG | WEIGHT: 147.94 LBS | SYSTOLIC BLOOD PRESSURE: 132 MMHG | HEIGHT: 70 IN | BODY MASS INDEX: 21.18 KG/M2 | HEART RATE: 98 BPM

## 2017-05-26 DIAGNOSIS — M17.31 POST-TRAUMATIC OSTEOARTHRITIS OF RIGHT KNEE: Primary | ICD-10-CM

## 2017-05-26 DIAGNOSIS — T85.848A PAIN FROM IMPLANTED HARDWARE, INITIAL ENCOUNTER: ICD-10-CM

## 2017-05-26 DIAGNOSIS — Z87.81 HISTORY OF TIBIAL FRACTURE: ICD-10-CM

## 2017-05-26 DIAGNOSIS — D17.20 LIPOMA OF THIGH: ICD-10-CM

## 2017-05-26 PROCEDURE — 99999 PR PBB SHADOW E&M-EST. PATIENT-LVL III: CPT | Mod: PBBFAC,,, | Performed by: PHYSICIAN ASSISTANT

## 2017-05-26 PROCEDURE — 99213 OFFICE O/P EST LOW 20 MIN: CPT | Mod: PBBFAC | Performed by: PHYSICIAN ASSISTANT

## 2017-05-26 PROCEDURE — 99213 OFFICE O/P EST LOW 20 MIN: CPT | Mod: S$PBB,,, | Performed by: PHYSICIAN ASSISTANT

## 2017-05-26 NOTE — PROGRESS NOTES
Subjective:      Patient ID: Santy Anderson is a 71 y.o. male.    Chief Complaint: Pain of the Right Knee    HPI  Patient presents to clinic for follow-up of right lower extremity discomfort and to discuss x-ray results and ultrasound study.  Patient was previously seen by eLon Benton PA-C.   Patient appears to have a lipoma of right anterior thigh.  He seems to have some discomfort from this but mostly generating from an arthritic knee.  Patient fractured right proximal tibia [place] [facility]  He had an ORIF and external fixator of right femur that was performed by another surgeon in 2007.  Patient has developed posttraumatic arthritis in his right knee.  He rates his current discomfort a 5 on a pain scale of 1-10.    Review of Systems   Constitution: Negative for chills, fever and night sweats.   Respiratory: Negative for cough, shortness of breath and wheezing.    Musculoskeletal: Positive for arthritis, back pain, joint pain and stiffness.   Gastrointestinal: Negative for diarrhea, nausea and vomiting.   Neurological: Negative for brief paralysis.   Psychiatric/Behavioral: Negative for altered mental status.         Objective:            General    Constitutional: He is oriented to person, place, and time. He appears well-developed and well-nourished.   Neck: Normal range of motion.   Cardiovascular: Normal rate and regular rhythm.    Pulmonary/Chest: Effort normal.   Abdominal: Soft.   Neurological: He is alert and oriented to person, place, and time.   Psychiatric: He has a normal mood and affect. His behavior is normal.           Right Knee Exam     Inspection   Scars: present    Tenderness   The patient is tender to palpation of the medial joint line, lateral joint line, tibial tubercle and condyle.    Crepitus   The patient has crepitus of the patella.    Range of Motion   The patient has normal right knee ROM.  Extension: normal   Flexion: normal     Comments:  + lipoma of anterior  thigh- mid shaft of femur.  Generalized atrophy of the quads.        + TTP over posterior calf    Extensive scarring and vascular changes noted of the right lower extremity.                       Results :  US SOFT TISSUE MISC    Clinical Indication:  M62.81 Muscle weakness (generalized); Z87.81 Personal history of (healed) traumatic fracture; R22.41 Localized swelling, mass and lump, right lower limb; M17.31 Unilateral post-traumatic osteoarthritis, right knee    Findings: Targeted ultrasound of the anterior mid to distal right thigh at the site of palpable concern demonstrates a 5.1 x 3.8 x 0.9 cm subcutaneous ovoid well-circumscribed isoechoic mass consistent with a lipoma.  Corresponding right femur x-ray demonstrates a similar appearing fat density mass in the subcutaneous tissues of the anterior aspect of the mid to distal thigh contacting and mildly deforming the adjacent rectus femoris.  No other abnormality identified.   Impression     5.1 cm subcutaneous lipoma in the anterior mid to distal thigh at the site of palpable concern.       XR FEMUR 2 VIEW RIGHT    Clinical history: Personal history of (healed) traumatic fracture; R22.41 Localized swelling, mass and lump, right lower limb; M17.31 Unilateral post-traumatic osteoarthritis, right knee    Findings: There is an ovoid fat density mass in the subcutaneous tissues of the anterior aspect of the mid to distal thigh contacting and mildly deforming the adjacent rectus femoris corresponding to the lipoma seen on current ultrasound.  Atherosclerotic calcifications are noted.  Removed lucent fixation screw tracks in the mid right femoral diaphysis and evidence of acute complication.  The visualized aspect of the proximal tibial fixation construct appears intact and well-positioned.  No acute fracture, suspicious bone marrow lesion or other acute abnormality is seen in the right femur. Joint alignment appears anatomic.  There is moderate to severe medial and  lateral femoral tibial joint space loss.   Impression    No acute abnormality identified.  Subcutaneous lipoma in the anterior aspect of the mid to distal thigh.  Other chronic findings, as above.         Assessment:       Encounter Diagnoses   Name Primary?    Lipoma of thigh     History of tibial fracture     Post-traumatic osteoarthritis of right knee Yes    Pain from implanted hardware, initial encounter           Plan:       Santy was seen today for pain.    Diagnoses and all orders for this visit:    Post-traumatic osteoarthritis of right knee    Lipoma of thigh    History of tibial fracture    Pain from implanted hardware, initial encounter    Imaging studies were discussed at length with the patient today. We will cancel MRI of right femur. This is clearly a lipoma of his right thigh.     The patient was interested in excision of the lipoma due to some discomfort however I think the majority of his pain is referred from his right knee due to hardware and posttraumatic arthritis.  After much discussion with myself and also with Dr. Eller, it was suggested to consider hardware removal of right tibia bfollowed by a total knee replacement.  The patient states that he will need some time to think about this.  At the end of today's visit, the patient did not schedule any surgery.  He states that he will think about it and call the office when he is ready.  Whenever he does call office, should he decide on surgery, it will be for hardware removal from right proximal tibia of right lower extremity.  We will proceed 2 to 3 months later with a total knee replacement on his right knee.            Stephanie Mo PA-C

## 2017-05-30 ENCOUNTER — TELEPHONE (OUTPATIENT)
Dept: ORTHOPEDICS | Facility: CLINIC | Age: 72
End: 2017-05-30

## 2017-05-30 DIAGNOSIS — Z01.818 PREOP TESTING: Primary | ICD-10-CM

## 2017-05-30 DIAGNOSIS — T85.848A PAIN FROM IMPLANTED HARDWARE, INITIAL ENCOUNTER: Primary | ICD-10-CM

## 2017-05-30 NOTE — TELEPHONE ENCOUNTER
----- Message from Rae Casarez sent at 5/30/2017  8:34 AM CDT -----  Contact: pt  Please  Call pt @ [phone], pt states he suppose to come in for 5/16 to get screws removed from knee. Pt states he returning nurse call.      Left message for pt to call back regarding message.

## 2017-06-08 ENCOUNTER — HOSPITAL ENCOUNTER (OUTPATIENT)
Dept: RADIOLOGY | Facility: HOSPITAL | Age: 72
Discharge: HOME | End: 2017-06-08
Attending: ORTHOPAEDIC SURGERY
Payer: MEDICARE

## 2017-06-08 ENCOUNTER — CLINICAL SUPPORT (OUTPATIENT)
Dept: CARDIOLOGY | Facility: CLINIC | Age: 72
End: 2017-06-08
Payer: MEDICARE

## 2017-06-08 DIAGNOSIS — Z01.818 PREOP TESTING: ICD-10-CM

## 2017-06-08 PROCEDURE — 93010 ELECTROCARDIOGRAM REPORT: CPT | Mod: S$PBB,,, | Performed by: INTERNAL MEDICINE

## 2017-06-08 PROCEDURE — 71020 XR CHEST PA AND LATERAL PRE-OP: CPT | Mod: 26,,, | Performed by: RADIOLOGY

## 2017-06-16 ENCOUNTER — OFFICE VISIT (OUTPATIENT)
Dept: INTERNAL MEDICINE | Facility: CLINIC | Age: 72
End: 2017-06-16
Payer: MEDICARE

## 2017-06-16 VITALS
SYSTOLIC BLOOD PRESSURE: 120 MMHG | OXYGEN SATURATION: 99 % | TEMPERATURE: 95 F | BODY MASS INDEX: 20.79 KG/M2 | DIASTOLIC BLOOD PRESSURE: 60 MMHG | HEART RATE: 102 BPM | WEIGHT: 142.88 LBS

## 2017-06-16 DIAGNOSIS — C34.32 MALIGNANT NEOPLASM OF LOWER LOBE OF LEFT LUNG: Primary | ICD-10-CM

## 2017-06-16 DIAGNOSIS — R74.8 ELEVATED LIVER ENZYMES: ICD-10-CM

## 2017-06-16 DIAGNOSIS — M25.561 RIGHT KNEE PAIN, UNSPECIFIED CHRONICITY: ICD-10-CM

## 2017-06-16 DIAGNOSIS — Z01.810 PREOP CARDIOVASCULAR EXAM: ICD-10-CM

## 2017-06-16 PROCEDURE — 99213 OFFICE O/P EST LOW 20 MIN: CPT | Mod: PBBFAC,PO | Performed by: INTERNAL MEDICINE

## 2017-06-16 PROCEDURE — 1159F MED LIST DOCD IN RCRD: CPT | Mod: ,,, | Performed by: INTERNAL MEDICINE

## 2017-06-16 PROCEDURE — 99214 OFFICE O/P EST MOD 30 MIN: CPT | Mod: S$PBB,,, | Performed by: INTERNAL MEDICINE

## 2017-06-16 PROCEDURE — 1126F AMNT PAIN NOTED NONE PRSNT: CPT | Mod: ,,, | Performed by: INTERNAL MEDICINE

## 2017-06-16 PROCEDURE — 99999 PR PBB SHADOW E&M-EST. PATIENT-LVL III: CPT | Mod: PBBFAC,,, | Performed by: INTERNAL MEDICINE

## 2017-06-16 RX ORDER — OMEPRAZOLE 20 MG/1
20 CAPSULE, DELAYED RELEASE ORAL DAILY
Status: ON HOLD | COMMUNITY
End: 2018-02-26

## 2017-06-16 NOTE — PRE-PROCEDURE INSTRUCTIONS
Pre op instructions reviewed with patient per phone:    To confirm, Your surgeon has instructed you:  Surgery is scheduled 6/20/17 at 1130.      Please report to [facility] 1st floor main lobby by 1000 Pre admit nurse will call afternoon prior to surgery for final arrival time.      INSTRUCTIONS IMPORTANT!!!  ¨ Do not eat, drink, or smoke after 12 midnight-including water. OK to brush teeth, no gum, candy or mints!    ¨ Take only these medicines with a small swallow of water-morning of surgery.  Omeprazole     ____  Do not wear makeup, including mascara.  ____  No powder, lotions or creams to surgical area.  ____  Please remove all jewelry, including piercings and leave at home.  ____  No money or valuables needed. Please leave at home.  ____  Please bring identification and insurance information to hospital.  ____  If going home the same day, arrange for a ride home. You will not be able to   drive if Anesthesia was used.  ____  Children, under 12 years old, must remain in the waiting room with an adult.  They are not allowed in patient areas.  ____  Wear loose fitting clothing. Allow for dressings, bandages.  ____  Stop Aspirin, Ibuprofen, Motrin and Aleve at least 5-7 days before surgery, unless otherwise instructed by your doctor, or the nurse.   You MAY use Tylenol/acetaminophen until day of surgery.  ____  If you take diabetic medication, do not take am of surgery unless instructed by   Doctor.  ____ Stop taking any Fish Oil supplement or any Vitamins that contain Vitamin E at least 5 days prior to surgery.          Bathing Instructions-- The night before surgery and the morning prior to coming to the hospital:   -Do not shave the surgical area.   -Shower and wash your hair and body as usual with anti-bacterial  soap and shampoo.   -Rinse your hair and body completely.   -Use one packet of hibiclens to wash the surgical site (using your hand) gently for 5 minutes.  Do not scrub you  skin too hard.   -Do not use hibiclens on your head, face, or genitals.   -Do not wash with anti-bacterial soap after you use the hibiclens.   -Rinse your body thoroughly.   -Dry with clean, soft towel.  Do not use lotion, cream, deodorant, or powders on   the surgical site.    Use antibacterial soap in place of hibiclens if your surgery is on the head, face or genitals.         Surgical Site Infection    Prevention of surgical site infections:     -Keep incisions clean and dry.   -Do not soak/submerge incisions in water until completely healed.   -Do not apply lotions, powders, creams, or deodorants to site.   -Always make sure hands are cleaned with antibacterial soap/ alcohol-based   prior to touching the surgical site.  (This includes doctors, nurses, staff, and yourself.)    Signs and symptoms:   -Redness and pain around the area where you had surgery   -Drainage of cloudy fluid from your surgical wound   -Fever over 100.4  I have read or had read and explained to me, and understand the above information.

## 2017-06-16 NOTE — PROGRESS NOTES
Subjective:       Patient ID: Santy Anderson is a 71 y.o. male.    Chief Complaint: Pre-op Exam    Here for follow up of medical problems and preop right knee hardware removal 6/20/17, with Dr. Eller.  No bleeding diathesis.  No problems with anesthesia.  No exertional cp/sob/palp.  BMs normal.  No liver or kidney disease.  No tylenol.  No nsaids.  No EtOH.  No herbs or vitamins other than vit D.    Updated/ annual due 2/18:  Health Maintenance: ref fluvax, 6/16 [id number], 6/16 TDaP, 1/17 BMD hi fx risk hip rec bisphos rep 2y, unk Cscope, 6/16 HCV neg, 7/16 Eye doc at [facility].          Review of Systems   Constitutional: Negative for chills, diaphoresis, fatigue and fever.   Respiratory: Negative for cough, chest tightness and shortness of breath.    Cardiovascular: Negative for chest pain, palpitations and leg swelling.   Gastrointestinal: Negative for blood in stool, constipation, diarrhea, nausea and vomiting.   Genitourinary: Negative for difficulty urinating and frequency.   Musculoskeletal: Negative for arthralgias.       Objective:   /60 (BP Location: Right arm, Patient Position: Sitting, BP Method: Manual)   Pulse 102   Temp (!) 95.3 °F (35.2 °C) (Tympanic)   Wt 64.8 kg (142 lb 13.7 oz)   SpO2 99%   BMI 20.79 kg/m²     Physical Exam   Constitutional: He is oriented to person, place, and time. He appears well-developed and well-nourished.   HENT:   Right Ear: External ear normal. Tympanic membrane is not injected.   Left Ear: External ear normal. Tympanic membrane is not injected.   Mouth/Throat: Oropharynx is clear and moist.   Eyes: Conjunctivae are normal.   Neck: Normal range of motion. Neck supple. No thyromegaly present.   Cardiovascular: Normal rate, regular rhythm and intact distal pulses.  Exam reveals no gallop and no friction rub.    No murmur heard.  Pulmonary/Chest: Effort normal and breath sounds normal. He has no wheezes. He has no rales.   Abdominal: Soft. Bowel sounds are normal. He  exhibits no mass. There is no tenderness.   Musculoskeletal: He exhibits no edema.   Lymphadenopathy:     He has no cervical adenopathy.   Neurological: He is alert and oriented to person, place, and time.   Psychiatric: He has a normal mood and affect.       Results for orders placed or performed in visit on 06/08/17   CBC auto differential   Result Value Ref Range    WBC 3.79 (L) 3.90 - 12.70 K/uL    RBC 3.76 (L) 4.60 - 6.20 M/uL    Hemoglobin 11.6 (L) 14.0 - 18.0 g/dL    Hematocrit 35.4 (L) 40.0 - 54.0 %    MCV 94 82 - 98 fL    MCH 30.9 27.0 - 31.0 pg    MCHC 32.8 32.0 - 36.0 %    RDW 18.6 (H) 11.5 - 14.5 %    Platelets 127 (L) 150 - 350 K/uL    MPV 9.4 9.2 - 12.9 fL    Gran # 1.5 (L) 1.8 - 7.7 K/uL    Lymph # 1.4 1.0 - 4.8 K/uL    Mono # 0.6 0.3 - 1.0 K/uL    Eos # 0.2 0.0 - 0.5 K/uL    Baso # 0.11 0.00 - 0.20 K/uL    Gran% 38.8 38.0 - 73.0 %    Lymph% 35.6 18.0 - 48.0 %    Mono% 16.9 (H) 4.0 - 15.0 %    Eosinophil% 5.8 0.0 - 8.0 %    Basophil% 2.9 (H) 0.0 - 1.9 %    Differential Method Automated    Comprehensive metabolic panel   Result Value Ref Range    Sodium 141 136 - 145 mmol/L    Potassium 3.8 3.5 - 5.1 mmol/L    Chloride 103 95 - 110 mmol/L    CO2 25 23 - 29 mmol/L    Glucose 90 70 - 110 mg/dL    BUN, Bld 7 (L) 8 - 23 mg/dL    Creatinine 0.8 0.5 - 1.4 mg/dL    Calcium 9.9 8.7 - 10.5 mg/dL    Total Protein 8.1 6.0 - 8.4 g/dL    Albumin 3.9 3.5 - 5.2 g/dL    Total Bilirubin 0.6 0.1 - 1.0 mg/dL    Alkaline Phosphatase 69 55 - 135 U/L     (H) 10 - 40 U/L    ALT 28 10 - 44 U/L    Anion Gap 13 8 - 16 mmol/L    eGFR if African American >60.0 >60 mL/min/1.73 m^2    eGFR if non African American >60.0 >60 mL/min/1.73 m^2     CXR stable.  EKG normal.    Assessment:       1. Malignant neoplasm of lower lobe of left lung    2. Elevated liver enzymes    3. Preop cardiovascular exam    4. Right knee pain, unspecified chronicity        Plan:       Santy was seen today for pre-op exam.    Diagnoses and all  orders for this visit:    Malignant neoplasm of lower lobe of left lung    Elevated liver enzymes- u/s liver after surgery.  RTC 1mo for f/u.    Preop cardiovascular exam, Right knee pain, unspecified chronicity- Pt is clear for anesthesia and surgery with Andrew Index Class I, very low risk for perioperative complications.      RTC 1mo.

## 2017-06-19 ENCOUNTER — ANESTHESIA EVENT (OUTPATIENT)
Dept: SURGERY | Facility: HOSPITAL | Age: 72
End: 2017-06-19
Payer: MEDICARE

## 2017-06-19 ENCOUNTER — TELEPHONE (OUTPATIENT)
Dept: ORTHOPEDICS | Facility: CLINIC | Age: 72
End: 2017-06-19

## 2017-06-20 ENCOUNTER — ANESTHESIA (OUTPATIENT)
Dept: SURGERY | Facility: HOSPITAL | Age: 72
End: 2017-06-20
Payer: MEDICARE

## 2017-06-20 ENCOUNTER — HOSPITAL ENCOUNTER (OUTPATIENT)
Facility: HOSPITAL | Age: 72
Discharge: HOME | End: 2017-06-20
Attending: ORTHOPAEDIC SURGERY | Admitting: ORTHOPAEDIC SURGERY
Payer: MEDICARE

## 2017-06-20 VITALS
HEART RATE: 85 BPM | SYSTOLIC BLOOD PRESSURE: 133 MMHG | TEMPERATURE: 98 F | RESPIRATION RATE: 13 BRPM | BODY MASS INDEX: 20.13 KG/M2 | WEIGHT: 140.63 LBS | OXYGEN SATURATION: 100 % | HEIGHT: 70 IN | DIASTOLIC BLOOD PRESSURE: 76 MMHG

## 2017-06-20 DIAGNOSIS — T85.848D PAIN FROM IMPLANTED HARDWARE, SUBSEQUENT ENCOUNTER: Primary | ICD-10-CM

## 2017-06-20 PROBLEM — T85.848A PAIN FROM IMPLANTED HARDWARE: Status: ACTIVE | Noted: 2017-06-20

## 2017-06-20 PROBLEM — T85.848A PAIN FROM IMPLANTED HARDWARE: Chronic | Status: ACTIVE | Noted: 2017-06-20

## 2017-06-20 LAB
POCT GLUCOSE: 85 MG/DL (ref 70–110)
POCT GLUCOSE: 91 MG/DL (ref 70–110)

## 2017-06-20 PROCEDURE — 37000008 HC ANESTHESIA 1ST 15 MINUTES: Performed by: ORTHOPAEDIC SURGERY

## 2017-06-20 PROCEDURE — 71000015 HC POSTOP RECOV 1ST HR: Performed by: ORTHOPAEDIC SURGERY

## 2017-06-20 PROCEDURE — 36000707: Performed by: ORTHOPAEDIC SURGERY

## 2017-06-20 PROCEDURE — 71000033 HC RECOVERY, INTIAL HOUR: Performed by: ORTHOPAEDIC SURGERY

## 2017-06-20 PROCEDURE — 63600175 PHARM REV CODE 636 W HCPCS: Performed by: PHYSICIAN ASSISTANT

## 2017-06-20 PROCEDURE — 25000003 PHARM REV CODE 250: Performed by: ANESTHESIOLOGY

## 2017-06-20 PROCEDURE — 63600175 PHARM REV CODE 636 W HCPCS: Performed by: NURSE ANESTHETIST, CERTIFIED REGISTERED

## 2017-06-20 PROCEDURE — 88300 SURGICAL PATH GROSS: CPT | Mod: 26,,, | Performed by: PATHOLOGY

## 2017-06-20 PROCEDURE — 25000003 PHARM REV CODE 250: Performed by: NURSE ANESTHETIST, CERTIFIED REGISTERED

## 2017-06-20 PROCEDURE — 25000003 PHARM REV CODE 250: Performed by: ORTHOPAEDIC SURGERY

## 2017-06-20 PROCEDURE — 37000009 HC ANESTHESIA EA ADD 15 MINS: Performed by: ORTHOPAEDIC SURGERY

## 2017-06-20 PROCEDURE — 36000706: Performed by: ORTHOPAEDIC SURGERY

## 2017-06-20 PROCEDURE — 88300 SURGICAL PATH GROSS: CPT | Performed by: PATHOLOGY

## 2017-06-20 RX ORDER — KETOROLAC TROMETHAMINE 30 MG/ML
INJECTION, SOLUTION INTRAMUSCULAR; INTRAVENOUS
Status: DISCONTINUED | OUTPATIENT
Start: 2017-06-20 | End: 2017-06-20

## 2017-06-20 RX ORDER — ACETAMINOPHEN 10 MG/ML
1000 INJECTION, SOLUTION INTRAVENOUS ONCE
Status: DISCONTINUED | OUTPATIENT
Start: 2017-06-20 | End: 2017-06-20 | Stop reason: HOSPADM

## 2017-06-20 RX ORDER — CEFAZOLIN SODIUM 2 G/50ML
2 SOLUTION INTRAVENOUS
Status: COMPLETED | OUTPATIENT
Start: 2017-06-20 | End: 2017-06-20

## 2017-06-20 RX ORDER — FENTANYL CITRATE 50 UG/ML
INJECTION, SOLUTION INTRAMUSCULAR; INTRAVENOUS
Status: DISCONTINUED | OUTPATIENT
Start: 2017-06-20 | End: 2017-06-20

## 2017-06-20 RX ORDER — HYDROMORPHONE HYDROCHLORIDE 2 MG/ML
0.2 INJECTION, SOLUTION INTRAMUSCULAR; INTRAVENOUS; SUBCUTANEOUS EVERY 5 MIN PRN
Status: DISCONTINUED | OUTPATIENT
Start: 2017-06-20 | End: 2017-06-20 | Stop reason: HOSPADM

## 2017-06-20 RX ORDER — BACITRACIN 50000 [IU]/1
INJECTION, POWDER, FOR SOLUTION INTRAMUSCULAR
Status: DISCONTINUED | OUTPATIENT
Start: 2017-06-20 | End: 2017-06-20 | Stop reason: HOSPADM

## 2017-06-20 RX ORDER — LIDOCAINE HYDROCHLORIDE 10 MG/ML
1 INJECTION, SOLUTION EPIDURAL; INFILTRATION; INTRACAUDAL; PERINEURAL ONCE
Status: DISCONTINUED | OUTPATIENT
Start: 2017-06-20 | End: 2017-06-20 | Stop reason: HOSPADM

## 2017-06-20 RX ORDER — ONDANSETRON 2 MG/ML
INJECTION INTRAMUSCULAR; INTRAVENOUS
Status: DISCONTINUED | OUTPATIENT
Start: 2017-06-20 | End: 2017-06-20

## 2017-06-20 RX ORDER — HYDROCODONE BITARTRATE AND ACETAMINOPHEN 10; 325 MG/1; MG/1
TABLET ORAL
Qty: 40 TABLET | Refills: 0 | Status: SHIPPED | OUTPATIENT
Start: 2017-06-20 | End: 2017-06-20

## 2017-06-20 RX ORDER — SODIUM CHLORIDE 9 MG/ML
INJECTION, SOLUTION INTRAVENOUS CONTINUOUS
Status: DISCONTINUED | OUTPATIENT
Start: 2017-06-21 | End: 2017-06-20 | Stop reason: HOSPADM

## 2017-06-20 RX ORDER — SODIUM CHLORIDE 9 MG/ML
3 INJECTION, SOLUTION INTRAMUSCULAR; INTRAVENOUS; SUBCUTANEOUS EVERY 8 HOURS
Status: DISCONTINUED | OUTPATIENT
Start: 2017-06-20 | End: 2017-06-20 | Stop reason: HOSPADM

## 2017-06-20 RX ORDER — SODIUM CHLORIDE 9 MG/ML
3 INJECTION, SOLUTION INTRAMUSCULAR; INTRAVENOUS; SUBCUTANEOUS
Status: DISCONTINUED | OUTPATIENT
Start: 2017-06-20 | End: 2017-06-20 | Stop reason: HOSPADM

## 2017-06-20 RX ORDER — PROPOFOL 10 MG/ML
VIAL (ML) INTRAVENOUS
Status: DISCONTINUED | OUTPATIENT
Start: 2017-06-20 | End: 2017-06-20

## 2017-06-20 RX ORDER — MELOXICAM 15 MG/1
15 TABLET ORAL DAILY
Qty: 30 TABLET | Refills: 2 | Status: ON HOLD | OUTPATIENT
Start: 2017-06-20 | End: 2018-02-26

## 2017-06-20 RX ORDER — MIDAZOLAM HYDROCHLORIDE 1 MG/ML
INJECTION, SOLUTION INTRAMUSCULAR; INTRAVENOUS
Status: DISCONTINUED | OUTPATIENT
Start: 2017-06-20 | End: 2017-06-20

## 2017-06-20 RX ORDER — HYDROCODONE BITARTRATE AND ACETAMINOPHEN 10; 325 MG/1; MG/1
TABLET ORAL
Qty: 40 TABLET | Refills: 0 | Status: ON HOLD | OUTPATIENT
Start: 2017-06-20 | End: 2018-02-26

## 2017-06-20 RX ORDER — LIDOCAINE HYDROCHLORIDE 20 MG/ML
JELLY TOPICAL
Status: DISCONTINUED | OUTPATIENT
Start: 2017-06-20 | End: 2017-06-20

## 2017-06-20 RX ORDER — SODIUM CHLORIDE, SODIUM LACTATE, POTASSIUM CHLORIDE, CALCIUM CHLORIDE 600; 310; 30; 20 MG/100ML; MG/100ML; MG/100ML; MG/100ML
INJECTION, SOLUTION INTRAVENOUS CONTINUOUS
Status: DISCONTINUED | OUTPATIENT
Start: 2017-06-20 | End: 2017-06-20 | Stop reason: HOSPADM

## 2017-06-20 RX ORDER — LIDOCAINE HYDROCHLORIDE 20 MG/ML
INJECTION, SOLUTION EPIDURAL; INFILTRATION; INTRACAUDAL; PERINEURAL
Status: DISCONTINUED | OUTPATIENT
Start: 2017-06-20 | End: 2017-06-20

## 2017-06-20 RX ORDER — BUPIVACAINE HYDROCHLORIDE 2.5 MG/ML
INJECTION, SOLUTION EPIDURAL; INFILTRATION; INTRACAUDAL
Status: DISCONTINUED | OUTPATIENT
Start: 2017-06-20 | End: 2017-06-20 | Stop reason: HOSPADM

## 2017-06-20 RX ORDER — SUCCINYLCHOLINE CHLORIDE 20 MG/ML
INJECTION INTRAMUSCULAR; INTRAVENOUS
Status: DISCONTINUED | OUTPATIENT
Start: 2017-06-20 | End: 2017-06-20

## 2017-06-20 RX ORDER — MEPERIDINE HYDROCHLORIDE 50 MG/ML
12.5 INJECTION INTRAMUSCULAR; INTRAVENOUS; SUBCUTANEOUS ONCE AS NEEDED
Status: DISCONTINUED | OUTPATIENT
Start: 2017-06-20 | End: 2017-06-20 | Stop reason: HOSPADM

## 2017-06-20 RX ORDER — HYDROCODONE BITARTRATE AND ACETAMINOPHEN 10; 325 MG/1; MG/1
TABLET ORAL
Qty: 40 TABLET | Refills: 0 | Status: SHIPPED | OUTPATIENT
Start: 2017-06-20 | End: 2017-06-20 | Stop reason: SDUPTHER

## 2017-06-20 RX ORDER — ROCURONIUM BROMIDE 10 MG/ML
INJECTION, SOLUTION INTRAVENOUS
Status: DISCONTINUED | OUTPATIENT
Start: 2017-06-20 | End: 2017-06-20

## 2017-06-20 RX ADMIN — CEFAZOLIN SODIUM 2 G: 2 SOLUTION INTRAVENOUS at 09:06

## 2017-06-20 RX ADMIN — PROPOFOL 140 MG: 10 INJECTION, EMULSION INTRAVENOUS at 09:06

## 2017-06-20 RX ADMIN — LIDOCAINE HYDROCHLORIDE 1 EACH: 20 JELLY TOPICAL at 09:06

## 2017-06-20 RX ADMIN — ONDANSETRON 4 MG: 2 INJECTION, SOLUTION INTRAMUSCULAR; INTRAVENOUS at 11:06

## 2017-06-20 RX ADMIN — FENTANYL CITRATE 50 MCG: 50 INJECTION, SOLUTION INTRAMUSCULAR; INTRAVENOUS at 10:06

## 2017-06-20 RX ADMIN — SODIUM CHLORIDE, SODIUM LACTATE, POTASSIUM CHLORIDE, AND CALCIUM CHLORIDE: 600; 310; 30; 20 INJECTION, SOLUTION INTRAVENOUS at 09:06

## 2017-06-20 RX ADMIN — KETOROLAC TROMETHAMINE 30 MG: 30 INJECTION, SOLUTION INTRAMUSCULAR; INTRAVENOUS at 11:06

## 2017-06-20 RX ADMIN — FENTANYL CITRATE 100 MCG: 50 INJECTION, SOLUTION INTRAMUSCULAR; INTRAVENOUS at 09:06

## 2017-06-20 RX ADMIN — SUCCINYLCHOLINE CHLORIDE 140 MG: 20 INJECTION, SOLUTION INTRAMUSCULAR; INTRAVENOUS at 09:06

## 2017-06-20 RX ADMIN — LIDOCAINE HYDROCHLORIDE 40 MG: 20 INJECTION, SOLUTION EPIDURAL; INFILTRATION; INTRACAUDAL; PERINEURAL at 09:06

## 2017-06-20 RX ADMIN — ROCURONIUM BROMIDE 5 MG: 10 INJECTION, SOLUTION INTRAVENOUS at 09:06

## 2017-06-20 RX ADMIN — FENTANYL CITRATE 50 MCG: 50 INJECTION, SOLUTION INTRAMUSCULAR; INTRAVENOUS at 11:06

## 2017-06-20 RX ADMIN — SODIUM CHLORIDE, SODIUM LACTATE, POTASSIUM CHLORIDE, AND CALCIUM CHLORIDE: 600; 310; 30; 20 INJECTION, SOLUTION INTRAVENOUS at 11:06

## 2017-06-20 RX ADMIN — MIDAZOLAM HYDROCHLORIDE 2 MG: 1 INJECTION, SOLUTION INTRAMUSCULAR; INTRAVENOUS at 09:06

## 2017-06-20 NOTE — PLAN OF CARE
Pt resting, denies pain at present. Respirations even and unlabored on 98% face tent with O2 sats of 100%. Dsg remains c/d/i. Neurovascular checks remain intact. VSS. Will cont to monitor. See flow sheet for detailed assessment.

## 2017-06-20 NOTE — TRANSFER OF CARE
"Anesthesia Transfer of Care Note    Patient: Santy Anderson    Procedure(s) Performed: Procedure(s) (LRB):  REMOVAL-HARDWARE-LEG FROM RIGHT PROXIMAL TIBIA OF RIGHT LOWER EXTREMITY (Right)    Patient location: PACU    Anesthesia Type: general    Transport from OR: Transported from OR on room air with adequate spontaneous ventilation    Post pain: adequate analgesia    Post assessment: no apparent anesthetic complications    Post vital signs: stable    Level of consciousness: awake, alert and oriented    Nausea/Vomiting: no nausea/vomiting    Complications: none    Transfer of care protocol was followed      Last vitals:   Visit Vitals  /77 (BP Location: Right arm, Patient Position: Sitting, BP Method: Automatic)   Pulse 100   Temp 36.5 °C (97.7 °F) (Tympanic)   Resp 18   Ht 5' 9.5" (1.765 m)   Wt 63.8 kg (140 lb 10.5 oz)   SpO2 98%   BMI 20.47 kg/m²     "

## 2017-06-20 NOTE — ANESTHESIA PREPROCEDURE EVALUATION
06/20/2017  Santy Anderson is a 71 y.o., male.    Anesthesia Evaluation    I have reviewed the Patient Summary Reports.    I have reviewed the Nursing Notes.   I have reviewed the Medications.     Review of Systems  Anesthesia Hx:  No problems with previous Anesthesia  Denies Family Hx of Anesthesia complications.   Denies Personal Hx of Anesthesia complications.   Social:  Former Smoker    Hematology/Oncology:        Current/Recent Cancer. Other (see Oncology comments) Oncology Comments: Lung cancer    Pulmonary:   Denies Asthma.  Denies Shortness of breath.  Denies Recent URI.    Renal/:  Renal/ Normal     Hepatic/GI:   GERD    Musculoskeletal:   Arthritis     Neurological:   Neuromuscular Disease,    Endocrine:   Diabetes, type 2        Physical Exam   Airway/Jaw/Neck:  Airway Findings: Mouth Opening: Normal Tongue: Normal  General Airway Assessment: Adult  Mallampati: III  Improves to II with phonation.  TM Distance: Normal, at least 6 cm      Dental:  Dental Findings: lower partial dentures Poor dentition, partial tooth upper left        Mental Status:  Mental Status Findings:  Alert and Oriented         Anesthesia Plan  Type of Anesthesia, risks & benefits discussed:  Anesthesia Type:  general  Patient's Preference:   Intra-op Monitoring Plan:   Intra-op Monitoring Plan Comments:   Post Op Pain Control Plan:   Post Op Pain Control Plan Comments:   Induction:   IV  Beta Blocker:  Patient is not currently on a Beta-Blocker (No further documentation required).       Informed Consent: Patient understands risks and agrees with Anesthesia plan.  Questions answered. Anesthesia consent signed with patient.  ASA Score: 3     Day of Surgery Review of History & Physical:    H&P update referred to the surgeon.         Ready For Surgery From Anesthesia Perspective.

## 2017-06-20 NOTE — BRIEF OP NOTE
[facility]  Brief Operative Note     SUMMARY     Surgery Date: 6/20/2017     Surgeon(s) and Role:     * Red Eller MD - Primary    Assisting Surgeon: None    Pre-op Diagnosis:  Pain from implanted hardware, initial encounter [T85.848A]    Post-op Diagnosis:  Post-Op Diagnosis Codes:     * Pain from implanted hardware, initial encounter [T85.848A]  Arthritis right knee    Procedure(s) (LRB):  REMOVAL-HARDWARE-LEG FROM RIGHT PROXIMAL TIBIA OF RIGHT LOWER EXTREMITY (Right)    Anesthesia: General    Description of the findings of the procedure: Removal of plate and screws from right proximal tibia    Findings/Key Components: Painful plate and screws and arthritis of right knee Estimated Blood Loss: 5 mL         Specimens: Metal plate and screws  Specimen (12h ago through future)    Start     Ordered    06/20/17 1130  Specimen to Pathology - Surgery  Once     Comments:  1.) Hardware from Right tibia (GROSS).DX: Painful hardware.      06/20/17 1130          Discharge Note    SUMMARY     Admit Date: 6/20/2017    Discharge Date and Time:  06/20/2017 12:05 PM    Hospital Course (synopsis of major diagnoses, care, treatment, and services provided during the course of the hospital stay): The patient had their procedure performed, had a routine post operative recovery, and was discharged home.       Final Diagnosis: Post-Op Diagnosis Codes:     * Pain from implanted hardware, initial encounter [T85.848A]  Arthritis right knee    Disposition: Home or Self Care    Follow Up/Patient Instructions:     Medications:  Reconciled Home Medications:   Current Discharge Medication List      START taking these medications    Details   hydrocodone-acetaminophen 10-325mg (NORCO)  mg Tab Take 1-2 tablets by mouth every 4-8 hrs as needed for pain.  Qty: 40 tablet, Refills: 0      meloxicam (MOBIC) 15 MG tablet Take 1 tablet (15 mg total) by mouth once daily.  Qty: 30 tablet, Refills: 2         CONTINUE these  medications which have NOT CHANGED    Details   omeprazole (PRILOSEC) 20 MG capsule Take 20 mg by mouth once daily.      pantoprazole (PROTONIX) 20 MG tablet Take 2 tablets (40 mg total) by mouth 2 (two) times daily before meals.  Qty: 60 tablet, Refills: 3    Associated Diagnoses: Intractable vomiting with nausea, vomiting of unspecified type; Epigastric pain      ergocalciferol (VITAMIN D2) 50,000 unit Cap Take 1 capsule (50,000 Units total) by mouth twice a week.  Qty: 8 capsule, Refills: 6    Comments: Generic For:DRISDOL    [id number]           No discharge procedures on file.  Follow-up Information     Susana Mo PA-C. Schedule an appointment as soon as possible for a visit on 7/7/2017.    Specialty:  Orthopedic Surgery  Why:  For wound re-check, staple removal and x-rays  Contact information:  [address] [place] [address] [address]i [address]ES [phone]  [phone]

## 2017-06-20 NOTE — INTERVAL H&P NOTE
The patient has been examined and the H&P has been reviewed:    I concur with the findings and no changes have occurred since H&P was written.    Anesthesia/Surgery risks, benefits and alternative options discussed and understood by patient/family.          Active Hospital Problems    Diagnosis  POA    Pain from implanted hardware [T85.068Y]  Yes      Resolved Hospital Problems    Diagnosis Date Resolved POA   No resolved problems to display.

## 2017-06-20 NOTE — ANESTHESIA POSTPROCEDURE EVALUATION
"Anesthesia Post Evaluation    Patient: Santy Anderson    Procedure(s) Performed: Procedure(s) (LRB):  REMOVAL-HARDWARE-LEG FROM RIGHT PROXIMAL TIBIA OF RIGHT LOWER EXTREMITY (Right)    Final Anesthesia Type: general  Patient location during evaluation: PACU  Patient participation: Yes- Able to Participate  Level of consciousness: awake and alert  Post-procedure vital signs: reviewed and stable  Pain management: adequate  Airway patency: patent  PONV status at discharge: No PONV  Anesthetic complications: no      Cardiovascular status: blood pressure returned to baseline  Respiratory status: unassisted and spontaneous ventilation  Hydration status: euvolemic  Follow-up not needed.        Visit Vitals  /76   Pulse 85   Temp 36.9 °C (98.4 °F) (Temporal)   Resp 13   Ht 5' 9.5" (1.765 m)   Wt 63.8 kg (140 lb 10.5 oz)   SpO2 100%   BMI 20.47 kg/m²       Pain/Roberto Score: Pain Assessment Performed: Yes (6/20/2017 12:15 PM)  Presence of Pain: denies (6/20/2017 12:15 PM)  Roberto Score: 9 (6/20/2017 12:15 PM)      "

## 2017-06-20 NOTE — H&P (VIEW-ONLY)
Subjective:       Patient ID: Santy Anderson is a 71 y.o. male.    Chief Complaint: Pre-op Exam    Here for follow up of medical problems and preop right knee hardware removal 6/20/17, with Dr. Eller.  No bleeding diathesis.  No problems with anesthesia.  No exertional cp/sob/palp.  BMs normal.  No liver or kidney disease.  No tylenol.  No nsaids.  No EtOH.  No herbs or vitamins other than vit D.    Updated/ annual due 2/18:  Health Maintenance: ref fluvax, 6/16 tlrogr05, 6/16 TDaP, 1/17 BMD hi fx risk hip rec bisphos rep 2y, unk Cscope, 6/16 HCV neg, 7/16 Eye doc at [facility].          Review of Systems   Constitutional: Negative for chills, diaphoresis, fatigue and fever.   Respiratory: Negative for cough, chest tightness and shortness of breath.    Cardiovascular: Negative for chest pain, palpitations and leg swelling.   Gastrointestinal: Negative for blood in stool, constipation, diarrhea, nausea and vomiting.   Genitourinary: Negative for difficulty urinating and frequency.   Musculoskeletal: Negative for arthralgias.       Objective:   /60 (BP Location: Right arm, Patient Position: Sitting, BP Method: Manual)   Pulse 102   Temp (!) 95.3 °F (35.2 °C) (Tympanic)   Wt 64.8 kg (142 lb 13.7 oz)   SpO2 99%   BMI 20.79 kg/m²     Physical Exam   Constitutional: He is oriented to person, place, and time. He appears well-developed and well-nourished.   HENT:   Right Ear: External ear normal. Tympanic membrane is not injected.   Left Ear: External ear normal. Tympanic membrane is not injected.   Mouth/Throat: Oropharynx is clear and moist.   Eyes: Conjunctivae are normal.   Neck: Normal range of motion. Neck supple. No thyromegaly present.   Cardiovascular: Normal rate, regular rhythm and intact distal pulses.  Exam reveals no gallop and no friction rub.    No murmur heard.  Pulmonary/Chest: Effort normal and breath sounds normal. He has no wheezes. He has no rales.   Abdominal: Soft. Bowel sounds are normal. He  exhibits no mass. There is no tenderness.   Musculoskeletal: He exhibits no edema.   Lymphadenopathy:     He has no cervical adenopathy.   Neurological: He is alert and oriented to person, place, and time.   Psychiatric: He has a normal mood and affect.       Results for orders placed or performed in visit on 06/08/17   CBC auto differential   Result Value Ref Range    WBC 3.79 (L) 3.90 - 12.70 K/uL    RBC 3.76 (L) 4.60 - 6.20 M/uL    Hemoglobin 11.6 (L) 14.0 - 18.0 g/dL    Hematocrit 35.4 (L) 40.0 - 54.0 %    MCV 94 82 - 98 fL    MCH 30.9 27.0 - 31.0 pg    MCHC 32.8 32.0 - 36.0 %    RDW 18.6 (H) 11.5 - 14.5 %    Platelets 127 (L) 150 - 350 K/uL    MPV 9.4 9.2 - 12.9 fL    Gran # 1.5 (L) 1.8 - 7.7 K/uL    Lymph # 1.4 1.0 - 4.8 K/uL    Mono # 0.6 0.3 - 1.0 K/uL    Eos # 0.2 0.0 - 0.5 K/uL    Baso # 0.11 0.00 - 0.20 K/uL    Gran% 38.8 38.0 - 73.0 %    Lymph% 35.6 18.0 - 48.0 %    Mono% 16.9 (H) 4.0 - 15.0 %    Eosinophil% 5.8 0.0 - 8.0 %    Basophil% 2.9 (H) 0.0 - 1.9 %    Differential Method Automated    Comprehensive metabolic panel   Result Value Ref Range    Sodium 141 136 - 145 mmol/L    Potassium 3.8 3.5 - 5.1 mmol/L    Chloride 103 95 - 110 mmol/L    CO2 25 23 - 29 mmol/L    Glucose 90 70 - 110 mg/dL    BUN, Bld 7 (L) 8 - 23 mg/dL    Creatinine 0.8 0.5 - 1.4 mg/dL    Calcium 9.9 8.7 - 10.5 mg/dL    Total Protein 8.1 6.0 - 8.4 g/dL    Albumin 3.9 3.5 - 5.2 g/dL    Total Bilirubin 0.6 0.1 - 1.0 mg/dL    Alkaline Phosphatase 69 55 - 135 U/L     (H) 10 - 40 U/L    ALT 28 10 - 44 U/L    Anion Gap 13 8 - 16 mmol/L    eGFR if African American >60.0 >60 mL/min/1.73 m^2    eGFR if non African American >60.0 >60 mL/min/1.73 m^2     CXR stable.  EKG normal.    Assessment:       1. Malignant neoplasm of lower lobe of left lung    2. Elevated liver enzymes    3. Preop cardiovascular exam    4. Right knee pain, unspecified chronicity        Plan:       Santy was seen today for pre-op exam.    Diagnoses and all  orders for this visit:    Malignant neoplasm of lower lobe of left lung    Elevated liver enzymes- u/s liver after surgery.  RTC 1mo for f/u.    Preop cardiovascular exam, Right knee pain, unspecified chronicity- Pt is clear for anesthesia and surgery with Andrew Index Class I, very low risk for perioperative complications.      RTC 1mo.

## 2017-06-20 NOTE — DISCHARGE INSTRUCTIONS
General Information:    1.  Do not drink alcoholic beverages including beer for 24 hours or as long as you are on pain medication..  2.  Do not drive a motor vehicle, operate machinery or power tools, or signs legal papers for 24 hours or as long as you are on pain medication.   3.  You may experience light-headedness, dizziness, and sleepiness following surgery. Please do not stay alone. A responsible adult should be with you for this 24 hour period.  4.  Go home and rest.    5. Progress slowly to a normal diet unless instructed.  Otherwise, begin with liquids such as soft drinks, then soup and crackers working up to solid foods. Drink plenty of nonalcoholic fluids.  6.  Certain anesthetics and pain medications produce nausea and vomiting in certain       individuals. If nausea becomes a problem at home, call you doctor.    7. A nurse will be calling you sometime after surgery. Do not be alarmed. This is our way of finding out how you are doing.    8. Several times every hour while you are awake, take 2-3 deep breaths and cough. If you had stomach surgery hold a pillow or rolled towel firmly against your stomach before you cough. This will help with any pain the cough might cause.  9. Several times every hour while you are awake, pump and flex your feet 5-6 times and do foot circles. This will help prevent blood clots.    10.Call your doctor for severe pain, bleeding, fever, or signs or symptoms of infection (pain, swelling, redness, foul odor, drainage).    11.You can contact your doctor anytime by [phone] for the [facility] (at [facility]) or [phone] for the [facility] on [facility].   [email or web address] is another way to contact your doctor if you are an active participant online with [facility].      Acetaminophen; Hydrocodone tablets or capsules  What is this medicine?  ACETAMINOPHEN; HYDROCODONE (a set a KANE avni fen; carmela droe KOE done) is a pain reliever. It is used to treat  moderate to severe pain.  How should I use this medicine?  Take this medicine by mouth with a glass of water. Follow the directions on the prescription label. You can take it with or without food. If it upsets your stomach, take it with food. Do not take your medicine more often than directed.  A special MedGuide will be given to you by the pharmacist with each prescription and refill. Be sure to read this information carefully each time.  Talk to your pediatrician regarding the use of this medicine in children. Special care may be needed.  What side effects may I notice from receiving this medicine?  Side effects that you should report to your doctor or health care professional as soon as possible:  · allergic reactions like skin rash, itching or hives, swelling of the face, lips, or tongue  · breathing problems  · confusion  · redness, blistering, peeling or loosening of the skin, including inside the mouth  · signs and symptoms of low blood pressure like dizziness; feeling faint or lightheaded, falls; unusually weak or tired  · trouble passing urine or change in the amount of urine  · yellowing of the eyes or skin  Side effects that usually do not require medical attention (report to your doctor or health care professional if they continue or are bothersome):  · constipation  · dry mouth  · nausea, vomiting  · tiredness  What may interact with this medicine?  This medicine may interact with the following medications:  · alcohol  · antiviral medicines for HIV or AIDS  · atropine  · antihistamines for allergy, cough and cold  · certain antibiotics like erythromycin, clarithromycin  · certain medicines for anxiety or sleep  · certain medicines for bladder problems like oxybutynin, tolterodine  · certain medicines for depression like amitriptyline, fluoxetine, sertraline  · certain medicines for fungal infections like ketoconazole and itraconazole  · certain medicines for Parkinson's disease like benztropine,  trihexyphenidyl  · certain medicines for seizures like carbamazepine, phenobarbital, phenytoin, primidone  · certain medicines for stomach problems like dicyclomine, hyoscyamine  · certain medicines for travel sickness like scopolamine  · general anesthetics like halothane, isoflurane, methoxyflurane, propofol  · ipratropium  · local anesthetics like lidocaine, pramoxine, tetracaine  · MAOIs like Carbex, Eldepryl, Marplan, Nardil, and Parnate  · medicines that relax muscles for surgery  · other medicines with acetaminophen  · other narcotic medicines for pain or cough  · phenothiazines like chlorpromazine, mesoridazine, prochlorperazine, thioridazine  · rifampin  What if I miss a dose?  If you miss a dose, take it as soon as you can. If it is almost time for your next dose, take only that dose. Do not take double or extra doses.  Where should I keep my medicine?  Keep out of the reach of children. This medicine can be abused. Keep your medicine in a safe place to protect it from theft. Do not share this medicine with anyone. Selling or giving away this medicine is dangerous and against the law.  This medicine may cause accidental overdose and death if it taken by other adults, children, or pets. Mix any unused medicine with a substance like cat litter or coffee grounds. Then throw the medicine away in a sealed container like a sealed bag or a coffee can with a lid. Do not use the medicine after the expiration date.  Store at room temperature between 15 and 30 degrees C (59 and 86 degrees F).  What should I tell my health care provider before I take this medicine?  They need to know if you have any of these conditions:  · brain tumor  · Crohn's disease, inflammatory bowel disease, or ulcerative colitis  · drug abuse or addiction  · head injury  · heart or circulation problems  · if you often drink alcohol  · kidney disease or problems going to the bathroom  · liver disease  · lung disease, asthma, or breathing  problems  · an unusual or allergic reaction to acetaminophen, hydrocodone, other opioid analgesics, other medicines, foods, dyes, or preservatives  · pregnant or trying to get pregnant  · breast-feeding  What should I watch for while using this medicine?  Tell your doctor or health care professional if your pain does not go away, if it gets worse, or if you have new or a different type of pain. You may develop tolerance to the medicine. Tolerance means that you will need a higher dose of the medicine for pain relief. Tolerance is normal and is expected if you take the medicine for a long time.  Do not suddenly stop taking your medicine because you may develop a severe reaction. Your body becomes used to the medicine. This does NOT mean you are addicted. Addiction is a behavior related to getting and using a drug for a non-medical reason. If you have pain, you have a medical reason to take pain medicine. Your doctor will tell you how much medicine to take. If your doctor wants you to stop the medicine, the dose will be slowly lowered over time to avoid any side effects.  There are different types of narcotic medicines (opiates). If you take more than one type at the same time or if you are taking another medicine that also causes drowsiness, you may have more side effects. Give your health care provider a list of all medicines you use. Your doctor will tell you how much medicine to take. Do not take more medicine than directed. Call emergency for help if you have problems breathing or unusual sleepiness.  Do not take other medicines that contain acetaminophen with this medicine. Always read labels carefully. If you have questions, ask your doctor or pharmacist.  If you take too much acetaminophen get medical help right away. Too much acetaminophen can be very dangerous and cause liver damage. Even if you do not have symptoms, it is important to get help right away.  You may get drowsy or dizzy. Do not drive, use  machinery, or do anything that needs mental alertness until you know how this medicine affects you. Do not stand or sit up quickly, especially if you are an older patient. This reduces the risk of dizzy or fainting spells. Alcohol may interfere with the effect of this medicine. Avoid alcoholic drinks.  The medicine will cause constipation. Try to have a bowel movement at least every 2 to 3 days. If you do not have a bowel movement for 3 days, call your doctor or health care professional.  Your mouth may get dry. Chewing sugarless gum or sucking hard candy, and drinking plenty of water may help. Contact your doctor if the problem does not go away or is severe.  Date Last Reviewed:   NOTE:This sheet is a summary. It may not cover all possible information. If you have questions about this medicine, talk to your doctor, pharmacist, or health care provider. Copyright© 2016 [employer]      Meloxicam tablets  What is this medicine?  MELOXICAM (alireza OX i cam) is a non-steroidal anti-inflammatory drug (NSAID). It is used to reduce swelling and to treat pain. It may be used for osteoarthritis, rheumatoid arthritis, or juvenile rheumatoid arthritis.  How should I use this medicine?  Take this medicine by mouth with a full glass of water. Follow the directions on the prescription label. You can take it with or without food. If it upsets your stomach, take it with food. Take your medicine at regular intervals. Do not take it more often than directed. Do not stop taking except on your doctor's advice.  A special MedGuide will be given to you by the pharmacist with each prescription and refill. Be sure to read this information carefully each time.  Talk to your pediatrician regarding the use of this medicine in children. While this drug may be prescribed for selected conditions, precautions do apply.  Patients over 65 years old may have a stronger reaction and need a smaller dose.  What side effects may I notice from receiving  this medicine?  Side effects that you should report to your doctor or health care professional as soon as possible:  · allergic reactions like skin rash, itching or hives, swelling of the face, lips, or tongue  · nausea, vomiting  · signs and symptoms of a blood clot such as breathing problems; changes in vision; chest pain; severe, sudden headache; pain, swelling, warmth in the leg; trouble speaking; sudden numbness or weakness of the face, arm, or leg  · signs and symptoms of bleeding such as bloody or black, tarry stools; red or dark-brown urine; spitting up blood or brown material that looks like coffee grounds; red spots on the skin; unusual bruising or bleeding from the eye, gums, or nose  · signs and symptoms of liver injury like dark yellow or brown urine; general ill feeling or flu-like symptoms; light-colored stools; loss of appetite; nausea; right upper belly pain; unusually weak or tired; yellowing of the eyes or skin  · signs and symptoms of stroke like changes in vision; confusion; trouble speaking or understanding; severe headaches; sudden numbness or weakness of the face, arm, or leg; trouble walking; dizziness; loss of balance or coordination  Side effects that usually do not require medical attention (report these to your doctor or health care professional if they continue or are bothersome):  · constipation  · diarrhea  · gas  What may interact with this medicine?  Do not take this medicine with any of the following medications:  · cidofovir  · ketorolac  This medicine may also interact with the following medications:  · aspirin and aspirin-like medicines  · certain medicines for blood pressure, heart disease, irregular heart beat  · certain medicines for depression, anxiety, or psychotic disturbances  · certain medicines that treat or prevent blood clots like warfarin, enoxaparin, dalteparin, apixaban, dabigatran, rivaroxaban  · cyclosporine  · digoxin  · diuretics  · methotrexate  · other  NSAIDs, medicines for pain and inflammation, like ibuprofen and naproxen  · pemetrexed  What if I miss a dose?  If you miss a dose, take it as soon as you can. If it is almost time for your next dose, take only that dose. Do not take double or extra doses.  Where should I keep my medicine?  Keep out of the reach of children.  Store at room temperature between 15 and 30 degrees C (59 and 86 degrees F). Throw away any unused medicine after the expiration date.  What should I tell my health care provider before I take this medicine?  They need to know if you have any of these conditions:  · bleeding disorders  · cigarette smoker  · coronary artery bypass graft (CABG) surgery within the past 2 weeks  · drink more than 3 alcohol-containing drinks per day  · heart disease  · high blood pressure  · history of stomach bleeding  · kidney disease  · liver disease  · lung or breathing disease, like asthma  · stomach or intestine problems  · an unusual or allergic reaction to meloxicam, aspirin, other NSAIDs, other medicines, foods, dyes, or preservatives  · pregnant or trying to get pregnant  · breast-feeding  What should I watch for while using this medicine?  Tell your doctor or healthcare professional if your symptoms do not start to get better or if they get worse.  Do not take other medicines that contain aspirin, ibuprofen, or naproxen with this medicine. Side effects such as stomach upset, nausea, or ulcers may be more likely to occur. Many medicines available without a prescription should not be taken with this medicine.  This medicine can cause ulcers and bleeding in the stomach and intestines at any time during treatment. This can happen with no warning and may cause death. There is increased risk with taking this medicine for a long time. Smoking, drinking alcohol, older age, and poor health can also increase risks. Call your doctor right away if you have stomach pain or blood in your vomit or stool.  This medicine  does not prevent heart attack or stroke. In fact, this medicine may increase the chance of a heart attack or stroke. The chance may increase with longer use of this medicine and in people who have heart disease. If you take aspirin to prevent heart attack or stroke, talk with your doctor or health care professional.  Date Last Reviewed:   NOTE:This sheet is a summary. It may not cover all possible information. If you have questions about this medicine, talk to your doctor, pharmacist, or health care provider. Copyright© 2016 [employer]

## 2017-06-20 NOTE — PLAN OF CARE
Gave discharge instructions to patient and wife, verbalized understanding.  All questions answered to the satisfaction of patient and wife.  To POV via WC, into POV with assist, no difficulty or distress noted.

## 2017-06-20 NOTE — OP NOTE
DATE OF PROCEDURE:  06/20/2017    PREOPERATIVE DIAGNOSES:  Painful retained hardware, right proximal tibia and   painful degenerative osteoarthritis of the right knee.    POSTOPERATIVE DIAGNOSES:  Painful retained hardware, right proximal tibia and   painful degenerative osteoarthritis of the right knee with moderately severe   osteoporosis of the tibia.    PROCEDURES PERFORMED:  Removal of painful plate and screws from right proximal   tibia in preparation for right total knee replacement for painful arthritis of   the right knee.    SURGEON:  Red Eller M.D.    ASSISTANT:  Stephanie Mo PA-C.    ANESTHESIA:  General.    DRAINS:  None.    FINDINGS:  Healed tibial plateau fracture with painful retained hardware and   osteoporosis of the tibia.    SPECIMENS:  Metallic plate and screws.    COMPLICATIONS:  None.    ESTIMATED BLOOD LOSS:  15 mL.    INDICATIONS:  This 71-year-old male presented approximately 10 years status post   ORIF of a proximal lateral tibial plateau fracture, right knee with painful   arthritis of the knee and retained painful plate and screws.  He was indicated   for hardware removal as a staged procedure prior to total knee replacement to   decrease his pain, diminish his morbidity and improve his ultimate functional   ability.    Stephanie Mo's seasoned skilled hands were necessary as first assistant for   wound retraction, positioning of the leg, handling of instruments and   instrumentation, wound closure and dressing application.    DESCRIPTION OF PROCEDURE:  The patient is placed in the supine position on the   operating table and satisfactory general anesthesia was administered by   Anesthesia.  A tourniquet was placed high on the patient's right upper thigh and   a bump was placed beneath the right buttock.  The patient's right lower   extremity was exsanguinated with an Esmarch bandage and the tourniquet inflated   to 300 mmHg pressure.  The right lower extremity was then  prepped and draped in   usual sterile fashion.  Utilizing the previous incision, sharp dissection was   utilized to incise the tissues and subperiosteal elevation of the plate and   screws was performed.  The plate and screws was then removed in sequential   fashion.  They were both locking and cortical screws noted and a total of 14   screws and the plate were removed.  The patient's bone was noted to be severely   osteoporotic.  The bone and wound were irrigated with bacitracin and saline   irrigation solution and hemostasis was maintained with electrocautery.  A 0.25%   plain Marcaine was infiltrated into the wound for postoperative analgesia   purposes.  The wound was then closed in layers utilizing #1 and 0 Vicryl   interrupted and running sutures for the deep layer of tissues, 2-0 Vicryl   running suture for the subcutaneous layer of tissues and staples for the skin.    A sterile compression dressing and Ace wrap was applied and the patient was   awakened and taken to Recovery Room in satisfactory condition.  There were no   complications.  Blood loss was 15 mL and the patient tolerated the procedure   well.      DARIEN  dd: 06/20/2017 12:12:58 (CDT)  td: 06/20/2017 15:14:01 (CDT)  Doc ID   #[id number]  Job ID #[id number]    CC:

## 2017-07-05 DIAGNOSIS — M79.661 PAIN IN RIGHT LOWER LEG: Primary | ICD-10-CM

## 2017-07-07 ENCOUNTER — OFFICE VISIT (OUTPATIENT)
Dept: ORTHOPEDICS | Facility: CLINIC | Age: 72
End: 2017-07-07
Payer: MEDICARE

## 2017-07-07 ENCOUNTER — HOSPITAL ENCOUNTER (OUTPATIENT)
Dept: RADIOLOGY | Facility: HOSPITAL | Age: 72
Discharge: HOME | End: 2017-07-07
Attending: PHYSICIAN ASSISTANT
Payer: MEDICARE

## 2017-07-07 VITALS
DIASTOLIC BLOOD PRESSURE: 71 MMHG | HEART RATE: 102 BPM | HEIGHT: 70 IN | SYSTOLIC BLOOD PRESSURE: 122 MMHG | BODY MASS INDEX: 20.13 KG/M2 | RESPIRATION RATE: 18 BRPM | WEIGHT: 140.63 LBS

## 2017-07-07 DIAGNOSIS — Z09 POSTOP CHECK: ICD-10-CM

## 2017-07-07 DIAGNOSIS — Z98.890 S/P HARDWARE REMOVAL: Primary | ICD-10-CM

## 2017-07-07 DIAGNOSIS — M79.661 PAIN IN RIGHT LOWER LEG: ICD-10-CM

## 2017-07-07 PROCEDURE — 99214 OFFICE O/P EST MOD 30 MIN: CPT | Mod: PBBFAC,25 | Performed by: PHYSICIAN ASSISTANT

## 2017-07-07 PROCEDURE — 73552 X-RAY EXAM OF FEMUR 2/>: CPT | Mod: 26,RT,, | Performed by: RADIOLOGY

## 2017-07-07 PROCEDURE — 99024 POSTOP FOLLOW-UP VISIT: CPT | Mod: ,,, | Performed by: PHYSICIAN ASSISTANT

## 2017-07-07 PROCEDURE — 99999 PR PBB SHADOW E&M-EST. PATIENT-LVL IV: CPT | Mod: PBBFAC,,, | Performed by: PHYSICIAN ASSISTANT

## 2017-07-09 NOTE — PROGRESS NOTES
"Subjective:      Patient ID: Santy Anderson is a 71 y.o. male.    Chief Complaint: Swelling, Post-op Evaluation, and Pain of the Right Lower Leg    HPI  The patient is seen today status post hardware removal from right lower extremity performed on 6/20/17.  This is his first visit since surgery.  The patient states that he has been very careful since surgery and using his walker.  He reports pain to right lower extremity which is expected since surgery.  He does have difficulty moving  his knee and lower leg.  He tells me that he experiences "shocking" discomfort radiating down to his ankle at times.  Patient does not report any issues with his incision.      X-rays were taken today.  He rates his current discomfort a 9 on a pain scale of 1-10.  Review of Systems   Constitution: Negative for chills, fever and night sweats.   Respiratory: Negative for cough, shortness of breath and wheezing.    Musculoskeletal: Positive for joint pain and joint swelling.   Gastrointestinal: Negative for diarrhea, nausea and vomiting.   Neurological: Negative for brief paralysis.   Psychiatric/Behavioral: Negative for altered mental status.         Objective:            General    Constitutional: He is oriented to person, place, and time. He appears well-developed and well-nourished.   Neck: Normal range of motion.   Cardiovascular: Normal rate and regular rhythm.    Pulmonary/Chest: Effort normal.   Abdominal: Soft.   Neurological: He is alert and oriented to person, place, and time.   Psychiatric: He has a normal mood and affect. His behavior is normal.     General Musculoskeletal Exam   Gait: abnormal       Right Knee Exam     Inspection   Erythema: absent  Effusion: effusion    Comments:  Tenderness to palpation over incision area.  His incision remains clean, dry and intact.  No evidence of infection.    The patient has a negative Homans sign.                Comparison: 05/09/2017    RIGHT femur 2 views, 4 " images    Findings:     There is faint visualization of known lipoma along the anterior margin of the mid thigh and best visualized on the lateral view.  Postsurgical changes noted involving the mid shaft of the femur and proximal tibia.  Skin staples noted over the anterior lateral soft tissues of the knee and proximal tibia.  Extensive atherosclerotic calcification noted.  No acute fracture or dislocation.  No definite effusion.  Osteopenia and mild degenerative change noted the hip with moderate tricompartment degenerative change involving the knee.   Impression           As above.          Assessment:       Encounter Diagnoses   Name Primary?    S/P hardware removal Yes    Pain in right lower leg     Postop check           Plan:       Santy was seen today for swelling, post-op evaluation and pain.    Diagnoses and all orders for this visit:    S/P hardware removal  -     Ambulatory Referral to Physical/Occupational Therapy    Pain in right lower leg  -     Ambulatory Referral to Physical/Occupational Therapy    Postop check  -     Ambulatory Referral to Physical/Occupational Therapy      Patient had staples removed today without incident.  He was instructed to keep it clean and dry for 24 hours and then can wash his leg in the shower.    The patient will continue to use his walker and be weightbearing as tolerated on right lower extremity.  He was instructed on at-home exercises and in addition to this will be sent to outpatient physical therapy.  He will return to recheck his leg in approximately 3 weeks.  Patient may still want to decide on having a joint replacement of his knee at his next visit.  He is hoping to postpone if he is doing better at that time.            Stephanie Mo PA-C

## 2017-07-15 ENCOUNTER — NURSE TRIAGE (OUTPATIENT)
Dept: ADMINISTRATIVE | Facility: CLINIC | Age: 72
End: 2017-07-15

## 2017-07-24 ENCOUNTER — OFFICE VISIT (OUTPATIENT)
Dept: INTERNAL MEDICINE | Facility: CLINIC | Age: 72
End: 2017-07-24
Payer: MEDICARE

## 2017-07-24 ENCOUNTER — LAB VISIT (OUTPATIENT)
Dept: LAB | Facility: HOSPITAL | Age: 72
End: 2017-07-24
Attending: INTERNAL MEDICINE
Payer: MEDICARE

## 2017-07-24 ENCOUNTER — OFFICE VISIT (OUTPATIENT)
Dept: HEMATOLOGY/ONCOLOGY | Facility: CLINIC | Age: 72
End: 2017-07-24
Payer: MEDICARE

## 2017-07-24 VITALS
TEMPERATURE: 98 F | HEIGHT: 70 IN | OXYGEN SATURATION: 99 % | WEIGHT: 142.88 LBS | BODY MASS INDEX: 20.46 KG/M2 | SYSTOLIC BLOOD PRESSURE: 109 MMHG | DIASTOLIC BLOOD PRESSURE: 56 MMHG | HEART RATE: 99 BPM

## 2017-07-24 VITALS
TEMPERATURE: 95 F | OXYGEN SATURATION: 99 % | WEIGHT: 143.31 LBS | HEART RATE: 92 BPM | BODY MASS INDEX: 20.52 KG/M2 | HEIGHT: 70 IN | DIASTOLIC BLOOD PRESSURE: 66 MMHG | SYSTOLIC BLOOD PRESSURE: 120 MMHG

## 2017-07-24 DIAGNOSIS — Z85.118 HISTORY OF LUNG CANCER: ICD-10-CM

## 2017-07-24 DIAGNOSIS — C34.32 MALIGNANT NEOPLASM OF LOWER LOBE OF LEFT LUNG: Primary | ICD-10-CM

## 2017-07-24 DIAGNOSIS — R74.01 ELEVATED AST (SGOT): ICD-10-CM

## 2017-07-24 DIAGNOSIS — E55.9 VITAMIN D DEFICIENCY: ICD-10-CM

## 2017-07-24 DIAGNOSIS — R74.01 ELEVATED AST (SGOT): Primary | ICD-10-CM

## 2017-07-24 PROBLEM — Z12.11 SCREEN FOR COLON CANCER: Status: RESOLVED | Noted: 2017-04-11 | Resolved: 2017-07-24

## 2017-07-24 LAB
25(OH)D3+25(OH)D2 SERPL-MCNC: 13 NG/ML
ALBUMIN SERPL BCP-MCNC: 3 G/DL
ALP SERPL-CCNC: 116 U/L
ALT SERPL W/O P-5'-P-CCNC: 6 U/L
AST SERPL-CCNC: 24 U/L
BILIRUB DIRECT SERPL-MCNC: 0.2 MG/DL
BILIRUB SERPL-MCNC: 0.4 MG/DL
PROT SERPL-MCNC: 7.4 G/DL

## 2017-07-24 PROCEDURE — 99999 PR PBB SHADOW E&M-EST. PATIENT-LVL III: CPT | Mod: PBBFAC,,, | Performed by: INTERNAL MEDICINE

## 2017-07-24 PROCEDURE — 1159F MED LIST DOCD IN RCRD: CPT | Mod: ,,, | Performed by: INTERNAL MEDICINE

## 2017-07-24 PROCEDURE — 99213 OFFICE O/P EST LOW 20 MIN: CPT | Mod: S$PBB,,, | Performed by: INTERNAL MEDICINE

## 2017-07-24 PROCEDURE — 99213 OFFICE O/P EST LOW 20 MIN: CPT | Mod: S$PBB,,, | Performed by: PHYSICIAN ASSISTANT

## 2017-07-24 PROCEDURE — 1159F MED LIST DOCD IN RCRD: CPT | Mod: ,,, | Performed by: PHYSICIAN ASSISTANT

## 2017-07-24 PROCEDURE — 99213 OFFICE O/P EST LOW 20 MIN: CPT | Mod: PBBFAC,27,PO | Performed by: INTERNAL MEDICINE

## 2017-07-24 PROCEDURE — 1126F AMNT PAIN NOTED NONE PRSNT: CPT | Mod: ,,, | Performed by: INTERNAL MEDICINE

## 2017-07-24 PROCEDURE — 99999 PR PBB SHADOW E&M-EST. PATIENT-LVL III: CPT | Mod: PBBFAC,,, | Performed by: PHYSICIAN ASSISTANT

## 2017-07-24 PROCEDURE — 1126F AMNT PAIN NOTED NONE PRSNT: CPT | Mod: ,,, | Performed by: PHYSICIAN ASSISTANT

## 2017-07-24 NOTE — PROGRESS NOTES
Subjective:       Patient ID: Santy Anderson is a 71 y.o. male.    Chief Complaint: Follow-up    71 year old male presents to clinic for f/u from PCP Dr. Peterson for elevated AST. He was seen by PCP last month for pre-op and AST was found to be 150. He reports feeling well since his surgery (hardware removal from R leg) and reports no CP, SOB, abd pain, stool changes, N/V, fever, chills, or other medical complaints. He says he does not take Tylenol regularly but does drink about 3 beers daily. Pt also has a regular f/u with his hem/onc Dr. Lara today. Hep C antibody was neg June 2016.    Patient Active Problem List:     Malignant neoplasm of lower lobe of left lung     Localized osteoarthrosis, lower leg     Chondromalacia     Lumbar spondylosis     Bilateral hip joint arthritis     DDD (degenerative disc disease), lumbar     Post-traumatic osteoarthritis of right knee     Esophagitis, Roberts grade D     Dysphagia     Vitamin D deficiency     Age-related osteoporosis without current pathological fracture     Lumbar radiculopathy     Screen for colon cancer     Pain from implanted hardware      Review of Systems   Constitutional: Negative for chills and fever.   Respiratory: Negative for cough and shortness of breath.    Cardiovascular: Negative for chest pain, palpitations and leg swelling.   Gastrointestinal: Negative for abdominal pain, blood in stool, constipation, diarrhea, nausea and vomiting.   Skin: Negative for rash.   Neurological: Negative for dizziness, weakness, numbness and headaches.   Psychiatric/Behavioral: Negative for confusion.       Objective:      Physical Exam   Constitutional: He is oriented to person, place, and time. He appears well-developed and well-nourished. No distress.   HENT:   Head: Normocephalic and atraumatic.   Eyes: EOM are normal. No scleral icterus.   Neck: Neck supple.   Cardiovascular: Normal rate and regular rhythm.    Pulmonary/Chest: Effort normal and breath sounds  normal. No respiratory distress. He has no decreased breath sounds. He has no wheezes. He has no rhonchi. He has no rales.   Abdominal: Soft. Bowel sounds are normal. He exhibits no distension and no mass. There is no tenderness. There is no rebound and no guarding.   Musculoskeletal: He exhibits no edema.   Neurological: He is alert and oriented to person, place, and time. No cranial nerve deficit.   Skin: Skin is dry. No rash noted. He is not diaphoretic.   Psychiatric: He has a normal mood and affect. His speech is normal and behavior is normal. Thought content normal.       Component      Latest Ref Rng & Units 6/8/2017   Sodium      136 - 145 mmol/L 141   Potassium      3.5 - 5.1 mmol/L 3.8   Chloride      95 - 110 mmol/L 103   CO2      23 - 29 mmol/L 25   Glucose      70 - 110 mg/dL 90   BUN, Bld      8 - 23 mg/dL 7 (L)   Creatinine      0.5 - 1.4 mg/dL 0.8   Calcium      8.7 - 10.5 mg/dL 9.9   Total Protein      6.0 - 8.4 g/dL 8.1   Albumin      3.5 - 5.2 g/dL 3.9   Total Bilirubin      0.1 - 1.0 mg/dL 0.6   Alkaline Phosphatase      55 - 135 U/L 69   AST      10 - 40 U/L 150 (H)   ALT      10 - 44 U/L 28   Anion Gap      8 - 16 mmol/L 13   eGFR if African American      >60 mL/min/1.73 m:2 >60.0   eGFR if non African American      >60 mL/min/1.73 m:2 >60.0     Assessment:       1. Elevated AST (SGOT)    2. Vitamin D deficiency    3. History of lung cancer        Plan:         Santy was seen today for follow-up.    Diagnoses and all orders for this visit:    Elevated AST (SGOT)  -     Hepatic function panel; Future  Will recheck AST level today and get abd U/S ordered by PCP scheduled for pt with result review following.    Vitamin D deficiency  -     Vitamin D; Future  Pt currently taking ergocalciferol twice weekly. Will recheck level today.    History of lung cancer  F/u with hem/onc Dr. Lara today as scheduled.    F/u with PCP in 2 months for further management. RTC sooner if needed. ER if severe sxs  occur.

## 2017-07-24 NOTE — PROGRESS NOTES
Subjective:       Patient ID: Santy Anderson is a 71 y.o. male.    Chief Complaint: Results and Lung Cancer    HPI 71-year-old male history of resected stage I non-small cell lung carcinoma patient returns for follow-up epidural steroid has been very successful in relieving back pain patient is here for review    Past Medical History:   Diagnosis Date    Arthritis     Diabetes mellitus, type 2     Does not have any more problems with diabetes    Gastric mass     GERD (gastroesophageal reflux disease)     Hyperlipidemia     Lung cancer      Family History   Problem Relation Age of Onset    Diabetes Mother     Hypertension Mother      Social History     Social History    Marital status:      Spouse name: N/A    Number of children: N/A    Years of education: N/A     Occupational History    Not on file.     Social History Main Topics    Smoking status: Former Smoker     Packs/day: 0.50     Years: 25.00     Quit date: 10/16/1988    Smokeless tobacco: Never Used    Alcohol use 9.0 oz/week     1 Glasses of wine, 14 Cans of beer per week      Comment: socailly    Drug use: No    Sexual activity: Not on file     Other Topics Concern    Not on file     Social History Narrative    No narrative on file     Past Surgical History:   Procedure Laterality Date    COLONOSCOPY      COLONOSCOPY N/A 4/11/2017    Procedure: COLONOSCOPY;  Surgeon: Brianne Wise MD;  Location: [facility];  Service: Endoscopy;  Laterality: N/A;    ESOPHAGOGASTRODUODENOSCOPY      FEMUR SURGERY Right     GASTRECTOMY      HAND SURGERY Left     KNEE SURGERY Right     laparotomy for trauma (negative)  1979    left lower lobectomy         Labs:  Lab Results   Component Value Date    WBC 3.79 (L) 06/08/2017    HGB 11.6 (L) 06/08/2017    HCT 35.4 (L) 06/08/2017    MCV 94 06/08/2017     (L) 06/08/2017     BMP  Lab Results   Component Value Date     06/08/2017    K 3.8 06/08/2017     06/08/2017    CO2 25  06/08/2017    BUN 7 (L) 06/08/2017    CREATININE 0.8 06/08/2017    CALCIUM 9.9 06/08/2017    ANIONGAP 13 06/08/2017    ESTGFRAFRICA >60.0 06/08/2017    EGFRNONAA >60.0 06/08/2017     Lab Results   Component Value Date    ALT 28 06/08/2017     (H) 06/08/2017    ALKPHOS 69 06/08/2017    BILITOT 0.6 06/08/2017       Lab Results   Component Value Date    IRON 194 (H) 05/22/2015    TIBC 312 05/22/2015    FERRITIN 128 05/22/2015     Lab Results   Component Value Date    TUQJWMIS20 370 09/23/2016     Lab Results   Component Value Date    FOLATE 8.8 09/23/2016     Lab Results   Component Value Date    TSH 2.376 01/25/2017         Review of Systems   Constitutional: Negative for activity change, appetite change, chills, diaphoresis, fatigue, fever and unexpected weight change.   HENT: Negative for congestion, dental problem, drooling, ear discharge, ear pain, facial swelling, hearing loss, mouth sores, nosebleeds, postnasal drip, rhinorrhea, sinus pressure, sneezing, sore throat, tinnitus, trouble swallowing and voice change.    Eyes: Negative for photophobia, pain, discharge, redness, itching and visual disturbance.   Respiratory: Negative for apnea, cough, choking, chest tightness, shortness of breath, wheezing and stridor.    Cardiovascular: Negative for chest pain, palpitations and leg swelling.   Gastrointestinal: Negative for abdominal distention, abdominal pain, anal bleeding, blood in stool, constipation, diarrhea, nausea, rectal pain and vomiting.   Endocrine: Negative for cold intolerance, heat intolerance, polydipsia, polyphagia and polyuria.   Genitourinary: Negative for decreased urine volume, difficulty urinating, discharge, dysuria, enuresis, flank pain, frequency, genital sores, hematuria, penile pain, penile swelling, scrotal swelling, testicular pain and urgency.   Musculoskeletal: Negative for arthralgias, back pain, gait problem, joint swelling, myalgias, neck pain and neck stiffness.   Skin:  Negative for color change, pallor, rash and wound.   Allergic/Immunologic: Negative for environmental allergies, food allergies and immunocompromised state.   Neurological: Negative for dizziness, tremors, seizures, syncope, facial asymmetry, speech difficulty, weakness, light-headedness, numbness and headaches.   Hematological: Negative for adenopathy. Does not bruise/bleed easily.   Psychiatric/Behavioral: Negative for agitation, behavioral problems, confusion, decreased concentration, dysphoric mood, hallucinations, self-injury, sleep disturbance and suicidal ideas. The patient is not nervous/anxious and is not hyperactive.        Objective:      Physical Exam   Constitutional: He is oriented to person, place, and time. He appears well-developed and well-nourished. He appears distressed.   HENT:   Head: Normocephalic.   Right Ear: External ear normal.   Left Ear: External ear normal.   Nose: Nose normal. Right sinus exhibits no maxillary sinus tenderness and no frontal sinus tenderness. Left sinus exhibits no maxillary sinus tenderness and no frontal sinus tenderness.   Mouth/Throat: Oropharynx is clear and moist. No oropharyngeal exudate.   Eyes: EOM and lids are normal. Pupils are equal, round, and reactive to light. Right eye exhibits no discharge. Left eye exhibits no discharge. Right conjunctiva is not injected. Right conjunctiva has no hemorrhage. Left conjunctiva is not injected. Left conjunctiva has no hemorrhage. No scleral icterus. Right eye exhibits normal extraocular motion. Left eye exhibits normal extraocular motion.   Neck: Normal range of motion. Neck supple. No JVD present. No tracheal deviation present. No thyromegaly present.   Cardiovascular: Normal rate and regular rhythm.    Pulmonary/Chest: Effort normal. No stridor. No respiratory distress.   Abdominal: Soft. He exhibits no mass. There is no hepatosplenomegaly, splenomegaly or hepatomegaly. There is no tenderness.   Musculoskeletal: Normal  range of motion. He exhibits no edema or tenderness.   Lymphadenopathy:        Head (right side): No posterior auricular and no occipital adenopathy present.        Head (left side): No posterior auricular and no occipital adenopathy present.     He has no cervical adenopathy.        Right cervical: No superficial cervical, no deep cervical and no posterior cervical adenopathy present.       Left cervical: No superficial cervical, no deep cervical and no posterior cervical adenopathy present.     He has no axillary adenopathy.        Right: No supraclavicular adenopathy present.        Left: No supraclavicular adenopathy present.   Neurological: He is alert and oriented to person, place, and time. He has normal strength. No cranial nerve deficit. Coordination normal.   Skin: Skin is dry. No rash noted. He is not diaphoretic. No cyanosis or erythema. Nails show no clubbing.   Psychiatric: He has a normal mood and affect. His behavior is normal. Judgment and thought content normal. Cognition and memory are normal.   Vitals reviewed.          Assessment:      1. Malignant neoplasm of lower lobe of left lung           Plan:   We'll proceed in October 2017 with CT chest laboratory review at that point and one year follow-up resected non-small cell lung carcinoma

## 2017-07-26 ENCOUNTER — TELEPHONE (OUTPATIENT)
Dept: INTERNAL MEDICINE | Facility: CLINIC | Age: 72
End: 2017-07-26

## 2017-07-26 DIAGNOSIS — E55.9 VITAMIN D DEFICIENCY: Primary | ICD-10-CM

## 2017-07-26 DIAGNOSIS — M81.0 AGE-RELATED OSTEOPOROSIS WITHOUT CURRENT PATHOLOGICAL FRACTURE: Primary | ICD-10-CM

## 2017-07-26 DIAGNOSIS — E55.9 VITAMIN D DEFICIENCY: ICD-10-CM

## 2017-07-26 NOTE — TELEPHONE ENCOUNTER
Insurance is not letting me order the vit D lab. Please make sure he has a 3 mo f/u with PCP and she may need to order it at that time.

## 2017-07-26 NOTE — TELEPHONE ENCOUNTER
----- Message from EMMANUELLE Frederick sent at 7/26/2017  7:57 AM CDT -----  Liver enzyme is now normal. Vit D is lower than previously at 13. Please verify that he has been taking his vit D prescription supplement twice weekly. If so, I discussed with Dr. Peterson and needs to increase it to 3 times weekly with a recheck in 3 months.

## 2017-07-27 ENCOUNTER — HOSPITAL ENCOUNTER (OUTPATIENT)
Dept: RADIOLOGY | Facility: HOSPITAL | Age: 72
Discharge: HOME | End: 2017-07-27
Attending: INTERNAL MEDICINE
Payer: MEDICARE

## 2017-07-27 DIAGNOSIS — C34.32 MALIGNANT NEOPLASM OF LOWER LOBE OF LEFT LUNG: ICD-10-CM

## 2017-07-27 DIAGNOSIS — R74.8 ELEVATED LIVER ENZYMES: ICD-10-CM

## 2017-07-27 PROCEDURE — 76705 ECHO EXAM OF ABDOMEN: CPT | Mod: TC

## 2017-07-28 ENCOUNTER — PATIENT MESSAGE (OUTPATIENT)
Dept: INTERNAL MEDICINE | Facility: CLINIC | Age: 72
End: 2017-07-28

## 2017-07-28 ENCOUNTER — OFFICE VISIT (OUTPATIENT)
Dept: ORTHOPEDICS | Facility: CLINIC | Age: 72
End: 2017-07-28
Payer: MEDICARE

## 2017-07-28 VITALS
BODY MASS INDEX: 20.13 KG/M2 | SYSTOLIC BLOOD PRESSURE: 94 MMHG | DIASTOLIC BLOOD PRESSURE: 61 MMHG | HEART RATE: 93 BPM | HEIGHT: 70 IN | WEIGHT: 140.63 LBS

## 2017-07-28 DIAGNOSIS — E55.9 VITAMIN D DEFICIENCY: Primary | ICD-10-CM

## 2017-07-28 DIAGNOSIS — Z09 POSTOP CHECK: ICD-10-CM

## 2017-07-28 DIAGNOSIS — Z98.890 S/P HARDWARE REMOVAL: Primary | ICD-10-CM

## 2017-07-28 DIAGNOSIS — E55.9 VITAMIN D INSUFFICIENCY: ICD-10-CM

## 2017-07-28 PROCEDURE — 99024 POSTOP FOLLOW-UP VISIT: CPT | Mod: ,,, | Performed by: PHYSICIAN ASSISTANT

## 2017-07-28 PROCEDURE — 99213 OFFICE O/P EST LOW 20 MIN: CPT | Mod: PBBFAC | Performed by: PHYSICIAN ASSISTANT

## 2017-07-28 PROCEDURE — 99999 PR PBB SHADOW E&M-EST. PATIENT-LVL III: CPT | Mod: PBBFAC,,, | Performed by: PHYSICIAN ASSISTANT

## 2017-07-28 RX ORDER — ERGOCALCIFEROL 1.25 MG/1
50000 CAPSULE ORAL
Qty: 12 CAPSULE | Refills: 6 | Status: SHIPPED | OUTPATIENT
Start: 2017-07-28 | End: 2017-11-08 | Stop reason: SDUPTHER

## 2017-07-28 NOTE — PROGRESS NOTES
Subjective:      Patient ID: Santy Anderson is a 71 y.o. male.    Chief Complaint: Pain and Post-op Evaluation of the Right Knee    HPI  The patient is seen today status post hardware removal from right lower extremity performed on 6/20/17.   this is the patient's second visit since surgery.  He does not report any increased pain or swelling.  His incision is healing well.  There is one small area that has not healed completely.  Patient states that he had a Steri-Strip over it and then a Band-Aid and pulled it off.  It does not look fluctuant, red or have any drainage.    The patient states that his knee is not bothering him.  He still is not interested in considering a total knee replacement.    Review of Systems   Constitution: Negative for chills, fever and night sweats.   Respiratory: Negative for cough, shortness of breath and wheezing.    Musculoskeletal: Negative for joint pain and joint swelling.   Gastrointestinal: Negative for diarrhea, nausea and vomiting.   Neurological: Negative for brief paralysis.   Psychiatric/Behavioral: Negative for altered mental status.         Objective:            General    Constitutional: He is oriented to person, place, and time. He appears well-developed and well-nourished.   Neck: Normal range of motion.   Cardiovascular: Normal rate and regular rhythm.    Pulmonary/Chest: Effort normal.   Abdominal: Soft.   Neurological: He is alert and oriented to person, place, and time.   Psychiatric: He has a normal mood and affect. His behavior is normal.     General Musculoskeletal Exam   Gait: normal       Right Knee Exam     Inspection   Erythema: absent  Scars: present  Effusion: effusion    Tenderness   The patient is experiencing no tenderness.         Range of Motion   The patient has normal right knee ROM.  Extension: 0   Flexion: 110     Comments:  Small area over incision that is superficial and not intact. It is not draining. Consistent with granuloma tissue. Will cleanse  with betadine and secure with steri-strips.                    Assessment:       Encounter Diagnoses   Name Primary?    S/P hardware removal Yes    Postop check           Plan:       Santy was seen today for pain and post-op evaluation.    Diagnoses and all orders for this visit:    S/P hardware removal    Postop check        The patient is recovering well and moving his leg more so than his last visit.  He has no current pain, discomfort, or edema.    The patient will return in approximately 3 months to recheck his leg and knee.    Superficial area on incision was cleansed with Betadine and had Steri-Strips applied over it to cover wound.  The patient was made aware that if there is any increased pain, redness or drainage to notify our office immediately.  I do not suspect any type of infection right now but wanted to inform the patient.              Stephanie Mo PA-C

## 2017-07-28 NOTE — TELEPHONE ENCOUNTER
Please try to find a way to order a vit D level in 3mo, prior to next appt.  It says it is covered 3x per year but won't let me order it.  Then sched for pt.  SM      Please have him sign a waiver so we can recheck it.  SM

## 2017-07-28 NOTE — LETTER
July 28, 2017      Joanie Peterson MD  [address] [facility] [address]           [facility]  [address] [place] [id number]  Phone: [phone]  Fax: [phone]          Patient: Santy Anderson   MR Number: [id number]   YOB: 1945   Date of Visit: 7/28/2017       Dear Dr. Joanie Peterson:    Thank you for referring Santy Anderson to me for evaluation. Attached you will find relevant portions of my assessment and plan of care.    If you have questions, please do not hesitate to call me. I look forward to following Santy Anderson along with you.    Sincerely,    Stephanie Mo PA-C    Enclosure  CC:  No Recipients    If you would like to receive this communication electronically, please contact [email or web address] or [phone] to request more information on [facility] access.    For providers and/or their staff who would like to refer a patient to [facility], please contact us through our one-stop-shop provider referral line, [facility] , at [phone].    If you feel you have received this communication in error or would no longer like to receive these types of communications, please e-mail [email or web address]

## 2017-07-31 DIAGNOSIS — E55.9 VITAMIN D DEFICIENCY: Primary | ICD-10-CM

## 2017-10-02 ENCOUNTER — TELEPHONE (OUTPATIENT)
Dept: ORTHOPEDICS | Facility: CLINIC | Age: 72
End: 2017-10-02

## 2017-10-02 DIAGNOSIS — M25.561 PAIN IN BOTH KNEES, UNSPECIFIED CHRONICITY: Primary | ICD-10-CM

## 2017-10-02 DIAGNOSIS — M25.562 PAIN IN BOTH KNEES, UNSPECIFIED CHRONICITY: Primary | ICD-10-CM

## 2017-10-02 NOTE — TELEPHONE ENCOUNTER
----- Message from Vicky Wolf sent at 10/2/2017  2:44 PM CDT -----  Patient states he have questions/concerns. Please adv/call [phone].//thanks. cw

## 2017-10-03 ENCOUNTER — TELEPHONE (OUTPATIENT)
Dept: HEMATOLOGY/ONCOLOGY | Facility: CLINIC | Age: 72
End: 2017-10-03

## 2017-10-03 NOTE — TELEPHONE ENCOUNTER
----- Message from Isabel Mccall sent at 10/3/2017  1:58 PM CDT -----  Contact: patient  Calling to change his appointments from 10/06/2017. Patient would like to come on the 9th or 10th. Please call patient ASAP today @[phone]. Thanks, ilana

## 2017-10-05 ENCOUNTER — TELEPHONE (OUTPATIENT)
Dept: HEMATOLOGY/ONCOLOGY | Facility: CLINIC | Age: 72
End: 2017-10-05

## 2017-10-06 ENCOUNTER — OFFICE VISIT (OUTPATIENT)
Dept: ORTHOPEDICS | Facility: CLINIC | Age: 72
End: 2017-10-06
Payer: MEDICARE

## 2017-10-06 ENCOUNTER — HOSPITAL ENCOUNTER (OUTPATIENT)
Dept: RADIOLOGY | Facility: HOSPITAL | Age: 72
Discharge: HOME | End: 2017-10-06
Attending: PHYSICIAN ASSISTANT
Payer: MEDICARE

## 2017-10-06 VITALS
DIASTOLIC BLOOD PRESSURE: 78 MMHG | SYSTOLIC BLOOD PRESSURE: 125 MMHG | HEART RATE: 98 BPM | WEIGHT: 140.63 LBS | HEIGHT: 70 IN | BODY MASS INDEX: 20.13 KG/M2

## 2017-10-06 DIAGNOSIS — M25.562 PAIN IN BOTH KNEES, UNSPECIFIED CHRONICITY: ICD-10-CM

## 2017-10-06 DIAGNOSIS — M17.11 ARTHRITIS OF RIGHT KNEE: ICD-10-CM

## 2017-10-06 DIAGNOSIS — M25.561 RIGHT KNEE PAIN, UNSPECIFIED CHRONICITY: ICD-10-CM

## 2017-10-06 DIAGNOSIS — M25.561 PAIN IN BOTH KNEES, UNSPECIFIED CHRONICITY: ICD-10-CM

## 2017-10-06 DIAGNOSIS — Z98.890 S/P HARDWARE REMOVAL: Primary | ICD-10-CM

## 2017-10-06 PROCEDURE — 73562 X-RAY EXAM OF KNEE 3: CPT | Mod: TC,50

## 2017-10-06 PROCEDURE — 99213 OFFICE O/P EST LOW 20 MIN: CPT | Mod: PBBFAC,25 | Performed by: PHYSICIAN ASSISTANT

## 2017-10-06 PROCEDURE — 73562 X-RAY EXAM OF KNEE 3: CPT | Mod: 26,LT,, | Performed by: RADIOLOGY

## 2017-10-06 PROCEDURE — 99999 PR PBB SHADOW E&M-EST. PATIENT-LVL III: CPT | Mod: PBBFAC,,, | Performed by: PHYSICIAN ASSISTANT

## 2017-10-06 PROCEDURE — 73562 X-RAY EXAM OF KNEE 3: CPT | Mod: 26,RT,, | Performed by: RADIOLOGY

## 2017-10-06 PROCEDURE — 99212 OFFICE O/P EST SF 10 MIN: CPT | Mod: S$PBB,,, | Performed by: PHYSICIAN ASSISTANT

## 2017-10-06 NOTE — PROGRESS NOTES
Subjective:      Patient ID: Santy Anderson is a 71 y.o. male.    Chief Complaint: Pain of the Right Knee    HPI  The patient is seen today status post hardware removal from right lower extremity performed on 6/20/17. It was recommended that he follow-up with us to reassess his arthritic knee. The patient still has persistent pain with stiffness. No swelling. Overall, his knee pain is stable.     His current pain is a 6 on a pain scale of 1-10. X-rays were taken today.     Review of Systems   Constitution: Negative for chills, fever and night sweats.   Respiratory: Negative for cough, shortness of breath and wheezing.    Musculoskeletal: Positive for arthritis, joint pain and stiffness. Negative for falls and joint swelling.   Gastrointestinal: Negative for diarrhea, nausea and vomiting.   Neurological: Negative for brief paralysis.   Psychiatric/Behavioral: Negative for altered mental status.         Objective:            General    Constitutional: He is oriented to person, place, and time. He appears well-developed and well-nourished.   Neck: Normal range of motion.   Cardiovascular: Normal rate and regular rhythm.    Pulmonary/Chest: Effort normal.   Abdominal: Soft.   Neurological: He is alert and oriented to person, place, and time.   Psychiatric: He has a normal mood and affect. His behavior is normal.     General Musculoskeletal Exam   Gait: abnormal       Right Knee Exam     Inspection   Scars: present    Tenderness   The patient is tender to palpation of the medial joint line and medial retinaculum.    Range of Motion   The patient has normal right knee ROM.  Extension: 0   Flexion: 120     Tests   Ligament Examination Lachman: normal (-1 to 2mm) PCL-Posterior Drawer: normal (0 to 2mm)     MCL - Valgus: normal (0 to 2mm)  LCL - Varus: normal                AP standing views of both knees as well as lateral and Merchant views of both knees were obtained.    Comparison: 05/08/2017    Findings: There has been  interval removal of the plate and screw fixation and screws at the proximal tibia on the right.  Generalized osteopenia is noted.  There is moderate joint space narrowing involving the medial compartment of the right knee.    The left knee demonstrates minimal joint space narrowing at the medial compartment.  Minimal degenerative change also present at the patellofemoral compartment.  There is also some chronic smooth enthesophyte formation at the patellar tendon insertion upon the tibia and also at the superior pole of the patella at the quadriceps insertion.    Vascular calcifications noted bilaterally.      Impression      As above.         Assessment:       Encounter Diagnoses   Name Primary?    S/P hardware removal Yes    Arthritis of right knee     Right knee pain, unspecified chronicity           Plan:       Santy was seen today for pain.    Diagnoses and all orders for this visit:    S/P hardware removal    Arthritis of right knee    Right knee pain, unspecified chronicity      X-rays were reviewed today. We discussed treatment options, including injections today. The patient wants to hold off from doing injections today.   He was instructed to utilize warm, damp compresses as needed. I recommended that he purchase a heating pad for his knee pain. He can apply it for up to 15 minutes at a time. I also gave him some exercises to do to help decrease stiffness in his knee.     He will return in 3 months to recheck his knee.               Stephanie Mo PA-C

## 2017-10-06 NOTE — PATIENT INSTRUCTIONS
Quad Set for Leg and Knee    This exercise is designed to stretch and strengthen your knee. Before beginning, read through all the instructions. While exercising, breathe normally and use smooth movements. If you feel any pain, stop the exercise. If pain persists, call your healthcare provider.  1.  Sit on the floor with one leg straight, the other bent.  2.  Flex the foot of your straight leg by pointing your toes toward you. Press the back of your knee into the floor while tightening the muscle on the top of your thigh. Hold for ______ seconds. Then relax.  3.  Repeat ______ times. Do ______ sets a day.  Caution  · Dont arch your back.  · Dont hunch your shoulders.  Date Last Reviewed: 9/20/2015  © 6419-9756 [employer]. [address] [place] [address]. All rights reserved. This information is not intended as a substitute for professional medical care. Always follow your healthcare professional's instructions.        Hamstring Curl (Strength)    This exercise is for an injured right knee. Switch sides if the injury is to your left knee.  1. Tie the ends of an elastic exercise band or tubing into a large, strong knot. Close the door on the elastic band, so the knot is on one side and the loop of the band is on the other. The elastic band should be close to the floor. You should be on the side of the door with the loop.  2. Sit in a chair facing the closed door. Slip the loop of the elastic exercise band around the heel of your right leg.  3. Slowly pull the elastic band backward along the floor with your heel. Stop when you cant pull it any farther. Hold in place for 10 seconds. Slowly return your leg to the starting position.  4. Repeat 10 times, or as instructed.     Safety tip: Take it slowly. If you do too much too soon, you can create new knee problems, or reinjure your knee.   Date Last Reviewed: 3/10/2016  © 8956-5025 [employer]. [address] [place] [address]  [address]. All rights reserved. This information is not intended as a substitute for professional medical care. Always follow your healthcare professional's instructions.        Hamstring Stretch    Begin your rehabilitation with exercises that develop muscle control. These help you meet basic goals, like driving a car or going back to work. Exercise as often as youre advised. But stop right away if any exercise causes sharp or increasing pain. Icing your knee for 15 to 20 minutes after exercise can help prevent swelling and soreness.  · Lie on your back with your good knee bent. Put a towel around the back of your injured leg. Tighten your stomach muscles.  · Keeping the knee as straight as you can, slowly pull on the towel to bring your injured leg up. Raise it as far as you comfortably can.  · Hold for 30 to 60 seconds. Repeat 2 to 3 times.   Caution: If you feel tingling or pain in your back or legs, youre not yet ready for this exercise or are pulling too aggressively.   For your safety, check with your healthcare provider before starting an exercise program.   Date Last Reviewed: 8/16/2015 © 2000-2016 [employer]. [address] [place] [address]. All rights reserved. This information is not intended as a substitute for professional medical care. Always follow your healthcare professional's instructions.        Quadriceps, Short Arcs (Strength)    5. Sit on the floor with your right leg straight in front of you. Bend your left knee up and put your left foot flat on the floor.  6. Place a rolled-up towel under your right thigh, just above your knee. Relax your leg.  7. Straighten your right leg, lifting your foot off the floor. Hold for 5 seconds. Then relax.  8. Repeat 10 times, or as instructed.  9. Switch legs and then repeat.     Challenge yourself  Use ankle weights for a tougher workout. Your healthcare provider will tell you what size ankle weights to use.   Date Last Reviewed:  3/10/2016  © 6945-5509 [employer]. [address] [place] [address]. All rights reserved. This information is not intended as a substitute for professional medical care. Always follow your healthcare professional's instructions.        Quadriceps, Isometric (Strength)    This exercise is for an injured right knee. Switch sides if the injury is to your left knee.  10. Sit on the floor with your right leg straight in front of you. Bend your left knee up and put your left foot flat on the floor.  11. Flex your right foot and tighten the thigh muscles of your right leg. Press the back of your right knee toward the floor. Dont arch your back or hunch your shoulders.  12. Hold for 5 to 10 seconds. Then relax.  13. Repeat 10 times, or as instructed.  14. Do this exercise 3 times a day, or as instructed.  Date Last Reviewed: 3/10/2016  © 8775-5580 [employer]. [address] [place] [address]. All rights reserved. This information is not intended as a substitute for professional medical care. Always follow your healthcare professional's instructions.

## 2017-10-10 ENCOUNTER — OFFICE VISIT (OUTPATIENT)
Dept: HEMATOLOGY/ONCOLOGY | Facility: CLINIC | Age: 72
End: 2017-10-10
Payer: MEDICARE

## 2017-10-10 ENCOUNTER — HOSPITAL ENCOUNTER (OUTPATIENT)
Dept: RADIOLOGY | Facility: HOSPITAL | Age: 72
Discharge: HOME | End: 2017-10-10
Attending: INTERNAL MEDICINE
Payer: MEDICARE

## 2017-10-10 VITALS
HEART RATE: 54 BPM | WEIGHT: 145.19 LBS | OXYGEN SATURATION: 100 % | DIASTOLIC BLOOD PRESSURE: 84 MMHG | SYSTOLIC BLOOD PRESSURE: 142 MMHG | TEMPERATURE: 98 F | BODY MASS INDEX: 21.13 KG/M2

## 2017-10-10 DIAGNOSIS — C34.32 MALIGNANT NEOPLASM OF LOWER LOBE OF LEFT LUNG: ICD-10-CM

## 2017-10-10 DIAGNOSIS — D50.0 IRON DEFICIENCY ANEMIA DUE TO CHRONIC BLOOD LOSS: Primary | ICD-10-CM

## 2017-10-10 DIAGNOSIS — D47.2 MGUS (MONOCLONAL GAMMOPATHY OF UNKNOWN SIGNIFICANCE): ICD-10-CM

## 2017-10-10 PROCEDURE — 25500020 PHARM REV CODE 255: Performed by: INTERNAL MEDICINE

## 2017-10-10 PROCEDURE — 99213 OFFICE O/P EST LOW 20 MIN: CPT | Mod: PBBFAC,25,PO | Performed by: INTERNAL MEDICINE

## 2017-10-10 PROCEDURE — 71260 CT THORAX DX C+: CPT | Mod: TC

## 2017-10-10 PROCEDURE — 99214 OFFICE O/P EST MOD 30 MIN: CPT | Mod: S$PBB,,, | Performed by: INTERNAL MEDICINE

## 2017-10-10 PROCEDURE — 99999 PR PBB SHADOW E&M-EST. PATIENT-LVL III: CPT | Mod: PBBFAC,,, | Performed by: INTERNAL MEDICINE

## 2017-10-10 RX ADMIN — IOHEXOL 75 ML: 350 INJECTION, SOLUTION INTRAVENOUS at 02:10

## 2017-10-10 NOTE — PROGRESS NOTES
Subjective:       Patient ID: Santy Anderson is a 71 y.o. male.    Chief Complaint: Results; Lung Cancer; and Anemia    HPI resected non-small cell lung carcinoma stage I continues follow-up last imaging performed in September 2016 patient returns for review and repeat CT chest    Past Medical History:   Diagnosis Date    Arthritis     Diabetes mellitus, type 2     Does not have any more problems with diabetes    Gastric mass     GERD (gastroesophageal reflux disease)     Hyperlipidemia     Lung cancer      Family History   Problem Relation Age of Onset    Diabetes Mother     Hypertension Mother      Social History     Social History    Marital status:      Spouse name: N/A    Number of children: N/A    Years of education: N/A     Occupational History    Not on file.     Social History Main Topics    Smoking status: Former Smoker     Packs/day: 0.50     Years: 25.00     Quit date: 10/16/1988    Smokeless tobacco: Never Used    Alcohol use 9.0 oz/week     1 Glasses of wine, 14 Cans of beer per week      Comment: socailly    Drug use: No    Sexual activity: Not on file     Other Topics Concern    Not on file     Social History Narrative    No narrative on file     Past Surgical History:   Procedure Laterality Date    COLONOSCOPY      COLONOSCOPY N/A 4/11/2017    Procedure: COLONOSCOPY;  Surgeon: Brianne Wise MD;  Location: [facility];  Service: Endoscopy;  Laterality: N/A;    ESOPHAGOGASTRODUODENOSCOPY      FEMUR SURGERY Right     GASTRECTOMY      HAND SURGERY Left     KNEE SURGERY Right     laparotomy for trauma (negative)  1979    left lower lobectomy         Labs:  Lab Results   Component Value Date    WBC 4.14 10/10/2017    HGB 10.3 (L) 10/10/2017    HCT 33.3 (L) 10/10/2017    MCV 87 10/10/2017     (L) 10/10/2017     BMP  Lab Results   Component Value Date     10/10/2017    K 4.3 10/10/2017     10/10/2017    CO2 23 10/10/2017    BUN 8 10/10/2017     CREATININE 0.7 10/10/2017    CALCIUM 9.3 10/10/2017    ANIONGAP 15 10/10/2017    ESTGFRAFRICA >60 10/10/2017    EGFRNONAA >60 10/10/2017     Lab Results   Component Value Date    ALT 17 10/10/2017    AST 79 (H) 10/10/2017    ALKPHOS 68 10/10/2017    BILITOT 0.8 10/10/2017       Lab Results   Component Value Date    IRON 194 (H) 05/22/2015    TIBC 312 05/22/2015    FERRITIN 128 05/22/2015     Lab Results   Component Value Date    PHKWETZH89 370 09/23/2016     Lab Results   Component Value Date    FOLATE 8.8 09/23/2016     Lab Results   Component Value Date    TSH 2.376 01/25/2017         Review of Systems   Constitutional: Negative for activity change, appetite change, chills, diaphoresis, fatigue, fever and unexpected weight change.   HENT: Negative for congestion, dental problem, drooling, ear discharge, ear pain, facial swelling, hearing loss, mouth sores, nosebleeds, postnasal drip, rhinorrhea, sinus pressure, sneezing, sore throat, tinnitus, trouble swallowing and voice change.    Eyes: Negative for photophobia, pain, discharge, redness, itching and visual disturbance.   Respiratory: Negative for apnea, cough, choking, chest tightness, shortness of breath, wheezing and stridor.    Cardiovascular: Negative for chest pain, palpitations and leg swelling.   Gastrointestinal: Negative for abdominal distention, abdominal pain, anal bleeding, blood in stool, constipation, diarrhea, nausea, rectal pain and vomiting.   Endocrine: Negative for cold intolerance, heat intolerance, polydipsia, polyphagia and polyuria.   Genitourinary: Negative for decreased urine volume, difficulty urinating, discharge, dysuria, enuresis, flank pain, frequency, genital sores, hematuria, penile pain, penile swelling, scrotal swelling, testicular pain and urgency.   Musculoskeletal: Positive for arthralgias, back pain and myalgias. Negative for gait problem, joint swelling, neck pain and neck stiffness.   Skin: Negative for color change,  pallor, rash and wound.   Allergic/Immunologic: Negative for environmental allergies, food allergies and immunocompromised state.   Neurological: Negative for dizziness, tremors, seizures, syncope, facial asymmetry, speech difficulty, weakness, light-headedness, numbness and headaches.   Hematological: Negative for adenopathy. Does not bruise/bleed easily.   Psychiatric/Behavioral: Negative for agitation, behavioral problems, confusion, decreased concentration, dysphoric mood, hallucinations, self-injury, sleep disturbance and suicidal ideas. The patient is not nervous/anxious and is not hyperactive.        Objective:      Physical Exam   Constitutional: He is oriented to person, place, and time. He appears well-developed and well-nourished. He appears distressed.   HENT:   Head: Normocephalic.   Right Ear: External ear normal.   Left Ear: External ear normal.   Nose: Nose normal. Right sinus exhibits no maxillary sinus tenderness and no frontal sinus tenderness. Left sinus exhibits no maxillary sinus tenderness and no frontal sinus tenderness.   Mouth/Throat: Oropharynx is clear and moist. No oropharyngeal exudate.   Eyes: EOM and lids are normal. Pupils are equal, round, and reactive to light. Right eye exhibits no discharge. Left eye exhibits no discharge. Right conjunctiva is not injected. Right conjunctiva has no hemorrhage. Left conjunctiva is not injected. Left conjunctiva has no hemorrhage. No scleral icterus. Right eye exhibits normal extraocular motion. Left eye exhibits normal extraocular motion.   Neck: Normal range of motion. Neck supple. No JVD present. No tracheal deviation present. No thyromegaly present.   Cardiovascular: Normal rate and regular rhythm.    Pulmonary/Chest: Effort normal. No stridor. No respiratory distress.   Abdominal: Soft. He exhibits no mass. There is no hepatosplenomegaly, splenomegaly or hepatomegaly. There is no tenderness.   Musculoskeletal: Normal range of motion. He  exhibits no edema or tenderness.   Lymphadenopathy:        Head (right side): No posterior auricular and no occipital adenopathy present.        Head (left side): No posterior auricular and no occipital adenopathy present.     He has no cervical adenopathy.        Right cervical: No superficial cervical, no deep cervical and no posterior cervical adenopathy present.       Left cervical: No superficial cervical, no deep cervical and no posterior cervical adenopathy present.     He has no axillary adenopathy.        Right: No supraclavicular adenopathy present.        Left: No supraclavicular adenopathy present.   Neurological: He is alert and oriented to person, place, and time. He has normal strength. No cranial nerve deficit. Coordination normal.   Skin: Skin is dry. No rash noted. He is not diaphoretic. No cyanosis or erythema. Nails show no clubbing.   Psychiatric: He has a normal mood and affect. His behavior is normal. Judgment and thought content normal. Cognition and memory are normal.   Vitals reviewed.          Assessment:      1. Malignant neoplasm of lower lobe of left lung    2. MGUS (monoclonal gammopathy of unknown significance)           Plan:   Reviewed imaging on my review no evidence of second primary reassurance given laboratory results pending will contact patient to patient portal otherwise return in 3-4 months with labs prior follow-up anemia as well as SPEP and free light chain elevation

## 2017-10-11 PROBLEM — D50.0 IRON DEFICIENCY ANEMIA DUE TO CHRONIC BLOOD LOSS: Status: ACTIVE | Noted: 2017-10-11

## 2017-10-11 RX ORDER — SODIUM CHLORIDE 0.9 % (FLUSH) 0.9 %
10 SYRINGE (ML) INJECTION
Status: CANCELLED | OUTPATIENT
Start: 2017-10-11

## 2017-10-11 RX ORDER — HEPARIN 100 UNIT/ML
500 SYRINGE INTRAVENOUS
Status: CANCELLED | OUTPATIENT
Start: 2017-10-11

## 2017-10-11 RX ORDER — HEPARIN 100 UNIT/ML
500 SYRINGE INTRAVENOUS
Status: CANCELLED | OUTPATIENT
Start: 2017-10-19

## 2017-10-11 RX ORDER — SODIUM CHLORIDE 0.9 % (FLUSH) 0.9 %
10 SYRINGE (ML) INJECTION
Status: CANCELLED | OUTPATIENT
Start: 2017-10-19

## 2017-10-18 ENCOUNTER — INFUSION (OUTPATIENT)
Dept: INFUSION THERAPY | Facility: HOSPITAL | Age: 72
End: 2017-10-18
Attending: INTERNAL MEDICINE
Payer: MEDICARE

## 2017-10-18 VITALS — HEART RATE: 101 BPM | SYSTOLIC BLOOD PRESSURE: 126 MMHG | DIASTOLIC BLOOD PRESSURE: 77 MMHG

## 2017-10-18 DIAGNOSIS — D50.0 IRON DEFICIENCY ANEMIA DUE TO CHRONIC BLOOD LOSS: Primary | ICD-10-CM

## 2017-10-18 PROCEDURE — 63600175 PHARM REV CODE 636 W HCPCS: Performed by: INTERNAL MEDICINE

## 2017-10-18 PROCEDURE — 96365 THER/PROPH/DIAG IV INF INIT: CPT

## 2017-10-18 PROCEDURE — 25000003 PHARM REV CODE 250: Performed by: INTERNAL MEDICINE

## 2017-10-18 RX ADMIN — FERUMOXYTOL 510 MG: 510 INJECTION INTRAVENOUS at 12:10

## 2017-10-18 NOTE — PLAN OF CARE
Problem: Patient Care Overview  Goal: Plan of Care Review  Outcome: Ongoing (interventions implemented as appropriate)  Pt stated, My back usually hurts but today it is ok

## 2017-10-18 NOTE — PATIENT INSTRUCTIONS
[facility]  [address]  [address] [address]  [phone] phone     [phone] fax  Hours of Operation: Monday- Friday 8:00am- 5:00pm  After hours phone  [phone]  Hematology / Oncology Physicians on call      Dr. Marco Fernandez                        Please call with any concerns regarding your appointment today.        Ferumoxytol injection  What is this medicine?  FERUMOXYTOL is an iron complex. Iron is used to make healthy red blood cells, which carry oxygen and nutrients throughout the body. This medicine is used to treat iron deficiency anemia in people with chronic kidney disease.  How should I use this medicine?  This medicine is for injection into a vein. It is given by a health care professional in a hospital or clinic setting.  Talk to your pediatrician regarding the use of this medicine in children. Special care may be needed.  What side effects may I notice from receiving this medicine?  Side effects that you should report to your doctor or health care professional as soon as possible:  · allergic reactions like skin rash, itching or hives, swelling of the face, lips, or tongue  · breathing problems  · changes in blood pressure  · feeling faint or lightheaded, falls  · fever or chills  · flushing, sweating, or hot feelings  · swelling of the ankles or feet  Side effects that usually do not require medical attention (Report these to your doctor or health care professional if they continue or are bothersome.):  · diarrhea  · headache  · nausea, vomiting  · stomach pain  What may interact with this medicine?  This medicine may interact with the following medications:  · other iron products  What if I miss a dose?  It is important not to miss your dose. Call your doctor or health care professional if you are unable to keep an appointment.  Where should I keep my medicine?  This drug is given in a hospital or clinic and will not  be stored at home.  What should I tell my health care provider before I take this medicine?  They need to know if you have any of these conditions:  · anemia not caused by low iron levels  · high levels of iron in the blood  · magnetic resonance imaging (MRI) test scheduled  · an unusual or allergic reaction to iron, other medicines, foods, dyes, or preservatives  · pregnant or trying to get pregnant  · breast-feeding  What should I watch for while using this medicine?  Visit your doctor or healthcare professional regularly. Tell your doctor or healthcare professional if your symptoms do not start to get better or if they get worse. You may need blood work done while you are taking this medicine.  You may need to follow a special diet. Talk to your doctor. Foods that contain iron include: whole grains/cereals, dried fruits, beans, or peas, leafy green vegetables, and organ meats (liver, kidney).  NOTE:This sheet is a summary. It may not cover all possible information. If you have questions about this medicine, talk to your doctor, pharmacist, or health care provider. Copyright© 2017 [employer]

## 2017-10-24 ENCOUNTER — LAB VISIT (OUTPATIENT)
Dept: LAB | Facility: HOSPITAL | Age: 72
End: 2017-10-24
Attending: INTERNAL MEDICINE
Payer: MEDICARE

## 2017-10-24 ENCOUNTER — OFFICE VISIT (OUTPATIENT)
Dept: INTERNAL MEDICINE | Facility: CLINIC | Age: 72
End: 2017-10-24
Payer: MEDICARE

## 2017-10-24 VITALS
HEIGHT: 70 IN | HEART RATE: 68 BPM | BODY MASS INDEX: 21.18 KG/M2 | SYSTOLIC BLOOD PRESSURE: 118 MMHG | WEIGHT: 147.94 LBS | TEMPERATURE: 96 F | OXYGEN SATURATION: 97 % | DIASTOLIC BLOOD PRESSURE: 66 MMHG

## 2017-10-24 DIAGNOSIS — R93.2 ABNORMAL LIVER ULTRASOUND: ICD-10-CM

## 2017-10-24 DIAGNOSIS — M81.0 AGE-RELATED OSTEOPOROSIS WITHOUT CURRENT PATHOLOGICAL FRACTURE: Primary | ICD-10-CM

## 2017-10-24 DIAGNOSIS — E55.9 VITAMIN D DEFICIENCY: ICD-10-CM

## 2017-10-24 DIAGNOSIS — C34.32 MALIGNANT NEOPLASM OF LOWER LOBE OF LEFT LUNG: ICD-10-CM

## 2017-10-24 PROCEDURE — 99213 OFFICE O/P EST LOW 20 MIN: CPT | Mod: PBBFAC,PO | Performed by: INTERNAL MEDICINE

## 2017-10-24 PROCEDURE — 99999 PR PBB SHADOW E&M-EST. PATIENT-LVL III: CPT | Mod: PBBFAC,,, | Performed by: INTERNAL MEDICINE

## 2017-10-24 PROCEDURE — 36415 COLL VENOUS BLD VENIPUNCTURE: CPT | Mod: PO

## 2017-10-24 PROCEDURE — 99214 OFFICE O/P EST MOD 30 MIN: CPT | Mod: S$PBB,,, | Performed by: INTERNAL MEDICINE

## 2017-10-24 PROCEDURE — 82306 VITAMIN D 25 HYDROXY: CPT

## 2017-10-24 NOTE — PROGRESS NOTES
"Subjective:       Patient ID: Sanyt Anderson is a 71 y.o. male.    Chief Complaint: Follow-up    Here for follow up of medical problems.  Knee surgery went well.  Taking weekly vit D.    Elevated liver enz, AST now 79.  U/s looks like "cirrhosis."  Beer daily, 3-4.  Used to drink more heavily.  No bleeding problems.  No f/c/sw/cough. No cp/sob/palp.  BMs normal and color normal.  Urine normal color.  No liver overload of iron, recent received IV iron for iron def anemia.    Updated/ annual due 2/18:  HM: ref fluvax, 6/16 [id number], 6/16 TDaP, 1/17 BMD hi fx risk hip rec bisphos rep 2y, unk Cscope, 6/16 HCV neg, 7/16 Eye doc at [facility].          Review of Systems   Constitutional: Negative for chills, diaphoresis, fatigue and fever.   Respiratory: Negative for cough, chest tightness and shortness of breath.    Cardiovascular: Negative for chest pain, palpitations and leg swelling.   Gastrointestinal: Negative for blood in stool, constipation, diarrhea, nausea and vomiting.   Genitourinary: Negative for difficulty urinating and frequency.   Musculoskeletal: Negative for arthralgias.       Objective:   /66 (BP Location: Right arm, Patient Position: Sitting)   Pulse 68   Temp (!) 95.6 °F (35.3 °C) (Tympanic)   Ht 5' 9.5" (1.765 m)   Wt 67.1 kg (147 lb 14.9 oz)   SpO2 97%   BMI 21.53 kg/m²     Physical Exam   Constitutional: He is oriented to person, place, and time. He appears well-developed and well-nourished.   HENT:   Mouth/Throat: Oropharynx is clear and moist.   Neck: Normal range of motion. Neck supple.   Cardiovascular: Normal rate, regular rhythm and intact distal pulses.  Exam reveals no gallop and no friction rub.    No murmur heard.  Pulmonary/Chest: Effort normal and breath sounds normal. He has no wheezes. He has no rales.   Abdominal: Soft. Bowel sounds are normal. He exhibits no mass. There is no tenderness.   Musculoskeletal: He exhibits no edema.   Lymphadenopathy:     He has no cervical " adenopathy.   Neurological: He is alert and oriented to person, place, and time.   Psychiatric: He has a normal mood and affect.       Results for orders placed or performed in visit on 10/10/17   Comprehensive metabolic panel   Result Value Ref Range    Sodium 143 136 - 145 mmol/L    Potassium 4.3 3.5 - 5.1 mmol/L    Chloride 105 95 - 110 mmol/L    CO2 23 23 - 29 mmol/L    Glucose 91 70 - 110 mg/dL    BUN, Bld 8 8 - 23 mg/dL    Creatinine 0.7 0.5 - 1.4 mg/dL    Calcium 9.3 8.7 - 10.5 mg/dL    Total Protein 8.1 6.0 - 8.4 g/dL    Albumin 3.6 3.5 - 5.2 g/dL    Total Bilirubin 0.8 0.1 - 1.0 mg/dL    Alkaline Phosphatase 68 55 - 135 U/L    AST 79 (H) 10 - 40 U/L    ALT 17 10 - 44 U/L    Anion Gap 15 8 - 16 mmol/L    eGFR if African American >60 >60 mL/min/1.73 m^2    eGFR if non African American >60 >60 mL/min/1.73 m^2   Ferritin   Result Value Ref Range    Ferritin 26 20.0 - 300.0 ng/mL   CBC auto differential   Result Value Ref Range    WBC 4.14 3.90 - 12.70 K/uL    RBC 3.81 (L) 4.60 - 6.20 M/uL    Hemoglobin 10.3 (L) 14.0 - 18.0 g/dL    Hematocrit 33.3 (L) 40.0 - 54.0 %    MCV 87 82 - 98 fL    MCH 27.0 27.0 - 31.0 pg    MCHC 30.9 (L) 32.0 - 36.0 g/dL    RDW 22.4 (H) 11.5 - 14.5 %    Platelets 114 (L) 150 - 350 K/uL    MPV 8.5 (L) 9.2 - 12.9 fL    Gran # 1.8 1.8 - 7.7 K/uL    Lymph # 1.4 1.0 - 4.8 K/uL    Mono # 0.8 0.3 - 1.0 K/uL    Eos # 0.2 0.0 - 0.5 K/uL    Baso # 0.09 0.00 - 0.20 K/uL    Gran% 43.2 38.0 - 73.0 %    Lymph% 32.6 18.0 - 48.0 %    Mono% 18.1 (H) 4.0 - 15.0 %    Eosinophil% 4.1 0.0 - 8.0 %    Basophil% 2.2 (H) 0.0 - 1.9 %    Poik Slight     Hypo Moderate     Target Cells Occasional     Tear Drop Cells Occasional     Stomatocytes Present     Differential Method Automated    Iron and TIBC   Result Value Ref Range    Iron 28 (L) 45 - 160 ug/dL    Transferrin 337 200 - 375 mg/dL    TIBC 499 (H) 250 - 450 ug/dL    Saturated Iron 6 (L) 20 - 50 %   Lactate dehydrogenase   Result Value Ref Range     110  "- 260 U/L   Reticulocytes   Result Value Ref Range    Retic 2.2 (H) 0.4 - 2.0 %       Assessment:       1. Age-related osteoporosis without current pathological fracture    2. Malignant neoplasm of lower lobe of left lung    3. Vitamin D deficiency    4. Abnormal liver ultrasound        Plan:       Santy was seen today for follow-up.    Diagnoses and all orders for this visit:    Age-related osteoporosis without current pathological fracture- check vit D now.  Poss start treatment once level is good.    Malignant neoplasm of lower lobe of left lung- recent CT good, f/w HemeOnc.    Vitamin D deficiency- check lab now.  -     Vitamin D; Future    Abnormal liver ultrasound c/w "cirrhosis", albumen normal.  Discussed taper off EtOH.  Hepatology input- need further w/u?  -     Ambulatory Referral to Hepatology    RTC 3 mo.       "

## 2017-10-25 ENCOUNTER — DOCUMENTATION ONLY (OUTPATIENT)
Dept: TRANSPLANT | Facility: CLINIC | Age: 72
End: 2017-10-25

## 2017-10-25 ENCOUNTER — INFUSION (OUTPATIENT)
Dept: INFUSION THERAPY | Facility: HOSPITAL | Age: 72
End: 2017-10-25
Attending: INTERNAL MEDICINE
Payer: MEDICARE

## 2017-10-25 ENCOUNTER — OFFICE VISIT (OUTPATIENT)
Dept: HEMATOLOGY/ONCOLOGY | Facility: CLINIC | Age: 72
End: 2017-10-25
Payer: MEDICARE

## 2017-10-25 ENCOUNTER — PATIENT MESSAGE (OUTPATIENT)
Dept: INTERNAL MEDICINE | Facility: CLINIC | Age: 72
End: 2017-10-25

## 2017-10-25 VITALS — HEART RATE: 96 BPM | DIASTOLIC BLOOD PRESSURE: 76 MMHG | RESPIRATION RATE: 18 BRPM | SYSTOLIC BLOOD PRESSURE: 124 MMHG

## 2017-10-25 VITALS
BODY MASS INDEX: 21.21 KG/M2 | SYSTOLIC BLOOD PRESSURE: 132 MMHG | WEIGHT: 145.75 LBS | OXYGEN SATURATION: 100 % | HEART RATE: 113 BPM | TEMPERATURE: 100 F | DIASTOLIC BLOOD PRESSURE: 84 MMHG

## 2017-10-25 DIAGNOSIS — E55.9 VITAMIN D DEFICIENCY: Primary | ICD-10-CM

## 2017-10-25 DIAGNOSIS — K20.80 ESOPHAGITIS, LOS ANGELES GRADE D: ICD-10-CM

## 2017-10-25 DIAGNOSIS — D50.0 IRON DEFICIENCY ANEMIA DUE TO CHRONIC BLOOD LOSS: Primary | ICD-10-CM

## 2017-10-25 DIAGNOSIS — D50.0 IRON DEFICIENCY ANEMIA DUE TO CHRONIC BLOOD LOSS: ICD-10-CM

## 2017-10-25 DIAGNOSIS — K74.69 OTHER CIRRHOSIS OF LIVER: Primary | ICD-10-CM

## 2017-10-25 DIAGNOSIS — K74.69 OTHER CIRRHOSIS OF LIVER: ICD-10-CM

## 2017-10-25 DIAGNOSIS — C34.32 MALIGNANT NEOPLASM OF LOWER LOBE OF LEFT LUNG: ICD-10-CM

## 2017-10-25 LAB — 25(OH)D3+25(OH)D2 SERPL-MCNC: 12 NG/ML

## 2017-10-25 PROCEDURE — 63600175 PHARM REV CODE 636 W HCPCS: Performed by: INTERNAL MEDICINE

## 2017-10-25 PROCEDURE — 99214 OFFICE O/P EST MOD 30 MIN: CPT | Mod: 25,S$PBB,, | Performed by: INTERNAL MEDICINE

## 2017-10-25 PROCEDURE — 99213 OFFICE O/P EST LOW 20 MIN: CPT | Mod: PBBFAC,PO | Performed by: INTERNAL MEDICINE

## 2017-10-25 PROCEDURE — 99999 PR PBB SHADOW E&M-EST. PATIENT-LVL III: CPT | Mod: PBBFAC,,, | Performed by: INTERNAL MEDICINE

## 2017-10-25 PROCEDURE — 25000003 PHARM REV CODE 250: Performed by: INTERNAL MEDICINE

## 2017-10-25 PROCEDURE — 80074 ACUTE HEPATITIS PANEL: CPT

## 2017-10-25 PROCEDURE — 96365 THER/PROPH/DIAG IV INF INIT: CPT

## 2017-10-25 RX ORDER — ERGOCALCIFEROL 1.25 MG/1
50000 CAPSULE ORAL
Qty: 8 CAPSULE | Refills: 6 | Status: SHIPPED | OUTPATIENT
Start: 2017-10-26 | End: 2018-01-26 | Stop reason: SDUPTHER

## 2017-10-25 RX ADMIN — FERUMOXYTOL 510 MG: 510 INJECTION INTRAVENOUS at 02:10

## 2017-10-25 NOTE — NURSING
Pt records reviewed.   Pt will be referred to Hepatology.    Initial referral received  from the [facility]que.   Referring Provider/diagnosis  CARLTON BERKOWITZ Provider:   Diagnosis: Abnormal liver ultrasound       Referral letter sent to provider and patient.

## 2017-10-25 NOTE — PROGRESS NOTES
Subjective:       Patient ID: Santy Anderson is a 71 y.o. male.    Chief Complaint: Results; Anemia; and Lung Cancer    HPI 71-year-old male returns day 1 of intravenous iron newly diagnosed iron deficiency anemia    Past Medical History:   Diagnosis Date    Arthritis     Diabetes mellitus, type 2     Does not have any more problems with diabetes    Gastric mass     GERD (gastroesophageal reflux disease)     Hyperlipidemia     Lung cancer      Family History   Problem Relation Age of Onset    Diabetes Mother     Hypertension Mother      Social History     Social History    Marital status:      Spouse name: N/A    Number of children: N/A    Years of education: N/A     Occupational History    Not on file.     Social History Main Topics    Smoking status: Former Smoker     Packs/day: 0.50     Years: 25.00     Quit date: 10/16/1988    Smokeless tobacco: Never Used    Alcohol use 9.0 oz/week     1 Glasses of wine, 14 Cans of beer per week      Comment: socailly    Drug use: No    Sexual activity: Not on file     Other Topics Concern    Not on file     Social History Narrative    No narrative on file     Past Surgical History:   Procedure Laterality Date    COLONOSCOPY      COLONOSCOPY N/A 4/11/2017    Procedure: COLONOSCOPY;  Surgeon: Brianne Wise MD;  Location: [facility];  Service: Endoscopy;  Laterality: N/A;    ESOPHAGOGASTRODUODENOSCOPY      FEMUR SURGERY Right     GASTRECTOMY      HAND SURGERY Left     KNEE SURGERY Right     laparotomy for trauma (negative)  1979    left lower lobectomy         Labs:  Lab Results   Component Value Date    WBC 4.14 10/10/2017    HGB 10.3 (L) 10/10/2017    HCT 33.3 (L) 10/10/2017    MCV 87 10/10/2017     (L) 10/10/2017     BMP  Lab Results   Component Value Date     10/10/2017    K 4.3 10/10/2017     10/10/2017    CO2 23 10/10/2017    BUN 8 10/10/2017    CREATININE 0.7 10/10/2017    CALCIUM 9.3 10/10/2017    ANIONGAP 15  10/10/2017    ESTGFRAFRICA >60 10/10/2017    EGFRNONAA >60 10/10/2017     Lab Results   Component Value Date    ALT 17 10/10/2017    AST 79 (H) 10/10/2017    ALKPHOS 68 10/10/2017    BILITOT 0.8 10/10/2017       Lab Results   Component Value Date    IRON 28 (L) 10/10/2017    TIBC 499 (H) 10/10/2017    FERRITIN 26 10/10/2017     Lab Results   Component Value Date    KCRBMDKF86 370 09/23/2016     Lab Results   Component Value Date    FOLATE 8.8 09/23/2016     Lab Results   Component Value Date    TSH 2.376 01/25/2017         Review of Systems   Constitutional: Positive for activity change and fatigue. Negative for appetite change, chills, diaphoresis, fever and unexpected weight change.   HENT: Negative for congestion, dental problem, drooling, ear discharge, ear pain, facial swelling, hearing loss, mouth sores, nosebleeds, postnasal drip, rhinorrhea, sinus pressure, sneezing, sore throat, tinnitus, trouble swallowing and voice change.    Eyes: Negative for photophobia, pain, discharge, redness, itching and visual disturbance.   Respiratory: Negative for apnea, cough, choking, chest tightness, shortness of breath, wheezing and stridor.    Cardiovascular: Negative for chest pain, palpitations and leg swelling.   Gastrointestinal: Negative for abdominal distention, abdominal pain, anal bleeding, blood in stool, constipation, diarrhea, nausea, rectal pain and vomiting.   Endocrine: Negative for cold intolerance, heat intolerance, polydipsia, polyphagia and polyuria.   Genitourinary: Negative for decreased urine volume, difficulty urinating, discharge, dysuria, enuresis, flank pain, frequency, genital sores, hematuria, penile pain, penile swelling, scrotal swelling, testicular pain and urgency.   Musculoskeletal: Negative for arthralgias, back pain, gait problem, joint swelling, myalgias, neck pain and neck stiffness.   Skin: Negative for color change, pallor, rash and wound.   Allergic/Immunologic: Negative for  environmental allergies, food allergies and immunocompromised state.   Neurological: Positive for weakness. Negative for dizziness, tremors, seizures, syncope, facial asymmetry, speech difficulty, light-headedness, numbness and headaches.   Hematological: Negative for adenopathy. Does not bruise/bleed easily.   Psychiatric/Behavioral: Positive for dysphoric mood. Negative for agitation, behavioral problems, confusion, decreased concentration, hallucinations, self-injury, sleep disturbance and suicidal ideas. The patient is nervous/anxious. The patient is not hyperactive.        Objective:      Physical Exam   Constitutional: He is oriented to person, place, and time. He appears cachectic. He has a sickly appearance. He appears ill. He appears distressed.   HENT:   Head: Normocephalic.   Right Ear: External ear normal.   Left Ear: External ear normal.   Nose: Nose normal. Right sinus exhibits no maxillary sinus tenderness and no frontal sinus tenderness. Left sinus exhibits no maxillary sinus tenderness and no frontal sinus tenderness.   Mouth/Throat: Oropharynx is clear and moist. No oropharyngeal exudate.   Eyes: EOM and lids are normal. Pupils are equal, round, and reactive to light. Right eye exhibits no discharge. Left eye exhibits no discharge. Right conjunctiva is not injected. Right conjunctiva has no hemorrhage. Left conjunctiva is not injected. Left conjunctiva has no hemorrhage. No scleral icterus. Right eye exhibits normal extraocular motion. Left eye exhibits normal extraocular motion.   Neck: Normal range of motion. Neck supple. No JVD present. No tracheal deviation present. No thyromegaly present.   Cardiovascular: Normal rate and regular rhythm.    Pulmonary/Chest: Effort normal. No stridor. No respiratory distress.   Abdominal: Soft. He exhibits no mass. There is no hepatosplenomegaly, splenomegaly or hepatomegaly. There is no tenderness.   Musculoskeletal: Normal range of motion. He exhibits no  edema or tenderness.   Lymphadenopathy:        Head (right side): No posterior auricular and no occipital adenopathy present.        Head (left side): No posterior auricular and no occipital adenopathy present.     He has no cervical adenopathy.        Right cervical: No superficial cervical, no deep cervical and no posterior cervical adenopathy present.       Left cervical: No superficial cervical, no deep cervical and no posterior cervical adenopathy present.     He has no axillary adenopathy.        Right: No supraclavicular adenopathy present.        Left: No supraclavicular adenopathy present.   Neurological: He is alert and oriented to person, place, and time. He has normal strength. No cranial nerve deficit. Coordination normal.   Skin: Skin is dry. No rash noted. He is not diaphoretic. No cyanosis or erythema. Nails show no clubbing.   Psychiatric: His behavior is normal. Judgment and thought content normal. His mood appears anxious. Cognition and memory are normal. He exhibits a depressed mood.   Vitals reviewed.          Assessment:      1. Iron deficiency anemia due to chronic blood loss    2. Malignant neoplasm of lower lobe of left lung    3. Esophagitis, [place] grade D    4. Other cirrhosis of liver            Plan:   Proceed with day 1 of intravenous iron review of GI evaluation demonstrates he had a colonoscopy in April 2017 he had a repeat upper endoscopy in 2016 which demonstrated some severe esophagitis will repeat upper endoscopy at this time also ultrasound demonstrated cirrhosis he states he drinks 3 beers per day acute hepatitis panel ordered today but I will try to have evaluation for cirrhosis move from [facility] to [facility] for convenience to patient follow-up with me for response to therapy. Psychosocial Distress screening score of Distress Score: 3 noted and reviewed. No intervention indicated.

## 2017-10-25 NOTE — PROGRESS NOTES
Distress Screening Results: Psychosocial Distress screening score of Distress Score: 3 {AMB ONC DISTRESS SCORE:41014}

## 2017-10-25 NOTE — LETTER
October 25, 2017    Santy Anderson  [address] [place] [address]      Dear Santy Anderson:    Your doctor has referred you to the [facility]. You will be contacted by our office and an initial appointment will then be scheduled for you.    We look forward to seeing you soon. If you have any further questions, please contact us at [phone].       Sincerely,        [facility]   [address] [place] [id number]  [phone]

## 2017-10-25 NOTE — PLAN OF CARE
Problem: Patient Care Overview  Goal: Plan of Care Review  Outcome: Ongoing (interventions implemented as appropriate)  I feel pretty good

## 2017-10-26 LAB
HAV IGM SERPL QL IA: NEGATIVE
HBV CORE IGM SERPL QL IA: NEGATIVE
HBV SURFACE AG SERPL QL IA: NEGATIVE
HCV AB SERPL QL IA: NEGATIVE

## 2017-10-31 ENCOUNTER — HOSPITAL ENCOUNTER (EMERGENCY)
Facility: HOSPITAL | Age: 72
Discharge: HOME | End: 2017-10-31
Payer: MEDICARE

## 2017-10-31 ENCOUNTER — TELEPHONE (OUTPATIENT)
Dept: INTERNAL MEDICINE | Facility: CLINIC | Age: 72
End: 2017-10-31

## 2017-10-31 ENCOUNTER — NURSE TRIAGE (OUTPATIENT)
Dept: ADMINISTRATIVE | Facility: CLINIC | Age: 72
End: 2017-10-31

## 2017-10-31 VITALS
HEIGHT: 69 IN | SYSTOLIC BLOOD PRESSURE: 149 MMHG | BODY MASS INDEX: 21.62 KG/M2 | RESPIRATION RATE: 19 BRPM | HEART RATE: 104 BPM | TEMPERATURE: 98 F | DIASTOLIC BLOOD PRESSURE: 73 MMHG | WEIGHT: 146 LBS | OXYGEN SATURATION: 98 %

## 2017-10-31 DIAGNOSIS — L03.012 CELLULITIS OF FINGER OF LEFT HAND: ICD-10-CM

## 2017-10-31 DIAGNOSIS — M25.569 KNEE PAIN: ICD-10-CM

## 2017-10-31 DIAGNOSIS — M17.10 ARTHRITIS OF KNEE: Primary | ICD-10-CM

## 2017-10-31 PROCEDURE — 99283 EMERGENCY DEPT VISIT LOW MDM: CPT

## 2017-10-31 RX ORDER — SULFAMETHOXAZOLE AND TRIMETHOPRIM 800; 160 MG/1; MG/1
1 TABLET ORAL 2 TIMES DAILY
Qty: 14 TABLET | Refills: 0 | Status: SHIPPED | OUTPATIENT
Start: 2017-10-31 | End: 2017-11-07

## 2017-10-31 NOTE — TELEPHONE ENCOUNTER
Spoke with patient and he states that he is already scheduled, and will keep appointment scheduled.

## 2017-10-31 NOTE — TELEPHONE ENCOUNTER
"    Reason for Disposition   [1] Thigh or calf pain AND [2] only 1 side AND [3] present > 1 hour     Right knee and right calf swelling and pain for last 2-3 days, reported by Santy during this triage call.  Recommended ED now for evaluation of same.  Encouraged him to call 911 for any SOB or chest pain.  He states he will do so.  Message to Joanie Simms , pcp.  Please contact caller directly with any additional care advice.    Answer Assessment - Initial Assessment Questions  1. LOCATION: "Where is the swelling located?"  (e.g., left, right, both knees)      Right knee swelling.    2. SIZE and DESCRIPTION: "What does the swelling look like?"  (e.g., entire knee, localized)      The entire area around the knee, where I had the hardware removed last year, 06/2016.    3. ONSET: "When did the swelling start?" "Does it come and go, or is it there all the time?"      Two days ago. The swelling is continuous.    4. PAIN: "Is there any pain?" If so, ask: "How bad is it?" (Scale 1-10; or mild, moderate, severe)      Yes. It hurts to move my leg, 9/10 on pain scale. Walking makes it better.    5. SETTING: "Has there been any recent work, exercise or other activity that involved that part of the body?"       No.    6. AGGRAVATING FACTORS: "What makes the knee swelling worse?" (e.g., walking, climbing stairs, running)      When I am trying to get up it is worse.  It is also hard to get into my van to go anywhere.    7. ASSOCIATED SYMPTOMS: "Is there any pain or redness?"      No redness, but pain 9/10    8. OTHER SYMPTOMS: "Do you have any other symptoms?" (e.g., chest pain, difficulty breathing, fever, calf pain)      Pain in the back of my leg, and it is swollen.    9. PREGNANCY: "Is there any chance you are pregnant?" "When was your last menstrual period?"      N/a    Protocols used: ST KNEE JOINT SWELLING-A-      "

## 2017-10-31 NOTE — TELEPHONE ENCOUNTER
----- Message from Melania Reyna MA sent at 10/30/2017 12:46 PM CDT -----  I can get him in sooner with Dr. Tejeda's NP Vilma Montaño to get him going quicker, [facility]  ----- Message -----  From: Giovanna Jennings LPN  Sent: 10/24/2017   4:17 PM  To: Nikhil ROBERTSON Staff    Dr. Peterson is requesting appt for pt w/abnormal liver u/s.  Please contact pt to schedule.  We do not have access./rpr

## 2017-11-01 ENCOUNTER — TELEPHONE (OUTPATIENT)
Dept: INTERNAL MEDICINE | Facility: CLINIC | Age: 72
End: 2017-11-01

## 2017-11-01 NOTE — ED NOTES
Pt examined by Marion HANDLEY  without RN, educated on prescriptions, given discharge instructions and discharged to [facility]. See provider notes for exam.

## 2017-11-01 NOTE — TELEPHONE ENCOUNTER
----- Message from Ruperto Liu sent at 11/1/2017 11:11 AM CDT -----  Contact: pt   States he's calling to see if he can be worked in to see Dr on 11/08 for test results and can be reached at [phone]//thanks/rahel

## 2017-11-01 NOTE — ED PROVIDER NOTES
SCRIBE #1 NOTE: I, Ton Fermin, am scribing for, and in the presence of, EMMANUELLE Self. I have scribed the entire note.      History      Chief Complaint   Patient presents with    Knee Pain     right knee pain reports swelling.     Hand Pain     left 2nd digit pain       Review of patient's allergies indicates:  No Known Allergies     HPI   HPI    10/31/2017, 8:29 PM   History obtained from the patient      History of Present Illness: Santy Anderson is a 71 y.o. male patient who presents to the Emergency Department for right knee pain which onset gradually 3 days ago. Pt reports right knee surgery 1 year ago. Symptoms are constant and moderate in severity. Pt denies any recent injury/ trauma. Sxs are worsened with movement of the RLE. No mitigating factors reported. Pt also complains of left 2nd digit pain. Prior tx includes an unspecified amount of Norco, with no relief of sxs reported. Patient denies any fever, chills, focal weakness/ numbness, dizziness, HA, hip pain, back pain, neck pain, and all other sxs at this time. No further complaints or concerns at this time.         Arrival mode: Personal vehicle    PCP: Joanie Simms MD       Past Medical History:  Past Medical History:   Diagnosis Date    Arthritis     Diabetes mellitus, type 2     Does not have any more problems with diabetes    Gastric mass     GERD (gastroesophageal reflux disease)     Hyperlipidemia     Lung cancer        Past Surgical History:  Past Surgical History:   Procedure Laterality Date    COLONOSCOPY      COLONOSCOPY N/A 4/11/2017    Procedure: COLONOSCOPY;  Surgeon: Brianne Wise MD;  Location: [facility];  Service: Endoscopy;  Laterality: N/A;    ESOPHAGOGASTRODUODENOSCOPY      FEMUR SURGERY Right     GASTRECTOMY      HAND SURGERY Left     KNEE SURGERY Right     laparotomy for trauma (negative)  1979    left lower lobectomy           Family History:  Family History   Problem Relation Age of Onset     Diabetes Mother     Hypertension Mother        Social History:  Social History     Social History Main Topics    Smoking status: Former Smoker     Packs/day: 0.50     Years: 25.00     Quit date: 10/16/1988    Smokeless tobacco: Never Used    Alcohol use 9.0 oz/week     1 Glasses of wine, 14 Cans of beer per week      Comment: socailly    Drug use: No    Sexual activity: Unknown       ROS   Review of Systems   Constitutional: Negative for chills, diaphoresis and fever.        - recent trauma/ injury    Respiratory: Negative for shortness of breath.    Cardiovascular: Negative for chest pain.   Gastrointestinal: Negative for abdominal pain, diarrhea, nausea and vomiting.   Genitourinary: Negative for difficulty urinating, frequency, hematuria and urgency.   Musculoskeletal: Negative for back pain and neck pain.        - hip pain  + right knee pain  + left 2nd digit pain    Skin: Negative for rash.   Neurological: Negative for dizziness, syncope, weakness, light-headedness, numbness and headaches.   All other systems reviewed and are negative.      Physical Exam      Initial Vitals [10/31/17 1953]   BP Pulse Resp Temp SpO2   (!) 149/73 104 19 97.7 °F (36.5 °C) 98 %      MAP       98.33          Physical Exam  Nursing Notes and Vital Signs Reviewed.  Constitutional: Patient is in no apparent distress. Well-developed and well-nourished.  Head: Atraumatic. Normocephalic.  Eyes: PERRL. EOM intact. Conjunctivae are not pale. No scleral icterus.  ENT: Mucous membranes are moist. Oropharynx is clear and symmetric.    Neck: Supple. Full ROM. No lymphadenopathy.  Cardiovascular: Regular rate. Regular rhythm. No murmurs, rubs, or gallops. Distal pulses are 2+ and symmetric.  Pulmonary/Chest: No respiratory distress. Clear to auscultation bilaterally. No wheezing, rales, or rhonchi.  Abdominal: Soft and non-distended.  There is no tenderness.  No rebound, guarding, or rigidity. Good bowel sounds.  Genitourinary: No CVA  "tenderness  Musculoskeletal: Moves all extremities. No obvious deformities. No edema. No calf tenderness.   Left Hand: No obvious deformity. Edema and tenderness of left index finger. Full flexion and extension of the wrist. Radial, median, and ulnar nerves are intact. Radial and ulnar pulses are 2+. Normal capillary refill.  Distal sensation is intact.  Right Knee:  No obvious deformity. No tenderness appreciated. Mild edema and pain with flexion and extension of right knee. No increased warmth, erythema, induration or fluctuance.  No ligament laxity. DP and PT pulses are 2+.  Normal capillary refill.  Distal sensation is intact.  Skin: Warm and dry.  Neurological:  Alert, awake, and appropriate.  Normal speech.  No acute focal neurological deficits are appreciated.  Psychiatric: Normal affect. Good eye contact. Appropriate in content.    ED Course    Procedures  ED Vital Signs:  Vitals:    10/31/17 1953   BP: (!) 149/73   Pulse: 104   Resp: 19   Temp: 97.7 °F (36.5 °C)   TempSrc: Oral   SpO2: 98%   Weight: 66.2 kg (146 lb)   Height: 5' 9" (1.753 m)         Imaging Results:  Imaging Results          X-Ray Finger 2 or More Views Left (Final result)  Result time 10/31/17 21:23:32    Final result by Cedric Noel Jr., MD (10/31/17 21:23:32)                 Impression:         No acute bone findings.   Soft tissue swelling.      Electronically signed by: CEDRIC NOEL MD  Date:     10/31/17  Time:    21:23              Narrative:    EXAM:   XR FINGER 2 OR MORE VIEWS LEFT    CLINICAL HISTORY:  left index finger; finger pain   .    COMPARISON:  None    FINDINGS:   Bone alignment is satisfactory.  No acute fracture. No dislocation. No advanced arthritic change. No soft tissue swelling at the PIP joint.  No bone destruction.  Osteopenia.  No foreign bodies.                             X-Ray Knee 3 View Right (Final result)  Result time 10/31/17 21:29:55    Final result by Chris Early MD (Timothy) (10/31/17 " 21:29:55)                 Impression:         No acute bony changes.  Old fracture of the proximal right tibia.      Electronically signed by: TAMMY SNYDER MD  Date:     10/31/17  Time:    21:29              Narrative:    Right knee, 4 views    Clinical History:  Right knee pain    Findings:     There is  no acute bony or joint abnormality. No acute fracture or dislocation.There appears be an old fracture of the proximal right tibia.  No joint effusions.  Degenerative changes are present.  Comparison 10/06/2017.                                      The Emergency Provider reviewed the vital signs and test results, which are outlined above.    ED Discussion     9:28 PM: Discussed with pt all pertinent ED information and results. Discussed pt dx and plan of tx. Gave pt all f/u and return to the ED instructions. All questions and concerns were addressed at this time. Pt expresses understanding of information and instructions, and is comfortable with plan to discharge. Pt is stable for discharge.    I discussed with patient and/or family/caretaker that evaluation in the ED does not suggest any emergent or life threatening medical conditions requiring immediate intervention beyond what was provided in the ED, and I believe patient is safe for discharge.  Regardless, an unremarkable evaluation in the ED does not preclude the development or presence of a serious of life threatening condition. As such, patient was instructed to return immediately for any worsening or change in current symptoms.      ED Medication(s):  Medications - No data to display    Discharge Medication List as of 10/31/2017  9:39 PM      START taking these medications    Details   sulfamethoxazole-trimethoprim 800-160mg (BACTRIM DS) 800-160 mg Tab Take 1 tablet by mouth 2 (two) times daily., Starting Tue 10/31/2017, Until Tue 11/7/2017, Print             Follow-up Information     Joanie Simms MD In 3 days.    Specialty:  Internal  Medicine  Contact information:  [address]  [place] [id number] [phone]                     Medical Decision Making    Medical Decision Making:   Clinical Tests:   Radiological Study: Ordered and Reviewed           Scribe Attestation:   Scribe #1: I performed the above scribed service and the documentation accurately describes the services I performed. I attest to the accuracy of the note.    Attending:   Physician Attestation Statement for Scribe #1: I, EMMANUELLE Self, personally performed the services described in this documentation, as scribed by Ton Fermin, in my presence, and it is both accurate and complete.          Clinical Impression       ICD-10-CM ICD-9-CM   1. Arthritis of knee M17.10 716.96   2. Knee pain M25.569 719.46   3. Cellulitis of finger of left hand L03.012 681.00       Disposition:   Disposition: Discharged  Condition: Stable         Marion Olivas PA-C  10/31/17 1923

## 2017-11-01 NOTE — TELEPHONE ENCOUNTER
S/w pt.  He was seen at [facility] for [place] pain.  Reports xrays were negative but he was advised to f/u w/PCP. Scheduled appt 11/8 as requested./rpr

## 2017-11-08 ENCOUNTER — OFFICE VISIT (OUTPATIENT)
Dept: INTERNAL MEDICINE | Facility: CLINIC | Age: 72
End: 2017-11-08
Payer: MEDICARE

## 2017-11-08 VITALS
TEMPERATURE: 96 F | HEART RATE: 88 BPM | OXYGEN SATURATION: 100 % | WEIGHT: 144.19 LBS | BODY MASS INDEX: 21.36 KG/M2 | HEIGHT: 69 IN | SYSTOLIC BLOOD PRESSURE: 110 MMHG | DIASTOLIC BLOOD PRESSURE: 60 MMHG

## 2017-11-08 DIAGNOSIS — L03.012 CELLULITIS OF FINGER OF LEFT HAND: ICD-10-CM

## 2017-11-08 DIAGNOSIS — M17.31 POST-TRAUMATIC OSTEOARTHRITIS OF RIGHT KNEE: Primary | ICD-10-CM

## 2017-11-08 PROCEDURE — 99213 OFFICE O/P EST LOW 20 MIN: CPT | Mod: S$PBB,,, | Performed by: INTERNAL MEDICINE

## 2017-11-08 PROCEDURE — 99999 PR PBB SHADOW E&M-EST. PATIENT-LVL III: CPT | Mod: PBBFAC,,, | Performed by: INTERNAL MEDICINE

## 2017-11-08 PROCEDURE — 99213 OFFICE O/P EST LOW 20 MIN: CPT | Mod: PBBFAC,PO | Performed by: INTERNAL MEDICINE

## 2017-11-08 NOTE — PROGRESS NOTES
"Subjective:       Patient ID: Santy Anderson is a 71 y.o. male.    Chief Complaint: Hospital Follow Up    Here for f/u hospital ER visit.  Knee pain improved, cellulitis of finger resolved. To see Hepatology for u/s liver c/w cirrhosis.    Updated/ annual due 2/18:  HM: ref fluvax, 6/16 [id number], 6/16 TDaP, 1/17 BMD hi fx risk hip rec bisphos rep 2y, unk Cscope, 6/16 HCV neg, 7/16 Eye doc at [facility].          Review of Systems   Constitutional: Negative for chills, diaphoresis, fatigue and fever.   Respiratory: Negative for cough, chest tightness and shortness of breath.    Cardiovascular: Negative for chest pain, palpitations and leg swelling.   Gastrointestinal: Negative for blood in stool, constipation, diarrhea, nausea and vomiting.   Genitourinary: Negative for difficulty urinating and frequency.   Musculoskeletal: Negative for arthralgias.       Objective:   /60 (BP Location: Right arm, Patient Position: Sitting, BP Method: Medium (Manual))   Pulse 88   Temp 96.3 °F (35.7 °C) (Tympanic)   Ht 5' 9" (1.753 m)   Wt 65.4 kg (144 lb 2.9 oz)   SpO2 100%   BMI 21.29 kg/m²     Physical Exam   Constitutional: He is oriented to person, place, and time. He appears well-developed and well-nourished.   HENT:   Mouth/Throat: Oropharynx is clear and moist.   Neck: Normal range of motion. Neck supple.   Cardiovascular: Normal rate, regular rhythm and intact distal pulses.  Exam reveals no gallop and no friction rub.    No murmur heard.  Pulmonary/Chest: Effort normal and breath sounds normal. He has no wheezes. He has no rales.   Abdominal: Soft. Bowel sounds are normal. He exhibits no mass. There is no tenderness.   Musculoskeletal: He exhibits no edema.   Lymphadenopathy:     He has no cervical adenopathy.   Neurological: He is alert and oriented to person, place, and time.   Psychiatric: He has a normal mood and affect.         Results for SANTY ANDERSON (MRN [id number]) as of 11/8/2017 07:59   Ref. Range " 10/24/2017 16:31   Vit D, 25-Hydroxy Latest Ref Range: 30 - 96 ng/mL 12 (L)     Assessment:       1. Post-traumatic osteoarthritis of right knee    2. Cellulitis of finger of left hand        Plan:       Santy was seen today for hospital follow up.    Diagnoses and all orders for this visit:    Post-traumatic osteoarthritis of right knee- doing well now.    Cellulitis of finger of left hand- resolved.    RTC as sched.

## 2017-11-13 ENCOUNTER — INITIAL CONSULT (OUTPATIENT)
Dept: GASTROENTEROLOGY | Facility: CLINIC | Age: 72
End: 2017-11-13
Payer: MEDICARE

## 2017-11-13 VITALS
HEART RATE: 64 BPM | DIASTOLIC BLOOD PRESSURE: 60 MMHG | BODY MASS INDEX: 21.45 KG/M2 | WEIGHT: 144.81 LBS | SYSTOLIC BLOOD PRESSURE: 120 MMHG | HEIGHT: 69 IN

## 2017-11-13 DIAGNOSIS — K70.9 LIVER DISEASE, CHRONIC, DUE TO ALCOHOL: ICD-10-CM

## 2017-11-13 DIAGNOSIS — D50.9 IRON DEFICIENCY ANEMIA, UNSPECIFIED IRON DEFICIENCY ANEMIA TYPE: Primary | ICD-10-CM

## 2017-11-13 DIAGNOSIS — Z87.19 HISTORY OF ESOPHAGITIS: ICD-10-CM

## 2017-11-13 DIAGNOSIS — Z86.010 HISTORY OF COLON POLYPS: ICD-10-CM

## 2017-11-13 PROCEDURE — 99214 OFFICE O/P EST MOD 30 MIN: CPT | Mod: S$PBB,,, | Performed by: PHYSICIAN ASSISTANT

## 2017-11-13 PROCEDURE — 99213 OFFICE O/P EST LOW 20 MIN: CPT | Mod: PBBFAC | Performed by: PHYSICIAN ASSISTANT

## 2017-11-13 PROCEDURE — 99999 PR PBB SHADOW E&M-EST. PATIENT-LVL III: CPT | Mod: PBBFAC,,, | Performed by: PHYSICIAN ASSISTANT

## 2017-11-13 NOTE — PROGRESS NOTES
Subjective:       Patient ID: Santy Anderson is a 71 y.o. male.    Chief Complaint: Colonoscopy and EGD    HPI   The patient presents to the GI clinic today following referral from Dr. Lara for iron deficiency anemia. An EGD and Colonoscopy are requested but these were done within the last year. His most recent Hgb was 10.3. In March it was 10.4. His Colonoscopy was done in April and showed benign polyps, diverticulosis and hemorrhoids. A three year repeat was recommended. EGD in October 2016 showed hiatal hernia and a single gastric polyp. Today the patient denies heartburn, dysphagia, nausea, vomiting, abdominal pain, change in appetite, weight loss, hematochezia or melena.     Dr. Lara note also indicates the patient may have liver cirrhosis based of an ultrasound in June. The patient reports drinking 3 beers a day. . No INR. LFTs showed elevated AST, normal ALP, ALT, Tbili and Albumin.     Review of Systems   Musculoskeletal: Positive for arthralgias and back pain.   Neurological: Positive for numbness.     As per HPI.     Objective:      Physical Exam   Constitutional: He is oriented to person, place, and time. He appears well-developed and well-nourished. No distress.   HENT:   Head: Normocephalic and atraumatic.   Eyes: EOM are normal.   Cardiovascular: Normal rate and regular rhythm.    Pulmonary/Chest: Effort normal and breath sounds normal. No respiratory distress. He has no wheezes.   Abdominal: Soft. Bowel sounds are normal. He exhibits no distension. There is no tenderness.   Musculoskeletal: He exhibits no edema.   Neurological: He is alert and oriented to person, place, and time. No cranial nerve deficit.   Skin: He is not diaphoretic.   Psychiatric: His behavior is normal.       Assessment:       1. Iron deficiency anemia, unspecified iron deficiency anemia type    2. Liver disease, chronic, due to alcohol    3. History of esophagitis    4. History of colon polyps        Plan:       The  patient has iron deficiency anemia. An EGD and colonoscopy have been done in the last year. Therefore, they are not indicated at this time. I would recommend video capsule endoscopy to complete the work up. As for the questionable cirrhosis, I will repeat and ultrasound and labs including an INR next month. This would be six months after the last US. I have discussed cirrhosis and suggest complete alcohol cessation. He already has a follow up with Dr. Tejeda for further evaluation.     Orders Placed This Encounter   Procedures    US Abdomen Limited    Comprehensive metabolic panel    Protime-INR    Capsule Video Endoscopy     Return in about 2 months (around 1/13/2018).    Thank you for the opportunity to participate in the care of this patient. This consult was designated to me by my supervising physician. A report will be sent to the referring provider.   Jaison Delarosa PA-C.

## 2017-11-13 NOTE — PATIENT INSTRUCTIONS
Capsule Endoscopy     The capsule is a tiny camera that takes pictures as it moves through the digestive tract.   Capsule endoscopy is a test done to take pictures of the digestive tract. It uses a capsule with a tiny camera in it. The capsule is swallowed like a pill. As the capsule travels through the digestive tract, it takes pictures. These pictures are sent to a recorder that is worn outside the body. The capsule passes out of the body through the stool in a few days. Capsule endoscopy is most often done to check for problems in the small bowel (intestine) that are hard to see with a standard endoscopy or colonoscopy. These problems include bleeding and tumors. The test can also help diagnose Crohns disease. This is a condition that causes inflammation, sores, and narrowing of the bowel.  Before the test  Tell your provider about any medicines you are taking. You may need to stop taking all or some of these before the test. This includes:  · All prescription medicines  · Over-the-counter medicines such as aspirin or ibuprofen  · Street drugs  · Herbs, vitamins, and other supplements  Also before the test:  · Tell your provider about any surgeries youve had before, that could cause the capsule to get stuck.  · Switch to a clear liquid diet 16 hours before the test.  · Dont eat anything starting from 12 hours before the test.  · You may be instructed to do a bowel cleansing or use a laxative. This may be needed to help clear your bowels before the test.  · Follow any other instructions from your provider.  During the test  The test is done in a providers office or hospital:  · Youll be asked to raise your shirt.  · Small, sticky, round patches are placed on the skin over your belly (abdomen). The patches have antennas that are attached to short wires (leads).  · The wires are then plugged into a data recorder. The recorder is attached to a belt worn around your waist.  · Once the recorder is confirmed to  be working, youll be given the capsule to swallow. (In rare cases, the pill may be placed in your small bowel with the help of an endoscope. This is a thin, flexible tube that can be inserted through your mouth and down into the digestive tract. Your doctor will tell you more about this, if needed.) The capsule works by sending pictures to the recorder as it moves through your stomach and small bowel.  · After you swallow the capsule, youre usually allowed to leave the facility. You can drink clear liquids after 2 hours, and you can eat food or take medicines after 4 hours. Be sure to follow any other instructions from your provider. For instance, you may be told not to do certain activities if they can affect your test results.  · You will be instructed when to return to your providers office or the hospital. All of the equipment is then removed.  · To prevent complications, do not schedule an MRI exam or go near the MRI device until the capsule has passed out of your body.  · The capsule is passed out of your body through your stool. If this does not happen within 3 days, let your healthcare provider know right away. Treatment may have to be done to remove the capsule. Your provider will tell you more about this, if needed.  After the test  Once the pictures are reviewed, your provider will go over the results with you. This is usually within a few days. If the pictures were blurry or unclear, the test may need to be done again.  Risks and complications  There is a small chance that the capsule will not pass out of your body. In that case, the capsule is most likely stuck in your bowels. Surgery or endoscopy will be needed to remove the capsule.   Date Last Reviewed: 7/1/2016 © 2000-2017 [employer]. [address] [place] [address]. All rights reserved. This information is not intended as a substitute for professional medical care. Always follow your healthcare professional's  instructions.

## 2017-11-17 ENCOUNTER — TELEPHONE (OUTPATIENT)
Dept: GASTROENTEROLOGY | Facility: CLINIC | Age: 72
End: 2017-11-17

## 2017-11-17 NOTE — TELEPHONE ENCOUNTER
When I spoke with patient the states that it was arthritis pain.      PLEASE tell pt that due to his liver findings, it would be better to treat with a topical medicine for his arthritis finger pain- try aspercreme (generic if possible.)  SM

## 2017-11-17 NOTE — TELEPHONE ENCOUNTER
----- Message from Joanie Peterson MD sent at 11/17/2017  3:05 PM CST -----  Contact: Patient  When I saw pt 11/8/17 his finger cellulitis was resolved- does he have a new pain?  SM    ----- Message -----  From: Salma Chester MA  Sent: 11/17/2017   1:13 PM  To: Joanie Peterson MD        ----- Message -----  From: Julienne Maciel  Sent: 11/17/2017  12:24 PM  To: Isaura Blackwood S Staff    Patient states he was suppose to have something for his finger pain called in but it has not, he discussed it with nurse an visit on 11/13/17, please call him back at [phone]. Thank you

## 2017-11-20 ENCOUNTER — TELEPHONE (OUTPATIENT)
Dept: GASTROENTEROLOGY | Facility: CLINIC | Age: 72
End: 2017-11-20

## 2017-11-20 NOTE — TELEPHONE ENCOUNTER
----- Message from David Martinez sent at 11/17/2017  4:11 PM CST -----  Contact: Pt   Pt called wanted to know if he needed to bring someone with him for his video capsule endo callback..[phone]

## 2017-12-05 ENCOUNTER — TELEPHONE (OUTPATIENT)
Dept: GASTROENTEROLOGY | Facility: CLINIC | Age: 72
End: 2017-12-05

## 2017-12-05 ENCOUNTER — CLINICAL SUPPORT (OUTPATIENT)
Dept: GASTROENTEROLOGY | Facility: CLINIC | Age: 72
End: 2017-12-05
Payer: MEDICARE

## 2017-12-05 DIAGNOSIS — D50.9 IRON DEFICIENCY ANEMIA, UNSPECIFIED IRON DEFICIENCY ANEMIA TYPE: ICD-10-CM

## 2017-12-05 PROCEDURE — 91110 GI TRC IMG INTRAL ESOPH-ILE: CPT

## 2017-12-05 PROCEDURE — 91110 GI TRC IMG INTRAL ESOPH-ILE: CPT | Mod: 26,S$PBB,, | Performed by: INTERNAL MEDICINE

## 2017-12-05 NOTE — TELEPHONE ENCOUNTER
Patient here for Capsule endoscopy.  2 patient identifies verified.  Asked patient how they were feeling and how their procedure prep went.  Patient reports, it went well     .  Reviewed procedure with patient.  Patient verbalized understanding.  Patient swallowed pill camera at  7:30an, we had trouble getting the VCE down  Reviewed diet and times patient can increase intake, written instruction also give to patient.  Informed patient the return time is  3:33  pm.  Patient verbalized understanding.

## 2017-12-05 NOTE — TELEPHONE ENCOUNTER
Good morning dr Hilario I have VCE to be done this am, patient had trouble getting the Capsule down this morning, but he finally got it down.  Spoke with Delilah and she to check with you and see if we need to do and xray, but patient stayed for 20 minutes and he stated that it did go down.

## 2017-12-06 ENCOUNTER — HOSPITAL ENCOUNTER (OUTPATIENT)
Dept: RADIOLOGY | Facility: HOSPITAL | Age: 72
Discharge: HOME | End: 2017-12-06
Attending: PHYSICIAN ASSISTANT
Payer: MEDICARE

## 2017-12-06 DIAGNOSIS — K70.9 LIVER DISEASE, CHRONIC, DUE TO ALCOHOL: ICD-10-CM

## 2017-12-06 PROCEDURE — 76705 ECHO EXAM OF ABDOMEN: CPT | Mod: TC

## 2017-12-08 NOTE — PROGRESS NOTES
Patient here for Capsule endoscopy.  2 patient identifies verified.  Asked patient how they were feeling and how their procedure prep went.  Patient reports, he  Did well.  Reviewed procedure with patient.  Patient verbalized understanding.  Patient swallowed pill camera at 7:30 am.  Reviewed diet and times patient can increase intake, written instruction also give to patient.  Informed patient the return time is 3:30 pm.  Patient verbalized understanding.

## 2017-12-15 ENCOUNTER — OFFICE VISIT (OUTPATIENT)
Dept: GASTROENTEROLOGY | Facility: CLINIC | Age: 72
End: 2017-12-15
Payer: MEDICARE

## 2017-12-15 VITALS
WEIGHT: 144.63 LBS | HEIGHT: 69 IN | SYSTOLIC BLOOD PRESSURE: 112 MMHG | HEART RATE: 82 BPM | BODY MASS INDEX: 21.42 KG/M2 | DIASTOLIC BLOOD PRESSURE: 78 MMHG

## 2017-12-15 DIAGNOSIS — K70.31 ALCOHOLIC CIRRHOSIS OF LIVER WITH ASCITES: Primary | ICD-10-CM

## 2017-12-15 DIAGNOSIS — D50.9 IRON DEFICIENCY ANEMIA, UNSPECIFIED IRON DEFICIENCY ANEMIA TYPE: ICD-10-CM

## 2017-12-15 PROCEDURE — 99213 OFFICE O/P EST LOW 20 MIN: CPT | Mod: PBBFAC,PO | Performed by: INTERNAL MEDICINE

## 2017-12-15 PROCEDURE — 99999 PR PBB SHADOW E&M-EST. PATIENT-LVL III: CPT | Mod: PBBFAC,,, | Performed by: INTERNAL MEDICINE

## 2017-12-15 PROCEDURE — 99214 OFFICE O/P EST MOD 30 MIN: CPT | Mod: S$PBB,,, | Performed by: INTERNAL MEDICINE

## 2017-12-15 NOTE — PROGRESS NOTES
Subjective:     Santy Anderson is here for evaluation of Abnormal Abdominal/Liver Imaging (review ultrasound)      HPI  Santy Andersonis here for eval of US with possible cirrhosis and ascites. Patient denies any known h/o liver disease. He does have a h/o heavy EToH. No reports of overt GIB but he is being evaluated for JULISSA thought 2/2 chronic blood loss and recently had a VCE awaiting results.  No encephalopathy.    His main issues have to do with gait problems. He is having trouble walking. Limited ROM in LE. This is causing him to have occasional falls.  Denies any LOC, heart palpitations.      Review of Systems   HENT: Negative.    Eyes: Negative.    Respiratory: Negative.    Cardiovascular: Negative.    Gastrointestinal: Negative.    Genitourinary: Negative.    Musculoskeletal: Positive for arthralgias, joint swelling and myalgias.   Skin: Negative.    Neurological: Positive for weakness.   Psychiatric/Behavioral: Negative.        Objective:     Physical Exam   Constitutional: He is oriented to person, place, and time. He appears well-developed and well-nourished. No distress.   HENT:   Head: Normocephalic and atraumatic.   Mouth/Throat: Oropharynx is clear and moist. No oropharyngeal exudate.   Eyes: Conjunctivae are normal. Pupils are equal, round, and reactive to light. Right eye exhibits no discharge. Left eye exhibits no discharge. No scleral icterus.   Pulmonary/Chest: Effort normal and breath sounds normal. No respiratory distress. He has no wheezes.   Abdominal: Soft. He exhibits no distension. There is no tenderness.   Musculoskeletal: He exhibits no edema.   Neurological: He is alert and oriented to person, place, and time.   Psychiatric: He has a normal mood and affect. His behavior is normal.   Vitals reviewed.      US 12/2017  Stable limited abdominal ultrasound.  Abnormal liver, with imaging characteristics of cirrhosis without mass, correlate.  Trace perihepatic ascites.   Cholelithiasis.    MELD-Na score: 7 at 12/6/2017  8:34 AM  MELD score: 7 at 12/6/2017  8:34 AM  Calculated from:  Serum Creatinine: 0.6 mg/dL (Rounded to 1) at 12/6/2017  8:34 AM  Serum Sodium: 140 mmol/L (Rounded to 137) at 12/6/2017  8:34 AM  Total Bilirubin: 0.7 mg/dL (Rounded to 1) at 12/6/2017  8:34 AM  INR(ratio): 1.1 at 12/6/2017  8:34 AM  Age: 72 years    WBC   Date Value Ref Range Status   10/10/2017 4.14 3.90 - 12.70 K/uL Final     Hemoglobin   Date Value Ref Range Status   10/10/2017 10.3 (L) 14.0 - 18.0 g/dL Final     Hematocrit   Date Value Ref Range Status   10/10/2017 33.3 (L) 40.0 - 54.0 % Final     Platelets   Date Value Ref Range Status   10/10/2017 114 (L) 150 - 350 K/uL Final     BUN, Bld   Date Value Ref Range Status   12/06/2017 3 (L) 8 - 23 mg/dL Final     Creatinine   Date Value Ref Range Status   12/06/2017 0.6 0.5 - 1.4 mg/dL Final     Glucose   Date Value Ref Range Status   12/06/2017 77 70 - 110 mg/dL Final     Calcium   Date Value Ref Range Status   12/06/2017 9.4 8.7 - 10.5 mg/dL Final     Sodium   Date Value Ref Range Status   12/06/2017 140 136 - 145 mmol/L Final     Potassium   Date Value Ref Range Status   12/06/2017 3.8 3.5 - 5.1 mmol/L Final     Chloride   Date Value Ref Range Status   12/06/2017 107 95 - 110 mmol/L Final     Magnesium   Date Value Ref Range Status   01/25/2017 1.7 1.6 - 2.6 mg/dL Final     AST   Date Value Ref Range Status   12/06/2017 22 10 - 40 U/L Final     ALT   Date Value Ref Range Status   12/06/2017 11 10 - 44 U/L Final     Alkaline Phosphatase   Date Value Ref Range Status   12/06/2017 67 55 - 135 U/L Final     Total Bilirubin   Date Value Ref Range Status   12/06/2017 0.7 0.1 - 1.0 mg/dL Final     Comment:     For infants and newborns, interpretation of results should be based  on gestational age, weight and in agreement with clinical  observations.  Premature Infant recommended reference ranges:  Up to 24 hours.............<8.0 mg/dL  Up to 48  hours............<12.0 mg/dL  3-5 days..................<15.0 mg/dL  6-29 days.................<15.0 mg/dL       Albumin   Date Value Ref Range Status   12/06/2017 2.9 (L) 3.5 - 5.2 g/dL Final     INR   Date Value Ref Range Status   12/06/2017 1.1 0.8 - 1.2 Final     Comment:     Coumadin Therapy:  2.0 - 3.0 for INR for all indicators except mechanical heart valves  and antiphospholipid syndromes which should use 2.5 - 3.5.           Assessment/Plan:     1. Alcoholic cirrhosis of liver with ascites    2. Iron deficiency anemia, unspecified iron deficiency anemia type      Santy Anderson is a 72 y.o. male withAbnormal Abdominal/Liver Imaging (review ultrasound)    Alcoholic cirrhosis of liver with ascites- concerned patient has liver cirrhosis with small volume ascites; meld is low  -Educated patient on above   -Strongly advised EtOH abstinence  -Low salt diet  -Next US for HCC surveillance 6/2018  -Next EGD for variceal surveillance 10/2019    Iron deficiency anemia, unspecified iron deficiency anemia type  -Awaiting result of VCE    RTC in 3 months    More than 50% of face-to-face 30 minute visit spent discussing diagnosis of cirrhosis ankle patient on diagnosis and plan      Danielle Tejeda MD

## 2017-12-15 NOTE — LETTER
December 21, 2017      Joanie Peterson MD  [address] [place] [address]           [facility]  [address] [place] [address]  Phone: [phone]  Fax: [phone]          Patient: Santy Anderson   MR Number: [id number]   YOB: 1945   Date of Visit: 12/15/2017       Dear Dr. Joanie Peterson:    Thank you for referring Santy Anderson to me for evaluation. Attached you will find relevant portions of my assessment and plan of care.    If you have questions, please do not hesitate to call me. I look forward to following Santy Anderson along with you.    Sincerely,    Danielle Tejeda MD    Enclosure  CC:  No Recipients    If you would like to receive this communication electronically, please contact [email or web address] or [phone] to request more information on [employer] access.    For providers and/or their staff who would like to refer a patient to [facility], please contact us through our one-stop-shop provider referral line, [facility], at [phone].    If you feel you have received this communication in error or would no longer like to receive these types of communications, please e-mail [email or web address]

## 2017-12-21 PROBLEM — K70.31 ALCOHOLIC CIRRHOSIS OF LIVER WITH ASCITES: Status: ACTIVE | Noted: 2017-12-21

## 2017-12-27 ENCOUNTER — TELEPHONE (OUTPATIENT)
Dept: GASTROENTEROLOGY | Facility: HOSPITAL | Age: 72
End: 2017-12-27

## 2017-12-27 ENCOUNTER — OFFICE VISIT (OUTPATIENT)
Dept: INTERNAL MEDICINE | Facility: CLINIC | Age: 72
End: 2017-12-27
Payer: MEDICARE

## 2017-12-27 VITALS
WEIGHT: 135.38 LBS | DIASTOLIC BLOOD PRESSURE: 78 MMHG | TEMPERATURE: 96 F | SYSTOLIC BLOOD PRESSURE: 128 MMHG | HEART RATE: 89 BPM | OXYGEN SATURATION: 99 % | HEIGHT: 69 IN | BODY MASS INDEX: 20.05 KG/M2

## 2017-12-27 DIAGNOSIS — K70.31 ALCOHOLIC CIRRHOSIS OF LIVER WITH ASCITES: ICD-10-CM

## 2017-12-27 DIAGNOSIS — E55.9 VITAMIN D DEFICIENCY: ICD-10-CM

## 2017-12-27 DIAGNOSIS — M81.0 AGE-RELATED OSTEOPOROSIS WITHOUT CURRENT PATHOLOGICAL FRACTURE: Primary | ICD-10-CM

## 2017-12-27 DIAGNOSIS — D50.0 IRON DEFICIENCY ANEMIA DUE TO CHRONIC BLOOD LOSS: ICD-10-CM

## 2017-12-27 DIAGNOSIS — M47.816 LUMBAR SPONDYLOSIS: ICD-10-CM

## 2017-12-27 DIAGNOSIS — C34.32 MALIGNANT NEOPLASM OF LOWER LOBE OF LEFT LUNG: ICD-10-CM

## 2017-12-27 PROCEDURE — 99999 PR PBB SHADOW E&M-EST. PATIENT-LVL III: CPT | Mod: PBBFAC,,, | Performed by: INTERNAL MEDICINE

## 2017-12-27 PROCEDURE — 99213 OFFICE O/P EST LOW 20 MIN: CPT | Mod: PBBFAC,PO | Performed by: INTERNAL MEDICINE

## 2017-12-27 PROCEDURE — 99214 OFFICE O/P EST MOD 30 MIN: CPT | Mod: S$PBB,,, | Performed by: INTERNAL MEDICINE

## 2017-12-27 NOTE — TELEPHONE ENCOUNTER
----- Message from Jaison Delarosa PA-C sent at 12/18/2017 11:29 AM CST -----  VCE: gastritis, small jejunal erosion, non-bleeding angiodysplasias.

## 2017-12-27 NOTE — TELEPHONE ENCOUNTER
VCE showed gastritis, small jejunal erosion and non-bleeding angiodysplasias in the jejunum.    It looks like patient also has liver disease.     I would not recommend doing an enteroscopy unless he becomes really symptomatic considering his liver disease.     Thanks,      Stoney Hilario

## 2017-12-27 NOTE — PROGRESS NOTES
"Subjective:       Patient ID: Santy Anderson is a 72 y.o. male.    Chief Complaint: Follow-up    Here for follow up of medical problems.  Results from capsule endoscopy still pending.  Taking vit D 50K twice a week.  Seeing back specialist, next appt Feb.  No f/c/sw/cough.  No cp/sob/palp.  BMs normal.    Updated/ annual due 2/18:  HM: ref fluvax, 6/16 [id number], 6/16 TDaP, 1/17 BMD hi fx risk hip rec bisphos rep 2y, unk Cscope, 6/16 HCV neg, 7/16 Eye doc at [facility].          Review of Systems   Constitutional: Negative for chills, diaphoresis, fatigue and fever.   Respiratory: Negative for cough, chest tightness and shortness of breath.    Cardiovascular: Negative for chest pain, palpitations and leg swelling.   Gastrointestinal: Negative for blood in stool, constipation, diarrhea, nausea and vomiting.   Genitourinary: Negative for difficulty urinating and frequency.   Musculoskeletal: Negative for arthralgias.       Objective:   /78 (BP Location: Right arm, Patient Position: Sitting, BP Method: Medium (Manual))   Pulse 89   Temp 96.1 °F (35.6 °C) (Tympanic)   Ht 5' 9" (1.753 m)   Wt 61.4 kg (135 lb 5.8 oz)   SpO2 99%   BMI 19.99 kg/m²     Physical Exam   Constitutional: He is oriented to person, place, and time. He appears well-developed and well-nourished.   HENT:   Mouth/Throat: Oropharynx is clear and moist.   Neck: Normal range of motion. Neck supple.   Cardiovascular: Normal rate, regular rhythm and intact distal pulses.  Exam reveals no gallop and no friction rub.    No murmur heard.  Pulmonary/Chest: Effort normal and breath sounds normal. He has no wheezes. He has no rales.   Abdominal: Soft. Bowel sounds are normal. He exhibits no mass. There is no tenderness.   Musculoskeletal: He exhibits no edema.   Lymphadenopathy:     He has no cervical adenopathy.   Neurological: He is alert and oriented to person, place, and time.   Psychiatric: He has a normal mood and affect.       Assessment:       1. " Age-related osteoporosis without current pathological fracture    2. Vitamin D deficiency    3. Iron deficiency anemia due to chronic blood loss    4. Alcoholic cirrhosis of liver with ascites    5. Lumbar spondylosis    6. Malignant neoplasm of lower lobe of left lung        Plan:       Santy was seen today for follow-up.    Diagnoses and all orders for this visit:    Age-related osteoporosis without current pathological fracture- will treat once vit D is normal.    Vitamin D deficiency- continue twice weekly    Iron deficiency anemia due to chronic blood loss- await test results.    Alcoholic cirrhosis of liver with ascites- per Hepatology.    Lumbar spondylosis- per pain doctor.    Malignant neoplasm of lower lobe of left lung, s/p surgery.  6/17 CXR clear.    RTC as sched.

## 2018-01-11 NOTE — PROGRESS NOTES
"Subjective:     Patient ID: Santy Anderson is a 72 y.o. male.    Chief Complaint: Pain of the Right Knee    HPI  The patient is seen today in follow-up regarding his right knee. He had hardware removal from right lower extremity performed on 6/20/17. He has had one fall "but did not hit anything or hurt" his knees. He tried to use a cane with ambulation.   He states that he does not have pain but it is "sore."       Past Medical History:   Diagnosis Date    Arthritis     Diabetes mellitus, type 2     Does not have any more problems with diabetes    Gastric mass     GERD (gastroesophageal reflux disease)     Hyperlipidemia     Lung cancer      Past Surgical History:   Procedure Laterality Date    COLONOSCOPY      COLONOSCOPY N/A 4/11/2017    Procedure: COLONOSCOPY;  Surgeon: Brianne Wise MD;  Location: [facility];  Service: Endoscopy;  Laterality: N/A;    ESOPHAGOGASTRODUODENOSCOPY      FEMUR SURGERY Right     GASTRECTOMY      HAND SURGERY Left     KNEE SURGERY Right     laparotomy for trauma (negative)  1979    left lower lobectomy       Family History   Problem Relation Age of Onset    Diabetes Mother     Hypertension Mother     Colon cancer Neg Hx      Social History     Social History    Marital status:      Spouse name: N/A    Number of children: N/A    Years of education: N/A     Occupational History    Not on file.     Social History Main Topics    Smoking status: Former Smoker     Packs/day: 0.50     Years: 25.00     Quit date: 10/16/1988    Smokeless tobacco: Never Used    Alcohol use 9.0 oz/week     1 Glasses of wine, 14 Cans of beer per week      Comment: socailly    Drug use: No    Sexual activity: Not on file     Other Topics Concern    Not on file     Social History Narrative         Medication List with Changes/Refills   Current Medications    ERGOCALCIFEROL (ERGOCALCIFEROL) 50,000 UNIT CAP    Take 1 capsule (50,000 Units total) by mouth twice a week. "    HYDROCODONE-ACETAMINOPHEN 10-325MG (NORCO)  MG TAB    Take 1-2 tablets by mouth every 4-8 hrs as needed for pain.    MELOXICAM (MOBIC) 15 MG TABLET    Take 1 tablet (15 mg total) by mouth once daily.    OMEPRAZOLE (PRILOSEC) 20 MG CAPSULE    Take 20 mg by mouth once daily.     Review of patient's allergies indicates:  No Known Allergies  Review of Systems   Constitution: Negative for chills and fever.   Musculoskeletal: Positive for joint pain.   Gastrointestinal: Negative for abdominal pain, diarrhea, nausea and vomiting.       Objective:   Body mass index is 19.99 kg/m².  Vitals:    01/12/18 0909   BP: 114/69   Pulse: 96   Resp: 19       General: Santy is well-developed, well-nourished, appears stated age, in no acute distress, alert and oriented to time, place and person.       General    Constitutional: He is oriented to person, place, and time. He appears well-developed and well-nourished.   Neck: Normal range of motion.   Cardiovascular: Normal rate and regular rhythm.    Pulmonary/Chest: Effort normal.   Abdominal: Soft.   Neurological: He is alert and oriented to person, place, and time.   Psychiatric: He has a normal mood and affect. His behavior is normal.     General Musculoskeletal Exam   Gait: abnormal       Right Knee Exam     Inspection   Erythema: absent  Scars: present  Effusion: effusion    Tenderness   The patient is experiencing no tenderness.         Range of Motion   Extension: 0   Flexion: 120     Tests   Patella   Patellar Grind: positive      Assessment:     Encounter Diagnoses   Name Primary?    S/P hardware removal     Arthritis of right knee Yes    Right knee pain, unspecified chronicity         Plan:   The patient was referred to outpatient PT near his home at [facility] to work on stretching and strengthening.   We will hold off on doing any injections or other medications today.     If he has any concerns, he will return to the clinic when necessary.

## 2018-01-12 ENCOUNTER — OFFICE VISIT (OUTPATIENT)
Dept: ORTHOPEDICS | Facility: CLINIC | Age: 73
End: 2018-01-12
Payer: MEDICARE

## 2018-01-12 VITALS
DIASTOLIC BLOOD PRESSURE: 69 MMHG | BODY MASS INDEX: 20.05 KG/M2 | RESPIRATION RATE: 19 BRPM | HEART RATE: 96 BPM | WEIGHT: 135.38 LBS | SYSTOLIC BLOOD PRESSURE: 114 MMHG | HEIGHT: 69 IN

## 2018-01-12 DIAGNOSIS — Z98.890 S/P HARDWARE REMOVAL: ICD-10-CM

## 2018-01-12 DIAGNOSIS — M25.561 RIGHT KNEE PAIN, UNSPECIFIED CHRONICITY: ICD-10-CM

## 2018-01-12 DIAGNOSIS — M17.11 ARTHRITIS OF RIGHT KNEE: Primary | ICD-10-CM

## 2018-01-12 PROCEDURE — 99212 OFFICE O/P EST SF 10 MIN: CPT | Mod: S$PBB,,, | Performed by: PHYSICIAN ASSISTANT

## 2018-01-12 PROCEDURE — 99999 PR PBB SHADOW E&M-EST. PATIENT-LVL III: CPT | Mod: PBBFAC,,, | Performed by: PHYSICIAN ASSISTANT

## 2018-01-12 PROCEDURE — 99213 OFFICE O/P EST LOW 20 MIN: CPT | Mod: PBBFAC | Performed by: PHYSICIAN ASSISTANT

## 2018-01-23 ENCOUNTER — TELEPHONE (OUTPATIENT)
Dept: INTERNAL MEDICINE | Facility: CLINIC | Age: 73
End: 2018-01-23

## 2018-01-23 ENCOUNTER — LAB VISIT (OUTPATIENT)
Dept: LAB | Facility: HOSPITAL | Age: 73
End: 2018-01-23
Attending: INTERNAL MEDICINE
Payer: MEDICARE

## 2018-01-23 DIAGNOSIS — D50.0 IRON DEFICIENCY ANEMIA DUE TO CHRONIC BLOOD LOSS: ICD-10-CM

## 2018-01-23 DIAGNOSIS — E55.9 VITAMIN D DEFICIENCY: ICD-10-CM

## 2018-01-23 LAB
25(OH)D3+25(OH)D2 SERPL-MCNC: 7 NG/ML
BASOPHILS # BLD AUTO: 0.04 K/UL
BASOPHILS NFR BLD: 0.7 %
DIFFERENTIAL METHOD: ABNORMAL
EOSINOPHIL # BLD AUTO: 0.2 K/UL
EOSINOPHIL NFR BLD: 2.6 %
ERYTHROCYTE [DISTWIDTH] IN BLOOD BY AUTOMATED COUNT: 14.1 %
FERRITIN SERPL-MCNC: 197 NG/ML
HCT VFR BLD AUTO: 43.8 %
HGB BLD-MCNC: 15.1 G/DL
IRON SERPL-MCNC: 252 UG/DL
LYMPHOCYTES # BLD AUTO: 1.2 K/UL
LYMPHOCYTES NFR BLD: 21.2 %
MCH RBC QN AUTO: 37.2 PG
MCHC RBC AUTO-ENTMCNC: 34.5 G/DL
MCV RBC AUTO: 108 FL
MONOCYTES # BLD AUTO: 0.6 K/UL
MONOCYTES NFR BLD: 10.5 %
NEUTROPHILS # BLD AUTO: 3.7 K/UL
NEUTROPHILS NFR BLD: 65 %
PLATELET # BLD AUTO: 166 K/UL
PMV BLD AUTO: 9.3 FL
RBC # BLD AUTO: 4.06 M/UL
SATURATED IRON: 87 %
TOTAL IRON BINDING CAPACITY: 290 UG/DL
TRANSFERRIN SERPL-MCNC: 196 MG/DL
WBC # BLD AUTO: 5.72 K/UL

## 2018-01-23 PROCEDURE — 36415 COLL VENOUS BLD VENIPUNCTURE: CPT | Mod: PO

## 2018-01-23 PROCEDURE — 82728 ASSAY OF FERRITIN: CPT

## 2018-01-23 PROCEDURE — 83540 ASSAY OF IRON: CPT

## 2018-01-23 PROCEDURE — 85025 COMPLETE CBC W/AUTO DIFF WBC: CPT | Mod: PO

## 2018-01-23 PROCEDURE — 82306 VITAMIN D 25 HYDROXY: CPT

## 2018-01-23 NOTE — TELEPHONE ENCOUNTER
"----- Message from Giovanna Jennings LPN sent at 1/23/2018  2:36 PM CST -----  Please advise on pt's request.  The following is the information I Googled on Two Old Goats Lotion:  Two Old Goats Essential Oil Lotion is our strongest formula, ready to tackle your toughest aches & pains caused by swelling due to Arthritis, Fibromyalgia, Gout or Injury. Our goat's milk base carries our natural blend of 6 Essential Oils deep into your joints and muscles. Swedish Chamomile, Lavender, Rosemary, Peppermint, Eucalyptus and Birch Bark work together as both an anti-inflammatory and an analgesic. Begin feeling relief instantly, while daily applications have a cumulative effect. Finally, pain management you CAN FEEL GOOD ABOUT, with a delicate scent, not a strong unpleasant medicinal odor. Ingredients: A goat's milk lotion base combined with Aloe Vera, [place] Oil, and 6 natural anti-inflammatory essential oils; Swedish Chamomile, Nauruan Lavender, Michelle, Eucalyptus, Peppermint and Birch Bark./rpr  ----- Message -----  From: Jamila Carty LPN  Sent: 1/23/2018   1:19 PM  To: Nicholas GUZMAN Staff    Pt stopped by clinic and wants to know if he can use "Two Old Goats" lotion with his current meds. Informed him I will send message to Dr Peterson's staff and they will call him back.       Please tell pt safe to use and he can try for a month and see if helpful for him.  SM        "

## 2018-01-25 ENCOUNTER — OFFICE VISIT (OUTPATIENT)
Dept: HEMATOLOGY/ONCOLOGY | Facility: CLINIC | Age: 73
End: 2018-01-25
Payer: MEDICARE

## 2018-01-25 VITALS
OXYGEN SATURATION: 99 % | HEIGHT: 70 IN | HEART RATE: 107 BPM | WEIGHT: 144.38 LBS | TEMPERATURE: 97 F | SYSTOLIC BLOOD PRESSURE: 132 MMHG | DIASTOLIC BLOOD PRESSURE: 92 MMHG | BODY MASS INDEX: 20.67 KG/M2

## 2018-01-25 DIAGNOSIS — D50.0 IRON DEFICIENCY ANEMIA DUE TO CHRONIC BLOOD LOSS: Primary | ICD-10-CM

## 2018-01-25 DIAGNOSIS — C34.32 MALIGNANT NEOPLASM OF LOWER LOBE OF LEFT LUNG: ICD-10-CM

## 2018-01-25 PROCEDURE — 99999 PR PBB SHADOW E&M-EST. PATIENT-LVL III: CPT | Mod: PBBFAC,,, | Performed by: INTERNAL MEDICINE

## 2018-01-25 PROCEDURE — 99213 OFFICE O/P EST LOW 20 MIN: CPT | Mod: S$PBB,,, | Performed by: INTERNAL MEDICINE

## 2018-01-25 PROCEDURE — 99213 OFFICE O/P EST LOW 20 MIN: CPT | Mod: PBBFAC | Performed by: INTERNAL MEDICINE

## 2018-01-25 NOTE — PROGRESS NOTES
Subjective:       Patient ID: Santy Anderson is a 72 y.o. male.    Chief Complaint: Results and Anemia    HPI 72-year-old male recently diagnosed with iron deficiency anemia treated with intravenous iron patient returns for response to therapy    Past Medical History:   Diagnosis Date    Arthritis     Diabetes mellitus, type 2     Does not have any more problems with diabetes    Gastric mass     GERD (gastroesophageal reflux disease)     Hyperlipidemia     Lung cancer      Family History   Problem Relation Age of Onset    Diabetes Mother     Hypertension Mother     Colon cancer Neg Hx      Social History     Social History    Marital status:      Spouse name: N/A    Number of children: N/A    Years of education: N/A     Occupational History    Not on file.     Social History Main Topics    Smoking status: Former Smoker     Packs/day: 0.50     Years: 25.00     Quit date: 10/16/1988    Smokeless tobacco: Never Used    Alcohol use 9.0 oz/week     1 Glasses of wine, 14 Cans of beer per week      Comment: socailly    Drug use: No    Sexual activity: Not on file     Other Topics Concern    Not on file     Social History Narrative         Past Surgical History:   Procedure Laterality Date    COLONOSCOPY      COLONOSCOPY N/A 4/11/2017    Procedure: COLONOSCOPY;  Surgeon: Brianne Wise MD;  Location: [facility];  Service: Endoscopy;  Laterality: N/A;    ESOPHAGOGASTRODUODENOSCOPY      FEMUR SURGERY Right     GASTRECTOMY      HAND SURGERY Left     KNEE SURGERY Right     laparotomy for trauma (negative)  1979    left lower lobectomy         Labs:  Lab Results   Component Value Date    WBC 5.72 01/23/2018    HGB 15.1 01/23/2018    HCT 43.8 01/23/2018     (H) 01/23/2018     01/23/2018     BMP  Lab Results   Component Value Date     12/06/2017    K 3.8 12/06/2017     12/06/2017    CO2 27 12/06/2017    BUN 3 (L) 12/06/2017    CREATININE 0.6 12/06/2017     CALCIUM 9.4 12/06/2017    ANIONGAP 6 (L) 12/06/2017    ESTGFRAFRICA >60 12/06/2017    EGFRNONAA >60 12/06/2017     Lab Results   Component Value Date    ALT 11 12/06/2017    AST 22 12/06/2017    ALKPHOS 67 12/06/2017    BILITOT 0.7 12/06/2017       Lab Results   Component Value Date    IRON 252 (H) 01/23/2018    TIBC 290 01/23/2018    FERRITIN 197 01/23/2018     Lab Results   Component Value Date    RUDWCMZU58 370 09/23/2016     Lab Results   Component Value Date    FOLATE 8.8 09/23/2016     Lab Results   Component Value Date    TSH 2.376 01/25/2017         Review of Systems   Constitutional: Negative for activity change, appetite change, chills, diaphoresis, fatigue, fever and unexpected weight change.   HENT: Negative for congestion, dental problem, drooling, ear discharge, ear pain, facial swelling, hearing loss, mouth sores, nosebleeds, postnasal drip, rhinorrhea, sinus pressure, sneezing, sore throat, tinnitus, trouble swallowing and voice change.    Eyes: Negative for photophobia, pain, discharge, redness, itching and visual disturbance.   Respiratory: Negative for apnea, cough, choking, chest tightness, shortness of breath, wheezing and stridor.    Cardiovascular: Negative for chest pain, palpitations and leg swelling.   Gastrointestinal: Negative for abdominal distention, abdominal pain, anal bleeding, blood in stool, constipation, diarrhea, nausea, rectal pain and vomiting.   Endocrine: Negative for cold intolerance, heat intolerance, polydipsia, polyphagia and polyuria.   Genitourinary: Negative for decreased urine volume, difficulty urinating, discharge, dysuria, enuresis, flank pain, frequency, genital sores, hematuria, penile pain, penile swelling, scrotal swelling, testicular pain and urgency.   Musculoskeletal: Negative for arthralgias, back pain, gait problem, joint swelling, myalgias, neck pain and neck stiffness.   Skin: Negative for color change, pallor, rash and wound.   Allergic/Immunologic:  Negative for environmental allergies, food allergies and immunocompromised state.   Neurological: Negative for dizziness, tremors, seizures, syncope, facial asymmetry, speech difficulty, weakness, light-headedness, numbness and headaches.   Hematological: Negative for adenopathy. Does not bruise/bleed easily.   Psychiatric/Behavioral: Negative for agitation, behavioral problems, confusion, decreased concentration, dysphoric mood, hallucinations, self-injury, sleep disturbance and suicidal ideas. The patient is not nervous/anxious and is not hyperactive.        Objective:      Physical Exam   Constitutional: He is oriented to person, place, and time. He appears well-developed and well-nourished. No distress.   HENT:   Head: Normocephalic.   Right Ear: External ear normal.   Left Ear: External ear normal.   Nose: Nose normal. Right sinus exhibits no maxillary sinus tenderness and no frontal sinus tenderness. Left sinus exhibits no maxillary sinus tenderness and no frontal sinus tenderness.   Mouth/Throat: Oropharynx is clear and moist. No oropharyngeal exudate.   Eyes: EOM and lids are normal. Pupils are equal, round, and reactive to light. Right eye exhibits no discharge. Left eye exhibits no discharge. Right conjunctiva is not injected. Right conjunctiva has no hemorrhage. Left conjunctiva is not injected. Left conjunctiva has no hemorrhage. No scleral icterus. Right eye exhibits normal extraocular motion. Left eye exhibits normal extraocular motion.   Neck: Normal range of motion. Neck supple. No JVD present. No tracheal deviation present. No thyromegaly present.   Cardiovascular: Normal rate and regular rhythm.    Pulmonary/Chest: Effort normal. No stridor. No respiratory distress.   Abdominal: Soft. He exhibits no mass. There is no hepatosplenomegaly, splenomegaly or hepatomegaly. There is no tenderness.   Musculoskeletal: Normal range of motion. He exhibits no edema or tenderness.   Lymphadenopathy:        Head  (right side): No posterior auricular and no occipital adenopathy present.        Head (left side): No posterior auricular and no occipital adenopathy present.     He has no cervical adenopathy.        Right cervical: No superficial cervical, no deep cervical and no posterior cervical adenopathy present.       Left cervical: No superficial cervical, no deep cervical and no posterior cervical adenopathy present.     He has no axillary adenopathy.        Right: No supraclavicular adenopathy present.        Left: No supraclavicular adenopathy present.   Neurological: He is alert and oriented to person, place, and time. He has normal strength. No cranial nerve deficit. Coordination normal.   Skin: Skin is dry. No rash noted. He is not diaphoretic. No cyanosis or erythema. Nails show no clubbing.   Psychiatric: He has a normal mood and affect. His behavior is normal. Judgment and thought content normal. Cognition and memory are normal.   Vitals reviewed.          Assessment:      1. Iron deficiency anemia due to chronic blood loss    2. Malignant neoplasm of lower lobe of left lung           Plan:   Patient GI workup completed last year patient is dramatically improved with intravenous iron patient will return in 3 months with CBC CMP and iron status  Imaging of long in October 2018 follow-up lung cancer

## 2018-01-26 ENCOUNTER — PATIENT MESSAGE (OUTPATIENT)
Dept: INTERNAL MEDICINE | Facility: CLINIC | Age: 73
End: 2018-01-26

## 2018-01-26 RX ORDER — ERGOCALCIFEROL 1.25 MG/1
50000 CAPSULE ORAL
Qty: 12 CAPSULE | Refills: 6 | Status: SHIPPED | OUTPATIENT
Start: 2018-01-26 | End: 2018-05-08

## 2018-02-16 ENCOUNTER — PATIENT OUTREACH (OUTPATIENT)
Dept: ADMINISTRATIVE | Facility: HOSPITAL | Age: 73
End: 2018-02-16

## 2018-02-26 ENCOUNTER — ANESTHESIA (OUTPATIENT)
Dept: ENDOSCOPY | Facility: HOSPITAL | Age: 73
End: 2018-02-26
Payer: MEDICARE

## 2018-02-26 ENCOUNTER — SURGERY (OUTPATIENT)
Age: 73
End: 2018-02-26

## 2018-02-26 ENCOUNTER — ANESTHESIA EVENT (OUTPATIENT)
Dept: ENDOSCOPY | Facility: HOSPITAL | Age: 73
End: 2018-02-26
Payer: MEDICARE

## 2018-02-26 ENCOUNTER — HOSPITAL ENCOUNTER (OUTPATIENT)
Facility: HOSPITAL | Age: 73
Discharge: HOME | End: 2018-02-27
Attending: EMERGENCY MEDICINE | Admitting: INTERNAL MEDICINE
Payer: MEDICARE

## 2018-02-26 DIAGNOSIS — R07.9 CHEST PAIN: ICD-10-CM

## 2018-02-26 DIAGNOSIS — R11.2 NON-INTRACTABLE VOMITING WITH NAUSEA, UNSPECIFIED VOMITING TYPE: ICD-10-CM

## 2018-02-26 DIAGNOSIS — K20.90 ESOPHAGITIS: Primary | ICD-10-CM

## 2018-02-26 DIAGNOSIS — R10.13 EPIGASTRIC PAIN: ICD-10-CM

## 2018-02-26 PROBLEM — F10.10 ALCOHOL ABUSE: Status: ACTIVE | Noted: 2018-02-26

## 2018-02-26 PROBLEM — R74.8 ELEVATED LIVER ENZYMES: Status: ACTIVE | Noted: 2018-02-26

## 2018-02-26 PROBLEM — R93.3 ABNORMAL CT SCAN, ESOPHAGUS: Status: ACTIVE | Noted: 2018-02-26

## 2018-02-26 PROBLEM — R10.10 PAIN OF UPPER ABDOMEN: Status: ACTIVE | Noted: 2018-02-26

## 2018-02-26 PROBLEM — K70.30 ALCOHOLIC CIRRHOSIS OF LIVER WITHOUT ASCITES: Status: ACTIVE | Noted: 2017-12-21

## 2018-02-26 PROBLEM — K80.20 CALCULUS OF GALLBLADDER WITHOUT CHOLECYSTITIS WITHOUT OBSTRUCTION: Status: ACTIVE | Noted: 2018-02-26

## 2018-02-26 LAB
ALBUMIN SERPL BCP-MCNC: 3.5 G/DL
ALP SERPL-CCNC: 84 U/L
ALT SERPL W/O P-5'-P-CCNC: 18 U/L
AMMONIA PLAS-SCNC: 58 UMOL/L
ANION GAP SERPL CALC-SCNC: 17 MMOL/L
APTT BLDCRRT: 25.7 SEC
AST SERPL-CCNC: 71 U/L
BASOPHILS # BLD AUTO: 0.03 K/UL
BASOPHILS NFR BLD: 0.4 %
BILIRUB SERPL-MCNC: 2.5 MG/DL
BUN SERPL-MCNC: 5 MG/DL
CALCIUM SERPL-MCNC: 8.8 MG/DL
CHLORIDE SERPL-SCNC: 98 MMOL/L
CO2 SERPL-SCNC: 23 MMOL/L
CREAT SERPL-MCNC: 0.7 MG/DL
D DIMER PPP IA.FEU-MCNC: 1.37 MG/L FEU
DIFFERENTIAL METHOD: ABNORMAL
EOSINOPHIL # BLD AUTO: 0.1 K/UL
EOSINOPHIL NFR BLD: 1.1 %
ERYTHROCYTE [DISTWIDTH] IN BLOOD BY AUTOMATED COUNT: 14.9 %
EST. GFR  (AFRICAN AMERICAN): >60 ML/MIN/1.73 M^2
EST. GFR  (NON AFRICAN AMERICAN): >60 ML/MIN/1.73 M^2
ETHANOL SERPL-MCNC: <10 MG/DL
GLUCOSE SERPL-MCNC: 83 MG/DL
HCT VFR BLD AUTO: 41.8 %
HGB BLD-MCNC: 14.9 G/DL
INR PPP: 1
LIPASE SERPL-CCNC: 7 U/L
LYMPHOCYTES # BLD AUTO: 1.8 K/UL
LYMPHOCYTES NFR BLD: 21.4 %
MAGNESIUM SERPL-MCNC: 2.1 MG/DL
MCH RBC QN AUTO: 38.3 PG
MCHC RBC AUTO-ENTMCNC: 35.6 G/DL
MCV RBC AUTO: 108 FL
MONOCYTES # BLD AUTO: 0.7 K/UL
MONOCYTES NFR BLD: 8.7 %
NEUTROPHILS # BLD AUTO: 5.7 K/UL
NEUTROPHILS NFR BLD: 68.4 %
PHOSPHATE SERPL-MCNC: 2.7 MG/DL
PLATELET # BLD AUTO: 72 K/UL
PMV BLD AUTO: 10.2 FL
POTASSIUM SERPL-SCNC: 4.5 MMOL/L
PROT SERPL-MCNC: 8 G/DL
PROTHROMBIN TIME: 10.5 SEC
RBC # BLD AUTO: 3.89 M/UL
SODIUM SERPL-SCNC: 138 MMOL/L
TROPONIN I SERPL DL<=0.01 NG/ML-MCNC: <0.006 NG/ML
TROPONIN I SERPL DL<=0.01 NG/ML-MCNC: <0.006 NG/ML
WBC # BLD AUTO: 8.27 K/UL

## 2018-02-26 PROCEDURE — 63600175 PHARM REV CODE 636 W HCPCS: Performed by: EMERGENCY MEDICINE

## 2018-02-26 PROCEDURE — 99214 OFFICE O/P EST MOD 30 MIN: CPT | Mod: 25,,, | Performed by: INTERNAL MEDICINE

## 2018-02-26 PROCEDURE — 84100 ASSAY OF PHOSPHORUS: CPT

## 2018-02-26 PROCEDURE — 43235 EGD DIAGNOSTIC BRUSH WASH: CPT | Mod: ,,, | Performed by: INTERNAL MEDICINE

## 2018-02-26 PROCEDURE — C9113 INJ PANTOPRAZOLE SODIUM, VIA: HCPCS | Performed by: PHYSICIAN ASSISTANT

## 2018-02-26 PROCEDURE — 37000008 HC ANESTHESIA 1ST 15 MINUTES: Performed by: INTERNAL MEDICINE

## 2018-02-26 PROCEDURE — 25500020 PHARM REV CODE 255: Performed by: EMERGENCY MEDICINE

## 2018-02-26 PROCEDURE — 93005 ELECTROCARDIOGRAM TRACING: CPT | Mod: 59

## 2018-02-26 PROCEDURE — 43235 EGD DIAGNOSTIC BRUSH WASH: CPT | Performed by: INTERNAL MEDICINE

## 2018-02-26 PROCEDURE — 63600175 PHARM REV CODE 636 W HCPCS: Performed by: NURSE ANESTHETIST, CERTIFIED REGISTERED

## 2018-02-26 PROCEDURE — 84484 ASSAY OF TROPONIN QUANT: CPT

## 2018-02-26 PROCEDURE — 25000003 PHARM REV CODE 250: Performed by: NURSE ANESTHETIST, CERTIFIED REGISTERED

## 2018-02-26 PROCEDURE — 25000003 PHARM REV CODE 250: Performed by: PHYSICIAN ASSISTANT

## 2018-02-26 PROCEDURE — 85730 THROMBOPLASTIN TIME PARTIAL: CPT

## 2018-02-26 PROCEDURE — 99285 EMERGENCY DEPT VISIT HI MDM: CPT

## 2018-02-26 PROCEDURE — 85025 COMPLETE CBC W/AUTO DIFF WBC: CPT

## 2018-02-26 PROCEDURE — 63600175 PHARM REV CODE 636 W HCPCS: Performed by: PHYSICIAN ASSISTANT

## 2018-02-26 PROCEDURE — 36415 COLL VENOUS BLD VENIPUNCTURE: CPT

## 2018-02-26 PROCEDURE — 83735 ASSAY OF MAGNESIUM: CPT

## 2018-02-26 PROCEDURE — 25000003 PHARM REV CODE 250: Performed by: INTERNAL MEDICINE

## 2018-02-26 PROCEDURE — 93010 ELECTROCARDIOGRAM REPORT: CPT | Mod: ,,, | Performed by: INTERNAL MEDICINE

## 2018-02-26 PROCEDURE — 37000009 HC ANESTHESIA EA ADD 15 MINS: Performed by: INTERNAL MEDICINE

## 2018-02-26 PROCEDURE — G0378 HOSPITAL OBSERVATION PER HR: HCPCS

## 2018-02-26 PROCEDURE — 82140 ASSAY OF AMMONIA: CPT

## 2018-02-26 PROCEDURE — 83690 ASSAY OF LIPASE: CPT

## 2018-02-26 PROCEDURE — 85379 FIBRIN DEGRADATION QUANT: CPT

## 2018-02-26 PROCEDURE — 84484 ASSAY OF TROPONIN QUANT: CPT | Mod: 91

## 2018-02-26 PROCEDURE — 96374 THER/PROPH/DIAG INJ IV PUSH: CPT

## 2018-02-26 PROCEDURE — 80320 DRUG SCREEN QUANTALCOHOLS: CPT

## 2018-02-26 PROCEDURE — 85610 PROTHROMBIN TIME: CPT

## 2018-02-26 PROCEDURE — 80053 COMPREHEN METABOLIC PANEL: CPT

## 2018-02-26 RX ORDER — ONDANSETRON 2 MG/ML
4 INJECTION INTRAMUSCULAR; INTRAVENOUS EVERY 8 HOURS PRN
Status: DISCONTINUED | OUTPATIENT
Start: 2018-02-26 | End: 2018-02-27 | Stop reason: HOSPADM

## 2018-02-26 RX ORDER — ONDANSETRON 2 MG/ML
4 INJECTION INTRAMUSCULAR; INTRAVENOUS
Status: COMPLETED | OUTPATIENT
Start: 2018-02-26 | End: 2018-02-26

## 2018-02-26 RX ORDER — SODIUM CHLORIDE 9 MG/ML
INJECTION, SOLUTION INTRAVENOUS CONTINUOUS
Status: DISCONTINUED | OUTPATIENT
Start: 2018-02-26 | End: 2018-02-27 | Stop reason: HOSPADM

## 2018-02-26 RX ORDER — PROPOFOL 10 MG/ML
VIAL (ML) INTRAVENOUS
Status: DISCONTINUED | OUTPATIENT
Start: 2018-02-26 | End: 2018-02-26

## 2018-02-26 RX ORDER — SODIUM CHLORIDE, SODIUM LACTATE, POTASSIUM CHLORIDE, CALCIUM CHLORIDE 600; 310; 30; 20 MG/100ML; MG/100ML; MG/100ML; MG/100ML
INJECTION, SOLUTION INTRAVENOUS CONTINUOUS
Status: DISCONTINUED | OUTPATIENT
Start: 2018-02-26 | End: 2018-02-26

## 2018-02-26 RX ORDER — PANTOPRAZOLE SODIUM 40 MG/10ML
40 INJECTION, POWDER, LYOPHILIZED, FOR SOLUTION INTRAVENOUS DAILY
Status: DISCONTINUED | OUTPATIENT
Start: 2018-02-26 | End: 2018-02-26 | Stop reason: ALTCHOICE

## 2018-02-26 RX ORDER — LIDOCAINE HYDROCHLORIDE 10 MG/ML
INJECTION INFILTRATION; PERINEURAL
Status: DISCONTINUED | OUTPATIENT
Start: 2018-02-26 | End: 2018-02-26

## 2018-02-26 RX ORDER — PANTOPRAZOLE SODIUM 40 MG/10ML
40 INJECTION, POWDER, LYOPHILIZED, FOR SOLUTION INTRAVENOUS 2 TIMES DAILY
Status: DISCONTINUED | OUTPATIENT
Start: 2018-02-26 | End: 2018-02-27 | Stop reason: HOSPADM

## 2018-02-26 RX ADMIN — ONDANSETRON 4 MG: 2 INJECTION INTRAMUSCULAR; INTRAVENOUS at 08:02

## 2018-02-26 RX ADMIN — FOLIC ACID: 5 INJECTION, SOLUTION INTRAMUSCULAR; INTRAVENOUS; SUBCUTANEOUS at 05:02

## 2018-02-26 RX ADMIN — SODIUM CHLORIDE, SODIUM LACTATE, POTASSIUM CHLORIDE, AND CALCIUM CHLORIDE: 600; 310; 30; 20 INJECTION, SOLUTION INTRAVENOUS at 03:02

## 2018-02-26 RX ADMIN — IOHEXOL 100 ML: 350 INJECTION, SOLUTION INTRAVENOUS at 10:02

## 2018-02-26 RX ADMIN — PROPOFOL 50 MG: 10 INJECTION, EMULSION INTRAVENOUS at 04:02

## 2018-02-26 RX ADMIN — LIDOCAINE HYDROCHLORIDE 30 MG: 10 INJECTION, SOLUTION INFILTRATION; PERINEURAL at 03:02

## 2018-02-26 RX ADMIN — PROPOFOL 50 MG: 10 INJECTION, EMULSION INTRAVENOUS at 03:02

## 2018-02-26 RX ADMIN — PANTOPRAZOLE SODIUM 40 MG: 40 INJECTION, POWDER, FOR SOLUTION INTRAVENOUS at 01:02

## 2018-02-26 RX ADMIN — SODIUM CHLORIDE, SODIUM LACTATE, POTASSIUM CHLORIDE, AND CALCIUM CHLORIDE: 600; 310; 30; 20 INJECTION, SOLUTION INTRAVENOUS at 02:02

## 2018-02-26 RX ADMIN — PANTOPRAZOLE SODIUM 40 MG: 40 INJECTION, POWDER, FOR SOLUTION INTRAVENOUS at 09:02

## 2018-02-26 RX ADMIN — SODIUM CHLORIDE: 0.9 INJECTION, SOLUTION INTRAVENOUS at 07:02

## 2018-02-26 NOTE — H&P
"[facility] Hospital Medicine  History & Physical    Patient Name: Santy Anderson  MRN: [id number]  Admission Date: 2/26/2018  Attending Physician: Pardeep Hill MD   Primary Care Provider: Joanie Simms MD         Patient information was obtained from patient, past medical records and ER records.     Subjective:     Principal Problem:Intractable vomiting with nausea    Chief Complaint:   Chief Complaint   Patient presents with    Chest Pain     Pt states, "I am having pain in my chest and my abdomen."        HPI: Santy Anderson is a 72 year old male with alcoholic cirrhosis who presetned to the ED with complaints of nausea, vomiting and abdominal pain that began 3 days ago. The patient describes his abdominal pain as both burning and a fullness. The patient is located diffusely, but worse in his epigastric region. There is associated difficulty swallowing, heartburn, nausea and vomiting. The patient reports that he feels as if something is stuck in his throat. When he swallows, it takes some time before liquids and solids pass the area of his stuck sensation. He reports that after his food goes down, he is having nausea and vomiting. He states that since the onset of symptoms 3 days ago, he has been unable to keep liquids or solids down. He also reports not having a bowel movement since the onset of symptoms. The patient denies fever, chills, chest pain and SOB. He admits to drinking three beers daily. His last beer was approximately 3 days ago due to his nausea and vomiting. Code status was discussed with the patient. He is a full code. His significant other, Anika Hernández, is his surrogate medical decision maker.      Past Medical History:   Diagnosis Date    Alcoholic cirrhosis of liver with ascites     Arthritis     Diabetes mellitus, type 2     Does not have any more problems with diabetes    Gastric mass     GERD (gastroesophageal reflux disease)     Hyperlipidemia     Lung " cancer        Past Surgical History:   Procedure Laterality Date    ABDOMINAL SURGERY      COLONOSCOPY      COLONOSCOPY N/A 4/11/2017    Procedure: COLONOSCOPY;  Surgeon: Brianne Wise MD;  Location: [facility];  Service: Endoscopy;  Laterality: N/A;    ESOPHAGOGASTRODUODENOSCOPY      FEMUR SURGERY Right     GASTRECTOMY      HAND SURGERY Left     KNEE SURGERY Right     laparotomy for trauma (negative)  1979    left lower lobectomy         Review of patient's allergies indicates:  No Known Allergies    No current facility-administered medications on file prior to encounter.      Current Outpatient Prescriptions on File Prior to Encounter   Medication Sig    ergocalciferol (ERGOCALCIFEROL) 50,000 unit Cap Take 1 capsule (50,000 Units total) by mouth 3 (three) times a week.    hydrocodone-acetaminophen 10-325mg (NORCO)  mg Tab Take 1-2 tablets by mouth every 4-8 hrs as needed for pain.    meloxicam (MOBIC) 15 MG tablet Take 1 tablet (15 mg total) by mouth once daily.    omeprazole (PRILOSEC) 20 MG capsule Take 20 mg by mouth once daily.     Family History     Problem Relation (Age of Onset)    Diabetes Mother    Hypertension Mother        Social History Main Topics    Smoking status: Former Smoker     Packs/day: 0.50     Years: 25.00     Quit date: 10/16/1988    Smokeless tobacco: Never Used    Alcohol use 9.0 oz/week     1 Glasses of wine, 14 Cans of beer per week      Comment: socailly    Drug use: No    Sexual activity: Not on file     Review of Systems   Constitutional: Negative for appetite change, chills, diaphoresis, fatigue and fever.   HENT: Negative for congestion, ear pain, mouth sores, sore throat and trouble swallowing.         Positive for dysphagia.    Eyes: Negative for pain and visual disturbance.   Respiratory: Negative for cough, chest tightness and shortness of breath.    Cardiovascular: Negative for chest pain, palpitations and leg swelling.   Gastrointestinal:  Positive for abdominal pain, constipation, nausea and vomiting. Negative for diarrhea.   Endocrine: Negative for cold intolerance, heat intolerance, polydipsia and polyuria.   Genitourinary: Negative for dysuria, frequency and hematuria.   Musculoskeletal: Negative for arthralgias, back pain, myalgias and neck pain.   Skin: Negative for pallor, rash and wound.   Allergic/Immunologic: Negative for environmental allergies and immunocompromised state.   Neurological: Negative for dizziness, seizures, syncope, weakness, numbness and headaches.   Hematological: Negative for adenopathy. Does not bruise/bleed easily.   Psychiatric/Behavioral: Negative for agitation, confusion and sleep disturbance.     Objective:     Vital Signs (Most Recent):  Temp: 97.5 °F (36.4 °C) (02/26/18 0815)  Pulse: 104 (02/26/18 1001)  Resp: 16 (02/26/18 1001)  BP: (!) 143/77 (02/26/18 1001)  SpO2: 100 % (02/26/18 1001) Vital Signs (24h Range):  Temp:  [97.5 °F (36.4 °C)] 97.5 °F (36.4 °C)  Pulse:  [104-113] 104  Resp:  [16-20] 16  SpO2:  [97 %-100 %] 100 %  BP: (143-158)/(77-83) 143/77     Weight: 66.7 kg (147 lb)  Body mass index is 21.4 kg/m².    Physical Exam   Constitutional: He is oriented to person, place, and time. He appears well-developed and well-nourished. He appears ill.   HENT:   Head: Normocephalic and atraumatic.   Eyes: Conjunctivae are normal.   Neck: Neck supple. No JVD present.   Cardiovascular: Regular rhythm.  Tachycardia present.    Murmur heard.  Pulmonary/Chest: Effort normal and breath sounds normal. He has no wheezes.   Abdominal: Soft. Bowel sounds are normal. He exhibits distension (mild). There is tenderness (diffuse).   Midline scar.    Musculoskeletal: Normal range of motion.   Neurological: He is alert and oriented to person, place, and time.   Skin: Skin is warm and dry. No rash noted.   Right lower extremity scarring with abrasions to his anterior lower leg.    Psychiatric: He has a normal mood and affect.  Thought content normal. His speech is delayed (mild). He is slowed.   Nursing note and vitals reviewed.          Significant Labs:   BMP:   Recent Labs  Lab 02/26/18  0831   GLU 83      K 4.5   CL 98   CO2 23   BUN 5*   CREATININE 0.7   CALCIUM 8.8     CBC:   Recent Labs  Lab 02/26/18  0831   WBC 8.27   HGB 14.9   HCT 41.8   PLT 72*     Lipase:   Recent Labs  Lab 02/26/18  0831   LIPASE 7     All pertinent labs within the past 24 hours have been reviewed.    Significant Imaging: I have reviewed all pertinent imaging results/findings within the past 24 hours.   Imaging Results          US Abdomen Limited (Final result)  Result time 02/26/18 11:44:00    Final result by Mina Shaffer Jr., MD (02/26/18 11:44:00)                 Impression:     The findings are consistent with fatty infiltration of the liver.  Cholelithiasis.      Electronically signed by: MINA SHAFFER M.D.  Date:     02/26/18  Time:    11:44              Narrative:    EXAM: [id number] ABDOMEN LIMITED    CLINICAL HISTORY:  abdominal pain     FINDINGS: Comparison to CT of the abdomen and pelvis from 02/26/2018.  The liver demonstrates a diffusely heterogeneous echotexture.  It measures 12.5 cm.  The gallbladder contains mobile shadowing stones. The common bile duct is normal at 3.7 mm. The portal vein shows a normal waveform. The pancreas is normal. The right kidney is normal at 9.5 cm.     No ascites seen.                             CT Abdomen Pelvis With Contrast (Final result)  Result time 02/26/18 10:17:32    Final result by Mina Shaffer Jr., MD (02/26/18 10:17:32)                 Impression:      1. Thickening of the distal esophagus is most suggestive of esophagitis.    2.  Likely fatty infiltration of the liver with perfusion variation within the right lobe.  This may be confirmed with MRI of the abdomen if there is clinical concern.    3.  Cholelithiasis.         Electronically signed by: MINA SHAFFER M.D.  Date:      02/26/18  Time:    10:17              Narrative:    CT ABDOMEN PELVIS WITH CONTRAST    Clinical history: Weight loss, unintended, non-localized abd pain     TECHNIQUE: Routine abdominal and pelvic CT performed after the IV administration of 75 mL Omnipaque 350. Coronal and sagittal images obtained. All CT scans at this facility use dose modulation, iterative reconstruction, and/or weight based dosing when appropriate to reduce radiation dose to as low as reasonably achievable.    Comparison: None    Findings:     There is abnormal wall thickening of the distal esophagus.  There are surgical clips near the body of the stomach.  Small bowel loops are normal in caliber.  The colon is unremarkable. No evidence of appendicitis. No free intraperitoneal fluid, free air or abscess identified.    There is abnormal low density of the liver parenchyma with perfusion variation within the right lobe. Cholelithiasis. The pancreas, spleen and adrenals are unremarkable. No aortic aneurysm is identified. The kidneys and ureters are unremarkable. The bladder is within normal limits. The reproductive organs are not unusual CT appearance. No pathologically enlarged lymph nodes are identified.      Osteopenia.  Degenerative spine change.                             CTA Chest Non-Coronary (PE Study) (Final result)  Result time 02/26/18 10:09:42    Final result by Mina Shaffer Jr., MD (02/26/18 10:09:42)                 Impression:      1. No evidence of pulmonary embolism.    2.  The esophagus is abnormal with circumferential wall thickening and enhancement of the mucosa consistent with esophagitis.  This is new compared to prior.    3.  Postoperative/post treatment change of the left chest remains stable.            Electronically signed by: MINA SHAFFER M.D.  Date:     02/26/18  Time:    10:09              Narrative:    CTA CHEST NON CORONARY    Clinical history: Dyspnea, cardiac origin suspected . Possible pulmonary  embolism    Technique: Routine CT angiogram of the chest protocol performed after the IV administration of 100 mL Omnipaque 350. 3D reconstructions/reformats/MIPs obtained. All CT scans at this facility use dose modulation, iterative reconstruction, and/or weight based dosing when appropriate to reduce radiation dose to as low as reasonably achievable.    Comparison: Previous CT of the chest from 10/10/2017.    Findings: There has been a prior partial left pneumonectomy with left-sided volume loss.  Again demonstrated is postoperative atelectasis/consolidation of the apex of the left lung.  Remaining lungs are clear.  There is no evidence of pulmonary embolism.  No aortic aneurysm or dissection is identified.  There is no cardiomegaly or pericardial effusion.  The esophagus demonstrates hyperenhancement of its mucosa with diffuse wall thickening. No pathologically enlarged lymph nodes are seen in the chest.  No acute osseous abnormality.                             X-Ray Chest AP Portable (Final result)  Result time 02/26/18 09:04:08    Final result by Chris Snyder MD (Timothy) (02/26/18 09:04:08)                 Impression:     Comparison 6/08/2017.Stable normal heart size. Stable postoperative changes suspected involving the left upper lobe and left lung.  Right lung remains clear.  No new abnormalities.      Electronically signed by: CHRIS SNYDER MD  Date:     02/26/18  Time:    09:04              Narrative:    Chest, 1 view.    Clinical History: Epigastric pain                                Assessment/Plan:     * Intractable vomiting with nausea    -Etiology unclear. Differential diagnosis includes gastroenteritis, gastritis or biliary dyskinesia.   -GI following with plans for EGD today.   -Continue symptomatic care.   -Consider HIDA if no other source found.           Pain of upper abdomen    -Etiology unclear. Differential diagnosis includes gastroenteritis, gastritis or biliary dyskinesia.   -GI  following with plans for EGD today.   -Continue symptomatic care.   -Consider HIDA if no other source found.           Elevated liver enzymes    -Likely associated with liver disease.   -Monitor.          Abnormal CT scan of the esophagus with esophageal dysphagia    -Possibly due to esophagitis.   -GI following.   -Follow up EGD results.   -BID PPI.         Alcoholic cirrhosis of liver without ascites    -GI following.   -Outpatient GI follow up.           Alcohol abuse    -Libirum, MVI, thiamine, folic acid.   -Monitor for signs and symptoms of withdrawal.             VTE Risk Mitigation         Ordered     Medium Risk of VTE  Once      02/26/18 1309     Place sequential compression device  Until discontinued      02/26/18 1309     Reason for No Pharmacological VTE Prophylaxis  Once      02/26/18 1309             EMMANUELLE Ojeda  Department of Hospital Medicine   [facility]

## 2018-02-26 NOTE — ED PROVIDER NOTES
"SCRIBE #1 NOTE: I, Sathish Lloyd, am scribing for, and in the presence of, Lester Camilo Jr., MD. I have scribed the entire note.      History      Chief Complaint   Patient presents with    Chest Pain     Pt states, "I am having pain in my chest and my abdomen."       Review of patient's allergies indicates:  No Known Allergies     HPI   HPI    2/26/2018, 8:16 AM   History obtained from the patient      History of Present Illness: Santy Anderson is a 72 y.o. male patient with a hx of DM and GERD presents to the Emergency Department for an evaluation of left sided chest pain which onset gradually x3 days ago. Pt reports a hx of surgery concerning a gastric mass years ago. Symptoms are constant and moderate in severity. Exacerbated by nothing and relieved by nothing. Associated sxs include upper abdominal pain. Patient denies any fever, chill, diaphoresis, SOB, blood in stool, N/V/D,leg pain/swelling, and all other sxs at this time. Pt denies tx PTA No further complaints or concerns at this time.     Arrival mode: Personal vehicle    PCP: Joanie Simms MD       Past Medical History:  Past Medical History:   Diagnosis Date    Arthritis     Diabetes mellitus, type 2     Does not have any more problems with diabetes    Gastric mass     GERD (gastroesophageal reflux disease)     Hyperlipidemia     Lung cancer        Past Surgical History:  Past Surgical History:   Procedure Laterality Date    ABDOMINAL SURGERY      COLONOSCOPY      COLONOSCOPY N/A 4/11/2017    Procedure: COLONOSCOPY;  Surgeon: Brianne Wise MD;  Location[facility];  Service: Endoscopy;  Laterality: N/A;    ESOPHAGOGASTRODUODENOSCOPY      FEMUR SURGERY Right     GASTRECTOMY      HAND SURGERY Left     KNEE SURGERY Right     laparotomy for trauma (negative)  1979    left lower lobectomy           Family History:  Family History   Problem Relation Age of Onset    Diabetes Mother     Hypertension Mother     Colon cancer Neg Hx  "       Social History:  Social History     Social History Main Topics    Smoking status: Former Smoker     Packs/day: 0.50     Years: 25.00     Quit date: 10/16/1988    Smokeless tobacco: Never Used    Alcohol use 9.0 oz/week     1 Glasses of wine, 14 Cans of beer per week      Comment: socially    Drug use: No    Sexual activity: Unknown       ROS   Review of Systems   Constitutional: Negative for chills, diaphoresis and fever.        (-) Recent travel  (-) Long car trips   HENT: Negative for congestion, sore throat and trouble swallowing.    Respiratory: Negative for cough and shortness of breath.    Cardiovascular: Positive for chest pain (Left). Negative for palpitations and leg swelling.   Gastrointestinal: Positive for abdominal pain (Upper). Negative for blood in stool, constipation, diarrhea, nausea and vomiting.   Genitourinary: Negative for dysuria, frequency, hematuria and urgency.   Musculoskeletal: Negative for back pain, neck pain and neck stiffness.   Skin: Negative for rash.   Neurological: Negative for weakness, light-headedness and headaches.   Hematological: Does not bruise/bleed easily.     Physical Exam      Initial Vitals [02/26/18 0815]   BP Pulse Resp Temp SpO2   (!) 158/77 (!) 113 20 97.5 °F (36.4 °C) 97 %      MAP       104          Physical Exam  Nursing Notes and Vital Signs Reviewed.  Constitutional: Patient is in no acute distress. Cachectic.  Head: Atraumatic. Normocephalic.  Eyes: PERRL. EOM intact. Conjunctivae are not pale. No scleral icterus.  ENT: Mucous membranes are moist.  Neck: Supple. Full ROM. No lymphadenopathy.  Cardiovascular: Tachycardic. Regular rhythm.  Pulmonary/Chest: No respiratory distress. Clear to auscultation bilaterally. No wheezing or rales. Left lower chest wall tenderness.   Abdominal: Soft and non-distended. Vague upper abdominal tenderness noted. Old surgical scar noted to upper abdomen.   Musculoskeletal: Moves all extremities. No obvious deformities.  "No edema. No calf tenderness.  Skin: Warm and dry.  Neurological:  Alert, awake, and appropriate.  Normal speech.  No acute focal neurological deficits are appreciated.  Psychiatric: Normal affect. Good eye contact. Appropriate in content.    ED Course    Procedures  ED Vital Signs:  Vitals:    02/26/18 0815 02/26/18 0827 02/26/18 0830 02/26/18 1001   BP: (!) 158/77  (!) 155/83 (!) 143/77   Pulse: (!) 113 105 107 104   Resp: 20  20 16   Temp: 97.5 °F (36.4 °C)      TempSrc: Oral      SpO2: 97%  100% 100%   Weight: 66.7 kg (147 lb)      Height: 5' 9.5" (1.765 m)          Abnormal Lab Results:  Labs Reviewed   CBC W/ AUTO DIFFERENTIAL - Abnormal; Notable for the following:        Result Value    RBC 3.89 (*)      (*)     MCH 38.3 (*)     RDW 14.9 (*)     Platelets 72 (*)     All other components within normal limits   COMPREHENSIVE METABOLIC PANEL - Abnormal; Notable for the following:     BUN, Bld 5 (*)     Total Bilirubin 2.5 (*)     AST 71 (*)     Anion Gap 17 (*)     All other components within normal limits   D DIMER, QUANTITATIVE - Abnormal; Notable for the following:     D-Dimer 1.37 (*)     All other components within normal limits   LIPASE   PROTIME-INR   APTT   TROPONIN I        All Lab Results:  Results for orders placed or performed during the hospital encounter of 02/26/18   CBC auto differential   Result Value Ref Range    WBC 8.27 3.90 - 12.70 K/uL    RBC 3.89 (L) 4.60 - 6.20 M/uL    Hemoglobin 14.9 14.0 - 18.0 g/dL    Hematocrit 41.8 40.0 - 54.0 %     (H) 82 - 98 fL    MCH 38.3 (H) 27.0 - 31.0 pg    MCHC 35.6 32.0 - 36.0 g/dL    RDW 14.9 (H) 11.5 - 14.5 %    Platelets 72 (L) 150 - 350 K/uL    MPV 10.2 9.2 - 12.9 fL    Gran # (ANC) 5.7 1.8 - 7.7 K/uL    Lymph # 1.8 1.0 - 4.8 K/uL    Mono # 0.7 0.3 - 1.0 K/uL    Eos # 0.1 0.0 - 0.5 K/uL    Baso # 0.03 0.00 - 0.20 K/uL    Gran% 68.4 38.0 - 73.0 %    Lymph% 21.4 18.0 - 48.0 %    Mono% 8.7 4.0 - 15.0 %    Eosinophil% 1.1 0.0 - 8.0 %    " Basophil% 0.4 0.0 - 1.9 %    Differential Method Automated    Comprehensive metabolic panel   Result Value Ref Range    Sodium 138 136 - 145 mmol/L    Potassium 4.5 3.5 - 5.1 mmol/L    Chloride 98 95 - 110 mmol/L    CO2 23 23 - 29 mmol/L    Glucose 83 70 - 110 mg/dL    BUN, Bld 5 (L) 8 - 23 mg/dL    Creatinine 0.7 0.5 - 1.4 mg/dL    Calcium 8.8 8.7 - 10.5 mg/dL    Total Protein 8.0 6.0 - 8.4 g/dL    Albumin 3.5 3.5 - 5.2 g/dL    Total Bilirubin 2.5 (H) 0.1 - 1.0 mg/dL    Alkaline Phosphatase 84 55 - 135 U/L    AST 71 (H) 10 - 40 U/L    ALT 18 10 - 44 U/L    Anion Gap 17 (H) 8 - 16 mmol/L    eGFR if African American >60 >60 mL/min/1.73 m^2    eGFR if non African American >60 >60 mL/min/1.73 m^2   Lipase   Result Value Ref Range    Lipase 7 4 - 60 U/L   Protime-INR   Result Value Ref Range    Prothrombin Time 10.5 9.0 - 12.5 sec    INR 1.0 0.8 - 1.2   APTT   Result Value Ref Range    aPTT 25.7 21.0 - 32.0 sec   D dimer, quantitative   Result Value Ref Range    D-Dimer 1.37 (H) <0.50 mg/L FEU   Troponin I   Result Value Ref Range    Troponin I <0.006 0.000 - 0.026 ng/mL         Imaging Results:  Imaging Results          US Abdomen Limited (In process)                CT Abdomen Pelvis With Contrast (Final result)  Result time 02/26/18 10:17:32    Final result by Mina Shaffer Jr., MD (02/26/18 10:17:32)                 Impression:      1. Thickening of the distal esophagus is most suggestive of esophagitis.    2.  Likely fatty infiltration of the liver with perfusion variation within the right lobe.  This may be confirmed with MRI of the abdomen if there is clinical concern.    3.  Cholelithiasis.         Electronically signed by: MINA SHAFFER M.D.  Date:     02/26/18  Time:    10:17              Narrative:    CT ABDOMEN PELVIS WITH CONTRAST    Clinical history: Weight loss, unintended, non-localized abd pain     TECHNIQUE: Routine abdominal and pelvic CT performed after the IV administration of 75 mL Omnipaque 350.  Coronal and sagittal images obtained. All CT scans at this facility use dose modulation, iterative reconstruction, and/or weight based dosing when appropriate to reduce radiation dose to as low as reasonably achievable.    Comparison: None    Findings:     There is abnormal wall thickening of the distal esophagus.  There are surgical clips near the body of the stomach.  Small bowel loops are normal in caliber.  The colon is unremarkable. No evidence of appendicitis. No free intraperitoneal fluid, free air or abscess identified.    There is abnormal low density of the liver parenchyma with perfusion variation within the right lobe. Cholelithiasis. The pancreas, spleen and adrenals are unremarkable. No aortic aneurysm is identified. The kidneys and ureters are unremarkable. The bladder is within normal limits. The reproductive organs are not unusual CT appearance. No pathologically enlarged lymph nodes are identified.      Osteopenia.  Degenerative spine change.                             CTA Chest Non-Coronary (PE Study) (Final result)  Result time 02/26/18 10:09:42    Final result by Mina Shaffer Jr., MD (02/26/18 10:09:42)                 Impression:      1. No evidence of pulmonary embolism.    2.  The esophagus is abnormal with circumferential wall thickening and enhancement of the mucosa consistent with esophagitis.  This is new compared to prior.    3.  Postoperative/post treatment change of the left chest remains stable.            Electronically signed by: MINA SHAFFER M.D.  Date:     02/26/18  Time:    10:09              Narrative:    CTA CHEST NON CORONARY    Clinical history: Dyspnea, cardiac origin suspected . Possible pulmonary embolism    Technique: Routine CT angiogram of the chest protocol performed after the IV administration of 100 mL Omnipaque 350. 3D reconstructions/reformats/MIPs obtained. All CT scans at this facility use dose modulation, iterative reconstruction, and/or weight based dosing  when appropriate to reduce radiation dose to as low as reasonably achievable.    Comparison: Previous CT of the chest from 10/10/2017.    Findings: There has been a prior partial left pneumonectomy with left-sided volume loss.  Again demonstrated is postoperative atelectasis/consolidation of the apex of the left lung.  Remaining lungs are clear.  There is no evidence of pulmonary embolism.  No aortic aneurysm or dissection is identified.  There is no cardiomegaly or pericardial effusion.  The esophagus demonstrates hyperenhancement of its mucosa with diffuse wall thickening. No pathologically enlarged lymph nodes are seen in the chest.  No acute osseous abnormality.                             X-Ray Chest AP Portable (Final result)  Result time 02/26/18 09:04:08    Final result by Chris Early MD (Timothy) (02/26/18 09:04:08)                 Impression:     Comparison 6/08/2017.Stable normal heart size. Stable postoperative changes suspected involving the left upper lobe and left lung.  Right lung remains clear.  No new abnormalities.      Electronically signed by: CHRIS EARLY MD  Date:     02/26/18  Time:    09:04              Narrative:    Chest, 1 view.    Clinical History: Epigastric pain                                  The EKG was ordered, reviewed, and independently interpreted by the ED provider.  Interpretation time: 8:22  Rate: 108 BPM  Rhythm: sinus tachycardia  Interpretation: Possible left atrial enlargement. Low voltage QRS. No STEMI.        The Emergency Provider reviewed the vital signs and test results, which are outlined above.    ED Discussion     10:30 AM: Dr. Camilo discussed the pt's case with Dr. Tejeda () who recommends admitting pt to observation and ordering an ultrasound on pt.    10:33 AM: Discussed case with EMMANUELLE Beckham ([facility]). Dr. Hill agrees with current care and management of pt and accepts admission.   Admitting Service: Hospital medicine   Admitting Physician:  Dr. Hill  Admit to: Observation    10:33 AM: Re-evaluated pt. I have discussed test results, shared treatment plan, and the need for admission with patient. Pt expresses understanding at this time and agree with all information. All questions answered. Pt has no further questions or concerns at this time. Pt is ready for admit.      ED Medication(s):  Medications   ondansetron injection 4 mg (4 mg Intravenous Given 2/26/18 0866)   omnipaque 350 iohexol 100 mL (100 mLs Intravenous Given 2/26/18 1003)       New Prescriptions    No medications on file             Medical Decision Making    Medical Decision Making:   Clinical Tests:   Lab Tests: Ordered and Reviewed  Radiological Study: Reviewed and Ordered  Medical Tests: Ordered and Reviewed           Scribe Attestation:   Scribe #1: I performed the above scribed service and the documentation accurately describes the services I performed. I attest to the accuracy of the note.    Attending:   Physician Attestation Statement for Scribe #1: I, Lester Camilo Jr., MD, personally performed the services described in this documentation, as scribed by Sathish Lloyd, in my presence, and it is both accurate and complete.          Clinical Impression       ICD-10-CM ICD-9-CM   1. Esophagitis K20.9 530.10   2. Chest pain R07.9 786.50   3. Epigastric pain R10.13 789.06       Disposition:   Disposition: Placed in Observation  Condition: Stable         Lester Camilo Jr., MD  02/26/18 1524

## 2018-02-26 NOTE — CONSULTS
[facility] -   Gastroenterology  Consult Note    Patient Name: Santy Anderson  MRN: [id number]  Admission Date: 2/26/2018  Hospital Length of Stay: 0 days  Code Status: Prior   Attending Provider: Lester Camilo Jr., MD   Consulting Provider: Alvaro Delarosa PA-C  Primary Care Physician: Joanie Simms MD  Principal Problem:<principal problem not specified>    Inpatient consult to Gastroenterology  Consult performed by: ALVARO DELAROSA  Consult ordered by: LESTER CAMILO JR  Reason for consult: Esophagitis        Subjective:     HPI:  The patient presented to the ER for abdominal pain, nausea and vomiting which started three days ago. The pain is across the upper abdomen and described as a constant, fullness. Nothing increases or decreases the pain. Vomitus in the ER was brown. He feels like there is something stuck in his throat. He reports heartburn. He hasn't had a BM in three days, but denies hematochezia or melena. He has been off the PPI at least two months. He denies NSAID use. He was drinking three beers a day up until three days ago. He has a history of liver cirrhosis, iron def anemia, jejunal angiodysplasia and esophagitis. His last EGD was in 2016, last colonoscopy 04/2017 and VCE 12/2017.      Past Medical History:   Diagnosis Date    Alcoholic cirrhosis of liver with ascites     Arthritis     Diabetes mellitus, type 2     Does not have any more problems with diabetes    Gastric mass     GERD (gastroesophageal reflux disease)     Hyperlipidemia     Lung cancer        Past Surgical History:   Procedure Laterality Date    ABDOMINAL SURGERY      COLONOSCOPY      COLONOSCOPY N/A 4/11/2017    Procedure: COLONOSCOPY;  Surgeon: Brianne Wise MD;  Location: [facility];  Service: Endoscopy;  Laterality: N/A;    ESOPHAGOGASTRODUODENOSCOPY      FEMUR SURGERY Right     GASTRECTOMY      HAND SURGERY Left     KNEE SURGERY Right     laparotomy for trauma (negative)  1979    left  lower lobectomy         Review of patient's allergies indicates:  No Known Allergies  Family History     Problem Relation (Age of Onset)    Diabetes Mother    Hypertension Mother        Social History Main Topics    Smoking status: Former Smoker     Packs/day: 0.50     Years: 25.00     Quit date: 10/16/1988    Smokeless tobacco: Never Used    Alcohol use 9.0 oz/week     1 Glasses of wine, 14 Cans of beer per week      Comment: socailly    Drug use: No    Sexual activity: Not on file     Review of Systems   Constitutional: Negative for fatigue and fever.   HENT: Negative for hearing loss.    Eyes: Negative for visual disturbance.   Respiratory: Negative for cough and shortness of breath.    Cardiovascular: Positive for chest pain (as per HPI). Negative for palpitations.   Gastrointestinal:        As per HPI.   Genitourinary: Negative for difficulty urinating, dysuria, frequency and hematuria.   Musculoskeletal: Positive for back pain (chronic low back pain). Negative for arthralgias.   Skin: Negative for color change and rash.   Neurological: Negative for dizziness, seizures, syncope, weakness, numbness and headaches.   Hematological: Does not bruise/bleed easily.   Psychiatric/Behavioral: The patient is not nervous/anxious.      Objective:     Vital Signs (Most Recent):  Temp: 97.5 °F (36.4 °C) (02/26/18 0815)  Pulse: 104 (02/26/18 1001)  Resp: 16 (02/26/18 1001)  BP: (!) 143/77 (02/26/18 1001)  SpO2: 100 % (02/26/18 1001) Vital Signs (24h Range):  Temp:  [97.5 °F (36.4 °C)] 97.5 °F (36.4 °C)  Pulse:  [104-113] 104  Resp:  [16-20] 16  SpO2:  [97 %-100 %] 100 %  BP: (143-158)/(77-83) 143/77     Weight: 66.7 kg (147 lb) (02/26/18 0815)  Body mass index is 21.4 kg/m².    No intake or output data in the 24 hours ending 02/26/18 1211    Lines/Drains/Airways     Peripheral Intravenous Line                 Peripheral IV - Single Lumen 02/26/18 0831 Left Forearm less than 1 day                Physical Exam    Constitutional: He is oriented to person, place, and time. He appears well-developed and well-nourished.   HENT:   Head: Normocephalic and atraumatic.   Eyes: EOM are normal. Scleral icterus is present.   Neck: Normal range of motion. Neck supple.   Cardiovascular: Normal rate, regular rhythm and normal heart sounds.    No murmur heard.  Pulmonary/Chest: Effort normal and breath sounds normal. No respiratory distress. He has no wheezes.   Abdominal: Soft. Bowel sounds are normal. He exhibits no distension, no ascites and no mass. There is no hepatomegaly. There is tenderness (mild, across the upper abdomen).   Musculoskeletal: He exhibits no edema.   Neurological: He is alert and oriented to person, place, and time. No cranial nerve deficit. Gait normal.   Skin: Skin is warm and dry. No rash noted.   Small open wound lower legs   Psychiatric: He has a normal mood and affect.       Significant Labs:  CBC:   Recent Labs  Lab 02/26/18  0831   WBC 8.27   HGB 14.9   HCT 41.8   PLT 72*     CMP:   Recent Labs  Lab 02/26/18  0831   GLU 83   CALCIUM 8.8   ALBUMIN 3.5   PROT 8.0      K 4.5   CO2 23   CL 98   BUN 5*   CREATININE 0.7   ALKPHOS 84   ALT 18   AST 71*   BILITOT 2.5*     Coagulation:   Recent Labs  Lab 02/26/18  0831   INR 1.0   APTT 25.7       Significant Imaging:  Imaging results within the past 24 hours have been reviewed.    Assessment/Plan:     Abnormal CT scan, esophagus    Findings likely related to esophagitis which was a problem in the past.   Start IV Protonix bid.   NPO.  EGD today.  Further recommendations after EGD.          Non-intractable vomiting with nausea    See plan above.   Antiemetics as needed per Hospital Medicine.           Pain of upper abdomen    See plan above.   Pain management as needed per Hospital Medicine.         Alcoholic cirrhosis of liver without ascites    Watch for withdrawal symptoms.   Monitor MELD labs daily.     MELD-Na score: 10 at 2/26/2018  8:31 AM  MELD score:  10 at 2/26/2018  8:31 AM  Calculated from:  Serum Creatinine: 0.7 mg/dL (Rounded to 1) at 2/26/2018  8:31 AM  Serum Sodium: 138 mmol/L (Rounded to 137) at 2/26/2018  8:31 AM  Total Bilirubin: 2.5 mg/dL at 2/26/2018  8:31 AM  INR(ratio): 1.0 at 2/26/2018  8:31 AM  Age: 72 years          Calculus of gallbladder without cholecystitis without obstruction    No evidence of cholecystitis by ultrasound but should be kept in differential.   Total bilirubin was elevated but may be secondary to alcohol cirrhosis.   Monitor labs.             Thank you for your consult. I will follow-up with patient. Please contact us if you have any additional questions.    Jaison Delarosa PA-C  Gastroenterology  [facility]

## 2018-02-26 NOTE — DISCHARGE INSTRUCTIONS
Esophagitis     With esophagitis, the lining of the esophagus is inflamed.   Do you often have burning pain in your chest? You may have esophagitis. This is when the lining of the esophagus becomes red and swollen (inflamed). The esophagus is the tube that connects your throat to your stomach. This sheet tells you more about esophagitis. It also explains your treatment options.  Main types of esophagitis  Reflux esophagitis. This is the more common type. It is caused by GERD (gastroesophageal reflux disease). Stomach contents with stomach acid flow back up into the esophagus. This happens over and over. It leads to inflammation. Risk factors can include:  · Being overweight  · Asthma  · Smoking  · Pregnancy  · Frequent vomiting  · Certain medicines (such as aspirin and other anti-inflammatories)  · Hiatal hernia  Infectious esophagitis. This is caused by an infection. You are more at risk for this if you have a weakened immune system and poor nutrition. Antibiotic use can also be a factor. The infection is often due to the following:  · A type of fungus (typically candida)  · A virus, such as herpes simplex virus 1 (HSV-1) or cytomegalovirus (CMV)  Eosinophilic esophagitis. Foods or other things around you can give you an allergic reaction. This triggers an immune response and leads to esophagitis.  Pill-induced esophagitis. Certain types of medicines can cause inflammation and ulcers in the esophagus. These include doxycycline, aspirin, NSAIDs, alendronate, potassium, quinidine, iron.  Symptoms of esophagitis  The following symptoms can occur with esophagitis:  · Pain when swallowing, or trouble swallowing  · Pain behind your breastbone (heartburn)  · Acid regurgitation  · Chronic sore throat  · Gum Inflammation  · Cavities  · Bad breath  · Nausea  · Pain in your upper belly (abdomen)  · Bleeding (indicated by bright red vomit or black, tarry stool)  These symptoms occur more often with reflux  esophagitis:  · Coughing, wheezing, or asthma  · Hoarseness  Diagnosis of esophagitis  Your healthcare provider will ask about your health history and symptoms. Youll also be examined. Sometimes certain tests are needed. These may include:  · Upper endoscopy. A thin, flexible tube with a tiny light and camera is used. It is inserted through the mouth down into the esophagus. This lets the provider look for damage. A small sample of tissue (biopsy) may also be removed. The sample is sent to a lab for testing.  · Upper GI X-ray with barium. An X-ray is done after you drink a substance called barium. Barium may make problems in the esophagus easier to see on an x-ray.  · Esophageal pH. A soft, thin tube is passed into the esophagus through the nose or mouth for 24 hours. It measures the acid level in the esophagus.  · Esophageal manometry. A soft, thin tube is passed into the esophagus through the nose or mouth. It measures muscle contractions in the esophagus.  Treatment of esophagitis  Medicines. Different medicines can help treat esophagitis. The medicine used will depend on the type of esophagitis you have. Talk with your healthcare provider.  Lifestyle changes. Making the following changes can help reduce irritation and ease your symptoms:  · Avoid spicy foods (pepper, chili powder, shields). Also avoid hard foods (nuts, crackers, raw vegetables) and acidic foods and drinks (tomatoes, citrus fruits and juices). Other problem foods include chocolate, peppermint, nutmeg, and foods high in fat.  · Until you can swallow without pain, follow a combined liquid and soft diet. Try foods such as cooked cereals, mashed potatoes, and soups.  · Take small bites and chew your food thoroughly.  · Avoid large meals and heavy evening meals. Don't lie down within 2 to 3 hours of eating.  · Get to or stay at a healthy weight.  · Avoid alcohol, caffeine, and smoking or tobacco products.  · Brush and floss your teeth  · Raise your  upper body by 4 to 6 inches when lying in bed. This can be done using a foam wedge. Or put blocks under the legs at the head of your bed.  Surgery. This may be needed for severe reflux esophagitis. Other noninvasive procedures to treat GERD and esophagitis are being studied. Your provider can tell you more.  Why treatment Is important  Without treatment, esophagitis can get worse. This is especially true with severe reflux esophagitis. For instance, continued symptoms can cause scarring of the esophagus. Over time, this can cause a narrowing the esophagus (stricture). This can make it hard to pass food down to the stomach. As symptoms go on they can also cause changes in the lining of the esophagus. These changes can put you at a slightly higher risk of cancer of the esophagus.   Date Last Reviewed: 7/1/2016  © 8180-9678 [employer]. [address] [place] [address], [place] [address]. All rights reserved. This information is not intended as a substitute for professional medical care. Always follow your healthcare professional's instructions.

## 2018-02-26 NOTE — ANESTHESIA POSTPROCEDURE EVALUATION
"Anesthesia Post Evaluation    Patient: Santy Anderson    Procedure(s) Performed: Procedure(s) (LRB):  ESOPHAGOGASTRODUODENOSCOPY (EGD) (N/A)    Final Anesthesia Type: MAC  Patient location during evaluation: GI PACU  Patient participation: Yes- Able to Participate  Level of consciousness: awake and alert  Post-procedure vital signs: reviewed and stable  Pain management: adequate  Airway patency: patent  PONV status at discharge: No PONV  Anesthetic complications: no      Cardiovascular status: blood pressure returned to baseline  Respiratory status: unassisted and spontaneous ventilation  Hydration status: euvolemic  Follow-up not needed.        Visit Vitals  /78 (BP Location: Right arm, Patient Position: Lying)   Pulse 103   Temp 36.9 °C (98.5 °F) (Oral)   Resp 20   Ht 5' 9" (1.753 m)   Wt 62.4 kg (137 lb 9.1 oz)   SpO2 99%   BMI 20.32 kg/m²       Pain/Roberto Score: Pain Assessment Performed: Yes (2/26/2018  4:26 PM)  Presence of Pain: denies (2/26/2018  4:26 PM)  Roberto Score: 9 (2/26/2018  4:26 PM)      "

## 2018-02-26 NOTE — ASSESSMENT & PLAN NOTE
-Etiology unclear. Differential diagnosis includes gastroenteritis, gastritis or biliary dyskinesia.   -GI following with plans for EGD today.   -Continue symptomatic care.   -Consider HIDA if no other source found.

## 2018-02-26 NOTE — SUBJECTIVE & OBJECTIVE
Past Medical History:   Diagnosis Date    Alcoholic cirrhosis of liver with ascites     Arthritis     Diabetes mellitus, type 2     Does not have any more problems with diabetes    Gastric mass     GERD (gastroesophageal reflux disease)     Hyperlipidemia     Lung cancer        Past Surgical History:   Procedure Laterality Date    ABDOMINAL SURGERY      COLONOSCOPY      COLONOSCOPY N/A 4/11/2017    Procedure: COLONOSCOPY;  Surgeon: Brianne Wise MD;  Location: [facility];  Service: Endoscopy;  Laterality: N/A;    ESOPHAGOGASTRODUODENOSCOPY      FEMUR SURGERY Right     GASTRECTOMY      HAND SURGERY Left     KNEE SURGERY Right     laparotomy for trauma (negative)  1979    left lower lobectomy         Review of patient's allergies indicates:  No Known Allergies  Family History     Problem Relation (Age of Onset)    Diabetes Mother    Hypertension Mother        Social History Main Topics    Smoking status: Former Smoker     Packs/day: 0.50     Years: 25.00     Quit date: 10/16/1988    Smokeless tobacco: Never Used    Alcohol use 9.0 oz/week     1 Glasses of wine, 14 Cans of beer per week      Comment: socailly    Drug use: No    Sexual activity: Not on file     Review of Systems   Constitutional: Negative for fatigue and fever.   HENT: Negative for hearing loss.    Eyes: Negative for visual disturbance.   Respiratory: Negative for cough and shortness of breath.    Cardiovascular: Positive for chest pain (as per HPI). Negative for palpitations.   Gastrointestinal:        As per HPI.   Genitourinary: Negative for difficulty urinating, dysuria, frequency and hematuria.   Musculoskeletal: Positive for back pain (chronic low back pain). Negative for arthralgias.   Skin: Negative for color change and rash.   Neurological: Negative for dizziness, seizures, syncope, weakness, numbness and headaches.   Hematological: Does not bruise/bleed easily.   Psychiatric/Behavioral: The patient is not  nervous/anxious.      Objective:     Vital Signs (Most Recent):  Temp: 97.5 °F (36.4 °C) (02/26/18 0815)  Pulse: 104 (02/26/18 1001)  Resp: 16 (02/26/18 1001)  BP: (!) 143/77 (02/26/18 1001)  SpO2: 100 % (02/26/18 1001) Vital Signs (24h Range):  Temp:  [97.5 °F (36.4 °C)] 97.5 °F (36.4 °C)  Pulse:  [104-113] 104  Resp:  [16-20] 16  SpO2:  [97 %-100 %] 100 %  BP: (143-158)/(77-83) 143/77     Weight: 66.7 kg (147 lb) (02/26/18 0815)  Body mass index is 21.4 kg/m².    No intake or output data in the 24 hours ending 02/26/18 1211    Lines/Drains/Airways     Peripheral Intravenous Line                 Peripheral IV - Single Lumen 02/26/18 0831 Left Forearm less than 1 day                Physical Exam   Constitutional: He is oriented to person, place, and time. He appears well-developed and well-nourished.   HENT:   Head: Normocephalic and atraumatic.   Eyes: EOM are normal. Scleral icterus is present.   Neck: Normal range of motion. Neck supple.   Cardiovascular: Normal rate, regular rhythm and normal heart sounds.    No murmur heard.  Pulmonary/Chest: Effort normal and breath sounds normal. No respiratory distress. He has no wheezes.   Abdominal: Soft. Bowel sounds are normal. He exhibits no distension, no ascites and no mass. There is no hepatomegaly. There is tenderness (mild, across the upper abdomen).   Musculoskeletal: He exhibits no edema.   Neurological: He is alert and oriented to person, place, and time. No cranial nerve deficit. Gait normal.   Skin: Skin is warm and dry. No rash noted.   Small open wound lower legs   Psychiatric: He has a normal mood and affect.       Significant Labs:  CBC:   Recent Labs  Lab 02/26/18 0831   WBC 8.27   HGB 14.9   HCT 41.8   PLT 72*     CMP:   Recent Labs  Lab 02/26/18 0831   GLU 83   CALCIUM 8.8   ALBUMIN 3.5   PROT 8.0      K 4.5   CO2 23   CL 98   BUN 5*   CREATININE 0.7   ALKPHOS 84   ALT 18   AST 71*   BILITOT 2.5*     Coagulation:   Recent Labs  Lab  02/26/18  0831   INR 1.0   APTT 25.7       Significant Imaging:  Imaging results within the past 24 hours have been reviewed.

## 2018-02-26 NOTE — ANESTHESIA RELEASE NOTE
"Anesthesia Release from PACU Note    Patient: Santy Anderson    Procedure(s) Performed: Procedure(s) (LRB):  ESOPHAGOGASTRODUODENOSCOPY (EGD) (N/A)    Anesthesia type: MAC    Post pain: Adequate analgesia    Post assessment: no apparent anesthetic complications, tolerated procedure well and no evidence of recall    Last Vitals:   Visit Vitals  /78 (BP Location: Right arm, Patient Position: Lying)   Pulse 103   Temp 36.9 °C (98.5 °F) (Oral)   Resp 20   Ht 5' 9" (1.753 m)   Wt 62.4 kg (137 lb 9.1 oz)   SpO2 99%   BMI 20.32 kg/m²       Post vital signs: stable    Level of consciousness: awake, alert  and oriented    Nausea/Vomiting: no nausea/no vomiting    Complications: none    Airway Patency: patent    Respiratory: unassisted, spontaneous ventilation    Cardiovascular: stable and blood pressure at baseline    Hydration: euvolemic  "

## 2018-02-26 NOTE — PLAN OF CARE
Dr Tejeda came to bedside and discussed findings. NO N/V,  no abdominal pain, no GI bleeding, and vitals stable.  Pt discharged from unit.

## 2018-02-26 NOTE — TRANSFER OF CARE
"Anesthesia Transfer of Care Note    Patient: Santy Anderson    Procedure(s) Performed: Procedure(s) (LRB):  ESOPHAGOGASTRODUODENOSCOPY (EGD) (N/A)    Patient location: GI    Anesthesia Type: MAC    Transport from OR: Transported from OR on room air with adequate spontaneous ventilation    Post pain: adequate analgesia    Post assessment: no apparent anesthetic complications    Post vital signs: stable    Level of consciousness: awake, alert and oriented    Nausea/Vomiting: no nausea/vomiting    Complications: none    Transfer of care protocol was followed      Last vitals:   Visit Vitals  /78 (BP Location: Right arm, Patient Position: Lying)   Pulse 103   Temp 36.9 °C (98.5 °F) (Oral)   Resp 20   Ht 5' 9" (1.753 m)   Wt 62.4 kg (137 lb 9.1 oz)   SpO2 99%   BMI 20.32 kg/m²     "

## 2018-02-26 NOTE — ANESTHESIA PREPROCEDURE EVALUATION
02/26/2018  Santy Anderson is a 72 y.o., male.    Anesthesia Evaluation    I have reviewed the Patient Summary Reports.    I have reviewed the Nursing Notes.      Review of Systems  Anesthesia Hx:  No problems with previous Anesthesia Denies Hx of Anesthetic complications  History of prior surgery of interest to airway management or planning: Previous anesthesia: General, MAC Denies Family Hx of Anesthesia complications.   Denies Personal Hx of Anesthesia complications.   Social:  No Alcohol Use, Alcohol Use    Hematology/Oncology:  Hematology Normal   Oncology Normal     EENT/Dental:EENT/Dental Normal   Cardiovascular:  Cardiovascular Normal     Pulmonary:  Pulmonary Normal    Renal/:  Renal/ Normal     Hepatic/GI:   GERD, poorly controlled Liver Disease,    Musculoskeletal:   Arthritis     Neurological:   Neuromuscular Disease,    Endocrine:  Endocrine Normal        Physical Exam  General:  Well nourished    Airway/Jaw/Neck:  Airway Findings: Mouth Opening: Normal Tongue: Normal  General Airway Assessment: Adult  Mallampati: II  TM Distance: Normal, at least 6 cm      Dental:  Dental Findings: lower partial dentures   Chest/Lungs:  Chest/Lungs Findings: Clear to auscultation, Normal Respiratory Rate     Heart/Vascular:  Heart Findings: Rate: Normal  Rhythm: Regular Rhythm  Sounds: Normal        Mental Status:  Mental Status Findings:  Cooperative, Alert and Oriented         Anesthesia Plan  Type of Anesthesia, risks & benefits discussed:  Anesthesia Type:  MAC  Patient's Preference:   Intra-op Monitoring Plan: standard ASA monitors  Intra-op Monitoring Plan Comments:   Post Op Pain Control Plan:   Post Op Pain Control Plan Comments:   Induction:   IV  Beta Blocker:  Patient is not currently on a Beta-Blocker (No further documentation required).       Informed Consent: Patient understands risks and agrees  with Anesthesia plan.  Questions answered. Anesthesia consent signed with patient.  ASA Score: 2  emergent   Day of Surgery Review of History & Physical:            Ready For Surgery From Anesthesia Perspective.

## 2018-02-26 NOTE — HPI
Santy Anderson is a 72 year old male with alcoholic cirrhosis who presetned to the ED with complaints of nausea, vomiting and abdominal pain that began 3 days ago. The patient describes his abdominal pain as both burning and a fullness. The patient is located diffusely, but worse in his epigastric region. There is associated difficulty swallowing, heartburn, nausea and vomiting. The patient reports that he feels as if something is stuck in his throat. When he swallows, it takes some time before liquids and solids pass the area of his stuck sensation. He reports that after his food goes down, he is having nausea and vomiting. He states that since the onset of symptoms 3 days ago, he has been unable to keep liquids or solids down. He also reports not having a bowel movement since the onset of symptoms. The patient denies fever, chills, chest pain and SOB. He admits to drinking three beers daily. His last beer was approximately 3 days ago due to his nausea and vomiting. Code status was discussed with the patient. He is a full code. His significant other, Anika Hernández, is his surrogate medical decision maker.

## 2018-02-26 NOTE — PLAN OF CARE
Pt partial denture was given back to pt and pt placed partial back in his mouth.  Pt report was given to pt's [facility] nurse KELI Mcbride proir to leaving Endoscopy dept.  Pt is alert and vitals stable.

## 2018-02-26 NOTE — HPI
The patient presented to the ER for abdominal pain, nausea and vomiting which started three days ago. The pain is across the upper abdomen and described as a constant, fullness. Nothing increases or decreases the pain. Vomitus in the ER was brown. He feels like there is something stuck in his throat. He reports heartburn. He hasn't had a BM in three days, but denies hematochezia or melena. He has been off the PPI at least two months. He denies NSAID use. He was drinking three beers a day up until three days ago. He has a history of liver cirrhosis, iron def anemia, jejunal angiodysplasia and esophagitis. His last EGD was in 2016, last colonoscopy 04/2017 and VCE 12/2017.

## 2018-02-26 NOTE — BRIEF OP NOTE
EGD with severe esophagitis and 500cc of coffee ground liquid retain in stomach    IV PPI bid, monitor overnight and d/c with PO ppi bid, repeat EGD in 8 weeks

## 2018-02-26 NOTE — ASSESSMENT & PLAN NOTE
No evidence of cholecystitis by ultrasound but should be kept in differential.   Total bilirubin was elevated but may be secondary to alcohol cirrhosis.   Monitor labs.

## 2018-02-26 NOTE — ASSESSMENT & PLAN NOTE
Watch for withdrawal symptoms.   Monitor MELD labs daily.     MELD-Na score: 10 at 2/26/2018  8:31 AM  MELD score: 10 at 2/26/2018  8:31 AM  Calculated from:  Serum Creatinine: 0.7 mg/dL (Rounded to 1) at 2/26/2018  8:31 AM  Serum Sodium: 138 mmol/L (Rounded to 137) at 2/26/2018  8:31 AM  Total Bilirubin: 2.5 mg/dL at 2/26/2018  8:31 AM  INR(ratio): 1.0 at 2/26/2018  8:31 AM  Age: 72 years

## 2018-02-26 NOTE — PLAN OF CARE
Pt report given at bedside to pt's [facility] nurse KELI Nobles.  Pt stood and sat in Telemetry bed. Pt alert and without complaints.  KELI Nobles was informed of Procedure report not crossing over to EPIC charting and will show pt report to Hospitalist.

## 2018-02-26 NOTE — ASSESSMENT & PLAN NOTE
Findings likely related to esophagitis which was a problem in the past.   Start IV Protonix bid.   NPO.  EGD today.  Further recommendations after EGD.

## 2018-02-26 NOTE — PLAN OF CARE
Problem: Patient Care Overview  Goal: Plan of Care Review  Outcome: Ongoing (interventions implemented as appropriate)  Pt has been free of falls. Bed alarm on. PIV intact. SR/ST on the monitor. Pt is on RA. Pt has no c/o pain. Pt does have abdominal discomfort/fullness. Pt waiting for EGD to be done. NPO at this time. Call light in reach and hourly rounding made.

## 2018-02-26 NOTE — SUBJECTIVE & OBJECTIVE
Past Medical History:   Diagnosis Date    Alcoholic cirrhosis of liver with ascites     Arthritis     Diabetes mellitus, type 2     Does not have any more problems with diabetes    Gastric mass     GERD (gastroesophageal reflux disease)     Hyperlipidemia     Lung cancer        Past Surgical History:   Procedure Laterality Date    ABDOMINAL SURGERY      COLONOSCOPY      COLONOSCOPY N/A 4/11/2017    Procedure: COLONOSCOPY;  Surgeon: Brianne Wise MD;  Location: [facility];  Service: Endoscopy;  Laterality: N/A;    ESOPHAGOGASTRODUODENOSCOPY      FEMUR SURGERY Right     GASTRECTOMY      HAND SURGERY Left     KNEE SURGERY Right     laparotomy for trauma (negative)  1979    left lower lobectomy         Review of patient's allergies indicates:  No Known Allergies    No current facility-administered medications on file prior to encounter.      Current Outpatient Prescriptions on File Prior to Encounter   Medication Sig    ergocalciferol (ERGOCALCIFEROL) 50,000 unit Cap Take 1 capsule (50,000 Units total) by mouth 3 (three) times a week.    hydrocodone-acetaminophen 10-325mg (NORCO)  mg Tab Take 1-2 tablets by mouth every 4-8 hrs as needed for pain.    meloxicam (MOBIC) 15 MG tablet Take 1 tablet (15 mg total) by mouth once daily.    omeprazole (PRILOSEC) 20 MG capsule Take 20 mg by mouth once daily.     Family History     Problem Relation (Age of Onset)    Diabetes Mother    Hypertension Mother        Social History Main Topics    Smoking status: Former Smoker     Packs/day: 0.50     Years: 25.00     Quit date: 10/16/1988    Smokeless tobacco: Never Used    Alcohol use 9.0 oz/week     1 Glasses of wine, 14 Cans of beer per week      Comment: socailly    Drug use: No    Sexual activity: Not on file     Review of Systems   Constitutional: Negative for appetite change, chills, diaphoresis, fatigue and fever.   HENT: Negative for congestion, ear pain, mouth sores, sore throat and trouble  swallowing.         Positive for dysphagia.    Eyes: Negative for pain and visual disturbance.   Respiratory: Negative for cough, chest tightness and shortness of breath.    Cardiovascular: Negative for chest pain, palpitations and leg swelling.   Gastrointestinal: Positive for abdominal pain, constipation, nausea and vomiting. Negative for diarrhea.   Endocrine: Negative for cold intolerance, heat intolerance, polydipsia and polyuria.   Genitourinary: Negative for dysuria, frequency and hematuria.   Musculoskeletal: Negative for arthralgias, back pain, myalgias and neck pain.   Skin: Negative for pallor, rash and wound.   Allergic/Immunologic: Negative for environmental allergies and immunocompromised state.   Neurological: Negative for dizziness, seizures, syncope, weakness, numbness and headaches.   Hematological: Negative for adenopathy. Does not bruise/bleed easily.   Psychiatric/Behavioral: Negative for agitation, confusion and sleep disturbance.     Objective:     Vital Signs (Most Recent):  Temp: 97.5 °F (36.4 °C) (02/26/18 0815)  Pulse: 104 (02/26/18 1001)  Resp: 16 (02/26/18 1001)  BP: (!) 143/77 (02/26/18 1001)  SpO2: 100 % (02/26/18 1001) Vital Signs (24h Range):  Temp:  [97.5 °F (36.4 °C)] 97.5 °F (36.4 °C)  Pulse:  [104-113] 104  Resp:  [16-20] 16  SpO2:  [97 %-100 %] 100 %  BP: (143-158)/(77-83) 143/77     Weight: 66.7 kg (147 lb)  Body mass index is 21.4 kg/m².    Physical Exam   Constitutional: He is oriented to person, place, and time. He appears well-developed and well-nourished. He appears ill.   HENT:   Head: Normocephalic and atraumatic.   Eyes: Conjunctivae are normal.   Neck: Neck supple. No JVD present.   Cardiovascular: Regular rhythm.  Tachycardia present.    Murmur heard.  Pulmonary/Chest: Effort normal and breath sounds normal. He has no wheezes.   Abdominal: Soft. Bowel sounds are normal. He exhibits distension (mild). There is tenderness (diffuse).   Midline scar.    Musculoskeletal:  Normal range of motion.   Neurological: He is alert and oriented to person, place, and time.   Skin: Skin is warm and dry. No rash noted.   Right lower extremity scarring with abrasions to his anterior lower leg.    Psychiatric: He has a normal mood and affect. Thought content normal. His speech is delayed (mild). He is slowed.   Nursing note and vitals reviewed.          Significant Labs:   BMP:   Recent Labs  Lab 02/26/18  0831   GLU 83      K 4.5   CL 98   CO2 23   BUN 5*   CREATININE 0.7   CALCIUM 8.8     CBC:   Recent Labs  Lab 02/26/18  0831   WBC 8.27   HGB 14.9   HCT 41.8   PLT 72*     Lipase:   Recent Labs  Lab 02/26/18  0831   LIPASE 7     All pertinent labs within the past 24 hours have been reviewed.    Significant Imaging: I have reviewed all pertinent imaging results/findings within the past 24 hours.   Imaging Results          US Abdomen Limited (Final result)  Result time 02/26/18 11:44:00    Final result by Mina Shaffer Jr., MD (02/26/18 11:44:00)                 Impression:     The findings are consistent with fatty infiltration of the liver.  Cholelithiasis.      Electronically signed by: MINA SHAFFER M.D.  Date:     02/26/18  Time:    11:44              Narrative:    EXAM: [id number] ABDOMEN LIMITED    CLINICAL HISTORY:  abdominal pain     FINDINGS: Comparison to CT of the abdomen and pelvis from 02/26/2018.  The liver demonstrates a diffusely heterogeneous echotexture.  It measures 12.5 cm.  The gallbladder contains mobile shadowing stones. The common bile duct is normal at 3.7 mm. The portal vein shows a normal waveform. The pancreas is normal. The right kidney is normal at 9.5 cm.     No ascites seen.                             CT Abdomen Pelvis With Contrast (Final result)  Result time 02/26/18 10:17:32    Final result by Mina Shaffer Jr., MD (02/26/18 10:17:32)                 Impression:      1. Thickening of the distal esophagus is most suggestive of esophagitis.    2.  Likely  fatty infiltration of the liver with perfusion variation within the right lobe.  This may be confirmed with MRI of the abdomen if there is clinical concern.    3.  Cholelithiasis.         Electronically signed by: MINA SHAFFER M.D.  Date:     02/26/18  Time:    10:17              Narrative:    CT ABDOMEN PELVIS WITH CONTRAST    Clinical history: Weight loss, unintended, non-localized abd pain     TECHNIQUE: Routine abdominal and pelvic CT performed after the IV administration of 75 mL Omnipaque 350. Coronal and sagittal images obtained. All CT scans at this facility use dose modulation, iterative reconstruction, and/or weight based dosing when appropriate to reduce radiation dose to as low as reasonably achievable.    Comparison: None    Findings:     There is abnormal wall thickening of the distal esophagus.  There are surgical clips near the body of the stomach.  Small bowel loops are normal in caliber.  The colon is unremarkable. No evidence of appendicitis. No free intraperitoneal fluid, free air or abscess identified.    There is abnormal low density of the liver parenchyma with perfusion variation within the right lobe. Cholelithiasis. The pancreas, spleen and adrenals are unremarkable. No aortic aneurysm is identified. The kidneys and ureters are unremarkable. The bladder is within normal limits. The reproductive organs are not unusual CT appearance. No pathologically enlarged lymph nodes are identified.      Osteopenia.  Degenerative spine change.                             CTA Chest Non-Coronary (PE Study) (Final result)  Result time 02/26/18 10:09:42    Final result by Mina Shaffer Jr., MD (02/26/18 10:09:42)                 Impression:      1. No evidence of pulmonary embolism.    2.  The esophagus is abnormal with circumferential wall thickening and enhancement of the mucosa consistent with esophagitis.  This is new compared to prior.    3.  Postoperative/post treatment change of the left chest  remains stable.            Electronically signed by: SMITH SALAMANCA M.D.  Date:     02/26/18  Time:    10:09              Narrative:    CTA CHEST NON CORONARY    Clinical history: Dyspnea, cardiac origin suspected . Possible pulmonary embolism    Technique: Routine CT angiogram of the chest protocol performed after the IV administration of 100 mL Omnipaque 350. 3D reconstructions/reformats/MIPs obtained. All CT scans at this facility use dose modulation, iterative reconstruction, and/or weight based dosing when appropriate to reduce radiation dose to as low as reasonably achievable.    Comparison: Previous CT of the chest from 10/10/2017.    Findings: There has been a prior partial left pneumonectomy with left-sided volume loss.  Again demonstrated is postoperative atelectasis/consolidation of the apex of the left lung.  Remaining lungs are clear.  There is no evidence of pulmonary embolism.  No aortic aneurysm or dissection is identified.  There is no cardiomegaly or pericardial effusion.  The esophagus demonstrates hyperenhancement of its mucosa with diffuse wall thickening. No pathologically enlarged lymph nodes are seen in the chest.  No acute osseous abnormality.                             X-Ray Chest AP Portable (Final result)  Result time 02/26/18 09:04:08    Final result by Chris Early MD (Timothy) (02/26/18 09:04:08)                 Impression:     Comparison 6/08/2017.Stable normal heart size. Stable postoperative changes suspected involving the left upper lobe and left lung.  Right lung remains clear.  No new abnormalities.      Electronically signed by: CHRIS EARLY MD  Date:     02/26/18  Time:    09:04              Narrative:    Chest, 1 view.    Clinical History: Epigastric pain

## 2018-02-27 VITALS
DIASTOLIC BLOOD PRESSURE: 62 MMHG | BODY MASS INDEX: 20.38 KG/M2 | SYSTOLIC BLOOD PRESSURE: 137 MMHG | HEIGHT: 69 IN | TEMPERATURE: 99 F | OXYGEN SATURATION: 97 % | RESPIRATION RATE: 17 BRPM | HEART RATE: 112 BPM | WEIGHT: 137.56 LBS

## 2018-02-27 LAB
ALBUMIN SERPL BCP-MCNC: 2.9 G/DL
ALP SERPL-CCNC: 67 U/L
ALT SERPL W/O P-5'-P-CCNC: 16 U/L
ANION GAP SERPL CALC-SCNC: 14 MMOL/L
AST SERPL-CCNC: 57 U/L
BASOPHILS # BLD AUTO: 0.02 K/UL
BASOPHILS NFR BLD: 0.2 %
BILIRUB SERPL-MCNC: 3.1 MG/DL
BUN SERPL-MCNC: 5 MG/DL
CALCIUM SERPL-MCNC: 7.9 MG/DL
CHLORIDE SERPL-SCNC: 101 MMOL/L
CO2 SERPL-SCNC: 22 MMOL/L
CREAT SERPL-MCNC: 0.6 MG/DL
DIFFERENTIAL METHOD: ABNORMAL
EOSINOPHIL # BLD AUTO: 0 K/UL
EOSINOPHIL NFR BLD: 0.5 %
ERYTHROCYTE [DISTWIDTH] IN BLOOD BY AUTOMATED COUNT: 15.2 %
EST. GFR  (AFRICAN AMERICAN): >60 ML/MIN/1.73 M^2
EST. GFR  (NON AFRICAN AMERICAN): >60 ML/MIN/1.73 M^2
GLUCOSE SERPL-MCNC: 66 MG/DL
HCT VFR BLD AUTO: 36.9 %
HGB BLD-MCNC: 12.5 G/DL
LYMPHOCYTES # BLD AUTO: 1.1 K/UL
LYMPHOCYTES NFR BLD: 13.7 %
MAGNESIUM SERPL-MCNC: 1.4 MG/DL
MCH RBC QN AUTO: 37.1 PG
MCHC RBC AUTO-ENTMCNC: 33.9 G/DL
MCV RBC AUTO: 110 FL
MONOCYTES # BLD AUTO: 0.7 K/UL
MONOCYTES NFR BLD: 8.4 %
NEUTROPHILS # BLD AUTO: 6.4 K/UL
NEUTROPHILS NFR BLD: 77.2 %
PHOSPHATE SERPL-MCNC: 1.5 MG/DL
PLATELET # BLD AUTO: 51 K/UL
PMV BLD AUTO: 9.6 FL
POTASSIUM SERPL-SCNC: 3.5 MMOL/L
PROT SERPL-MCNC: 6.2 G/DL
RBC # BLD AUTO: 3.37 M/UL
SODIUM SERPL-SCNC: 137 MMOL/L
WBC # BLD AUTO: 8.23 K/UL

## 2018-02-27 PROCEDURE — 85025 COMPLETE CBC W/AUTO DIFF WBC: CPT

## 2018-02-27 PROCEDURE — 25000003 PHARM REV CODE 250: Performed by: INTERNAL MEDICINE

## 2018-02-27 PROCEDURE — 84100 ASSAY OF PHOSPHORUS: CPT

## 2018-02-27 PROCEDURE — C9113 INJ PANTOPRAZOLE SODIUM, VIA: HCPCS | Performed by: PHYSICIAN ASSISTANT

## 2018-02-27 PROCEDURE — 63600175 PHARM REV CODE 636 W HCPCS: Performed by: PHYSICIAN ASSISTANT

## 2018-02-27 PROCEDURE — 36415 COLL VENOUS BLD VENIPUNCTURE: CPT

## 2018-02-27 PROCEDURE — G0378 HOSPITAL OBSERVATION PER HR: HCPCS

## 2018-02-27 PROCEDURE — 80053 COMPREHEN METABOLIC PANEL: CPT

## 2018-02-27 PROCEDURE — 94761 N-INVAS EAR/PLS OXIMETRY MLT: CPT

## 2018-02-27 PROCEDURE — 83735 ASSAY OF MAGNESIUM: CPT

## 2018-02-27 RX ORDER — PANTOPRAZOLE SODIUM 40 MG/1
40 TABLET, DELAYED RELEASE ORAL 2 TIMES DAILY
Status: CANCELLED | OUTPATIENT
Start: 2018-02-27

## 2018-02-27 RX ORDER — PANTOPRAZOLE SODIUM 40 MG/1
40 TABLET, DELAYED RELEASE ORAL 2 TIMES DAILY
Qty: 60 TABLET | Refills: 1 | Status: ON HOLD | OUTPATIENT
Start: 2018-02-27 | End: 2018-04-23

## 2018-02-27 RX ADMIN — PANTOPRAZOLE SODIUM 40 MG: 40 INJECTION, POWDER, FOR SOLUTION INTRAVENOUS at 08:02

## 2018-02-27 RX ADMIN — SODIUM CHLORIDE: 0.9 INJECTION, SOLUTION INTRAVENOUS at 06:02

## 2018-02-27 NOTE — PROGRESS NOTES
Discharge instructions explained. PT verbalized understanding. IV removed. Tele monitor removed. No signs of acute distress noted. PT transported via wheelchair to front entrance. Assisted to car. Alea Norwood NP states pt is okay to drive himself home.

## 2018-02-27 NOTE — PLAN OF CARE
POC discussed with patient. Patient is tolerating clear liquid diet and should be able to be advanced. Fall precautions are in place. Patient educated on the importance of calling for assistance out of bed. Verbalized understanding. Bed alarm activated, cane at bedside, urinal at bedside. NS at 100 to PIV. No complaints of pain at this time. Sinus Tach. Will continue to monitor.

## 2018-02-27 NOTE — PLAN OF CARE
Problem: Patient Care Overview  Goal: Plan of Care Review  Outcome: Ongoing (interventions implemented as appropriate)  POC discussed w/patient, verbalized understanding. ST on monitor. VSS. Voids per BRP. No c/o pain. Frequent weight change encouraged. IV fluids infusing.  Patient turns independently in bed. Fall precautions in place, bed alarm on. Patient denies needs at this time.

## 2018-02-27 NOTE — HOSPITAL COURSE
Patient was kept for OBS with Intractable vomiting with nausea under the care of Hospital Medicine. Patient was given antiemetics and GI was consulted. Patient had EGD that showed severe esophagitis. Pt started on IV PPI BID and monitored overnight. He will have repeat EGD in 8 weeks. Patient was seen and examined today and deemed stable for discharge home with PO PPI.

## 2018-02-28 ENCOUNTER — DOCUMENTATION ONLY (OUTPATIENT)
Dept: GASTROENTEROLOGY | Facility: CLINIC | Age: 73
End: 2018-02-28

## 2018-02-28 NOTE — PROGRESS NOTES
Pt. Has been scheduled for EGD on 04/23/18, instructions mailed.   Per [employer], Person pressed touch tone key to speak to the

## 2018-03-01 ENCOUNTER — TELEPHONE (OUTPATIENT)
Dept: INTERNAL MEDICINE | Facility: CLINIC | Age: 73
End: 2018-03-01

## 2018-03-01 NOTE — TELEPHONE ENCOUNTER
----- Message from Ruperto Liu sent at 3/1/2018  9:57 AM CST -----  Contact: pt   States he's calling to follow up on paper work that was brought in and to be sent to his pharm and can be reached at [phone]

## 2018-03-01 NOTE — TELEPHONE ENCOUNTER
----- Message from Isabel Mccall sent at 3/1/2018  2:50 PM CST -----  Contact: patient  Calling for status of RX . States the pharmacy still does not have it. Pleas call patient ASAP today URGENT!! @ [phone]. Thanks, [facility]ana [facility] - [address]  [address]  [place] [address]  Phone: [phone] Fax: [phone]

## 2018-03-07 ENCOUNTER — OFFICE VISIT (OUTPATIENT)
Dept: INTERNAL MEDICINE | Facility: CLINIC | Age: 73
End: 2018-03-07
Payer: MEDICARE

## 2018-03-07 ENCOUNTER — LAB VISIT (OUTPATIENT)
Dept: LAB | Facility: HOSPITAL | Age: 73
End: 2018-03-07
Attending: INTERNAL MEDICINE
Payer: MEDICARE

## 2018-03-07 VITALS
BODY MASS INDEX: 21.65 KG/M2 | SYSTOLIC BLOOD PRESSURE: 108 MMHG | OXYGEN SATURATION: 98 % | TEMPERATURE: 95 F | HEIGHT: 69 IN | WEIGHT: 146.19 LBS | DIASTOLIC BLOOD PRESSURE: 62 MMHG

## 2018-03-07 DIAGNOSIS — Z29.9 PREVENTIVE MEASURE: ICD-10-CM

## 2018-03-07 DIAGNOSIS — E55.9 VITAMIN D DEFICIENCY: Primary | ICD-10-CM

## 2018-03-07 DIAGNOSIS — E78.5 HYPERLIPIDEMIA, UNSPECIFIED HYPERLIPIDEMIA TYPE: ICD-10-CM

## 2018-03-07 DIAGNOSIS — M85.80 OSTEOPENIA, UNSPECIFIED LOCATION: ICD-10-CM

## 2018-03-07 DIAGNOSIS — E55.9 VITAMIN D DEFICIENCY: ICD-10-CM

## 2018-03-07 DIAGNOSIS — D50.0 IRON DEFICIENCY ANEMIA DUE TO CHRONIC BLOOD LOSS: ICD-10-CM

## 2018-03-07 DIAGNOSIS — K20.90 ESOPHAGITIS: ICD-10-CM

## 2018-03-07 DIAGNOSIS — K70.30 ALCOHOLIC CIRRHOSIS OF LIVER WITHOUT ASCITES: ICD-10-CM

## 2018-03-07 LAB
25(OH)D3+25(OH)D2 SERPL-MCNC: 19 NG/ML
CHOLEST SERPL-MCNC: 160 MG/DL
CHOLEST/HDLC SERPL: 2.2 {RATIO}
HDLC SERPL-MCNC: 72 MG/DL
HDLC SERPL: 45 %
LDLC SERPL CALC-MCNC: 76.4 MG/DL
NONHDLC SERPL-MCNC: 88 MG/DL
TRIGL SERPL-MCNC: 58 MG/DL

## 2018-03-07 PROCEDURE — 82306 VITAMIN D 25 HYDROXY: CPT

## 2018-03-07 PROCEDURE — 99213 OFFICE O/P EST LOW 20 MIN: CPT | Mod: PBBFAC,PO | Performed by: PHYSICIAN ASSISTANT

## 2018-03-07 PROCEDURE — 99214 OFFICE O/P EST MOD 30 MIN: CPT | Mod: S$PBB,,, | Performed by: PHYSICIAN ASSISTANT

## 2018-03-07 PROCEDURE — 80061 LIPID PANEL: CPT

## 2018-03-07 PROCEDURE — 99999 PR PBB SHADOW E&M-EST. PATIENT-LVL III: CPT | Mod: PBBFAC,,, | Performed by: PHYSICIAN ASSISTANT

## 2018-03-07 PROCEDURE — 36415 COLL VENOUS BLD VENIPUNCTURE: CPT | Mod: PO

## 2018-03-07 NOTE — PROGRESS NOTES
Subjective:       Patient ID: Santy Anderson is a 72 y.o. male.    Chief Complaint: Follow-up    72 year old male presents to clinic for 2 month f/u from last PCP visit. He had [facility] hospital stay 2/26/18 for esophagitis - reports feeling much better and is taking his Protonix as prescribed. He is now able to eat without any difficulty. Pt has a GI f/u scheduled for later this month. He reports he was taking his vit D suppl once weekly but started 3X weekly last week. He reports otherwise feeling well. He reports no fever, chills, abd pain, N/V/D, blood in stool, CP, SOB, or other medical complaints.    PCP: Dr. Peterson    Patient Active Problem List:     Malignant neoplasm of lower lobe of left lung     Localized osteoarthrosis, lower leg     Chondromalacia     Lumbar spondylosis     Bilateral hip joint arthritis     DDD (degenerative disc disease), lumbar     Post-traumatic osteoarthritis of right knee     Vitamin D deficiency     Age-related osteoporosis without current pathological fracture     Lumbar radiculopathy     Pain from implanted hardware     Iron deficiency anemia due to chronic blood loss     Alcoholic cirrhosis of liver without ascites     Chest pain     Pain of upper abdomen     Intractable vomiting with nausea     Abnormal CT scan of the esophagus with esophageal dysphagia     Calculus of gallbladder without cholecystitis without obstruction     Alcohol abuse     Elevated liver enzymes     Esophagitis      Review of Systems   Constitutional: Negative for chills and fever.   Respiratory: Negative for cough and shortness of breath.    Cardiovascular: Negative for chest pain, palpitations and leg swelling.   Gastrointestinal: Negative for abdominal pain, blood in stool, diarrhea, nausea and vomiting.   Skin: Negative for rash.   Neurological: Negative for dizziness, weakness, numbness and headaches.   Psychiatric/Behavioral: Negative for confusion.       Objective:       Vitals:    03/07/18 0826  "  BP: 108/62   BP Location: Right arm   Patient Position: Sitting   BP Method: Small (Manual)   Temp: (!) 95.4 °F (35.2 °C)   TempSrc: Tympanic   SpO2: 98%   Weight: 66.3 kg (146 lb 2.6 oz)   Height: 5' 9" (1.753 m)     Physical Exam   Constitutional: He is oriented to person, place, and time. He appears well-developed and well-nourished. No distress.   HENT:   Head: Normocephalic and atraumatic.   Eyes: EOM are normal. No scleral icterus.   Neck: Neck supple.   Cardiovascular: Normal rate and regular rhythm.    Pulmonary/Chest: Effort normal and breath sounds normal. No respiratory distress. He has no decreased breath sounds. He has no wheezes. He has no rhonchi. He has no rales.   Musculoskeletal: Normal range of motion.   Neurological: He is alert and oriented to person, place, and time. No cranial nerve deficit.   Skin: Skin is dry. No rash noted. He is not diaphoretic.   Psychiatric: He has a normal mood and affect. His speech is normal and behavior is normal. Thought content normal.       Assessment:       1. Vitamin D deficiency    2. Iron deficiency anemia due to chronic blood loss    3. Alcoholic cirrhosis of liver without ascites    4. Esophagitis    5. Preventive measure    6. Osteopenia, unspecified location    7. Hyperlipidemia, unspecified hyperlipidemia type        Plan:         Santy was seen today for follow-up.    Diagnoses and all orders for this visit:    Vitamin D deficiency  -     Vitamin D; Future    Iron deficiency anemia due to chronic blood loss  As per hem/onc.    Alcoholic cirrhosis of liver without ascites, Esophagitis  Continue PPI and avoid GI irritants as discussed. F/u with GI as scheduled.    Preventive measure  -     Lipid panel; Future    Osteopenia, unspecified location  -     Vitamin D; Future  Last DEXA was Jan 2017 - osteopenia with high fx risk.    Hyperlipidemia, unspecified hyperlipidemia type  -     Lipid panel; Future    F/u with PCP in 2 months for health management. " RTC sooner if needed.

## 2018-03-08 ENCOUNTER — TELEPHONE (OUTPATIENT)
Dept: INTERNAL MEDICINE | Facility: CLINIC | Age: 73
End: 2018-03-08

## 2018-03-08 NOTE — TELEPHONE ENCOUNTER
----- Message from EMMANUELLE Frederick sent at 3/8/2018  8:00 AM CST -----  Cholesterol level is normal. Vit D has improved from 7 to 19, but is still low. Take vit D as prescribed and f/u with Dr. Peterson as scheduled.

## 2018-03-20 ENCOUNTER — OFFICE VISIT (OUTPATIENT)
Dept: GASTROENTEROLOGY | Facility: CLINIC | Age: 73
End: 2018-03-20
Payer: MEDICARE

## 2018-03-20 VITALS
HEART RATE: 96 BPM | WEIGHT: 144.63 LBS | HEIGHT: 69 IN | BODY MASS INDEX: 21.42 KG/M2 | DIASTOLIC BLOOD PRESSURE: 70 MMHG | SYSTOLIC BLOOD PRESSURE: 110 MMHG

## 2018-03-20 DIAGNOSIS — K70.30 ALCOHOLIC CIRRHOSIS OF LIVER WITHOUT ASCITES: ICD-10-CM

## 2018-03-20 DIAGNOSIS — K20.90 ESOPHAGITIS: Primary | ICD-10-CM

## 2018-03-20 PROCEDURE — 99999 PR PBB SHADOW E&M-EST. PATIENT-LVL III: CPT | Mod: PBBFAC,,, | Performed by: NURSE PRACTITIONER

## 2018-03-20 PROCEDURE — 99213 OFFICE O/P EST LOW 20 MIN: CPT | Mod: PBBFAC,PO | Performed by: NURSE PRACTITIONER

## 2018-03-20 PROCEDURE — 99214 OFFICE O/P EST MOD 30 MIN: CPT | Mod: S$PBB,,, | Performed by: NURSE PRACTITIONER

## 2018-03-21 NOTE — PROGRESS NOTES
Clinic Follow Up:  [facility] Follow Up Note    Reason for Follow Up:  The primary encounter diagnosis was Esophagitis. A diagnosis of Alcoholic cirrhosis of liver without ascites was also pertinent to this visit.    PCP: Joanie Simms       HPI:  This is a 72 y.o. male here for follow up of the above  He was recently discharged from the hospital after an episode of vomiting.  During that hospitalization, an EGD was completed and found LA grade D esophagitis and hematin in the gastric body.  Pt has been on his PPI BID as prescribed.   He admits to continued ETOH intake with beer.  He has stopped all hard liquor.   He denies any abdominal pain.  No melena or hematochezia.  No nausea or vomiting.       Review of Systems   Constitutional: Negative for chills, fever, malaise/fatigue and weight loss.   Respiratory: Negative for cough.    Cardiovascular: Negative for chest pain.   Gastrointestinal:        Per HPI   Musculoskeletal: Negative for myalgias.   Skin: Negative for itching and rash.   Neurological: Negative for headaches.   Psychiatric/Behavioral: The patient is not nervous/anxious.        Medical History:  Past Medical History:   Diagnosis Date    Alcoholic cirrhosis of liver with ascites     Arthritis     Diabetes mellitus, type 2     Does not have any more problems with diabetes    Gastric mass     GERD (gastroesophageal reflux disease)     Hyperlipidemia     Lung cancer        Surgical History:   Past Surgical History:   Procedure Laterality Date    ABDOMINAL SURGERY      COLONOSCOPY      COLONOSCOPY N/A 4/11/2017    Procedure: COLONOSCOPY;  Surgeon: Brianne Wise MD;  Location: [facility];  Service: Endoscopy;  Laterality: N/A;    ESOPHAGOGASTRODUODENOSCOPY      FEMUR SURGERY Right     GASTRECTOMY      HAND SURGERY Left     KNEE SURGERY Right     laparotomy for trauma (negative)  1979    left lower lobectomy         Family History:   Family History   Problem  "Relation Age of Onset    Diabetes Mother     Hypertension Mother     Colon cancer Neg Hx        Social History:   Social History   Substance Use Topics    Smoking status: Former Smoker     Packs/day: 0.50     Years: 25.00     Quit date: 10/16/1988    Smokeless tobacco: Never Used    Alcohol use 9.0 oz/week     1 Glasses of wine, 14 Cans of beer per week      Comment: socailly       Allergies: Reviewed    Home Medications:  Current Outpatient Prescriptions on File Prior to Visit   Medication Sig Dispense Refill    ergocalciferol (ERGOCALCIFEROL) 50,000 unit Cap Take 1 capsule (50,000 Units total) by mouth 3 (three) times a week. 12 capsule 6    pantoprazole (PROTONIX) 40 MG tablet Take 1 tablet (40 mg total) by mouth 2 (two) times daily. 60 tablet 1     No current facility-administered medications on file prior to visit.        Physical Exam:  Vital Signs:  /70   Pulse 96   Ht 5' 9" (1.753 m)   Wt 65.6 kg (144 lb 10 oz)   BMI 21.36 kg/m²   Body mass index is 21.36 kg/m².  Physical Exam   Constitutional: He is oriented to person, place, and time. He appears well-developed.   HENT:   Head: Normocephalic.   Eyes: No scleral icterus.   Neck: Normal range of motion.   Cardiovascular: Normal rate and regular rhythm.    Pulmonary/Chest: Effort normal and breath sounds normal.   Abdominal: Soft. Bowel sounds are normal. He exhibits no distension. There is no tenderness.   Musculoskeletal: Normal range of motion.   Neurological: He is alert and oriented to person, place, and time.   Skin: Skin is warm and dry.   Psychiatric: He has a normal mood and affect.   Vitals reviewed.      Labs: Pertinent labs reviewed.  MELD-Na score: 6 at 3/20/2018  3:15 PM  MELD score: 6 at 3/20/2018  3:15 PM  Calculated from:  Serum Creatinine: 0.7 mg/dL (Rounded to 1) at 3/20/2018  3:13 PM  Serum Sodium: 141 mmol/L (Rounded to 137) at 3/20/2018  3:13 PM  Total Bilirubin: 0.8 mg/dL (Rounded to 1) at 3/20/2018  3:13 " PM  INR(ratio): 1.0 at 3/20/2018  3:15 PM  Age: 72 years      Assessment:  1. Esophagitis    2. Alcoholic cirrhosis of liver without ascites        Recommendations:  - Continue PPI BID  - Continue with plan for repeat EGD in April  - Will get MELD labs today for stability  - Encouraged complete cessation of all ETOH.  Pt is not ready to stop at this time.     Esophagitis  -     CBC auto differential; Future; Expected date: 03/20/2018  -     Comprehensive metabolic panel; Future; Expected date: 03/20/2018  -     Protime-INR; Future; Expected date: 03/20/2018    Alcoholic cirrhosis of liver without ascites  -     CBC auto differential; Future; Expected date: 03/20/2018  -     Comprehensive metabolic panel; Future; Expected date: 03/20/2018  -     Protime-INR; Future; Expected date: 03/20/2018        Return to Clinic:    After repeat EGD completed.

## 2018-04-13 ENCOUNTER — TELEPHONE (OUTPATIENT)
Dept: GASTROENTEROLOGY | Facility: CLINIC | Age: 73
End: 2018-04-13

## 2018-04-13 NOTE — TELEPHONE ENCOUNTER
----- Message from Catherine Bowen sent at 4/13/2018  7:14 AM CDT -----  Contact: pT  Pt is calling in regards to wanting to know what is the process for his procedure scheduled on 04/23/18    Pt would like to know if he needs someone to drive him home    Pt can be reached at .[phone] ([place])

## 2018-04-23 ENCOUNTER — HOSPITAL ENCOUNTER (OUTPATIENT)
Facility: HOSPITAL | Age: 73
Discharge: HOME | End: 2018-04-23
Attending: INTERNAL MEDICINE | Admitting: INTERNAL MEDICINE
Payer: MEDICARE

## 2018-04-23 ENCOUNTER — ANESTHESIA (OUTPATIENT)
Dept: ENDOSCOPY | Facility: HOSPITAL | Age: 73
End: 2018-04-23
Payer: MEDICARE

## 2018-04-23 ENCOUNTER — ANESTHESIA EVENT (OUTPATIENT)
Dept: ENDOSCOPY | Facility: HOSPITAL | Age: 73
End: 2018-04-23
Payer: MEDICARE

## 2018-04-23 ENCOUNTER — SURGERY (OUTPATIENT)
Age: 73
End: 2018-04-23

## 2018-04-23 VITALS
DIASTOLIC BLOOD PRESSURE: 79 MMHG | TEMPERATURE: 98 F | SYSTOLIC BLOOD PRESSURE: 120 MMHG | HEART RATE: 92 BPM | OXYGEN SATURATION: 98 % | RESPIRATION RATE: 17 BRPM

## 2018-04-23 DIAGNOSIS — K20.90 ESOPHAGITIS: ICD-10-CM

## 2018-04-23 DIAGNOSIS — K44.9 HIATAL HERNIA: Primary | ICD-10-CM

## 2018-04-23 PROCEDURE — 88305 TISSUE EXAM BY PATHOLOGIST: CPT | Mod: 26,,, | Performed by: PATHOLOGY

## 2018-04-23 PROCEDURE — 43239 EGD BIOPSY SINGLE/MULTIPLE: CPT | Performed by: INTERNAL MEDICINE

## 2018-04-23 PROCEDURE — 63600175 PHARM REV CODE 636 W HCPCS: Performed by: NURSE ANESTHETIST, CERTIFIED REGISTERED

## 2018-04-23 PROCEDURE — 43239 EGD BIOPSY SINGLE/MULTIPLE: CPT | Mod: ,,, | Performed by: INTERNAL MEDICINE

## 2018-04-23 PROCEDURE — 88305 TISSUE EXAM BY PATHOLOGIST: CPT | Mod: 59 | Performed by: PATHOLOGY

## 2018-04-23 PROCEDURE — 37000009 HC ANESTHESIA EA ADD 15 MINS: Performed by: INTERNAL MEDICINE

## 2018-04-23 PROCEDURE — 25000003 PHARM REV CODE 250: Performed by: NURSE ANESTHETIST, CERTIFIED REGISTERED

## 2018-04-23 PROCEDURE — 25000003 PHARM REV CODE 250: Performed by: INTERNAL MEDICINE

## 2018-04-23 PROCEDURE — 27201012 HC FORCEPS, HOT/COLD, DISP: Performed by: INTERNAL MEDICINE

## 2018-04-23 PROCEDURE — 37000008 HC ANESTHESIA 1ST 15 MINUTES: Performed by: INTERNAL MEDICINE

## 2018-04-23 RX ORDER — LIDOCAINE HYDROCHLORIDE 10 MG/ML
INJECTION INFILTRATION; PERINEURAL
Status: DISCONTINUED | OUTPATIENT
Start: 2018-04-23 | End: 2018-04-23

## 2018-04-23 RX ORDER — PROPOFOL 10 MG/ML
VIAL (ML) INTRAVENOUS
Status: DISCONTINUED | OUTPATIENT
Start: 2018-04-23 | End: 2018-04-23

## 2018-04-23 RX ORDER — PANTOPRAZOLE SODIUM 40 MG/1
40 TABLET, DELAYED RELEASE ORAL 2 TIMES DAILY
Qty: 60 TABLET | Refills: 1 | Status: SHIPPED | OUTPATIENT
Start: 2018-04-23 | End: 2018-11-20

## 2018-04-23 RX ORDER — SODIUM CHLORIDE, SODIUM LACTATE, POTASSIUM CHLORIDE, CALCIUM CHLORIDE 600; 310; 30; 20 MG/100ML; MG/100ML; MG/100ML; MG/100ML
INJECTION, SOLUTION INTRAVENOUS CONTINUOUS
Status: DISCONTINUED | OUTPATIENT
Start: 2018-04-23 | End: 2018-04-23 | Stop reason: HOSPADM

## 2018-04-23 RX ADMIN — LIDOCAINE HYDROCHLORIDE 100 MG: 10 INJECTION, SOLUTION INFILTRATION; PERINEURAL at 10:04

## 2018-04-23 RX ADMIN — PROPOFOL 25 MG: 10 INJECTION, EMULSION INTRAVENOUS at 10:04

## 2018-04-23 RX ADMIN — SODIUM CHLORIDE, SODIUM LACTATE, POTASSIUM CHLORIDE, AND CALCIUM CHLORIDE: .6; .31; .03; .02 INJECTION, SOLUTION INTRAVENOUS at 09:04

## 2018-04-23 RX ADMIN — PROPOFOL 50 MG: 10 INJECTION, EMULSION INTRAVENOUS at 10:04

## 2018-04-23 RX ADMIN — PROPOFOL 75 MG: 10 INJECTION, EMULSION INTRAVENOUS at 10:04

## 2018-04-23 NOTE — ANESTHESIA POSTPROCEDURE EVALUATION
Anesthesia Post Evaluation    Patient: Santy Anderson    Procedure(s) Performed: Procedure(s) (LRB):  ESOPHAGOGASTRODUODENOSCOPY (EGD) (N/A)    Final Anesthesia Type: MAC  Patient location during evaluation: GI PACU  Patient participation: Yes- Able to Participate  Level of consciousness: awake and alert  Post-procedure vital signs: reviewed and stable  Pain management: adequate  Airway patency: patent  PONV status at discharge: No PONV  Anesthetic complications: no      Cardiovascular status: hemodynamically stable and blood pressure returned to baseline  Respiratory status: unassisted, spontaneous ventilation and room air  Hydration status: euvolemic  Follow-up not needed.        There were no vitals taken for this visit.    Pain/Roberto Score: Pain Assessment Performed: Yes (4/23/2018  9:11 AM)  Presence of Pain: denies (4/23/2018  9:11 AM)

## 2018-04-23 NOTE — H&P
Short Stay Endoscopy History and Physical    PCP - Joanie Simms MD    Procedure - EGD    H/o esophagitis improved with PPI. No acute issues.    ROS:  Constitutional: No fevers, chills, No weight loss  ENT: No allergies  CV: No chest pain  Pulm: No cough, No shortness of breath  Ophtho: No vision changes  GI: see HPI  Derm: No rash  Heme: No lymphadenopathy, No bruising  MSK: No arthritis  : No dysuria, No hematuria  Endo: No hot or cold intolerance  Neuro: No syncope, No seizure  Psych: No anxiety, No depression    Medical History:  has a past medical history of Alcoholic cirrhosis of liver with ascites; Arthritis; Diabetes mellitus, type 2; Gastric mass; GERD (gastroesophageal reflux disease); Hyperlipidemia; and Lung cancer.    Surgical History:  has a past surgical history that includes Knee surgery (Right); Hand surgery (Left); Femur Surgery (Right); Colonoscopy; left lower lobectomy; laparotomy for trauma (negative) (1979); Gastrectomy; Esophagogastroduodenoscopy; Colonoscopy (N/A, 4/11/2017); and Abdominal surgery.    Family History: family history includes Diabetes in his mother; Hypertension in his mother.. Otherwise no colon cancer, inflammatory bowel disease, or GI malignancies.    Social History:  reports that he quit smoking about 29 years ago. He has a 12.50 pack-year smoking history. He has never used smokeless tobacco. He reports that he drinks about 1.8 oz of alcohol per week . He reports that he does not use drugs.    Review of patient's allergies indicates:  No Known Allergies    Medications:   Prescriptions Prior to Admission   Medication Sig Dispense Refill Last Dose    ergocalciferol (ERGOCALCIFEROL) 50,000 unit Cap Take 1 capsule (50,000 Units total) by mouth 3 (three) times a week. 12 capsule 6 Past Week at Unknown time    pantoprazole (PROTONIX) 40 MG tablet Take 1 tablet (40 mg total) by mouth 2 (two) times daily. 60 tablet 1 Past Week at Unknown time       Objective  Findings:    Vital Signs: There were no vitals filed for this visit.      Physical Exam:  General Appearance: Well appearing in no acute distress  Eyes:    No scleral icterus  ENT: Neck supple, Lips, mucosa, and tongue normal; teeth and gums normal  Lungs: CTA bilaterally in anterior and posterior fields, no wheezes, no crackles.  Heart:  Regular rate, S1, S2 normal, no murmurs heard.  Abdomen: Soft, non tender, non distended with normal bowel sounds. No hepatosplenomegaly, ascites, or mass.  Extremities: No clubbing, cyanosis or edema  Skin: No rash    Labs:  Lab Results   Component Value Date    WBC 7.01 03/20/2018    HGB 13.9 (L) 03/20/2018    HCT 42.5 03/20/2018     03/20/2018    CHOL 160 03/07/2018    TRIG 58 03/07/2018    HDL 72 03/07/2018    ALT 9 (L) 03/20/2018    AST 29 03/20/2018     03/20/2018    K 3.9 03/20/2018     03/20/2018    CREATININE 0.7 03/20/2018    BUN 4 (L) 03/20/2018    CO2 24 03/20/2018    TSH 2.376 01/25/2017    PSA 1.4 01/25/2017    INR 1.0 03/20/2018    HGBA1C 4.5 01/25/2017       I have explained the risks and benefits of endoscopy procedures to the patient including but not limited to bleeding, perforation, infection, and death.    Proceed with EGD for f/u esophagitis.

## 2018-04-23 NOTE — OR NURSING
Final time out, anesthesia agrees pt adequately sedated for procedure. See anesthesia record for meds/vitals/fluid documentation. Family updated on pt status

## 2018-04-23 NOTE — DISCHARGE INSTRUCTIONS
What Is a Hiatal Hernia?    Hiatal hernia is when the area where the stomach and esophagus meet bulges up through the diaphragm into the chest cavity. In some cases, part of the stomach may bulge above the diaphragm. Stomach acid may move up into the esophagus and cause symptoms. The symptoms are often blamed on gastroesophageal reflux disease (GERD). You may only know about the hernia when it shows up on an X-ray taken for other reasons.   What you may feel  The hiatus is a normal hole in the diaphragm. The esophagus passes through this hole and leads to the stomach. In some cases, part of the stomach may bulge above the diaphragm. This bulge is called a hernia. Stomach acid may move up into the esophagus and cause symptoms.  When you eat, the muscle at the hiatus relaxes to allow food to pass into the stomach. It tightens again to keep food and digestive acids in the stomach.  Many people with hiatal hernias have mild symptoms. You may notice the following GERD symptoms:  · Heartburn or other chest discomfort  · A feeling of chest fullness after a meal  · Frequent burping  · Acid taste in the mouth  · Trouble swallowing  Treating symptoms  If you have been diagnosed with hiatal hernia, these suggestions may help improve symptoms:  · Lose excess weight. Extra weight puts pressure on the stomach and esophagus.  · Dont lie down after eating. Sit up for at least an hour after eating. Lying down after eating can increase symptoms.  · Avoid certain foods and drinks. These include fatty foods, chocolate, coffee, mint, and other foods that cause symptoms for you.  · Dont smoke or drink alcohol. These can worsen symptoms.  · Look at your medicines. Discuss your medicines with your healthcare provider. Many medicines can cause symptoms.  · Consider an antacid medicine. Ask your healthcare provider about over-the-counter and prescription medicines that may help.  · Ask about surgery, if needed. Surgery is a treatment  choice for some people. Your healthcare provider can determine if surgery is an option for you.    Date Last Reviewed: 10/1/2016  © 1033-8089 [employer]. [address] [place] [address], [place] [address]. All rights reserved. This information is not intended as a substitute for professional medical care. Always follow your healthcare professional's instructions.        Gastritis (Adult)    Gastritis is inflammation and irritation of the stomach lining. It can be present for a short time (acute) or be long lasting (chronic). Gastritis is often caused by infection with bacteria called H pylori. More than a third of people in the [place] have this bacteria in their bodies. In many cases, H pylori causes no problems or symptoms. In some people, though, the infection irritates the stomach lining and causes gastritis. Other causes of stomach irritation include drinking alcohol or taking pain-relieving medicines called NSAIDs (such as aspirin or ibuprofen).   Symptoms of gastritis can include:  · Abdominal pain or bloating  · Loss of appetite  · Nausea or vomiting  · Vomiting blood or having black stools  · Feeling more tired than usual  An inflamed and irritated stomach lining is more likely to develop a sore called an ulcer. To help prevent this, gastritis should be treated.  Home care  If needed, medicines may be prescribed. If you have H pylori infection, treating it will likely relieve your symptoms. Other changes can help reduce stomach irritation and help it heal.  · If you have been prescribed medicines for H pylori infection, take them as directed. Take all of the medicine until it is finished or your healthcare provider tells you to stop, even if you feel better.  · Your healthcare provider may recommend avoiding NSAIDs. If you take daily aspirin for your heart or other medical reasons, do not stop without talking to your healthcare provider first.  · Avoid drinking alcohol.  · Stop smoking. Smoking can  irritate the stomach and delay healing. As much as possible, stay away from second hand smoke.  Follow-up care  Follow up with your healthcare provider, or as advised by our staff. Testing may be needed to check for inflammation or an ulcer.  When to seek medical advice  Call your healthcare provider for any of the following:  · Stomach pain that gets worse or moves to the lower right abdomen (appendix area)  · Chest pain that appears or gets worse, or spreads to the back, neck, shoulder, or arm  · Frequent vomiting (cant keep down liquids)  · Blood in the stool or vomit (red or black in color)  · Feeling weak or dizzy  · Fever of 100.4ºF (38ºC) or higher, or as directed by your healthcare provider  Date Last Reviewed: 6/22/2015  © 4746-8317 [employer]. [address] [place] [address], [place] [address]. All rights reserved. This information is not intended as a substitute for professional medical care. Always follow your healthcare professional's instructions.

## 2018-04-23 NOTE — PROVATION PATIENT INSTRUCTIONS
Discharge Summary/Instructions after an Endoscopic Procedure  Patient Name: Santy Anderson  Patient MRN: [id number]  Patient YOB: 1945 Monday, April 23, 2018 Danielle Tejeda MD  RESTRICTIONS:  During your procedure today, you received medications for sedation.  These   medications may affect your judgment, balance and coordination.  Therefore,   for 24 hours, you have the following restrictions:   - DO NOT drive a car, operate machinery, make legal/financial decisions,   sign important papers or drink alcohol.    ACTIVITY:  The following day: return to full activity including work, except no heavy   lifting, straining or running for 3 days if polyps were removed.  DIET:  Eat and drink normally unless instructed otherwise.     TREATMENT FOR COMMON SIDE EFFECTS:  - Mild abdominal pain, nausea, belching, bloating or excessive gas:  rest,   eat lightly and use a heating pad.  - Sore Throat: treat with throat lozenges and/or gargle with warm salt   water.  - Because air was used during the procedure, expelling large amounts of air   from your rectum or belching is normal.  - If a bowel prep was taken, you may not have a bowel movement for 1-3 days.    This is normal.  SYMPTOMS TO WATCH FOR AND REPORT TO YOUR PHYSICIAN:  1. Abdominal pain or bloating, other than gas cramps.  2. Chest pain.  3. Back pain.  4. Signs of infection such as: chills or fever occurring within 24 hours   after the procedure.  5. Rectal bleeding, which would show as bright red, maroon, or black stools.   (A tablespoon of blood from the rectum is not serious, especially if   hemorrhoids are present.)  6. Vomiting.  7. Weakness or dizziness.  GO DIRECTLY TO THE NEAREST EMERGENCY ROOM IF YOU HAVE ANY OF THE FOLLOWING:      Difficulty breathing              Chills and/or fever over 101 F   Persistent vomiting and/or vomiting blood   Severe abdominal pain   Severe chest pain   Black, tarry stools   Bleeding- more than one tablespoon   Any other  symptom or condition that you feel may need urgent attention  Your doctor recommends these additional instructions:  If any biopsies were taken, your doctors clinic will contact you in 1 to 2   weeks with any results.  - Patient has a contact number available for emergencies.  The signs and   symptoms of potential delayed complications were discussed with the   patient.  Return to normal activities tomorrow.  Written discharge   instructions were provided to the patient.   - Resume previous diet.   - Continue present medications.   - Await pathology results.   - No aspirin, ibuprofen, naproxen, or other non-steroidal anti-inflammatory   drugs.   - Use Protonix (pantoprazole) 40 mg PO BID for 8 weeks, then decrease to   40mg daily.   - Discharge patient to home.  For questions, problems or results please call your physician Danielle Tejeda MD at Work:  [phone]  If you have any questions about the above instructions, call the GI   department at [phone] or call the endoscopy unit at [phone]   from 7am until 3 pm.  [facility] [place], EMERGENCY ROOM PHONE NUMBER:   [phone]  IF A COMPLICATION OR EMERGENCY SITUATION ARISES AND YOU ARE UNABLE TO REACH   YOUR PHYSICIAN - GO DIRECTLY TO THE EMERGENCY ROOM.  I have read or have had read to me these discharge instructions for my   procedure and have received a written copy.  I understand these   instructions and will follow-up with my physician if I have any questions.     __________________________________       _____________________________________  Nurse Signature                                          Patient/Designated   Responsible Party Signature  Danielle Tejeda MD  4/23/2018 10:35:42 AM  This report has been verified and signed electronically.

## 2018-04-23 NOTE — ANESTHESIA PREPROCEDURE EVALUATION
04/23/2018  Santy Anderson is a 72 y.o., male.    Pre-op Assessment    I have reviewed the Patient Summary Reports.     I have reviewed the Nursing Notes.   I have reviewed the Medications.     Review of Systems  Anesthesia Hx:  No problems with previous Anesthesia Denies Hx of Anesthetic complications  History of prior surgery of interest to airway management or planning: Previous anesthesia: General, MAC Denies Family Hx of Anesthesia complications.   Denies Personal Hx of Anesthesia complications.   Social:  No Alcohol Use, Alcohol Use    Hematology/Oncology:  Hematology Normal   Oncology Normal     EENT/Dental:EENT/Dental Normal   Cardiovascular:  Cardiovascular Normal     Pulmonary:  Pulmonary Normal    Renal/:  Renal/ Normal     Hepatic/GI:   GERD, poorly controlled Liver Disease,    Musculoskeletal:   Arthritis     Neurological:   Neuromuscular Disease,    Endocrine:   Diabetes, type 2        Physical Exam  General:  Well nourished    Airway/Jaw/Neck:  Airway Findings: Mouth Opening: Normal Tongue: Normal  General Airway Assessment: Adult  Mallampati: II  TM Distance: Normal, at least 6 cm      Dental:  Dental Findings: lower partial dentures   Chest/Lungs:  Chest/Lungs Findings: Clear to auscultation, Normal Respiratory Rate     Heart/Vascular:  Heart Findings: Rate: Normal  Rhythm: Regular Rhythm  Sounds: Normal        Mental Status:  Mental Status Findings:  Cooperative, Alert and Oriented         Anesthesia Plan  Type of Anesthesia, risks & benefits discussed:  Anesthesia Type:  MAC  Patient's Preference:   Intra-op Monitoring Plan: standard ASA monitors  Intra-op Monitoring Plan Comments:   Post Op Pain Control Plan:   Post Op Pain Control Plan Comments:   Induction:   IV  Beta Blocker:  Patient is not currently on a Beta-Blocker (No further documentation required).       Informed Consent:  Patient understands risks and agrees with Anesthesia plan.  Questions answered. Anesthesia consent signed with patient.  ASA Score: 2     Day of Surgery Review of History & Physical: I have interviewed and examined the patient. I have reviewed the patient's H&P dated:            Ready For Surgery From Anesthesia Perspective.

## 2018-04-23 NOTE — ANESTHESIA RELEASE NOTE
Anesthesia Release from PACU Note    Patient: Santy Anderson    Procedure(s) Performed: Procedure(s) (LRB):  ESOPHAGOGASTRODUODENOSCOPY (EGD) (N/A)    Anesthesia type: MAC    Post pain: Adequate analgesia    Post assessment: no apparent anesthetic complications, tolerated procedure well and no evidence of recall    Last Vitals: There were no vitals taken for this visit.    Post vital signs: stable    Level of consciousness: awake    Nausea/Vomiting: no nausea/no vomiting    Complications: none    Airway Patency: patent    Respiratory: unassisted, spontaneous ventilation, room air    Cardiovascular: stable and blood pressure at baseline    Hydration: euvolemic

## 2018-04-24 ENCOUNTER — PATIENT OUTREACH (OUTPATIENT)
Dept: ADMINISTRATIVE | Facility: HOSPITAL | Age: 73
End: 2018-04-24

## 2018-05-04 NOTE — PROGRESS NOTES
"Subjective:      Patient ID: Santy Anderson is a 72 y.o. male.    Chief Complaint: Follow-up (2 months)      HPI  Here for f/u medical problems and preventive exam.  Taking vit D weekly.  Energy good.  Has neck pains in the morning, depending on how he sleeps.  No f/c/sw/cough.  No cp/sob/palp.  BMs and urine normal.  Has stopped all hard liquor.  Beer continues, but not daily.  On PPI bid.    HM: ref fluvax, 6/16 [id number], 6/16 TDaP, 1/17 BMD hi fx risk hip rec bisphos rep 2y, unk Cscope, 6/16 HCV neg, 7/17 Eye doc at [facility], 2/18 CXR clear.        Review of Systems   Constitutional: Negative for appetite change, chills, diaphoresis, fatigue and fever.   HENT: Negative for congestion, ear pain, rhinorrhea and sinus pressure.    Respiratory: Negative for cough and shortness of breath.    Cardiovascular: Negative for chest pain and palpitations.   Gastrointestinal: Negative for abdominal distention, abdominal pain, blood in stool, constipation, diarrhea, nausea and vomiting.   Genitourinary: Negative for difficulty urinating, dysuria, frequency, hematuria and urgency.   Musculoskeletal: Negative for arthralgias.   Skin: Negative for rash.   Neurological: Negative for dizziness and headaches.   Psychiatric/Behavioral: The patient is not nervous/anxious.          Objective:   /66 (BP Location: Right arm, Patient Position: Sitting)   Pulse 98   Temp 97.9 °F (36.6 °C) (Tympanic)   Ht 5' 9" (1.753 m)   Wt 65.7 kg (144 lb 13.5 oz)   SpO2 98%   BMI 21.39 kg/m²     Physical Exam   Constitutional: He is oriented to person, place, and time. He appears well-developed and well-nourished.   HENT:   Right Ear: External ear normal. Tympanic membrane is not injected.   Left Ear: External ear normal. Tympanic membrane is not injected.   Mouth/Throat: Oropharynx is clear and moist.   Eyes: Conjunctivae are normal.   Neck: Normal range of motion. Neck supple. No thyromegaly present.   Cardiovascular: Normal rate, regular " rhythm and intact distal pulses.  Exam reveals no gallop and no friction rub.    No murmur heard.  Pulmonary/Chest: Effort normal and breath sounds normal. He has no wheezes. He has no rales.   Abdominal: Soft. Bowel sounds are normal. He exhibits no mass. There is no tenderness.   Musculoskeletal: He exhibits no edema.   Lymphadenopathy:     He has no cervical adenopathy.   Neurological: He is alert and oriented to person, place, and time.   Psychiatric: He has a normal mood and affect.       Results for JUAN TANG (MRN [id number]) as of 5/8/2018 10:25   Ref. Range 3/7/2018 09:24 3/20/2018 15:13   WBC Latest Ref Range: 3.90 - 12.70 K/uL  7.01   RBC Latest Ref Range: 4.60 - 6.20 M/uL  3.81 (L)   Hemoglobin Latest Ref Range: 14.0 - 18.0 g/dL  13.9 (L)   Hematocrit Latest Ref Range: 40.0 - 54.0 %  42.5   MCV Latest Ref Range: 82 - 98 fL  112 (H)   MCH Latest Ref Range: 27.0 - 31.0 pg  36.5 (H)   MCHC Latest Ref Range: 32.0 - 36.0 g/dL  32.7   RDW Latest Ref Range: 11.5 - 14.5 %  13.1   Platelets Latest Ref Range: 150 - 350 K/uL  286   MPV Latest Ref Range: 9.2 - 12.9 fL  9.7   Gran% Latest Ref Range: 38.0 - 73.0 %  53.3   Gran # (ANC) Latest Ref Range: 1.8 - 7.7 K/uL  3.7   Immature Granulocytes Latest Ref Range: 0.0 - 0.5 %  0.3   Immature Grans (Abs) Latest Ref Range: 0.00 - 0.04 K/uL  0.02   Lymph% Latest Ref Range: 18.0 - 48.0 %  29.8   Lymph # Latest Ref Range: 1.0 - 4.8 K/uL  2.1   Mono% Latest Ref Range: 4.0 - 15.0 %  12.7   Mono # Latest Ref Range: 0.3 - 1.0 K/uL  0.9   Eosinophil% Latest Ref Range: 0.0 - 8.0 %  2.6   Eos # Latest Ref Range: 0.0 - 0.5 K/uL  0.2   Basophil% Latest Ref Range: 0.0 - 1.9 %  1.3   Baso # Latest Ref Range: 0.00 - 0.20 K/uL  0.09   nRBC Latest Ref Range: 0 /100 WBC  0   Sodium Latest Ref Range: 136 - 145 mmol/L  141   Potassium Latest Ref Range: 3.5 - 5.1 mmol/L  3.9   Chloride Latest Ref Range: 95 - 110 mmol/L  107   CO2 Latest Ref Range: 23 - 29 mmol/L  24   Anion Gap Latest Ref  Range: 8 - 16 mmol/L  10   BUN, Bld Latest Ref Range: 8 - 23 mg/dL  4 (L)   Creatinine Latest Ref Range: 0.5 - 1.4 mg/dL  0.7   eGFR if non African American Latest Ref Range: >60 mL/min/1.73 m^2  >60.0   eGFR if African American Latest Ref Range: >60 mL/min/1.73 m^2  >60.0   Glucose Latest Ref Range: 70 - 110 mg/dL  74   Calcium Latest Ref Range: 8.7 - 10.5 mg/dL  9.1   Alkaline Phosphatase Latest Ref Range: 55 - 135 U/L  91   Total Protein Latest Ref Range: 6.0 - 8.4 g/dL  7.1   Albumin Latest Ref Range: 3.5 - 5.2 g/dL  2.9 (L)   Total Bilirubin Latest Ref Range: 0.1 - 1.0 mg/dL  0.8   AST Latest Ref Range: 10 - 40 U/L  29   ALT Latest Ref Range: 10 - 44 U/L  9 (L)   Triglycerides Latest Ref Range: 30 - 150 mg/dL 58    Cholesterol Latest Ref Range: 120 - 199 mg/dL 160    HDL Latest Ref Range: 40 - 75 mg/dL 72    LDL Cholesterol Latest Ref Range: 63.0 - 159.0 mg/dL 76.4    Total Cholesterol/HDL Ratio Latest Ref Range: 2.0 - 5.0  2.2    Vit D, 25-Hydroxy Latest Ref Range: 30 - 96 ng/mL 19 (L)        Assessment:       1. Abnormal CT scan of the esophagus with esophageal dysphagia    2. Age-related osteoporosis without current pathological fracture    3. Alcoholic cirrhosis of liver without ascites    4. Iron deficiency anemia due to chronic blood loss    5. Lumbar spondylosis    6. Malignant neoplasm of lower lobe of left lung    7. Vitamin D deficiency    8. Encounter for preventive health examination    9. Neck pain    10. Encounter for screening for malignant neoplasm of prostate           Plan:     Abnormal CT scan of the esophagus with esophageal dysphagia- per GI.    Age-related osteoporosis without current pathological fracture    Alcoholic cirrhosis of liver without ascites- f/w Hepatology.    Iron deficiency anemia due to chronic blood loss- doing better now, on PPI.    Lumbar spondylosis    Malignant neoplasm of lower lobe of left lung, clear cxr 2/18.    Vitamin D deficiency  -     cholecalciferol, vitamin  D3, 2,000 unit Cap; Take 1 capsule (2,000 Units total) by mouth once daily.  Dispense: 100 capsule; Refill: 6    Encounter for preventive health examination  -     PSA, Screening; Future; Expected date: 05/08/2018    Neck pain  -     turmeric root extract 500 mg Cap; Take 1,000 capsules by mouth once daily.  Dispense: 100 capsule; Refill: 6    RTC 6 mo.

## 2018-05-08 ENCOUNTER — TELEPHONE (OUTPATIENT)
Dept: INTERNAL MEDICINE | Facility: CLINIC | Age: 73
End: 2018-05-08

## 2018-05-08 ENCOUNTER — OFFICE VISIT (OUTPATIENT)
Dept: INTERNAL MEDICINE | Facility: CLINIC | Age: 73
End: 2018-05-08
Payer: MEDICARE

## 2018-05-08 ENCOUNTER — LAB VISIT (OUTPATIENT)
Dept: LAB | Facility: HOSPITAL | Age: 73
End: 2018-05-08
Attending: INTERNAL MEDICINE
Payer: MEDICARE

## 2018-05-08 VITALS
SYSTOLIC BLOOD PRESSURE: 116 MMHG | OXYGEN SATURATION: 98 % | BODY MASS INDEX: 21.45 KG/M2 | WEIGHT: 144.81 LBS | DIASTOLIC BLOOD PRESSURE: 66 MMHG | HEIGHT: 69 IN | HEART RATE: 98 BPM | TEMPERATURE: 98 F

## 2018-05-08 DIAGNOSIS — M54.2 NECK PAIN: ICD-10-CM

## 2018-05-08 DIAGNOSIS — Z00.00 ENCOUNTER FOR PREVENTIVE HEALTH EXAMINATION: ICD-10-CM

## 2018-05-08 DIAGNOSIS — D50.0 IRON DEFICIENCY ANEMIA DUE TO CHRONIC BLOOD LOSS: ICD-10-CM

## 2018-05-08 DIAGNOSIS — Z29.9 PREVENTIVE MEASURE: Primary | ICD-10-CM

## 2018-05-08 DIAGNOSIS — M47.816 LUMBAR SPONDYLOSIS: ICD-10-CM

## 2018-05-08 DIAGNOSIS — Z12.5 ENCOUNTER FOR SCREENING FOR MALIGNANT NEOPLASM OF PROSTATE: ICD-10-CM

## 2018-05-08 DIAGNOSIS — M81.0 AGE-RELATED OSTEOPOROSIS WITHOUT CURRENT PATHOLOGICAL FRACTURE: ICD-10-CM

## 2018-05-08 DIAGNOSIS — R93.3 ABNORMAL CT SCAN, ESOPHAGUS: Primary | ICD-10-CM

## 2018-05-08 DIAGNOSIS — K70.30 ALCOHOLIC CIRRHOSIS OF LIVER WITHOUT ASCITES: ICD-10-CM

## 2018-05-08 DIAGNOSIS — E55.9 VITAMIN D DEFICIENCY: ICD-10-CM

## 2018-05-08 DIAGNOSIS — C34.32 MALIGNANT NEOPLASM OF LOWER LOBE OF LEFT LUNG: ICD-10-CM

## 2018-05-08 LAB — COMPLEXED PSA SERPL-MCNC: 0.93 NG/ML

## 2018-05-08 PROCEDURE — 99999 PR PBB SHADOW E&M-EST. PATIENT-LVL III: CPT | Mod: PBBFAC,,, | Performed by: INTERNAL MEDICINE

## 2018-05-08 PROCEDURE — 99214 OFFICE O/P EST MOD 30 MIN: CPT | Mod: S$PBB,,, | Performed by: INTERNAL MEDICINE

## 2018-05-08 PROCEDURE — 99213 OFFICE O/P EST LOW 20 MIN: CPT | Mod: PBBFAC,PO | Performed by: INTERNAL MEDICINE

## 2018-05-08 PROCEDURE — 36415 COLL VENOUS BLD VENIPUNCTURE: CPT | Mod: PO

## 2018-05-08 PROCEDURE — 84153 ASSAY OF PSA TOTAL: CPT

## 2018-05-08 RX ORDER — GUAIFENESIN AND PHENYLEPHRINE HCL 400; 10 MG/1; MG/1
1000 TABLET ORAL DAILY
Qty: 100 CAPSULE | Refills: 6 | Status: SHIPPED | OUTPATIENT
Start: 2018-05-08 | End: 2018-06-08

## 2018-05-08 RX ORDER — ACETAMINOPHEN 500 MG
1 TABLET ORAL DAILY
Qty: 100 CAPSULE | Refills: 6 | Status: SHIPPED | OUTPATIENT
Start: 2018-05-08 | End: 2018-08-22 | Stop reason: SDUPTHER

## 2018-05-08 NOTE — TELEPHONE ENCOUNTER
Please tell pt I forgot to tell him he needs the other pneumonia vaccine, and come in for [id number] nurse visit.  SM

## 2018-06-08 ENCOUNTER — OFFICE VISIT (OUTPATIENT)
Dept: INTERNAL MEDICINE | Facility: CLINIC | Age: 73
End: 2018-06-08
Payer: MEDICARE

## 2018-06-08 ENCOUNTER — TELEPHONE (OUTPATIENT)
Dept: INTERNAL MEDICINE | Facility: CLINIC | Age: 73
End: 2018-06-08

## 2018-06-08 ENCOUNTER — HOSPITAL ENCOUNTER (OUTPATIENT)
Dept: RADIOLOGY | Facility: HOSPITAL | Age: 73
Discharge: HOME | End: 2018-06-08
Attending: INTERNAL MEDICINE
Payer: MEDICARE

## 2018-06-08 VITALS
HEIGHT: 69 IN | DIASTOLIC BLOOD PRESSURE: 68 MMHG | TEMPERATURE: 98 F | OXYGEN SATURATION: 97 % | SYSTOLIC BLOOD PRESSURE: 114 MMHG | BODY MASS INDEX: 20.14 KG/M2 | WEIGHT: 136 LBS | HEART RATE: 116 BPM

## 2018-06-08 DIAGNOSIS — R05.9 COUGH: ICD-10-CM

## 2018-06-08 DIAGNOSIS — C34.32 MALIGNANT NEOPLASM OF LOWER LOBE OF LEFT LUNG: ICD-10-CM

## 2018-06-08 DIAGNOSIS — J18.9 PNEUMONIA OF RIGHT LOWER LOBE DUE TO INFECTIOUS ORGANISM: ICD-10-CM

## 2018-06-08 DIAGNOSIS — R05.9 COUGH: Primary | ICD-10-CM

## 2018-06-08 DIAGNOSIS — R63.4 WEIGHT LOSS: ICD-10-CM

## 2018-06-08 DIAGNOSIS — K70.30 ALCOHOLIC CIRRHOSIS OF LIVER WITHOUT ASCITES: ICD-10-CM

## 2018-06-08 PROCEDURE — 99999 PR PBB SHADOW E&M-EST. PATIENT-LVL III: CPT | Mod: PBBFAC,,, | Performed by: INTERNAL MEDICINE

## 2018-06-08 PROCEDURE — 71046 X-RAY EXAM CHEST 2 VIEWS: CPT | Mod: 26,,, | Performed by: RADIOLOGY

## 2018-06-08 PROCEDURE — 99213 OFFICE O/P EST LOW 20 MIN: CPT | Mod: PBBFAC,PO | Performed by: INTERNAL MEDICINE

## 2018-06-08 PROCEDURE — 99214 OFFICE O/P EST MOD 30 MIN: CPT | Mod: S$PBB,,, | Performed by: INTERNAL MEDICINE

## 2018-06-08 PROCEDURE — 71046 X-RAY EXAM CHEST 2 VIEWS: CPT | Mod: TC,FY,PO

## 2018-06-08 RX ORDER — LEVOFLOXACIN 500 MG/1
500 TABLET, FILM COATED ORAL DAILY
Qty: 10 TABLET | Refills: 0 | Status: SHIPPED | OUTPATIENT
Start: 2018-06-08 | End: 2018-06-08 | Stop reason: SDUPTHER

## 2018-06-08 RX ORDER — LEVOFLOXACIN 500 MG/1
500 TABLET, FILM COATED ORAL DAILY
Qty: 10 TABLET | Refills: 0 | Status: SHIPPED | OUTPATIENT
Start: 2018-06-08 | End: 2018-10-09 | Stop reason: ALTCHOICE

## 2018-06-08 NOTE — TELEPHONE ENCOUNTER
Left detailed msg on machine informing pt of result and to  abx at [facility].  Also informed of 2 wk f/u xray & appt w/Saba./trixie

## 2018-06-08 NOTE — PROGRESS NOTES
"Subjective:      Patient ID: Santy Anderson is a 72 y.o. male.    Chief Complaint: Cough and Nasal Congestion      HPI  Here for follow up of medical problems and cough and chest congestion.  Worse at night.  No f/c/n/v/d.  Sputum is white.  Nose is stopped up.  Sx for 5-7d.  Not better on cough syrup.  DM resolved with wt loss.  No appetite x 5 days, has lost a few pounds.  BMs normal.    Updated/ annual due 5/19:  HM: ref fluvax, 6/16 [id number], 6/16 TDaP, 1/17 BMD hi fx risk hip rec bisphos rep 2y, unk Cscope, 6/16 HCV neg, 7/17 Eye doc at [facility], 2/18 CXR clear.     Review of Systems   Constitutional: Negative for chills, diaphoresis, fatigue and fever.   Respiratory: Negative for cough, chest tightness and shortness of breath.    Cardiovascular: Negative for chest pain, palpitations and leg swelling.   Gastrointestinal: Negative for blood in stool, constipation, diarrhea, nausea and vomiting.   Genitourinary: Negative for difficulty urinating and frequency.   Musculoskeletal: Negative for arthralgias.         Objective:   /68 (BP Location: Right arm, Patient Position: Sitting)   Pulse (!) 116   Temp 98.3 °F (36.8 °C) (Tympanic)   Ht 5' 9" (1.753 m)   Wt 61.7 kg (136 lb 0.4 oz)   SpO2 97%   BMI 20.09 kg/m²     Physical Exam   Constitutional: He is oriented to person, place, and time. He appears well-developed and well-nourished.   HENT:   Mouth/Throat: Oropharynx is clear and moist.   Neck: Normal range of motion. Neck supple.   Cardiovascular: Normal rate, regular rhythm and intact distal pulses.  Exam reveals no gallop and no friction rub.    No murmur heard.  Pulmonary/Chest: Effort normal. He has no wheezes. He has rales (bilat lower lung fields, distant breath sounds.).   Abdominal: Soft. Bowel sounds are normal. He exhibits no mass. There is no tenderness.   Musculoskeletal: He exhibits no edema.   Lymphadenopathy:     He has no cervical adenopathy.   Neurological: He is alert and oriented to " person, place, and time.   Psychiatric: He has a normal mood and affect.       Results for JUAN TANG (MRN [id number]) as of 6/8/2018 12:27   Ref. Range 3/20/2018 15:13   BUN, Bld Latest Ref Range: 8 - 23 mg/dL 4 (L)   Creatinine Latest Ref Range: 0.5 - 1.4 mg/dL 0.7   eGFR if non African American Latest Ref Range: >60 mL/min/1.73 m^2 >60.0   eGFR if African American Latest Ref Range: >60 mL/min/1.73 m^2 >60.0   Glucose Latest Ref Range: 70 - 110 mg/dL 74   Calcium Latest Ref Range: 8.7 - 10.5 mg/dL 9.1   Alkaline Phosphatase Latest Ref Range: 55 - 135 U/L 91   Total Protein Latest Ref Range: 6.0 - 8.4 g/dL 7.1   Albumin Latest Ref Range: 3.5 - 5.2 g/dL 2.9 (L)   Total Bilirubin Latest Ref Range: 0.1 - 1.0 mg/dL 0.8   AST Latest Ref Range: 10 - 40 U/L 29   ALT Latest Ref Range: 10 - 44 U/L 9 (L)       Assessment:       1. Cough    2. Malignant neoplasm of lower lobe of left lung    3. Alcoholic cirrhosis of liver without ascites    4. Weight loss    5. Pneumonia of right lower lobe due to infectious organism          Plan:     Cough x 5 days, abnormal lung exam- CXR now with TX to decide on treatment.    Malignant neoplasm of lower lobe of left lung  Alcoholic cirrhosis of liver without ascites  Weight loss- RTC 3 mo.    CXR result:  Impression       New right lower lung opacity compatible with pneumonia in the appropriate clinical setting.  Recommend follow-up to resolution.      Electronically signed by: Faustino Graves MD  Date: 06/08/2018  Time: 10:17     Will treat with levaquin 500mg daily, RTC 2 weeks with CXR.

## 2018-06-08 NOTE — TELEPHONE ENCOUNTER
Please inform pt he does have a pneumonia.  Rx sent to [facility] pharmacy to take daily for 10d.  Then needs to come back with CXR in 2 weeks- please sched with Saba in 2 weeks.  Please cancel Rx at [facility] pharm.  SM

## 2018-06-08 NOTE — ADDENDUM NOTE
Addended by: CARLTON BERKOWITZ on: 6/8/2018 12:31 PM     Modules accepted: Orders, Level of Service

## 2018-06-08 NOTE — TELEPHONE ENCOUNTER
----- Message from Mega Albarran sent at 6/8/2018  2:52 PM CDT -----  Contact: self [phone]  Returning call, please call back at [phone].  Md Mackenzie

## 2018-06-11 ENCOUNTER — NURSE TRIAGE (OUTPATIENT)
Dept: ADMINISTRATIVE | Facility: CLINIC | Age: 73
End: 2018-06-11

## 2018-06-11 NOTE — TELEPHONE ENCOUNTER
"    Reason for Disposition   Caller has medication question only, adult not sick, and triager answers question    Answer Assessment - Initial Assessment Questions  1. SYMPTOMS: "Do you have any symptoms?"      Pt wants to know if he has to take the rx vitamin D3 or if he can take the 2000 units Of D3 that he purchased otc?.  Wants to know if he should take his meds before or after he eats his meals?  2. SEVERITY: If symptoms are present, ask "Are they mild, moderate or severe?"      na    Protocols used: ST MEDICATION QUESTION CALL-A-AH      "

## 2018-08-14 DIAGNOSIS — M25.561 RIGHT KNEE PAIN, UNSPECIFIED CHRONICITY: Primary | ICD-10-CM

## 2018-08-15 ENCOUNTER — HOSPITAL ENCOUNTER (OUTPATIENT)
Dept: RADIOLOGY | Facility: HOSPITAL | Age: 73
Discharge: HOME | End: 2018-08-15
Attending: PHYSICIAN ASSISTANT
Payer: MEDICARE

## 2018-08-15 ENCOUNTER — OFFICE VISIT (OUTPATIENT)
Dept: ORTHOPEDICS | Facility: CLINIC | Age: 73
End: 2018-08-15
Payer: MEDICARE

## 2018-08-15 ENCOUNTER — DOCUMENTATION ONLY (OUTPATIENT)
Dept: INTERNAL MEDICINE | Facility: CLINIC | Age: 73
End: 2018-08-15

## 2018-08-15 VITALS
DIASTOLIC BLOOD PRESSURE: 80 MMHG | HEIGHT: 69 IN | SYSTOLIC BLOOD PRESSURE: 131 MMHG | WEIGHT: 136 LBS | HEART RATE: 88 BPM | BODY MASS INDEX: 20.14 KG/M2

## 2018-08-15 DIAGNOSIS — M25.561 RIGHT KNEE PAIN, UNSPECIFIED CHRONICITY: Primary | ICD-10-CM

## 2018-08-15 DIAGNOSIS — M25.561 PAIN IN BOTH KNEES, UNSPECIFIED CHRONICITY: Primary | ICD-10-CM

## 2018-08-15 DIAGNOSIS — M25.562 PAIN IN BOTH KNEES, UNSPECIFIED CHRONICITY: Primary | ICD-10-CM

## 2018-08-15 DIAGNOSIS — M25.561 PAIN IN BOTH KNEES, UNSPECIFIED CHRONICITY: ICD-10-CM

## 2018-08-15 DIAGNOSIS — M17.11 ARTHRITIS OF RIGHT KNEE: ICD-10-CM

## 2018-08-15 DIAGNOSIS — Z98.890 S/P HARDWARE REMOVAL: ICD-10-CM

## 2018-08-15 DIAGNOSIS — M25.562 PAIN IN BOTH KNEES, UNSPECIFIED CHRONICITY: ICD-10-CM

## 2018-08-15 PROCEDURE — 99213 OFFICE O/P EST LOW 20 MIN: CPT | Mod: S$PBB,,, | Performed by: PHYSICIAN ASSISTANT

## 2018-08-15 PROCEDURE — 99213 OFFICE O/P EST LOW 20 MIN: CPT | Mod: PBBFAC,25 | Performed by: PHYSICIAN ASSISTANT

## 2018-08-15 PROCEDURE — 99999 PR PBB SHADOW E&M-EST. PATIENT-LVL III: CPT | Mod: PBBFAC,,, | Performed by: PHYSICIAN ASSISTANT

## 2018-08-15 PROCEDURE — 73564 X-RAY EXAM KNEE 4 OR MORE: CPT | Mod: TC,50

## 2018-08-15 PROCEDURE — 73564 X-RAY EXAM KNEE 4 OR MORE: CPT | Mod: 26,50,, | Performed by: RADIOLOGY

## 2018-08-15 NOTE — PROGRESS NOTES
PA initiated for Protonix for Protonix through [email or web address].  Received a favorable outcome./rpr

## 2018-08-15 NOTE — PROGRESS NOTES
Subjective:     Patient ID: Santy Anedrson is a 72 y.o. male.    Chief Complaint: Pain of the Right Knee    HPI  The patient is seen today for f/u concerning his R knee. He has pain and swelling. He is overcompensating with his left knee and starting to have issues with it.   He denies any recent trauma or falls.   He had hardware removal from right lower extremity performed on 17.    His current pain is a 6/10. X-rays were taken today.   Past Medical History:   Diagnosis Date    Alcoholic cirrhosis of liver with ascites     Arthritis     Diabetes mellitus, type 2     Does not have any more problems with diabetes    Gastric mass     GERD (gastroesophageal reflux disease)     Hyperlipidemia     Lung cancer      Past Surgical History:   Procedure Laterality Date    ABDOMINAL SURGERY      COLONOSCOPY      ESOPHAGOGASTRODUODENOSCOPY      FEMUR SURGERY Right     GASTRECTOMY      HAND SURGERY Left     KNEE SURGERY Right     laparotomy for trauma (negative)      left lower lobectomy       Family History   Problem Relation Age of Onset    Diabetes Mother     Hypertension Mother     Colon cancer Neg Hx      Social History     Socioeconomic History    Marital status:      Spouse name: Not on file    Number of children: Not on file    Years of education: Not on file    Highest education level: Not on file   Social Needs    Financial resource strain: Not on file    Food insecurity - worry: Not on file    Food insecurity - inability: Not on file    Transportation needs - medical: Not on file    Transportation needs - non-medical: Not on file   Occupational History    Not on file   Tobacco Use    Smoking status: Former Smoker     Packs/day: 0.50     Years: 25.00     Pack years: 12.50     Last attempt to quit: 10/16/1988     Years since quittin.8    Smokeless tobacco: Never Used   Substance and Sexual Activity    Alcohol use: Yes     Alcohol/week: 1.8 oz     Types: 3 Cans of  beer per week     Comment: socailly    Drug use: No    Sexual activity: Not on file   Other Topics Concern    Not on file   Social History Narrative         Medication List with Changes/Refills   Current Medications    CHOLECALCIFEROL, VITAMIN D3, 2,000 UNIT CAP    Take 1 capsule (2,000 Units total) by mouth once daily.    LEVOFLOXACIN (LEVAQUIN) 500 MG TABLET    Take 1 tablet (500 mg total) by mouth once daily.    PANTOPRAZOLE (PROTONIX) 40 MG TABLET    Take 1 tablet (40 mg total) by mouth 2 (two) times daily.     Review of patient's allergies indicates:  No Known Allergies  Review of Systems   Constitution: Negative for chills and fever.   Musculoskeletal: Positive for arthritis, joint pain and joint swelling. Negative for falls.   Gastrointestinal: Negative for abdominal pain, diarrhea, nausea and vomiting.       Objective:   Body mass index is 20.08 kg/m².  Vitals:    08/15/18 0912   BP: 131/80   Pulse: 88       General: Santy is well-developed, well-nourished, appears stated age, in no acute distress, alert and oriented to time, place and person.       General    Nursing note and vitals reviewed.  Constitutional: He is oriented to person, place, and time. He appears well-developed and well-nourished.   HENT:   Head: Atraumatic.   Eyes: EOM are normal.   Neck: Neck supple.   Pulmonary/Chest: Effort normal.   Neurological: He is alert and oriented to person, place, and time.   Psychiatric: He has a normal mood and affect. His behavior is normal.     General Musculoskeletal Exam   Gait: abnormal       Right Knee Exam     Inspection   Erythema: absent  Scars: present  Swelling: present  Effusion: effusion    Tenderness   The patient is tender to palpation of the medial joint line, condyle, lateral joint line and patella.    Crepitus   The patient has crepitus of the patella.    Range of Motion   Extension: 0   Flexion: 110     Tests   Ligament Examination Lachman: normal (-1 to 2mm) PCL-Posterior  Drawer: normal (0 to 2mm)     Patella   Patellar Grind: positive    Other   Sensation: normal    Comments:  Scars over R knee joint and calf.     Diffuse calf pain, chronic in nature.     Left Knee Exam     Range of Motion   Extension: 0   Flexion: 130       EXAMINATION:  XR KNEE ORTHO BILAT WITH FLEXION    CLINICAL HISTORY:  Pain in right knee    TECHNIQUE:  AP standing of both knees, PA flexion standing views of both knees, and Merchant views of both knees were performed.  Lateral views of both knees were also performed.    COMPARISON  10/31/2017    FINDINGS:  There is mild-to-moderate joint space narrowing involving the medial compartment of either knee.  There appears to be an old healed fracture involving the lateral tibial plateau on the right.  There appear to be some screw tracts within the right proximal tibia.  There is a stable step-off involving the articular surface of the right lateral tibial plateau.  Minimal degenerative changes noted either patellofemoral compartment.  There is some spurring in the region of either tibial tuberosity.  No joint effusions are suggested.  No acute fracture or dislocation.  Vascular calcifications are noted.      Impression       1.  As above         Assessment:     Encounter Diagnoses   Name Primary?    Right knee pain, unspecified chronicity Yes    Arthritis of right knee     S/P hardware removal         Plan:     Tx options were discussed with the patient today concerning the painful arthritis of his knee. He is still not wanting to proceed with surgery yet. We discussed several injections and he wants to have a Synvisc One injection next week.   He will return then to have this injection.

## 2018-08-22 ENCOUNTER — OFFICE VISIT (OUTPATIENT)
Dept: ORTHOPEDICS | Facility: CLINIC | Age: 73
End: 2018-08-22
Payer: MEDICARE

## 2018-08-22 VITALS
WEIGHT: 136 LBS | DIASTOLIC BLOOD PRESSURE: 64 MMHG | RESPIRATION RATE: 18 BRPM | HEART RATE: 108 BPM | BODY MASS INDEX: 20.14 KG/M2 | HEIGHT: 69 IN | SYSTOLIC BLOOD PRESSURE: 106 MMHG

## 2018-08-22 DIAGNOSIS — Z98.890 S/P HARDWARE REMOVAL: ICD-10-CM

## 2018-08-22 DIAGNOSIS — M25.561 RIGHT KNEE PAIN, UNSPECIFIED CHRONICITY: Primary | ICD-10-CM

## 2018-08-22 DIAGNOSIS — M17.11 ARTHRITIS OF RIGHT KNEE: ICD-10-CM

## 2018-08-22 PROCEDURE — 99499 UNLISTED E&M SERVICE: CPT | Mod: S$PBB,,, | Performed by: PHYSICIAN ASSISTANT

## 2018-08-22 PROCEDURE — 99999 PR PBB SHADOW E&M-EST. PATIENT-LVL III: CPT | Mod: PBBFAC,,, | Performed by: PHYSICIAN ASSISTANT

## 2018-08-22 PROCEDURE — 99213 OFFICE O/P EST LOW 20 MIN: CPT | Mod: PBBFAC,25 | Performed by: PHYSICIAN ASSISTANT

## 2018-08-22 PROCEDURE — 20610 DRAIN/INJ JOINT/BURSA W/O US: CPT | Mod: PBBFAC | Performed by: PHYSICIAN ASSISTANT

## 2018-08-22 PROCEDURE — 20610 DRAIN/INJ JOINT/BURSA W/O US: CPT | Mod: S$PBB,RT,, | Performed by: PHYSICIAN ASSISTANT

## 2018-08-22 RX ORDER — ACETAMINOPHEN 500 MG
1 TABLET ORAL DAILY
COMMUNITY
Start: 2018-05-08 | End: 2021-06-14 | Stop reason: SDUPTHER

## 2018-08-22 RX ADMIN — Medication 48 MG: at 10:08

## 2018-08-22 NOTE — PROGRESS NOTES
Subjective:     Patient ID: Santy Anderson is a 72 y.o. male.    Chief Complaint: Pain, Swelling, and Follow-up of the Right Knee    HPI   The patient is seen today for a Synvisc One injection- R knee.    Past Medical History:   Diagnosis Date    Alcoholic cirrhosis of liver with ascites     Arthritis     Diabetes mellitus, type 2     Does not have any more problems with diabetes    Gastric mass     GERD (gastroesophageal reflux disease)     Hyperlipidemia     Lung cancer      Past Surgical History:   Procedure Laterality Date    ABDOMINAL SURGERY      COLONOSCOPY      ESOPHAGOGASTRODUODENOSCOPY      FEMUR SURGERY Right     GASTRECTOMY      HAND SURGERY Left     KNEE SURGERY Right     laparotomy for trauma (negative)      left lower lobectomy       Family History   Problem Relation Age of Onset    Diabetes Mother     Hypertension Mother     Colon cancer Neg Hx      Social History     Socioeconomic History    Marital status:      Spouse name: Not on file    Number of children: Not on file    Years of education: Not on file    Highest education level: Not on file   Social Needs    Financial resource strain: Not on file    Food insecurity - worry: Not on file    Food insecurity - inability: Not on file    Transportation needs - medical: Not on file    Transportation needs - non-medical: Not on file   Occupational History    Not on file   Tobacco Use    Smoking status: Former Smoker     Packs/day: 0.50     Years: 25.00     Pack years: 12.50     Last attempt to quit: 10/16/1988     Years since quittin.8    Smokeless tobacco: Never Used   Substance and Sexual Activity    Alcohol use: Yes     Alcohol/week: 1.8 oz     Types: 3 Cans of beer per week     Comment: socailly    Drug use: No    Sexual activity: Not on file   Other Topics Concern    Not on file   Social History Narrative         Medication List with Changes/Refills   Current Medications    CHOLECALCIFEROL,  VITAMIN D3, 2,000 UNIT CAP    Take 2,000 Units by mouth.    LEVOFLOXACIN (LEVAQUIN) 500 MG TABLET    Take 1 tablet (500 mg total) by mouth once daily.    PANTOPRAZOLE (PROTONIX) 40 MG TABLET    Take 1 tablet (40 mg total) by mouth 2 (two) times daily.   Discontinued Medications    CHOLECALCIFEROL, VITAMIN D3, 2,000 UNIT CAP    Take 1 capsule (2,000 Units total) by mouth once daily.     Review of patient's allergies indicates:  No Known Allergies  Review of Systems   Constitution: Negative for chills and fever.   Gastrointestinal: Negative for abdominal pain, diarrhea, nausea and vomiting.       Objective:   Body mass index is 20.08 kg/m².  Vitals:    08/22/18 0947   BP: 106/64   Pulse: 108   Resp: 18       General: Santy is well-developed, well-nourished, appears stated age, in no acute distress, alert and oriented to time, place and person.       General    Constitutional: He is oriented to person, place, and time. He appears well-developed and well-nourished.   Neck: Normal range of motion.   Cardiovascular: Normal rate and regular rhythm.    Pulmonary/Chest: Effort normal.   Abdominal: Soft.   Neurological: He is alert and oriented to person, place, and time.   Psychiatric: He has a normal mood and affect. His behavior is normal.               Assessment:     Encounter Diagnoses   Name Primary?    Right knee pain, unspecified chronicity Yes    Arthritis of right knee     S/P hardware removal         Plan:       The patient was administered a Synvisc One injection. I counseled him regarding application of cold compresses to reduce swelling and decrease pain. He was also informed about taking ES Tylenol for pain control.   The patient will return in 6 months for a follow-up. If he has any problems, he will return sooner.     Procedure note:  Lot number [id number]  Preoperative diagnosis:right knee pain, arthritis of right knee  Postoperative diagnosis: same    Procedure performed Injection  right knee(s)    Description:  Under sterile conditions the patient's right knee(s) was injected through inferolateral approach with Synvisc One.  The patient tolerated the procedure without incident.  A Band-Aid was applied to the needle site.

## 2018-10-08 ENCOUNTER — HOSPITAL ENCOUNTER (OUTPATIENT)
Facility: HOSPITAL | Age: 73
Discharge: HOME HEALTH | End: 2018-10-09
Attending: FAMILY MEDICINE | Admitting: INTERNAL MEDICINE
Payer: MEDICARE

## 2018-10-08 DIAGNOSIS — K70.30 ALCOHOLIC CIRRHOSIS OF LIVER WITHOUT ASCITES: ICD-10-CM

## 2018-10-08 DIAGNOSIS — E16.2 HYPOGLYCEMIA: ICD-10-CM

## 2018-10-08 DIAGNOSIS — R42 DIZZINESS: ICD-10-CM

## 2018-10-08 DIAGNOSIS — R44.3 HALLUCINATIONS: ICD-10-CM

## 2018-10-08 DIAGNOSIS — R53.1 WEAKNESS: Primary | ICD-10-CM

## 2018-10-08 DIAGNOSIS — E87.6 HYPOKALEMIA: ICD-10-CM

## 2018-10-08 PROCEDURE — 96361 HYDRATE IV INFUSION ADD-ON: CPT

## 2018-10-08 PROCEDURE — 82962 GLUCOSE BLOOD TEST: CPT

## 2018-10-08 PROCEDURE — 96374 THER/PROPH/DIAG INJ IV PUSH: CPT

## 2018-10-08 PROCEDURE — 99285 EMERGENCY DEPT VISIT HI MDM: CPT | Mod: 25

## 2018-10-09 VITALS
HEIGHT: 69 IN | HEART RATE: 93 BPM | RESPIRATION RATE: 18 BRPM | TEMPERATURE: 98 F | OXYGEN SATURATION: 100 % | SYSTOLIC BLOOD PRESSURE: 107 MMHG | BODY MASS INDEX: 29.39 KG/M2 | WEIGHT: 198.44 LBS | DIASTOLIC BLOOD PRESSURE: 63 MMHG

## 2018-10-09 PROBLEM — R44.3 HALLUCINATIONS: Status: ACTIVE | Noted: 2018-10-09

## 2018-10-09 PROBLEM — E87.6 HYPOKALEMIA: Status: ACTIVE | Noted: 2018-10-09

## 2018-10-09 PROBLEM — M89.9 LYTIC LESION OF BONE ON X-RAY: Status: ACTIVE | Noted: 2018-10-09

## 2018-10-09 PROBLEM — E16.2 HYPOGLYCEMIA: Status: ACTIVE | Noted: 2018-10-09

## 2018-10-09 PROBLEM — R53.1 WEAKNESS: Status: ACTIVE | Noted: 2018-10-09

## 2018-10-09 LAB
ALBUMIN SERPL BCP-MCNC: 3.2 G/DL
ALP SERPL-CCNC: 54 U/L
ALT SERPL W/O P-5'-P-CCNC: 17 U/L
ANION GAP SERPL CALC-SCNC: 16 MMOL/L
AST SERPL-CCNC: 39 U/L
BASOPHILS # BLD AUTO: 0.02 K/UL
BASOPHILS NFR BLD: 0.3 %
BILIRUB SERPL-MCNC: 2.8 MG/DL
BILIRUB UR QL STRIP: ABNORMAL
BUN SERPL-MCNC: 17 MG/DL
CALCIUM SERPL-MCNC: 8.4 MG/DL
CHLORIDE SERPL-SCNC: 102 MMOL/L
CLARITY UR: CLEAR
CO2 SERPL-SCNC: 20 MMOL/L
COLOR UR: YELLOW
CREAT SERPL-MCNC: 0.7 MG/DL
DIFFERENTIAL METHOD: ABNORMAL
EOSINOPHIL # BLD AUTO: 0.1 K/UL
EOSINOPHIL NFR BLD: 1.7 %
ERYTHROCYTE [DISTWIDTH] IN BLOOD BY AUTOMATED COUNT: 18 %
EST. GFR  (AFRICAN AMERICAN): >60 ML/MIN/1.73 M^2
EST. GFR  (NON AFRICAN AMERICAN): >60 ML/MIN/1.73 M^2
GLUCOSE SERPL-MCNC: 54 MG/DL
GLUCOSE UR QL STRIP: NEGATIVE
HCT VFR BLD AUTO: 35.5 %
HGB BLD-MCNC: 12.4 G/DL
HGB UR QL STRIP: NEGATIVE
INR PPP: 1
KETONES UR QL STRIP: ABNORMAL
LEUKOCYTE ESTERASE UR QL STRIP: NEGATIVE
LYMPHOCYTES # BLD AUTO: 1.5 K/UL
LYMPHOCYTES NFR BLD: 26.3 %
MCH RBC QN AUTO: 39.7 PG
MCHC RBC AUTO-ENTMCNC: 34.9 G/DL
MCV RBC AUTO: 114 FL
MONOCYTES # BLD AUTO: 0.7 K/UL
MONOCYTES NFR BLD: 12.5 %
NEUTROPHILS # BLD AUTO: 3.4 K/UL
NEUTROPHILS NFR BLD: 59.2 %
NITRITE UR QL STRIP: NEGATIVE
PH UR STRIP: 6 [PH] (ref 5–8)
PLATELET # BLD AUTO: 92 K/UL
PMV BLD AUTO: 8.8 FL
POCT GLUCOSE: 115 MG/DL (ref 70–110)
POCT GLUCOSE: 69 MG/DL (ref 70–110)
POTASSIUM SERPL-SCNC: 3 MMOL/L
POTASSIUM SERPL-SCNC: 3.3 MMOL/L
PROT SERPL-MCNC: 6.8 G/DL
PROT UR QL STRIP: NEGATIVE
PROTHROMBIN TIME: 10.7 SEC
RBC # BLD AUTO: 3.12 M/UL
SODIUM SERPL-SCNC: 138 MMOL/L
SP GR UR STRIP: 1.02 (ref 1–1.03)
URN SPEC COLLECT METH UR: ABNORMAL
UROBILINOGEN UR STRIP-ACNC: >=8 EU/DL
WBC # BLD AUTO: 5.77 K/UL

## 2018-10-09 PROCEDURE — G0378 HOSPITAL OBSERVATION PER HR: HCPCS

## 2018-10-09 PROCEDURE — 36415 COLL VENOUS BLD VENIPUNCTURE: CPT

## 2018-10-09 PROCEDURE — 25500020 PHARM REV CODE 255: Performed by: INTERNAL MEDICINE

## 2018-10-09 PROCEDURE — 99215 OFFICE O/P EST HI 40 MIN: CPT | Mod: ,,, | Performed by: INTERNAL MEDICINE

## 2018-10-09 PROCEDURE — 85025 COMPLETE CBC W/AUTO DIFF WBC: CPT

## 2018-10-09 PROCEDURE — 25000003 PHARM REV CODE 250: Performed by: PHYSICIAN ASSISTANT

## 2018-10-09 PROCEDURE — 82962 GLUCOSE BLOOD TEST: CPT | Mod: 91

## 2018-10-09 PROCEDURE — A9585 GADOBUTROL INJECTION: HCPCS | Performed by: INTERNAL MEDICINE

## 2018-10-09 PROCEDURE — 63600175 PHARM REV CODE 636 W HCPCS: Performed by: FAMILY MEDICINE

## 2018-10-09 PROCEDURE — 80053 COMPREHEN METABOLIC PANEL: CPT

## 2018-10-09 PROCEDURE — 85610 PROTHROMBIN TIME: CPT

## 2018-10-09 PROCEDURE — 84132 ASSAY OF SERUM POTASSIUM: CPT

## 2018-10-09 PROCEDURE — 81003 URINALYSIS AUTO W/O SCOPE: CPT

## 2018-10-09 PROCEDURE — 25000003 PHARM REV CODE 250: Performed by: FAMILY MEDICINE

## 2018-10-09 RX ORDER — POTASSIUM CHLORIDE 20 MEQ/15ML
20 SOLUTION ORAL ONCE
Status: COMPLETED | OUTPATIENT
Start: 2018-10-09 | End: 2018-10-09

## 2018-10-09 RX ORDER — LANOLIN ALCOHOL/MO/W.PET/CERES
100 CREAM (GRAM) TOPICAL DAILY
Qty: 30 TABLET | Refills: 0 | Status: SHIPPED | OUTPATIENT
Start: 2018-10-10 | End: 2018-11-20

## 2018-10-09 RX ORDER — POTASSIUM CHLORIDE 20 MEQ/1
40 TABLET, EXTENDED RELEASE ORAL ONCE
Status: COMPLETED | OUTPATIENT
Start: 2018-10-09 | End: 2018-10-09

## 2018-10-09 RX ORDER — ONDANSETRON 8 MG/1
8 TABLET, ORALLY DISINTEGRATING ORAL EVERY 8 HOURS PRN
Status: DISCONTINUED | OUTPATIENT
Start: 2018-10-09 | End: 2018-10-09 | Stop reason: HOSPADM

## 2018-10-09 RX ORDER — CHLORDIAZEPOXIDE HYDROCHLORIDE 10 MG/1
10 CAPSULE, GELATIN COATED ORAL 3 TIMES DAILY
Status: DISCONTINUED | OUTPATIENT
Start: 2018-10-09 | End: 2018-10-09 | Stop reason: HOSPADM

## 2018-10-09 RX ORDER — THIAMINE HCL 100 MG
100 TABLET ORAL DAILY
Status: DISCONTINUED | OUTPATIENT
Start: 2018-10-09 | End: 2018-10-09 | Stop reason: HOSPADM

## 2018-10-09 RX ORDER — KETOROLAC TROMETHAMINE 30 MG/ML
30 INJECTION, SOLUTION INTRAMUSCULAR; INTRAVENOUS ONCE
Status: COMPLETED | OUTPATIENT
Start: 2018-10-09 | End: 2018-10-09

## 2018-10-09 RX ORDER — GADOBUTROL 604.72 MG/ML
9 INJECTION INTRAVENOUS
Status: COMPLETED | OUTPATIENT
Start: 2018-10-09 | End: 2018-10-09

## 2018-10-09 RX ORDER — CHLORDIAZEPOXIDE HYDROCHLORIDE 10 MG/1
10 CAPSULE, GELATIN COATED ORAL 3 TIMES DAILY
Status: DISCONTINUED | OUTPATIENT
Start: 2018-10-09 | End: 2018-10-09

## 2018-10-09 RX ORDER — HYDROCODONE BITARTRATE AND ACETAMINOPHEN 10; 325 MG/1; MG/1
1 TABLET ORAL
Status: ON HOLD | COMMUNITY
End: 2018-10-09 | Stop reason: HOSPADM

## 2018-10-09 RX ORDER — FOLIC ACID 1 MG/1
1 TABLET ORAL DAILY
Status: DISCONTINUED | OUTPATIENT
Start: 2018-10-09 | End: 2018-10-09 | Stop reason: HOSPADM

## 2018-10-09 RX ORDER — DEXTROSE MONOHYDRATE 25 G/50ML
25 INJECTION, SOLUTION INTRAVENOUS
Status: DISCONTINUED | OUTPATIENT
Start: 2018-10-09 | End: 2018-10-09

## 2018-10-09 RX ORDER — PANTOPRAZOLE SODIUM 40 MG/1
40 TABLET, DELAYED RELEASE ORAL 2 TIMES DAILY
Status: DISCONTINUED | OUTPATIENT
Start: 2018-10-09 | End: 2018-10-09 | Stop reason: HOSPADM

## 2018-10-09 RX ORDER — FOLIC ACID 1 MG/1
1 TABLET ORAL DAILY
Qty: 30 TABLET | Refills: 0 | Status: SHIPPED | OUTPATIENT
Start: 2018-10-10 | End: 2019-03-14

## 2018-10-09 RX ADMIN — SODIUM CHLORIDE 1000 ML: 0.9 INJECTION, SOLUTION INTRAVENOUS at 01:10

## 2018-10-09 RX ADMIN — KETOROLAC TROMETHAMINE 30 MG: 30 INJECTION INTRAMUSCULAR; INTRAVENOUS at 01:10

## 2018-10-09 RX ADMIN — CHLORDIAZEPOXIDE HYDROCHLORIDE 10 MG: 10 CAPSULE ORAL at 08:10

## 2018-10-09 RX ADMIN — THERA TABS 1 TABLET: TAB at 10:10

## 2018-10-09 RX ADMIN — PANTOPRAZOLE SODIUM 40 MG: 40 TABLET, DELAYED RELEASE ORAL at 10:10

## 2018-10-09 RX ADMIN — POTASSIUM CHLORIDE 40 MEQ: 1500 TABLET, EXTENDED RELEASE ORAL at 08:10

## 2018-10-09 RX ADMIN — POTASSIUM CHLORIDE 20 MEQ: 20 SOLUTION ORAL at 02:10

## 2018-10-09 RX ADMIN — FOLIC ACID 1 MG: 1 TABLET ORAL at 10:10

## 2018-10-09 RX ADMIN — Medication 100 MG: at 10:10

## 2018-10-09 RX ADMIN — CHLORDIAZEPOXIDE HYDROCHLORIDE 10 MG: 10 CAPSULE ORAL at 03:10

## 2018-10-09 RX ADMIN — GADOBUTROL 9 ML: 604.72 INJECTION INTRAVENOUS at 11:10

## 2018-10-09 NOTE — PLAN OF CARE
CM spoke with member at bedside. Member lives alone and reports recently falls with rolling walker. CM requested PT evaluation per secure chat to Mojgan Champagne NP, to assess mobility status. Patient reports he plans to go to [place] to reside with Anika storey ([phone]), to assist with his needs/care. CM spoke with cordelia and she confirmed plan. She will leave [place] tonight but it is a 5 hour drive. CM spoke with neighbor, Carmine Aponte, and he agreed to stay with patient until cordelia makes it town. Patient, family and friends agree with plan.     DC Plan: Home with family (Anika storey) with home health. Choice form obtained and referral submitted in [facility].

## 2018-10-09 NOTE — ASSESSMENT & PLAN NOTE
CT of the brain showed two concerning areas that were concerning for metastatic disease.  An MRI ordered by [facility] medicine ruled out metastatic disease to the brain.  Results from Brain MRI state -there is no abnormal marrow signal or abnormal enhancement to suggest metastatic/neoplastic disease.  The previously-described lucencies within the calvarium are favored to relate to venous lakes/foci of fat.

## 2018-10-09 NOTE — PROGRESS NOTES
"Pt left the floor via wheelchair for chest xray. No s/s of distress noted.  /69   Pulse 92   Temp 98.5 °F (36.9 °C) (Oral)   Resp 18   Ht 5' 9" (1.753 m)   Wt 90 kg (198 lb 6.6 oz)   SpO2 100%   BMI 29.30 kg/m²     "

## 2018-10-09 NOTE — ASSESSMENT & PLAN NOTE
The patient has a history of stage I non-small cell lung cancer in 2009 status post resection followed by adjuvant radiation treated here.  He has been treated within our clinic for hypercalcemia and iron deficiency anemia.  Last CT of chest in 10/2017 showed no recurrent malignancy.  AMS upon admit with abnormal CT of head was concerning for metastatic disease to the brain.  MRI done by hospital medicine today ruled out metastatic disease to brain and revealed chronic ischemic changes.

## 2018-10-09 NOTE — CONSULTS
[facility] -   Hematology/Oncology  Consult Note    Patient Name: Santy Anderson  MRN: [id number]  Admission Date: 10/8/2018  Hospital Length of Stay: 0 days  Code Status: Full Code   Attending Provider: Caio Philip MD  Consulting Provider: Madhuri Bonilla NP  Primary Care Physician: Joanie Simms MD  Principal Problem:Hallucinations    Consults  Subjective:     HPI:  Santy Anderson is a 72 year old male with lung cancer status post resection and alcoholism who presented to the ED with complaints of hallucinations that began 3 days prior to arrival. The patient reports seeing people that were not there on multiple occasions. He denies other symptoms including fever, chills, cough, hemoptysis, dizziness and headache. The patient reports being treated for knee pain with Norco which is started one day prior to the onset of hallucinations. He reports drinking three beers most days. His last drink was 3 days ago. He is followed by Dr. Lara for his history of lung cancer and reports that his only treatment was surgical. In the ED, the patient's head CT showed heterogeneous marrow signal and multiple lytic abnormalities of unknown clinical significance within the calvarium. His potassium was low at 3. His bilirubin was elevated to 2.8 which is consistent with his chronic elevation. Labs were otherwise unremarkable. Code status was discussed with the patient. He is a full code. His significant other, Anika Hernández, is his surrogate medical decision maker.     Hematology/oncology consulted due to history of lung cancer and AMS on admit along with CT of head showing multiple lytic abnormalities of unknown clinical significance within the calvarium.        Oncology Treatment Plan:   [No treatment plan]    Medications:  Continuous Infusions:  Scheduled Meds:   chlordiazepoxide  10 mg Oral TID    folic acid  1 mg Oral Daily    multivitamin  1 tablet Oral Daily    pantoprazole  40 mg Oral BID     thiamine  100 mg Oral Daily     PRN Meds:ondansetron     Review of patient's allergies indicates:  No Known Allergies     Past Medical History:   Diagnosis Date    Alcoholic cirrhosis of liver with ascites     Arthritis     Gastric mass     GERD (gastroesophageal reflux disease)     Hyperlipidemia     Lung cancer      Past Surgical History:   Procedure Laterality Date    ABDOMINAL SURGERY      COLONOSCOPY N/A 2017    Procedure: COLONOSCOPY;  Surgeon: Brianne Wise MD;  Location: [facility];  Service: Endoscopy;  Laterality: N/A;    COLONOSCOPY N/A 2017    Performed by [facility]ne [facility] MD at [facility] ENDO    EGD (ESOPHAGOGASTRODUODENOSCOPY) N/A 10/16/2013    Performed by Marvin Baez MD at [facility] ENDO    ESOPHAGOGASTRODUODENOSCOPY      ESOPHAGOGASTRODUODENOSCOPY (EGD) N/A 2018    Performed by Danielle Tejeda MD at [facility] ENDO    ESOPHAGOGASTRODUODENOSCOPY (EGD) N/A 2018    Performed by Danielle Tejeda MD at [facility] ENDO    ESOPHAGOGASTRODUODENOSCOPY (EGD) N/A 10/20/2016    Performed by [facility]ne Wise MD at [facility] ENDO    ESOPHAGOGASTRODUODENOSCOPY (EGD) N/A 2016    Performed by [facility]ne Wise MD at [facility] ENDO    FEMUR SURGERY Right     GASTRECTOMY      GASTRECTOMY, PARTIAL N/A 10/30/2013    Performed by Louis O. Jeansonne IV, MD at [facility] OR    GASTRECTOMY-LAPAROSCOPIC N/A 10/30/2013    Performed by Louis O. Jeansonne IV, MD at [facility] OR    HAND SURGERY Left     KNEE SURGERY Right     laparotomy for trauma (negative)      left lower lobectomy      REMOVAL-HARDWARE-LEG Right 2017    Performed by Red Eller MD at [facility] OR     Family History     Problem Relation (Age of Onset)    Diabetes Mother    Hypertension Mother        Tobacco Use    Smoking status: Former Smoker     Packs/day: 0.50     Years: 25.00     Pack years: 12.50     Last attempt to quit: 10/16/1988     Years since quittin.0    Smokeless tobacco: Never Used   Substance and Sexual  Activity    Alcohol use: Yes     Alcohol/week: 1.8 oz     Types: 3 Cans of beer per week     Comment: socailly    Drug use: No    Sexual activity: No       Review of Systems   Constitutional: Negative for chills, fatigue and fever.   HENT: Negative for nosebleeds.    Respiratory: Negative for chest tightness and shortness of breath.    Cardiovascular: Negative for chest pain and palpitations.   Gastrointestinal: Positive for abdominal pain (when turning on either side). Negative for anal bleeding and blood in stool.   Genitourinary: Negative for dysuria and hematuria.   Skin: Negative for rash and wound.   Neurological: Negative for dizziness, syncope and light-headedness.   Hematological: Negative for adenopathy. Does not bruise/bleed easily.   Psychiatric/Behavioral: Negative for confusion and decreased concentration.     Objective:     Vital Signs (Most Recent):  Temp: 97.3 °F (36.3 °C) (10/09/18 1130)  Pulse: 88 (10/09/18 1130)  Resp: 18 (10/09/18 1130)  BP: (!) 166/82 (10/09/18 1130)  SpO2: 100 % (10/09/18 1130) Vital Signs (24h Range):  Temp:  [97.3 °F (36.3 °C)-98.5 °F (36.9 °C)] 97.3 °F (36.3 °C)  Pulse:  [] 88  Resp:  [18-30] 18  SpO2:  [99 %-100 %] 100 %  BP: (114-166)/(66-85) 166/82     Weight: 90 kg (198 lb 6.6 oz)  Body mass index is 29.3 kg/m².  Body surface area is 2.09 meters squared.    No intake or output data in the 24 hours ending 10/09/18 1227    Physical Exam   Constitutional: Vital signs are normal. He appears well-developed and well-nourished.  Non-toxic appearance. He does not have a sickly appearance. He does not appear ill. No distress.   HENT:   Head: Normocephalic and atraumatic.   Eyes: Conjunctivae, EOM and lids are normal. No scleral icterus.   Cardiovascular: Normal rate, regular rhythm, S1 normal, S2 normal and normal heart sounds. Exam reveals no gallop and no friction rub.   No murmur heard.  Pulmonary/Chest: Effort normal. No accessory muscle usage or stridor. No apnea,  no tachypnea and no bradypnea. No respiratory distress. He has decreased breath sounds. He has no wheezes. He has no rales.   Abdominal: Soft. Normal appearance. He exhibits no distension.   Musculoskeletal: Normal range of motion.   Neurological: He is alert.   Skin: Skin is warm, dry and intact. No bruising noted. He is not diaphoretic. No pallor.   Psychiatric: He has a normal mood and affect. His speech is normal and behavior is normal. Judgment and thought content normal. Cognition and memory are normal. He is attentive.   Vitals reviewed.      Significant Labs:   BMP:   Recent Labs   Lab  10/09/18   0109  10/09/18   0436   GLU  54*   --    NA  138   --    K  3.0*  3.3*   CL  102   --    CO2  20*   --    BUN  17   --    CREATININE  0.7   --    CALCIUM  8.4*   --    , CBC:   Recent Labs   Lab  10/09/18   0109   WBC  5.77   HGB  12.4*   HCT  35.5*   PLT  92*   , CMP:   Recent Labs   Lab  10/09/18   0109  10/09/18   0436   NA  138   --    K  3.0*  3.3*   CL  102   --    CO2  20*   --    GLU  54*   --    BUN  17   --    CREATININE  0.7   --    CALCIUM  8.4*   --    PROT  6.8   --    ALBUMIN  3.2*   --    BILITOT  2.8*   --    ALKPHOS  54*   --    AST  39   --    ALT  17   --    ANIONGAP  16   --    EGFRNONAA  >60   --    , LDH: No results for input(s): LDHCSF, BFSOURCE in the last 48 hours., LFTs:   Recent Labs   Lab  10/09/18   0109   ALT  17   AST  39   ALKPHOS  54*   BILITOT  2.8*   PROT  6.8   ALBUMIN  3.2*   , Reticulocytes: No results for input(s): RETIC in the last 48 hours., Tumor Markers: No results for input(s): PSA, CEA, , AFPTM, CA1252,  in the last 48 hours.    Invalid input(s): ALGTM, Urine Studies:   Recent Labs   Lab  10/09/18   0314   COLORU  Yellow   APPEARANCEUA  Clear   PHUR  6.0   SPECGRAV  1.020   PROTEINUA  Negative   GLUCUA  Negative   KETONESU  2+*   BILIRUBINUA  1+*   OCCULTUA  Negative   NITRITE  Negative   UROBILINOGEN  >=8.0*   LEUKOCYTESUR  Negative    and All pertinent labs  from the last 24 hours have been reviewed.    Diagnostic Results:  I have reviewed all pertinent imaging results/findings within the past 24 hours.    Assessment/Plan:     Lytic lesion of bone on x-ray    CT of the brain showed two concerning areas that were concerning for metastatic disease.  An MRI ordered by hospital medicine ruled out metastatic disease to the brain.  Results from Brain MRI state -there is no abnormal marrow signal or abnormal enhancement to suggest metastatic/neoplastic disease.  The previously-described lucencies within the calvarium are favored to relate to venous lakes/foci of fat.        Malignant neoplasm of lower lobe of left lung    The patient has a history of stage I non-small cell lung cancer in 2009 status post resection followed by adjuvant radiation treated here.  He has been treated within our clinic for hypercalcemia and iron deficiency anemia.  Last CT of chest in 10/2017 showed no recurrent malignancy.  AMS upon admit with abnormal CT of head was concerning for metastatic disease to the brain.  MRI done by hospital medicine today ruled out metastatic disease to brain and revealed chronic ischemic changes.                     Thank you for your consult. I will follow-up with patient. Please contact us if you have any additional questions.    Madhuri Bonilla NP  Hematology/Oncology  [facility] [place]

## 2018-10-09 NOTE — SUBJECTIVE & OBJECTIVE
Past Medical History:   Diagnosis Date    Alcoholic cirrhosis of liver with ascites     Arthritis     Gastric mass     GERD (gastroesophageal reflux disease)     Hyperlipidemia     Lung cancer        Past Surgical History:   Procedure Laterality Date    ABDOMINAL SURGERY      COLONOSCOPY N/A 4/11/2017    Procedure: COLONOSCOPY;  Surgeon: Brianne Wise MD;  Location: [facility];  Service: Endoscopy;  Laterality: N/A;    COLONOSCOPY N/A 4/11/2017    Performed by [facility] at [facility] ENDO    EGD (ESOPHAGOGASTRODUODENOSCOPY) N/A 10/16/2013    Performed by Marvin Baez MD at [facility] ENDO    ESOPHAGOGASTRODUODENOSCOPY      ESOPHAGOGASTRODUODENOSCOPY (EGD) N/A 4/23/2018    Performed by Danielle Tejeda MD at [facility] ENDO    ESOPHAGOGASTRODUODENOSCOPY (EGD) N/A 2/26/2018    Performed by Danielle Tejeda MD at [facility] ENDO    ESOPHAGOGASTRODUODENOSCOPY (EGD) N/A 10/20/2016    Performed by [facility]ne Wise MD at [facility] ENDO    ESOPHAGOGASTRODUODENOSCOPY (EGD) N/A 9/22/2016    Performed by Brianne Wise MD at [facility] ENDO    FEMUR SURGERY Right     GASTRECTOMY      GASTRECTOMY, PARTIAL N/A 10/30/2013    Performed by Louis O. Jeansonne IV, MD at [facility] OR    GASTRECTOMY-LAPAROSCOPIC N/A 10/30/2013    Performed by [facility] O. [facility]ansonne IV, MD at [facility] OR    HAND SURGERY Left     KNEE SURGERY Right     laparotomy for trauma (negative)  1979    left lower lobectomy      REMOVAL-HARDWARE-LEG Right 6/20/2017    Performed by Red Eller MD at [facility] OR       Review of patient's allergies indicates:  No Known Allergies    No current facility-administered medications on file prior to encounter.      Current Outpatient Medications on File Prior to Encounter   Medication Sig    cholecalciferol, vitamin D3, 2,000 unit Cap Take 2,000 Units by mouth.    HYDROcodone-acetaminophen (NORCO)  mg per tablet Take 1 tablet by mouth.    pantoprazole (PROTONIX) 40 MG tablet Take 1 tablet (40 mg total) by mouth  2 (two) times daily.    [DISCONTINUED] levoFLOXacin (LEVAQUIN) 500 MG tablet Take 1 tablet (500 mg total) by mouth once daily.     Family History     Problem Relation (Age of Onset)    Diabetes Mother    Hypertension Mother        Tobacco Use    Smoking status: Former Smoker     Packs/day: 0.50     Years: 25.00     Pack years: 12.50     Last attempt to quit: 10/16/1988     Years since quittin.0    Smokeless tobacco: Never Used   Substance and Sexual Activity    Alcohol use: Yes     Alcohol/week: 1.8 oz     Types: 3 Cans of beer per week     Comment: socailly    Drug use: No    Sexual activity: No     Review of Systems   Constitutional: Negative for appetite change, chills, diaphoresis, fatigue and fever.   HENT: Negative for congestion, ear pain, mouth sores, sore throat and trouble swallowing.    Eyes: Negative for pain and visual disturbance.   Respiratory: Negative for cough, chest tightness and shortness of breath.    Cardiovascular: Negative for chest pain, palpitations and leg swelling.   Gastrointestinal: Negative for abdominal pain, constipation, diarrhea and nausea.   Endocrine: Negative for cold intolerance, heat intolerance, polydipsia and polyuria.   Genitourinary: Negative for dysuria, frequency and hematuria.   Musculoskeletal: Positive for arthralgias (right knee). Negative for back pain, myalgias and neck pain.   Skin: Negative for pallor, rash and wound.   Allergic/Immunologic: Negative for environmental allergies and immunocompromised state.   Neurological: Negative for dizziness, seizures, syncope, weakness, numbness and headaches.   Hematological: Negative for adenopathy. Does not bruise/bleed easily.   Psychiatric/Behavioral: Positive for hallucinations. Negative for agitation, confusion and sleep disturbance.     Objective:     Vital Signs (Most Recent):  Temp: 98.5 °F (36.9 °C) (10/09/18 0721)  Pulse: 92 (10/09/18 0721)  Resp: 18 (10/09/18 0721)  BP: 120/69 (10/09/18 0721)  SpO2:  100 % (10/09/18 0721) Vital Signs (24h Range):  Temp:  [97.8 °F (36.6 °C)-98.5 °F (36.9 °C)] 98.5 °F (36.9 °C)  Pulse:  [] 92  Resp:  [18-30] 18  SpO2:  [99 %-100 %] 100 %  BP: (114-148)/(66-85) 120/69     Weight: 90 kg (198 lb 6.6 oz)  Body mass index is 29.3 kg/m².    Physical Exam   Constitutional: He is oriented to person, place, and time. He appears well-developed and well-nourished.   HENT:   Head: Normocephalic and atraumatic.   Eyes: Conjunctivae are normal.   Neck: Neck supple. No JVD present.   Cardiovascular: Normal rate, regular rhythm and normal heart sounds.   Pulmonary/Chest: Effort normal and breath sounds normal. He has no wheezes.   Abdominal: Soft. Bowel sounds are normal. He exhibits no distension. There is no tenderness.   Musculoskeletal: Normal range of motion.   Neurological: He is alert and oriented to person, place, and time.   Skin: Skin is warm and dry. No rash noted.   Psychiatric: He has a normal mood and affect. His behavior is normal. Thought content normal.   Nursing note and vitals reviewed.          Significant Labs:   CBC:   Recent Labs   Lab  10/09/18   0109   WBC  5.77   HGB  12.4*   HCT  35.5*   PLT  92*     CMP:   Recent Labs   Lab  10/09/18   0109  10/09/18   0436   NA  138   --    K  3.0*  3.3*   CL  102   --    CO2  20*   --    GLU  54*   --    BUN  17   --    CREATININE  0.7   --    CALCIUM  8.4*   --    PROT  6.8   --    ALBUMIN  3.2*   --    BILITOT  2.8*   --    ALKPHOS  54*   --    AST  39   --    ALT  17   --    ANIONGAP  16   --    EGFRNONAA  >60   --      All pertinent labs within the past 24 hours have been reviewed.    Significant Imaging: I have reviewed all pertinent imaging results/findings within the past 24 hours.   Imaging Results          CT Head Without Contrast (Final result)  Result time 10/09/18 07:13:58    Final result by Elver Carter MD (10/09/18 07:13:58)                 Impression:      Chronic microvascular ischemic changes.  Severe  vascular calcifications.    Stable findings within the calvarium with heterogeneous marrow signal and multiple lytic abnormalities of unknown clinical significance.  Myomatous process is not excluded.      Electronically signed by: Elver Carter MD  Date:    10/09/2018  Time:    07:13             Narrative:    EXAMINATION:  CT HEAD WITHOUT CONTRAST    CLINICAL HISTORY:  Confusion/delirium, altered LOC, unexplained;    TECHNIQUE:  Low dose axial CT images obtained throughout the head without intravenous contrast. Sagittal and coronal reconstructions were performed.  All CT scans at this facility use dose modulation, iterative reconstruction and/or weight based dosing when appropriate to reduce radiation dose to as low as reasonably achievable.    COMPARISON:  9/23/16.    FINDINGS:  Intracranial compartment:    The brain parenchyma demonstrates areas of decreased attenuation with mild to moderate periventricular white matter consistent with chronic microvascular ischemic changes..  No parenchymal mass, hemorrhage, edema or major vascular distribution infarct.  Vascular calcifications are noted.    Mild prominence of the sulci and ventricles are consistent with age-related involutional changes.    No extra-axial blood or fluid collections.    Skull/extracranial contents (limited evaluation):Stable findings within the calvarium with heterogeneous marrow signal and multiple lytic abnormalities of unknown clinical significance.  Myomatous process is not excluded.  No fracture. Mild mucosal thickening within the paranasal sinuses.  Mastoid air cells and paranasal sinuses are essentially clear.

## 2018-10-09 NOTE — ED NOTES
Pt repositioned to comfort. Pt c/o being hungry - sandwich, juice and crackers given. Pt alert and oriented at this time. Pt remembers seeing people at his home on Sunday and Monday that were not there. Pt states he has been taking his pain medication and not eating much.  SR up x 2 with call light in reach.  Awaiting further orders for disposition.

## 2018-10-09 NOTE — PROGRESS NOTES
Physicians (Dr JEROMY Philip and BRIDGETTE Champagne, ESVIN) notified via secure chat that pt arrived to the unit at approximately 6:30am.

## 2018-10-09 NOTE — HPI
Santy Anderson is a 72 year old male with lung cancer status post resection and alcoholism who presented to the ED with complaints of hallucinations that began 3 days prior to arrival. The patient reports seeing people that were not there on multiple occasions. He denies other symptoms including fever, chills, cough, hemoptysis, dizziness and headache. The patient reports being treated for knee pain with Norco which is started one day prior to the onset of hallucinations. He reports drinking three beers most days. His last drink was 3 days ago. He is followed by Dr. Lara for his history of lung cancer and reports that his only treatment was surgical. In the ED, the patient's head CT showed heterogeneous marrow signal and multiple lytic abnormalities of unknown clinical significance within the calvarium. His potassium was low at 3. His bilirubin was elevated to 2.8 which is consistent with his chronic elevation. Labs were otherwise unremarkable. Code status was discussed with the patient. He is a full code. His significant other, Anikaayah Hernández, is his surrogate medical decision maker.

## 2018-10-09 NOTE — ED PROVIDER NOTES
SCRIBE #1 NOTE: I, Alexa Young, am scribing for, and in the presence of, Melania Anderson MD. I have scribed the entire.       History     Chief Complaint   Patient presents with    Dizziness     pt states he was started on new meds and has been c/o dizziness     Review of patient's allergies indicates:  No Known Allergies      History of Present Illness     HPI    10/8/2018, 11:54 PM  History obtained from the patient and pt's neighbor      History of Present Illness: Santy Anderson is a 72 y.o. male patient with a PMHx including DM, GERD, and HLD who presents to the Emergency Department for evaluation of visual hallucinations which onset gradually 2 days ago. Patient reports starting Norco 2 days ago. Symptoms are intermittent and moderate in severity. No mitigating or exacerbating factors reported. Patient describes sxs as seeing people in his driveway that are not their. Patient reports calling his neighbor to come over and neighbor denies seeing people in his driveway. Patient reports this occurred yesterday and today. No associated sx reported. Patient also c/o dizziness. Patient denies any fever, chills, N/V/D, cough, suicidal ideations, homicidal ideations, recent increased stress, sleep changes, alcohol use, IV drug use, change in appetite, and all other sxs at this time. No prior tx reported. Patient had partial cordectomy on 7/27/18 by Dr. Rachel, pathology revealed carcinoma. No further complaints or concerns at this time.     Arrival mode: Personal vehicle      PCP: Joanie Simms MD        Past Medical History:  Past Medical History:   Diagnosis Date    Alcoholic cirrhosis of liver with ascites     Arthritis     Gastric mass     GERD (gastroesophageal reflux disease)     Hyperlipidemia     Lung cancer        Past Surgical History:  Past Surgical History:   Procedure Laterality Date    ABDOMINAL SURGERY      COLONOSCOPY      COLONOSCOPY N/A 4/11/2017    Procedure: COLONOSCOPY;   Surgeon: Brianne Wise MD;  Location: [facility];  Service: Endoscopy;  Laterality: N/A;    COLONOSCOPY N/A 2017    Performed by [facility]ne [facility] at [facility]    EGD (ESOPHAGOGASTRODUODENOSCOPY) N/A 10/16/2013    Performed by Marvin Baez MD at [facility]    ESOPHAGOGASTRODUODENOSCOPY      ESOPHAGOGASTRODUODENOSCOPY (EGD) N/A 2018    Performed by [facility]le [facility]jeda MD at [facility] ENDO    ESOPHAGOGASTRODUODENOSCOPY (EGD) N/A 2018    Performed by [facility]le [facility]jeda MD at [facility] ENDO    ESOPHAGOGASTRODUODENOSCOPY (EGD) N/A 10/20/2016    Performed by [facility]ne [facility] MD at [facility] ENDO    ESOPHAGOGASTRODUODENOSCOPY (EGD) N/A 2016    Performed by [facility]ne Wise MD at [facility] ENDO    FEMUR SURGERY Right     GASTRECTOMY      GASTRECTOMY, PARTIAL N/A 10/30/2013    Performed by Louis O. Jeansonne IV, MD at [facility] OR    GASTRECTOMY-LAPAROSCOPIC N/A 10/30/2013    Performed by Louis O. Jeansonne IV, MD at [facility] OR    HAND SURGERY Left     KNEE SURGERY Right     laparotomy for trauma (negative)  1979    left lower lobectomy      REMOVAL-HARDWARE-LEG Right 2017    Performed by Red Eller MD at [facility] OR         Family History:  Family History   Problem Relation Age of Onset    Diabetes Mother     Hypertension Mother     Colon cancer Neg Hx        Social History:  Social History     Tobacco Use    Smoking status: Former Smoker     Packs/day: 0.50     Years: 25.00     Pack years: 12.50     Last attempt to quit: 10/16/1988     Years since quittin.0    Smokeless tobacco: Never Used   Substance and Sexual Activity    Alcohol use: Yes     Alcohol/week: 1.8 oz     Types: 3 Cans of beer per week     Comment: socailly    Drug use: No    Sexual activity: Unknown        Review of Systems     Review of Systems   Constitutional: Negative for appetite change, chills and fever.   HENT: Negative for sore throat.    Respiratory: Negative for cough and shortness of breath.    Cardiovascular:  Negative for chest pain.   Gastrointestinal: Negative for diarrhea, nausea and vomiting.   Genitourinary: Negative for dysuria.   Musculoskeletal: Negative for back pain.   Skin: Negative for rash.   Neurological: Positive for dizziness. Negative for weakness.   Hematological: Does not bruise/bleed easily.   Psychiatric/Behavioral: Positive for hallucinations (Visual). Negative for sleep disturbance and suicidal ideas.        (-) Homicidal ideation   (-) increased stress      Physical Exam     Initial Vitals [10/08/18 2234]   BP Pulse Resp Temp SpO2   124/74 108 20 97.9 °F (36.6 °C) 99 %      MAP       --          Physical Exam  Nursing Notes and Vital Signs Reviewed.  Constitutional: Patient is in no acute distress. Well-developed and well-nourished.  Head: Atraumatic. Normocephalic.  Eyes: PERRL. EOM intact. Conjunctivae are not pale. No scleral icterus.  ENT: Mucous membranes are moist. Oropharynx is clear and symmetric.    Neck: Supple. Full ROM. No lymphadenopathy.  Cardiovascular: Regular rate. Regular rhythm. No murmurs, rubs, or gallops. Distal pulses are 2+ and symmetric.  Pulmonary/Chest: No respiratory distress. Clear to auscultation bilaterally. No wheezing or rales.  Abdominal: Soft and non-distended.  There is no tenderness.  No rebound, guarding, or rigidity. Good bowel sounds.  Genitourinary: No CVA tenderness  Musculoskeletal: Moves all extremities. No obvious deformities. No edema. No calf tenderness.  Skin: Warm and dry. Decreasing mobility.   Neurological:  Alert, awake, and appropriate.  Normal speech.  No acute focal neurological deficits are appreciated.  Psychiatric: Normal affect. Good eye contact. Appropriate in content. Denies any current visual/ auditory hallucinations at present.      ED Course   Procedures  ED Vital Signs:  Vitals:    10/08/18 2234 10/09/18 0039 10/09/18 0100 10/09/18 0102   BP: 124/74 (!) 144/77  133/75   Pulse: 108 100  94   Resp: 20 20 (!) 21 (!) 25   Temp: 97.9 °F  "(36.6 °C)      TempSrc: Oral      SpO2: 99% 100%  100%   Weight:       Height: 5' 9.5" (1.765 m)       10/09/18 0125 10/09/18 0218 10/09/18 0440 10/09/18 0506   BP:  (!) 148/85 127/66 136/72   Pulse:  104 100 108   Resp:  19 (!) 26 20   Temp:       TempSrc:       SpO2:  100% 100% 100%   Weight: 61.7 kg (136 lb)      Height: 5' 9" (1.753 m)          Abnormal Lab Results:  Labs Reviewed   CBC W/ AUTO DIFFERENTIAL - Abnormal; Notable for the following components:       Result Value    RBC 3.12 (*)     Hemoglobin 12.4 (*)     Hematocrit 35.5 (*)      (*)     MCH 39.7 (*)     RDW 18.0 (*)     Platelets 92 (*)     MPV 8.8 (*)     All other components within normal limits   COMPREHENSIVE METABOLIC PANEL - Abnormal; Notable for the following components:    Potassium 3.0 (*)     CO2 20 (*)     Glucose 54 (*)     Calcium 8.4 (*)     Albumin 3.2 (*)     Total Bilirubin 2.8 (*)     Alkaline Phosphatase 54 (*)     All other components within normal limits   URINALYSIS - Abnormal; Notable for the following components:    Ketones, UA 2+ (*)     Bilirubin (UA) 1+ (*)     Urobilinogen, UA >=8.0 (*)     All other components within normal limits   POTASSIUM - Abnormal; Notable for the following components:    Potassium 3.3 (*)     All other components within normal limits   POCT GLUCOSE - Abnormal; Notable for the following components:    POCT Glucose 69 (*)     All other components within normal limits   AMMONIA        All Lab Results:  Results for orders placed or performed during the hospital encounter of 10/08/18   CBC auto differential   Result Value Ref Range    WBC 5.77 3.90 - 12.70 K/uL    RBC 3.12 (L) 4.60 - 6.20 M/uL    Hemoglobin 12.4 (L) 14.0 - 18.0 g/dL    Hematocrit 35.5 (L) 40.0 - 54.0 %     (H) 82 - 98 fL    MCH 39.7 (H) 27.0 - 31.0 pg    MCHC 34.9 32.0 - 36.0 g/dL    RDW 18.0 (H) 11.5 - 14.5 %    Platelets 92 (L) 150 - 350 K/uL    MPV 8.8 (L) 9.2 - 12.9 fL    Gran # (ANC) 3.4 1.8 - 7.7 K/uL    Lymph # " 1.5 1.0 - 4.8 K/uL    Mono # 0.7 0.3 - 1.0 K/uL    Eos # 0.1 0.0 - 0.5 K/uL    Baso # 0.02 0.00 - 0.20 K/uL    Gran% 59.2 38.0 - 73.0 %    Lymph% 26.3 18.0 - 48.0 %    Mono% 12.5 4.0 - 15.0 %    Eosinophil% 1.7 0.0 - 8.0 %    Basophil% 0.3 0.0 - 1.9 %    Differential Method Automated    Comprehensive metabolic panel   Result Value Ref Range    Sodium 138 136 - 145 mmol/L    Potassium 3.0 (L) 3.5 - 5.1 mmol/L    Chloride 102 95 - 110 mmol/L    CO2 20 (L) 23 - 29 mmol/L    Glucose 54 (L) 70 - 110 mg/dL    BUN, Bld 17 8 - 23 mg/dL    Creatinine 0.7 0.5 - 1.4 mg/dL    Calcium 8.4 (L) 8.7 - 10.5 mg/dL    Total Protein 6.8 6.0 - 8.4 g/dL    Albumin 3.2 (L) 3.5 - 5.2 g/dL    Total Bilirubin 2.8 (H) 0.1 - 1.0 mg/dL    Alkaline Phosphatase 54 (L) 55 - 135 U/L    AST 39 10 - 40 U/L    ALT 17 10 - 44 U/L    Anion Gap 16 8 - 16 mmol/L    eGFR if African American >60 >60 mL/min/1.73 m^2    eGFR if non African American >60 >60 mL/min/1.73 m^2   Urinalysis - Clean Catch   Result Value Ref Range    Specimen UA Urine, Clean Catch     Color, UA Yellow Yellow, Straw, Tresa    Appearance, UA Clear Clear    pH, UA 6.0 5.0 - 8.0    Specific Gravity, UA 1.020 1.005 - 1.030    Protein, UA Negative Negative    Glucose, UA Negative Negative    Ketones, UA 2+ (A) Negative    Bilirubin (UA) 1+ (A) Negative    Occult Blood UA Negative Negative    Nitrite, UA Negative Negative    Urobilinogen, UA >=8.0 (A) <2.0 EU/dL    Leukocytes, UA Negative Negative   Potassium   Result Value Ref Range    Potassium 3.3 (L) 3.5 - 5.1 mmol/L   POCT glucose   Result Value Ref Range    POCT Glucose 69 (L) 70 - 110 mg/dL       Imaging Results:  Imaging Results          CT Head Without Contrast                 Per Virtual radiology, pt's CT Head results: No acute intracranial process. Scattered indeterminate lytic lesions in calvarium, metastatic/myelomatous process not excluded.           The Emergency Provider reviewed the vital signs and test results, which are  outlined above.     ED Discussion     3:00 AM: Re-evaluated pt. Pt is resting comfortably and is in no acute distress.  D/w pt all pertinent results. D/w pt any concerns expressed at this time. Answered all questions. Pt expresses understanding at this time. Patient's neighbor at bedside. Neighbor reports patient has been weak for the last few weeks described as having to hold onto objects when walking. Neighbor reports patient lives alone.    5:34 AM: Patient's cordeliae called. Discussed patient's case with janay. Janay states she lives in [place] and states that she usually comes to care for patient when he is ill. Janay states she will be unable to get to [place] for a few days, but states she will be coming. Janay is requesting patient be admitted until she can arrive.    6:01 AM: Re-evaluated pt. I have discussed test results, shared treatment plan, and the need for admission with patient. Pt expresses understanding at this time and agree with all information. All questions answered. Pt has no further questions or concerns at this time. Pt is ready for admit.    6:05 AM: Discussed case with Dr. Randle ([facility]) who agrees with current care and management of pt and accepts admission.   Admitting Service: Hospital medicine   Admitting Physician: Dr. Randle  Admit to: Obs      ED Medication(s):  Medications   dextrose 50% (D50W) solution 25 g (not administered)   ketorolac injection 30 mg (30 mg Intravenous Given 10/9/18 0116)   sodium chloride 0.9% bolus 1,000 mL (0 mLs Intravenous Stopped 10/9/18 0246)   potassium chloride 10% oral solution 20 mEq (20 mEq Oral Given 10/9/18 0258)          Medical Decision Making     Medical Decision Making:   Clinical Tests:   Lab Tests: Ordered and Reviewed  Radiological Study: Ordered and Reviewed             Scribe Attestation:   Scribe #1: I performed the above scribed service and the documentation accurately describes the services I performed. I attest  to the accuracy of the note. 10/09/2018 6:06 AM    Attending:   Physician Attestation Statement for Scribe #1: I, Melania Anderson MD, personally performed the services described in this documentation, as scribed by Alexa Young, in my presence, and it is both accurate and complete.           Clinical Impression       ICD-10-CM ICD-9-CM   1. Weakness R53.1 780.79   2. Dizziness R42 780.4   3. Hypokalemia E87.6 276.8   4. Hypoglycemia E16.2 251.2       Disposition:   Disposition: Placed in Observation  Condition: Fair               Melania Anderson MD  10/11/18 0609

## 2018-10-09 NOTE — H&P
[facility] Hospital Medicine  History & Physical    Patient Name: Santy Anderson  MRN: [id number]  Admission Date: 10/8/2018  Attending Physician: Caio Philip MD   Primary Care Provider: Joanie Simms MD         Patient information was obtained from patient, past medical records and ER records.     Subjective:     Principal Problem:Hallucinations    Chief Complaint:     Chief Complaint   Patient presents with    Hallucinations     pt states he was started on new meds and has been seeing people that are not there        HPI: Santy Anderson is a 72 year old male with lung cancer status post resection and alcoholism who presented to the ED with complaints of hallucinations that began 3 days prior to arrival. The patient reports seeing people that were not there on multiple occasions. He denies other symptoms including fever, chills, cough, hemoptysis, dizziness and headache. The patient reports being treated for knee pain with Norco which is started one day prior to the onset of hallucinations. He reports drinking three beers most days. His last drink was 3 days ago. He is followed by Dr. Lara for his history of lung cancer and reports that his only treatment was surgical. In the ED, the patient's head CT showed heterogeneous marrow signal and multiple lytic abnormalities of unknown clinical significance within the calvarium. His potassium was low at 3. His bilirubin was elevated to 2.8 which is consistent with his chronic elevation. Labs were otherwise unremarkable. Code status was discussed with the patient. He is a full code. His significant other, Anika Hernández, is his surrogate medical decision maker.     Past Medical History:   Diagnosis Date    Alcoholic cirrhosis of liver with ascites     Arthritis     Gastric mass     GERD (gastroesophageal reflux disease)     Hyperlipidemia     Lung cancer        Past Surgical History:   Procedure Laterality Date    ABDOMINAL SURGERY       COLONOSCOPY N/A 4/11/2017    Procedure: COLONOSCOPY;  Surgeon: Brianne Wise MD;  Location: [facility];  Service: Endoscopy;  Laterality: N/A;    COLONOSCOPY N/A 4/11/2017    Performed by [facility]ne Wise MD at [facility] ENDO    EGD (ESOPHAGOGASTRODUODENOSCOPY) N/A 10/16/2013    Performed by Marvin Baez MD at [facility] ENDO    ESOPHAGOGASTRODUODENOSCOPY      ESOPHAGOGASTRODUODENOSCOPY (EGD) N/A 4/23/2018    Performed by Danielle Tejeda MD at [facility] ENDO    ESOPHAGOGASTRODUODENOSCOPY (EGD) N/A 2/26/2018    Performed by Danielle Tejeda MD at [facility] ENDO    ESOPHAGOGASTRODUODENOSCOPY (EGD) N/A 10/20/2016    Performed by Brianne Wise MD at [facility] ENDO    ESOPHAGOGASTRODUODENOSCOPY (EGD) N/A 9/22/2016    Performed by Brianne Wise MD at [facility] ENDO    FEMUR SURGERY Right     GASTRECTOMY      GASTRECTOMY, PARTIAL N/A 10/30/2013    Performed by Louis O. Jeansonne IV, MD at [facility] OR    GASTRECTOMY-LAPAROSCOPIC N/A 10/30/2013    Performed by Louis O. Jeansonne IV, MD at [facility] OR    HAND SURGERY Left     KNEE SURGERY Right     laparotomy for trauma (negative)  1979    left lower lobectomy      REMOVAL-HARDWARE-LEG Right 6/20/2017    Performed by Red Eller MD at [facility] OR       Review of patient's allergies indicates:  No Known Allergies    No current facility-administered medications on file prior to encounter.      Current Outpatient Medications on File Prior to Encounter   Medication Sig    cholecalciferol, vitamin D3, 2,000 unit Cap Take 2,000 Units by mouth.    HYDROcodone-acetaminophen (NORCO)  mg per tablet Take 1 tablet by mouth.    pantoprazole (PROTONIX) 40 MG tablet Take 1 tablet (40 mg total) by mouth 2 (two) times daily.    [DISCONTINUED] levoFLOXacin (LEVAQUIN) 500 MG tablet Take 1 tablet (500 mg total) by mouth once daily.     Family History     Problem Relation (Age of Onset)    Diabetes Mother    Hypertension Mother        Tobacco Use    Smoking status: Former Smoker      Packs/day: 0.50     Years: 25.00     Pack years: 12.50     Last attempt to quit: 10/16/1988     Years since quittin.0    Smokeless tobacco: Never Used   Substance and Sexual Activity    Alcohol use: Yes     Alcohol/week: 1.8 oz     Types: 3 Cans of beer per week     Comment: socailly    Drug use: No    Sexual activity: No     Review of Systems   Constitutional: Negative for appetite change, chills, diaphoresis, fatigue and fever.   HENT: Negative for congestion, ear pain, mouth sores, sore throat and trouble swallowing.    Eyes: Negative for pain and visual disturbance.   Respiratory: Negative for cough, chest tightness and shortness of breath.    Cardiovascular: Negative for chest pain, palpitations and leg swelling.   Gastrointestinal: Negative for abdominal pain, constipation, diarrhea and nausea.   Endocrine: Negative for cold intolerance, heat intolerance, polydipsia and polyuria.   Genitourinary: Negative for dysuria, frequency and hematuria.   Musculoskeletal: Positive for arthralgias (right knee). Negative for back pain, myalgias and neck pain.   Skin: Negative for pallor, rash and wound.   Allergic/Immunologic: Negative for environmental allergies and immunocompromised state.   Neurological: Negative for dizziness, seizures, syncope, weakness, numbness and headaches.   Hematological: Negative for adenopathy. Does not bruise/bleed easily.   Psychiatric/Behavioral: Positive for hallucinations. Negative for agitation, confusion and sleep disturbance.     Objective:     Vital Signs (Most Recent):  Temp: 98.5 °F (36.9 °C) (10/09/18 0721)  Pulse: 92 (10/09/18 0721)  Resp: 18 (10/09/18 0721)  BP: 120/69 (10/09/18 0721)  SpO2: 100 % (10/09/18 0721) Vital Signs (24h Range):  Temp:  [97.8 °F (36.6 °C)-98.5 °F (36.9 °C)] 98.5 °F (36.9 °C)  Pulse:  [] 92  Resp:  [18-30] 18  SpO2:  [99 %-100 %] 100 %  BP: (114-148)/(66-85) 120/69     Weight: 90 kg (198 lb 6.6 oz)  Body mass index is 29.3  kg/m².    Physical Exam   Constitutional: He is oriented to person, place, and time. He appears well-developed and well-nourished.   HENT:   Head: Normocephalic and atraumatic.   Eyes: Conjunctivae are normal.   Neck: Neck supple. No JVD present.   Cardiovascular: Normal rate, regular rhythm and normal heart sounds.   Pulmonary/Chest: Effort normal and breath sounds normal. He has no wheezes.   Abdominal: Soft. Bowel sounds are normal. He exhibits no distension. There is no tenderness.   Musculoskeletal: Normal range of motion.   Neurological: He is alert and oriented to person, place, and time.   Skin: Skin is warm and dry. No rash noted.   Psychiatric: He has a normal mood and affect. His behavior is normal. Thought content normal.   Nursing note and vitals reviewed.          Significant Labs:   CBC:   Recent Labs   Lab  10/09/18   0109   WBC  5.77   HGB  12.4*   HCT  35.5*   PLT  92*     CMP:   Recent Labs   Lab  10/09/18   0109  10/09/18   0436   NA  138   --    K  3.0*  3.3*   CL  102   --    CO2  20*   --    GLU  54*   --    BUN  17   --    CREATININE  0.7   --    CALCIUM  8.4*   --    PROT  6.8   --    ALBUMIN  3.2*   --    BILITOT  2.8*   --    ALKPHOS  54*   --    AST  39   --    ALT  17   --    ANIONGAP  16   --    EGFRNONAA  >60   --      All pertinent labs within the past 24 hours have been reviewed.    Significant Imaging: I have reviewed all pertinent imaging results/findings within the past 24 hours.   Imaging Results          CT Head Without Contrast (Final result)  Result time 10/09/18 07:13:58    Final result by Elver Carter MD (10/09/18 07:13:58)                 Impression:      Chronic microvascular ischemic changes.  Severe vascular calcifications.    Stable findings within the calvarium with heterogeneous marrow signal and multiple lytic abnormalities of unknown clinical significance.  Myomatous process is not excluded.      Electronically signed by: Elver Carter  MD  Date:    10/09/2018  Time:    07:13             Narrative:    EXAMINATION:  CT HEAD WITHOUT CONTRAST    CLINICAL HISTORY:  Confusion/delirium, altered LOC, unexplained;    TECHNIQUE:  Low dose axial CT images obtained throughout the head without intravenous contrast. Sagittal and coronal reconstructions were performed.  All CT scans at this facility use dose modulation, iterative reconstruction and/or weight based dosing when appropriate to reduce radiation dose to as low as reasonably achievable.    COMPARISON:  9/23/16.    FINDINGS:  Intracranial compartment:    The brain parenchyma demonstrates areas of decreased attenuation with mild to moderate periventricular white matter consistent with chronic microvascular ischemic changes..  No parenchymal mass, hemorrhage, edema or major vascular distribution infarct.  Vascular calcifications are noted.    Mild prominence of the sulci and ventricles are consistent with age-related involutional changes.    No extra-axial blood or fluid collections.    Skull/extracranial contents (limited evaluation):Stable findings within the calvarium with heterogeneous marrow signal and multiple lytic abnormalities of unknown clinical significance.  Myomatous process is not excluded.  No fracture. Mild mucosal thickening within the paranasal sinuses.  Mastoid air cells and paranasal sinuses are essentially clear.                                  Assessment/Plan:     * Hallucinations    -Possibly due to Norco.   -Check MRI brain to further evaluate lytic lesions.   -Neurochecks.           Lytic lesion of bone on x-ray    -Check MRI brain to further evaluate.   -Oncology consult.           Hypokalemia    Monitor and replace as needed.           Alcoholic cirrhosis of liver without ascites    -Creatinine and total bilirubin at baseline. Check INR.   -Supportive care.           Alcohol abuse    -Patient counseled on cessation.   -Librium for withdrawal prophylaxis.   -MVI, thiamine and  folic acid.             VTE Risk Mitigation (From admission, onward)        Ordered     Place sequential compression device  Until discontinued      10/09/18 1029             EMMANUELLE Carney  Department of Hospital Medicine   [facility]

## 2018-10-09 NOTE — ASSESSMENT & PLAN NOTE
-Patient counseled on cessation.   -Librium for withdrawal prophylaxis.   -MVI, thiamine and folic acid.

## 2018-10-09 NOTE — HPI
Santy Anderson is a 72 year old male with lung cancer status post resection and alcoholism who presented to the ED with complaints of hallucinations that began 3 days prior to arrival. The patient reports seeing people that were not there on multiple occasions. He denies other symptoms including fever, chills, cough, hemoptysis, dizziness and headache. The patient reports being treated for knee pain with Norco which is started one day prior to the onset of hallucinations. He reports drinking three beers most days. His last drink was 3 days ago. He is followed by Dr. Lara for his history of lung cancer and reports that his only treatment was surgical. In the ED, the patient's head CT showed heterogeneous marrow signal and multiple lytic abnormalities of unknown clinical significance within the calvarium. His potassium was low at 3. His bilirubin was elevated to 2.8 which is consistent with his chronic elevation. Labs were otherwise unremarkable. Code status was discussed with the patient. He is a full code. His significant other, Anika Hernández, is his surrogate medical decision maker.     Hematology/oncology consulted due to history of lung cancer and AMS on admit along with CT of head showing multiple lytic abnormalities of unknown clinical significance within the calvarium.

## 2018-10-09 NOTE — PROGRESS NOTES
"Pt is currently trying to arrange transportation home. States that he was attempting to reach his brother. No s/s of distress noted.  /63 (Patient Position: Lying)   Pulse 82   Temp 97.5 °F (36.4 °C) (Oral)   Resp 18   Ht 5' 9" (1.753 m)   Wt 90 kg (198 lb 6.6 oz)   SpO2 100%   BMI 29.30 kg/m²     "

## 2018-10-09 NOTE — SUBJECTIVE & OBJECTIVE
Oncology Treatment Plan:   [No treatment plan]    Medications:  Continuous Infusions:  Scheduled Meds:   chlordiazepoxide  10 mg Oral TID    folic acid  1 mg Oral Daily    multivitamin  1 tablet Oral Daily    pantoprazole  40 mg Oral BID    thiamine  100 mg Oral Daily     PRN Meds:ondansetron     Review of patient's allergies indicates:  No Known Allergies     Past Medical History:   Diagnosis Date    Alcoholic cirrhosis of liver with ascites     Arthritis     Gastric mass     GERD (gastroesophageal reflux disease)     Hyperlipidemia     Lung cancer      Past Surgical History:   Procedure Laterality Date    ABDOMINAL SURGERY      COLONOSCOPY N/A 4/11/2017    Procedure: COLONOSCOPY;  Surgeon: Brianne Wise MD;  Location: [facility];  Service: Endoscopy;  Laterality: N/A;    COLONOSCOPY N/A 4/11/2017    Performed by Brianne Wise MD at [facility] ENDO    EGD (ESOPHAGOGASTRODUODENOSCOPY) N/A 10/16/2013    Performed by Marvin Baez MD at [facility] ENDO    ESOPHAGOGASTRODUODENOSCOPY      ESOPHAGOGASTRODUODENOSCOPY (EGD) N/A 4/23/2018    Performed by Danielle Tejeda MD at [facility] ENDO    ESOPHAGOGASTRODUODENOSCOPY (EGD) N/A 2/26/2018    Performed by Danielle [facility]jeda MD at [facility] ENDO    ESOPHAGOGASTRODUODENOSCOPY (EGD) N/A 10/20/2016    Performed by Brianne Wise MD at [facility] ENDO    ESOPHAGOGASTRODUODENOSCOPY (EGD) N/A 9/22/2016    Performed by Brianne Wise MD at [facility] ENDO    FEMUR SURGERY Right     GASTRECTOMY      GASTRECTOMY, PARTIAL N/A 10/30/2013    Performed by Louis O. Jeansonne IV, MD at [facility] OR    GASTRECTOMY-LAPAROSCOPIC N/A 10/30/2013    Performed by Louis O. Jeansonne IV, MD at [facility] OR    HAND SURGERY Left     KNEE SURGERY Right     laparotomy for trauma (negative)  1979    left lower lobectomy      REMOVAL-HARDWARE-LEG Right 6/20/2017    Performed by Red Eller MD at [facility] OR     Family History     Problem Relation (Age of Onset)    Diabetes Mother    Hypertension  Mother        Tobacco Use    Smoking status: Former Smoker     Packs/day: 0.50     Years: 25.00     Pack years: 12.50     Last attempt to quit: 10/16/1988     Years since quittin.0    Smokeless tobacco: Never Used   Substance and Sexual Activity    Alcohol use: Yes     Alcohol/week: 1.8 oz     Types: 3 Cans of beer per week     Comment: socailly    Drug use: No    Sexual activity: No       Review of Systems   Constitutional: Negative for chills, fatigue and fever.   HENT: Negative for nosebleeds.    Respiratory: Negative for chest tightness and shortness of breath.    Cardiovascular: Negative for chest pain and palpitations.   Gastrointestinal: Positive for abdominal pain (when turning on either side). Negative for anal bleeding and blood in stool.   Genitourinary: Negative for dysuria and hematuria.   Skin: Negative for rash and wound.   Neurological: Negative for dizziness, syncope and light-headedness.   Hematological: Negative for adenopathy. Does not bruise/bleed easily.   Psychiatric/Behavioral: Negative for confusion and decreased concentration.     Objective:     Vital Signs (Most Recent):  Temp: 97.3 °F (36.3 °C) (10/09/18 1130)  Pulse: 88 (10/09/18 1130)  Resp: 18 (10/09/18 1130)  BP: (!) 166/82 (10/09/18 1130)  SpO2: 100 % (10/09/18 1130) Vital Signs (24h Range):  Temp:  [97.3 °F (36.3 °C)-98.5 °F (36.9 °C)] 97.3 °F (36.3 °C)  Pulse:  [] 88  Resp:  [18-30] 18  SpO2:  [99 %-100 %] 100 %  BP: (114-166)/(66-85) 166/82     Weight: 90 kg (198 lb 6.6 oz)  Body mass index is 29.3 kg/m².  Body surface area is 2.09 meters squared.    No intake or output data in the 24 hours ending 10/09/18 1227    Physical Exam   Constitutional: Vital signs are normal. He appears well-developed and well-nourished.  Non-toxic appearance. He does not have a sickly appearance. He does not appear ill. No distress.   HENT:   Head: Normocephalic and atraumatic.   Eyes: Conjunctivae, EOM and lids are normal. No scleral  icterus.   Cardiovascular: Normal rate, regular rhythm, S1 normal, S2 normal and normal heart sounds. Exam reveals no gallop and no friction rub.   No murmur heard.  Pulmonary/Chest: Effort normal. No accessory muscle usage or stridor. No apnea, no tachypnea and no bradypnea. No respiratory distress. He has decreased breath sounds. He has no wheezes. He has no rales.   Abdominal: Soft. Normal appearance. He exhibits no distension.   Musculoskeletal: Normal range of motion.   Neurological: He is alert.   Skin: Skin is warm, dry and intact. No bruising noted. He is not diaphoretic. No pallor.   Psychiatric: He has a normal mood and affect. His speech is normal and behavior is normal. Judgment and thought content normal. Cognition and memory are normal. He is attentive.   Vitals reviewed.      Significant Labs:   BMP:   Recent Labs   Lab  10/09/18   0109  10/09/18   0436   GLU  54*   --    NA  138   --    K  3.0*  3.3*   CL  102   --    CO2  20*   --    BUN  17   --    CREATININE  0.7   --    CALCIUM  8.4*   --    , CBC:   Recent Labs   Lab  10/09/18   0109   WBC  5.77   HGB  12.4*   HCT  35.5*   PLT  92*   , CMP:   Recent Labs   Lab  10/09/18   0109  10/09/18   0436   NA  138   --    K  3.0*  3.3*   CL  102   --    CO2  20*   --    GLU  54*   --    BUN  17   --    CREATININE  0.7   --    CALCIUM  8.4*   --    PROT  6.8   --    ALBUMIN  3.2*   --    BILITOT  2.8*   --    ALKPHOS  54*   --    AST  39   --    ALT  17   --    ANIONGAP  16   --    EGFRNONAA  >60   --    , LDH: No results for input(s): LDHCSF, BFSOURCE in the last 48 hours., LFTs:   Recent Labs   Lab  10/09/18   0109   ALT  17   AST  39   ALKPHOS  54*   BILITOT  2.8*   PROT  6.8   ALBUMIN  3.2*   , Reticulocytes: No results for input(s): RETIC in the last 48 hours., Tumor Markers: No results for input(s): PSA, CEA, , AFPTM, [id number],  in the last 48 hours.    Invalid input(s): ALGTM, Urine Studies:   Recent Labs   Lab  10/09/18   0314   COLORU   Yellow   APPEARANCEUA  Clear   PHUR  6.0   SPECGRAV  1.020   PROTEINUA  Negative   GLUCUA  Negative   KETONESU  2+*   BILIRUBINUA  1+*   OCCULTUA  Negative   NITRITE  Negative   UROBILINOGEN  >=8.0*   LEUKOCYTESUR  Negative    and All pertinent labs from the last 24 hours have been reviewed.    Diagnostic Results:  I have reviewed all pertinent imaging results/findings within the past 24 hours.

## 2018-10-09 NOTE — PROGRESS NOTES
Clinical Pharmacy Progress Note: Telemetry Pharmacist Rounding     Patient was counseled by pharmacist on the telemetry pharmacist availability to discuss new medications, side effects, and reasons for changes in medication during admission.   Asked if patient had any medication questions at this time.   Instructed patient to use call light with any questions or concerns to discuss with pharmacy during the admission.   Offered bedside medication delivery to patient.   Patient expressed understanding and had no further questions.    Thank you for allowing us to participate in this patient's care.    Elmira Bledsoe, PharmD 10/9/2018 3:01 PM

## 2018-10-10 PROBLEM — G92.9 ENCEPHALOPATHY, TOXIC: Status: ACTIVE | Noted: 2018-10-10

## 2018-10-10 NOTE — NURSING
PIV and telemetry monitor removed. Discharge instructions given to patient. Patient verbalized understanding and was discharged per wheelchair to neighbor's vehicle in no acute distress.

## 2018-10-10 NOTE — HOSPITAL COURSE
The pt was placed in observation for hallucinations, AMS, and fall. Pt was recently prescribed Norco. He reports he was taking it 4 times per day. Norco discontinued on admit. AMS and hallucinations resolved. CT head showed abnormalities- possible lytic lesions. MRI brain showed nothing acute- no lytic lesions. Pt was AAOX3. Pt instructed to discontinue all sedating medications due to pt's hx of cirrhosis and alcohol use. Pt extensively counseled on alcohol cessation. Pt verbalized understanding.

## 2018-10-15 ENCOUNTER — TELEPHONE (OUTPATIENT)
Dept: INTERNAL MEDICINE | Facility: CLINIC | Age: 73
End: 2018-10-15

## 2018-10-15 NOTE — TELEPHONE ENCOUNTER
----- Message from Zandra Wolf sent at 10/15/2018  2:58 PM CDT -----  Contact: pt  The pt want to have his 10/23 appt reschedule to 10/22, the pt can be reached at [phone]///[id number]

## 2018-10-23 ENCOUNTER — OFFICE VISIT (OUTPATIENT)
Dept: INTERNAL MEDICINE | Facility: CLINIC | Age: 73
End: 2018-10-23
Payer: MEDICARE

## 2018-10-23 ENCOUNTER — LAB VISIT (OUTPATIENT)
Dept: LAB | Facility: HOSPITAL | Age: 73
End: 2018-10-23
Attending: INTERNAL MEDICINE
Payer: MEDICARE

## 2018-10-23 VITALS
WEIGHT: 141.13 LBS | HEIGHT: 69 IN | BODY MASS INDEX: 20.9 KG/M2 | SYSTOLIC BLOOD PRESSURE: 112 MMHG | TEMPERATURE: 98 F | OXYGEN SATURATION: 98 % | DIASTOLIC BLOOD PRESSURE: 78 MMHG | HEART RATE: 108 BPM

## 2018-10-23 DIAGNOSIS — E87.6 HYPOKALEMIA: Primary | ICD-10-CM

## 2018-10-23 DIAGNOSIS — E55.9 VITAMIN D DEFICIENCY: ICD-10-CM

## 2018-10-23 DIAGNOSIS — E87.6 HYPOKALEMIA: ICD-10-CM

## 2018-10-23 DIAGNOSIS — R44.3 HALLUCINATIONS: ICD-10-CM

## 2018-10-23 PROCEDURE — 80048 BASIC METABOLIC PNL TOTAL CA: CPT

## 2018-10-23 PROCEDURE — 99214 OFFICE O/P EST MOD 30 MIN: CPT | Mod: S$PBB,,, | Performed by: INTERNAL MEDICINE

## 2018-10-23 PROCEDURE — 36415 COLL VENOUS BLD VENIPUNCTURE: CPT | Mod: PO

## 2018-10-23 PROCEDURE — 99213 OFFICE O/P EST LOW 20 MIN: CPT | Mod: PBBFAC,PO | Performed by: INTERNAL MEDICINE

## 2018-10-23 PROCEDURE — 99999 PR PBB SHADOW E&M-EST. PATIENT-LVL III: CPT | Mod: PBBFAC,,, | Performed by: INTERNAL MEDICINE

## 2018-10-23 NOTE — PROGRESS NOTES
"Subjective:      Patient ID: Santy Anderson is a 72 y.o. male.    Chief Complaint: Hospital Follow Up      HPI  Here for follow up of medical problems and hospital follow up for hallucinations.  12 days ago saw 30-35 people dancing in the street in front of his home, neighbor did not see what he was seeing.  MRI with temporal lobe atrophy, no acute findings.  Pt had taken a dose of norco for knee pain  prior to symptoms, had not had EtOH.  ER provider stopped PPI.  No more hallucinations. K was low in the hospital. Has had 2 episodes of N/V, but not sure if prior to ER.  Occas dry cough.  No f/c.  No cp/sob/palp.  BMs normal.  1-2x nocturia.    Updated/ annual due 5/19:  HM: ref fluvax, 6/16 [id number], 6/16 TDaP, 1/17 BMD hi fx risk hip rec bisphos rep 2y, unk Cscope, 6/16 HCV neg, 7/17 Eye doc at [facility], 2/18 CXR clear.     Review of Systems   Constitutional: Negative for chills, diaphoresis and fever.   Respiratory: Negative for cough and shortness of breath.    Cardiovascular: Negative for chest pain, palpitations and leg swelling.   Gastrointestinal: Negative for blood in stool, constipation, diarrhea, nausea and vomiting.   Genitourinary: Negative for dysuria, frequency and hematuria.   Psychiatric/Behavioral: The patient is not nervous/anxious.          Objective:   /78 (BP Location: Right arm, Patient Position: Sitting)   Pulse 108   Temp 98.2 °F (36.8 °C) (Tympanic)   Ht 5' 9" (1.753 m)   Wt 64 kg (141 lb 1.5 oz)   SpO2 98%   BMI 20.84 kg/m²     Physical Exam   Constitutional: He is oriented to person, place, and time. He appears well-developed and well-nourished.   HENT:   Mouth/Throat: Oropharynx is clear and moist.   Neck: Normal range of motion. Neck supple.   Cardiovascular: Normal rate, regular rhythm and intact distal pulses. Exam reveals no gallop and no friction rub.   No murmur heard.  Pulmonary/Chest: Effort normal and breath sounds normal. He has no wheezes. He has no rales. "   Abdominal: Soft. Bowel sounds are normal. He exhibits no mass. There is no tenderness.   Musculoskeletal: He exhibits no edema.   Lymphadenopathy:     He has no cervical adenopathy.   Neurological: He is alert and oriented to person, place, and time.   Psychiatric: He has a normal mood and affect.           Assessment:       1. Hypokalemia    2. Hallucinations    3. Vitamin D deficiency          Plan:     Hypokalemia- recheck now.  -     Basic metabolic panel; Future; Expected date: 10/23/2018    Hallucinations, likely secondary to the narcotic, now resolved.    Vitamin D deficiency- restart supplement.    RTC 3 mo.

## 2018-10-24 ENCOUNTER — TELEPHONE (OUTPATIENT)
Dept: INTERNAL MEDICINE | Facility: CLINIC | Age: 73
End: 2018-10-24

## 2018-10-24 LAB
ANION GAP SERPL CALC-SCNC: 8 MMOL/L
BUN SERPL-MCNC: 6 MG/DL
CALCIUM SERPL-MCNC: 9.1 MG/DL
CHLORIDE SERPL-SCNC: 105 MMOL/L
CO2 SERPL-SCNC: 26 MMOL/L
CREAT SERPL-MCNC: 0.7 MG/DL
EST. GFR  (AFRICAN AMERICAN): >60 ML/MIN/1.73 M^2
EST. GFR  (NON AFRICAN AMERICAN): >60 ML/MIN/1.73 M^2
GLUCOSE SERPL-MCNC: 70 MG/DL
POTASSIUM SERPL-SCNC: 4.2 MMOL/L
SODIUM SERPL-SCNC: 139 MMOL/L

## 2018-10-24 NOTE — TELEPHONE ENCOUNTER
Can you see what is up with the BMP results?  I told him we would call this morning regarding his potassium- please see if there is a problem with the lab.  SM

## 2018-10-30 ENCOUNTER — TELEPHONE (OUTPATIENT)
Dept: ORTHOPEDICS | Facility: CLINIC | Age: 73
End: 2018-10-30

## 2018-10-30 NOTE — TELEPHONE ENCOUNTER
Called pt to inform him that mrs. jiang will no longer be in ortho. Pt understood. Pt will no be following up with dr. Mann. Pt understood.

## 2018-10-31 RX ORDER — SILDENAFIL CITRATE 100 MG/1
TABLET, FILM COATED ORAL
Qty: 30 TABLET | Refills: 11 | Status: SHIPPED | OUTPATIENT
Start: 2018-10-31 | End: 2018-11-20

## 2018-11-06 NOTE — PROGRESS NOTES
"Subjective:      Patient ID: Santy Anderson is a 72 y.o. male.    Chief Complaint: Follow-up      HPI  Here for follow up of medical problems.  Appetite good.  Weight has been stable.  No f/c/sw/cough.  No more hallucinations since stopped pain meds.  Sleeps well.  No cp/sob/palp.  BMs normal.  Denies any EtOH now.    Updated/ annual due 5/19:  HM: ref fluvax, 6/16 [id number], 11/18 today [id number], 6/16 TDaP, 1/17 BMD hi fx risk hip rec bisphos rep 2y, unk Cscope, 6/16 HCV neg, 7/17 Eye doc at [facility], 2/18 CXR clear.     Review of Systems   Constitutional: Negative for chills, diaphoresis, fatigue and fever.   Respiratory: Negative for cough, chest tightness and shortness of breath.    Cardiovascular: Negative for chest pain, palpitations and leg swelling.   Gastrointestinal: Negative for blood in stool, constipation, diarrhea, nausea and vomiting.   Genitourinary: Negative for difficulty urinating and frequency.   Musculoskeletal: Negative for arthralgias.         Objective:   /82 (BP Location: Right arm, Patient Position: Sitting)   Pulse 100   Temp 96 °F (35.6 °C) (Tympanic)   Ht 5' 9" (1.753 m)   Wt 63.6 kg (140 lb 3.4 oz)   SpO2 98%   BMI 20.71 kg/m²     Physical Exam   Constitutional: He is oriented to person, place, and time. He appears well-developed and well-nourished.   HENT:   Mouth/Throat: Oropharynx is clear and moist.   Neck: Normal range of motion. Neck supple.   Cardiovascular: Normal rate, regular rhythm and intact distal pulses. Exam reveals no gallop and no friction rub.   No murmur heard.  Pulmonary/Chest: Effort normal and breath sounds normal. He has no wheezes. He has no rales.   Abdominal: Soft. Bowel sounds are normal. He exhibits no mass. There is no tenderness.   Musculoskeletal: He exhibits no edema.   Lymphadenopathy:     He has no cervical adenopathy.   Neurological: He is alert and oriented to person, place, and time.   Psychiatric: He has a normal mood and affect. "           Assessment:       1. Weight loss    2. Vitamin D deficiency    3. Malignant neoplasm of lower lobe of left lung    4. Preventive measure    5. Elevated bilirubin    6. Encounter for screening for malignant neoplasm of prostate           Plan:     Weight loss- doing well now.    Vitamin D deficiency- cont supp, recheck 3mo.    Malignant neoplasm of lower lobe of left lung- no sx now.    Preventive measure- will do in 3mo. [id number] vaccine now.  -     CBC auto differential; Future; Expected date: 11/20/2018  -     Comprehensive metabolic panel; Future; Expected date: 11/20/2018  -     Vitamin D; Future  -     PSA, Screening; Future; Expected date: 11/20/2018    Elevated bilirubin and low alk phos when in hospital- denies EtOH now, recheck 3mo.  -     CBC auto differential; Future; Expected date: 11/20/2018  -     Comprehensive metabolic panel; Future; Expected date: 11/20/2018  -     Vitamin D; Future  -     PSA, Screening; Future; Expected date: 11/20/2018    RTC 3mo.

## 2018-11-20 ENCOUNTER — OFFICE VISIT (OUTPATIENT)
Dept: INTERNAL MEDICINE | Facility: CLINIC | Age: 73
End: 2018-11-20
Payer: MEDICARE

## 2018-11-20 VITALS
SYSTOLIC BLOOD PRESSURE: 128 MMHG | TEMPERATURE: 96 F | OXYGEN SATURATION: 98 % | DIASTOLIC BLOOD PRESSURE: 82 MMHG | WEIGHT: 140.19 LBS | BODY MASS INDEX: 20.76 KG/M2 | HEIGHT: 69 IN | HEART RATE: 100 BPM

## 2018-11-20 DIAGNOSIS — R17 ELEVATED BILIRUBIN: ICD-10-CM

## 2018-11-20 DIAGNOSIS — Z29.9 PREVENTIVE MEASURE: ICD-10-CM

## 2018-11-20 DIAGNOSIS — R63.4 WEIGHT LOSS: Primary | ICD-10-CM

## 2018-11-20 DIAGNOSIS — E55.9 VITAMIN D DEFICIENCY: ICD-10-CM

## 2018-11-20 DIAGNOSIS — C34.32 MALIGNANT NEOPLASM OF LOWER LOBE OF LEFT LUNG: ICD-10-CM

## 2018-11-20 DIAGNOSIS — Z12.5 ENCOUNTER FOR SCREENING FOR MALIGNANT NEOPLASM OF PROSTATE: ICD-10-CM

## 2018-11-20 PROCEDURE — 90732 PPSV23 VACC 2 YRS+ SUBQ/IM: CPT | Mod: PBBFAC,PO

## 2018-11-20 PROCEDURE — 99999 PR PBB SHADOW E&M-EST. PATIENT-LVL III: CPT | Mod: PBBFAC,,, | Performed by: INTERNAL MEDICINE

## 2018-11-20 PROCEDURE — 99213 OFFICE O/P EST LOW 20 MIN: CPT | Mod: PBBFAC,PO | Performed by: INTERNAL MEDICINE

## 2018-11-20 PROCEDURE — 99214 OFFICE O/P EST MOD 30 MIN: CPT | Mod: 25,S$PBB,, | Performed by: INTERNAL MEDICINE

## 2018-11-20 NOTE — ADDENDUM NOTE
Addended by: CARLTON BERKOWITZ on: 11/20/2018 10:13 AM     Modules accepted: Orders, Level of Service

## 2019-01-22 ENCOUNTER — TELEPHONE (OUTPATIENT)
Dept: INTERNAL MEDICINE | Facility: CLINIC | Age: 74
End: 2019-01-22

## 2019-01-22 NOTE — TELEPHONE ENCOUNTER
----- Message from Gregory Patrick sent at 1/22/2019  8:03 AM CST -----  Contact: pt  He's calling in regards to his scheduled appt, [phone] (home)

## 2019-01-22 NOTE — TELEPHONE ENCOUNTER
----- Message from Renata Ascencio sent at 1/22/2019  8:22 AM CST -----  Contact: self  Returning phone call. Please call back at [phone].    Thanks,  Renata Ascencio

## 2019-01-23 NOTE — PROGRESS NOTES
"Subjective:      Patient ID: Santy Anderson is a 73 y.o. male.    Chief Complaint: Annual Exam      HPI  Here for follow up of medical problems.  Energy ok.  No bleeding with brushing teeth.  Energy good.  Walks 2 miles twice a week.  No f/c/sw/cough.  No cp/sob/palp.  BMs normal, no black or blood.  Urine normal.  No alcohol x 2 weeks, he says.  EGD one year ago, no varices seen.      Updated/ annual due 5/19:  HM: ref fluvax, 2/19 today HAV, 6/16 [id number], 11/18 [id number], 6/16 TDaP, 1/17 BMD hi fx risk hip rec bisphos rep 2y, unk Cscope, 6/16 HCV neg, 7/17 Eye doc at [facility], 2/18 CXR clear, 11/17 Hep panel neg.     Review of Systems   Constitutional: Negative for chills, diaphoresis, fatigue and fever.   Respiratory: Negative for cough, chest tightness and shortness of breath.    Cardiovascular: Negative for chest pain, palpitations and leg swelling.   Gastrointestinal: Negative for blood in stool, constipation, diarrhea, nausea and vomiting.   Genitourinary: Negative for difficulty urinating and frequency.   Musculoskeletal: Negative for arthralgias.         Objective:   /80 (BP Location: Right arm, Patient Position: Sitting, BP Method: Medium (Manual))   Pulse 89   Temp 98.9 °F (37.2 °C) (Tympanic)   Ht 5' 9" (1.753 m)   Wt 64.7 kg (142 lb 10.2 oz)   SpO2 98%   BMI 21.06 kg/m²     Physical Exam   Constitutional: He is oriented to person, place, and time. He appears well-developed and well-nourished.   HENT:   Mouth/Throat: Oropharynx is clear and moist.   Neck: Normal range of motion. Neck supple.   Cardiovascular: Normal rate, regular rhythm and intact distal pulses. Exam reveals no gallop and no friction rub.   No murmur heard.  Pulmonary/Chest: Effort normal and breath sounds normal. He has no wheezes. He has no rales.   Abdominal: Soft. Bowel sounds are normal. He exhibits no mass. There is no tenderness.   Musculoskeletal: He exhibits no edema.   Lymphadenopathy:     He has no cervical " adenopathy.   Neurological: He is alert and oriented to person, place, and time.   Psychiatric: He has a normal mood and affect.       Results for orders placed or performed in visit on 01/30/19   CBC auto differential   Result Value Ref Range    WBC 4.19 3.90 - 12.70 K/uL    RBC 3.25 (L) 4.60 - 6.20 M/uL    Hemoglobin 12.3 (L) 14.0 - 18.0 g/dL    Hematocrit 36.4 (L) 40.0 - 54.0 %     (H) 82 - 98 fL    MCH 37.8 (H) 27.0 - 31.0 pg    MCHC 33.8 32.0 - 36.0 g/dL    RDW 13.8 11.5 - 14.5 %    Platelets 126 (L) 150 - 350 K/uL    MPV 9.7 9.2 - 12.9 fL    Immature Granulocytes 0.5 0.0 - 0.5 %    Gran # (ANC) 1.9 1.8 - 7.7 K/uL    Immature Grans (Abs) 0.02 0.00 - 0.04 K/uL    Lymph # 1.7 1.0 - 4.8 K/uL    Mono # 0.5 0.3 - 1.0 K/uL    Eos # 0.1 0.0 - 0.5 K/uL    Baso # 0.06 0.00 - 0.20 K/uL    nRBC 0 0 /100 WBC    Gran% 44.5 38.0 - 73.0 %    Lymph% 40.3 18.0 - 48.0 %    Mono% 11.2 4.0 - 15.0 %    Eosinophil% 2.1 0.0 - 8.0 %    Basophil% 1.4 0.0 - 1.9 %    Differential Method Automated    Comprehensive metabolic panel   Result Value Ref Range    Sodium 145 136 - 145 mmol/L    Potassium 3.3 (L) 3.5 - 5.1 mmol/L    Chloride 108 95 - 110 mmol/L    CO2 23 23 - 29 mmol/L    Glucose 66 (L) 70 - 110 mg/dL    BUN, Bld 5 (L) 8 - 23 mg/dL    Creatinine 0.6 0.5 - 1.4 mg/dL    Calcium 8.5 (L) 8.7 - 10.5 mg/dL    Total Protein 6.9 6.0 - 8.4 g/dL    Albumin 3.1 (L) 3.5 - 5.2 g/dL    Total Bilirubin 1.1 (H) 0.1 - 1.0 mg/dL    Alkaline Phosphatase 92 55 - 135 U/L    AST 48 (H) 10 - 40 U/L    ALT 16 10 - 44 U/L    Anion Gap 14 8 - 16 mmol/L    eGFR if African American >60.0 >60 mL/min/1.73 m^2    eGFR if non African American >60.0 >60 mL/min/1.73 m^2   Vitamin D   Result Value Ref Range    Vit D, 25-Hydroxy 15 (L) 30 - 96 ng/mL   PSA, Screening   Result Value Ref Range    PSA, SCREEN 0.68 0.00 - 4.00 ng/mL         Assessment:       1. Alcoholic cirrhosis of liver without ascites    2. Thrombocytopenia    3. Preventive measure    4.  Age-related osteoporosis without current pathological fracture    5. Malignant neoplasm of lower lobe of left lung    6. Vitamin D deficiency          Plan:     Alcoholic cirrhosis of liver without ascites- says has stopped EtOH, f/u with Dr. Tejeda.  -     Ambulatory Referral to Hepatology    Thrombocytopenia, related to above, no sign bleeding.    Preventive measure- HAV now.  RTC 3mo for annual.  -     Hepatitis A Vaccine (Adult) (IM)    Age-related osteoporosis without current pathological fracture- restart vit D.    Malignant neoplasm of lower lobe of left lung/ iron def anemia-  -     Ambulatory consult to Hematology / Oncology    Vitamin D deficiency- restart supplement.

## 2019-01-30 ENCOUNTER — LAB VISIT (OUTPATIENT)
Dept: LAB | Facility: HOSPITAL | Age: 74
End: 2019-01-30
Attending: INTERNAL MEDICINE
Payer: MEDICARE

## 2019-01-30 DIAGNOSIS — R17 ELEVATED BILIRUBIN: ICD-10-CM

## 2019-01-30 DIAGNOSIS — E55.9 VITAMIN D DEFICIENCY: ICD-10-CM

## 2019-01-30 DIAGNOSIS — Z29.9 PREVENTIVE MEASURE: ICD-10-CM

## 2019-01-30 DIAGNOSIS — Z12.5 ENCOUNTER FOR SCREENING FOR MALIGNANT NEOPLASM OF PROSTATE: ICD-10-CM

## 2019-01-30 LAB
25(OH)D3+25(OH)D2 SERPL-MCNC: 15 NG/ML
ALBUMIN SERPL BCP-MCNC: 3.1 G/DL
ALP SERPL-CCNC: 92 U/L
ALT SERPL W/O P-5'-P-CCNC: 16 U/L
ANION GAP SERPL CALC-SCNC: 14 MMOL/L
AST SERPL-CCNC: 48 U/L
BASOPHILS # BLD AUTO: 0.06 K/UL
BASOPHILS NFR BLD: 1.4 %
BILIRUB SERPL-MCNC: 1.1 MG/DL
BUN SERPL-MCNC: 5 MG/DL
CALCIUM SERPL-MCNC: 8.5 MG/DL
CHLORIDE SERPL-SCNC: 108 MMOL/L
CO2 SERPL-SCNC: 23 MMOL/L
COMPLEXED PSA SERPL-MCNC: 0.68 NG/ML
CREAT SERPL-MCNC: 0.6 MG/DL
DIFFERENTIAL METHOD: ABNORMAL
EOSINOPHIL # BLD AUTO: 0.1 K/UL
EOSINOPHIL NFR BLD: 2.1 %
ERYTHROCYTE [DISTWIDTH] IN BLOOD BY AUTOMATED COUNT: 13.8 %
EST. GFR  (AFRICAN AMERICAN): >60 ML/MIN/1.73 M^2
EST. GFR  (NON AFRICAN AMERICAN): >60 ML/MIN/1.73 M^2
GLUCOSE SERPL-MCNC: 66 MG/DL
HCT VFR BLD AUTO: 36.4 %
HGB BLD-MCNC: 12.3 G/DL
IMM GRANULOCYTES # BLD AUTO: 0.02 K/UL
IMM GRANULOCYTES NFR BLD AUTO: 0.5 %
LYMPHOCYTES # BLD AUTO: 1.7 K/UL
LYMPHOCYTES NFR BLD: 40.3 %
MCH RBC QN AUTO: 37.8 PG
MCHC RBC AUTO-ENTMCNC: 33.8 G/DL
MCV RBC AUTO: 112 FL
MONOCYTES # BLD AUTO: 0.5 K/UL
MONOCYTES NFR BLD: 11.2 %
NEUTROPHILS # BLD AUTO: 1.9 K/UL
NEUTROPHILS NFR BLD: 44.5 %
NRBC BLD-RTO: 0 /100 WBC
PLATELET # BLD AUTO: 126 K/UL
PMV BLD AUTO: 9.7 FL
POTASSIUM SERPL-SCNC: 3.3 MMOL/L
PROT SERPL-MCNC: 6.9 G/DL
RBC # BLD AUTO: 3.25 M/UL
SODIUM SERPL-SCNC: 145 MMOL/L
WBC # BLD AUTO: 4.19 K/UL

## 2019-01-30 PROCEDURE — 36415 COLL VENOUS BLD VENIPUNCTURE: CPT

## 2019-01-30 PROCEDURE — 84153 ASSAY OF PSA TOTAL: CPT

## 2019-01-30 PROCEDURE — 82306 VITAMIN D 25 HYDROXY: CPT

## 2019-01-30 PROCEDURE — 80053 COMPREHEN METABOLIC PANEL: CPT

## 2019-01-30 PROCEDURE — 85025 COMPLETE CBC W/AUTO DIFF WBC: CPT

## 2019-02-06 ENCOUNTER — OFFICE VISIT (OUTPATIENT)
Dept: INTERNAL MEDICINE | Facility: CLINIC | Age: 74
End: 2019-02-06
Payer: MEDICARE

## 2019-02-06 VITALS
HEART RATE: 89 BPM | DIASTOLIC BLOOD PRESSURE: 80 MMHG | OXYGEN SATURATION: 98 % | BODY MASS INDEX: 21.13 KG/M2 | SYSTOLIC BLOOD PRESSURE: 134 MMHG | HEIGHT: 69 IN | WEIGHT: 142.63 LBS | TEMPERATURE: 99 F

## 2019-02-06 DIAGNOSIS — M81.0 AGE-RELATED OSTEOPOROSIS WITHOUT CURRENT PATHOLOGICAL FRACTURE: ICD-10-CM

## 2019-02-06 DIAGNOSIS — K70.30 ALCOHOLIC CIRRHOSIS OF LIVER WITHOUT ASCITES: Primary | ICD-10-CM

## 2019-02-06 DIAGNOSIS — D69.6 THROMBOCYTOPENIA: ICD-10-CM

## 2019-02-06 DIAGNOSIS — C34.32 MALIGNANT NEOPLASM OF LOWER LOBE OF LEFT LUNG: ICD-10-CM

## 2019-02-06 DIAGNOSIS — D50.0 IRON DEFICIENCY ANEMIA DUE TO CHRONIC BLOOD LOSS: ICD-10-CM

## 2019-02-06 DIAGNOSIS — Z29.9 PREVENTIVE MEASURE: ICD-10-CM

## 2019-02-06 DIAGNOSIS — E55.9 VITAMIN D DEFICIENCY: ICD-10-CM

## 2019-02-06 PROCEDURE — 99999 PR PBB SHADOW E&M-EST. PATIENT-LVL IV: ICD-10-PCS | Mod: PBBFAC,,, | Performed by: INTERNAL MEDICINE

## 2019-02-06 PROCEDURE — 90471 IMMUNIZATION ADMIN: CPT | Mod: PBBFAC,PN

## 2019-02-06 PROCEDURE — 99214 OFFICE O/P EST MOD 30 MIN: CPT | Mod: 25,S$PBB,, | Performed by: INTERNAL MEDICINE

## 2019-02-06 PROCEDURE — 99214 PR OFFICE/OUTPT VISIT, EST, LEVL IV, 30-39 MIN: ICD-10-PCS | Mod: 25,S$PBB,, | Performed by: INTERNAL MEDICINE

## 2019-02-06 PROCEDURE — 99999 PR PBB SHADOW E&M-EST. PATIENT-LVL IV: CPT | Mod: PBBFAC,,, | Performed by: INTERNAL MEDICINE

## 2019-02-06 PROCEDURE — 99214 OFFICE O/P EST MOD 30 MIN: CPT | Mod: PBBFAC,PN,25 | Performed by: INTERNAL MEDICINE

## 2019-02-12 ENCOUNTER — DOCUMENTATION ONLY (OUTPATIENT)
Dept: TRANSPLANT | Facility: CLINIC | Age: 74
End: 2019-02-12

## 2019-02-12 NOTE — LETTER
February 12, 2019    Santy Anderson  [address] [place] [address]      Dear Santy Anderson:    Your doctor has referred you to the [facility]. We are sending this letter to remind you to make an appointment with us to complete the referral process.     Please call us at [phone] to schedule an appointment. We look forward to seeing you soon.     If you received a call and have been scheduled, please disregard this letter.       Sincerely,        [facility]   [address] [place] [address] [id number] [phone]

## 2019-02-12 NOTE — NURSING
Pt records reviewed.   Pt will be referred to Hepatology.    Initial referral received  from the workque.   Referring Provider/diagnosis    CARLTON BERKOWITZ Provider:   Diagnosis: Alcoholic cirrhosis of liver without ascites       Referral letter sent to patient.

## 2019-02-13 ENCOUNTER — TELEPHONE (OUTPATIENT)
Dept: INTERNAL MEDICINE | Facility: CLINIC | Age: 74
End: 2019-02-13

## 2019-02-13 ENCOUNTER — OFFICE VISIT (OUTPATIENT)
Dept: HEMATOLOGY/ONCOLOGY | Facility: CLINIC | Age: 74
End: 2019-02-13
Payer: MEDICARE

## 2019-02-13 ENCOUNTER — LAB VISIT (OUTPATIENT)
Dept: LAB | Facility: HOSPITAL | Age: 74
End: 2019-02-13
Attending: INTERNAL MEDICINE
Payer: MEDICARE

## 2019-02-13 VITALS
TEMPERATURE: 98 F | HEIGHT: 69 IN | HEART RATE: 91 BPM | WEIGHT: 141.13 LBS | RESPIRATION RATE: 20 BRPM | SYSTOLIC BLOOD PRESSURE: 144 MMHG | BODY MASS INDEX: 20.9 KG/M2 | DIASTOLIC BLOOD PRESSURE: 91 MMHG | OXYGEN SATURATION: 100 %

## 2019-02-13 DIAGNOSIS — D50.0 IRON DEFICIENCY ANEMIA DUE TO CHRONIC BLOOD LOSS: ICD-10-CM

## 2019-02-13 DIAGNOSIS — D49.1 NEOPLASM OF LUNG: ICD-10-CM

## 2019-02-13 DIAGNOSIS — C34.32 MALIGNANT NEOPLASM OF LOWER LOBE OF LEFT LUNG: Primary | ICD-10-CM

## 2019-02-13 DIAGNOSIS — C34.32 MALIGNANT NEOPLASM OF LOWER LOBE OF LEFT LUNG: ICD-10-CM

## 2019-02-13 LAB
ALBUMIN SERPL BCP-MCNC: 3.1 G/DL
ALP SERPL-CCNC: 78 U/L
ALT SERPL W/O P-5'-P-CCNC: 15 U/L
ANION GAP SERPL CALC-SCNC: 11 MMOL/L
AST SERPL-CCNC: 48 U/L
BASOPHILS # BLD AUTO: 0.06 K/UL
BASOPHILS NFR BLD: 2 %
BILIRUB SERPL-MCNC: 0.9 MG/DL
BUN SERPL-MCNC: 4 MG/DL
CALCIUM SERPL-MCNC: 8.8 MG/DL
CHLORIDE SERPL-SCNC: 109 MMOL/L
CO2 SERPL-SCNC: 26 MMOL/L
CREAT SERPL-MCNC: 0.7 MG/DL
DIFFERENTIAL METHOD: ABNORMAL
EOSINOPHIL # BLD AUTO: 0.1 K/UL
EOSINOPHIL NFR BLD: 4 %
ERYTHROCYTE [DISTWIDTH] IN BLOOD BY AUTOMATED COUNT: 15.5 %
EST. GFR  (AFRICAN AMERICAN): >60 ML/MIN/1.73 M^2
EST. GFR  (NON AFRICAN AMERICAN): >60 ML/MIN/1.73 M^2
FERRITIN SERPL-MCNC: 137 NG/ML
GLUCOSE SERPL-MCNC: 126 MG/DL
HCT VFR BLD AUTO: 36.6 %
HGB BLD-MCNC: 12.6 G/DL
IMM GRANULOCYTES # BLD AUTO: 0.01 K/UL
IMM GRANULOCYTES NFR BLD AUTO: 0.3 %
IRON SERPL-MCNC: 130 UG/DL
LYMPHOCYTES # BLD AUTO: 1.5 K/UL
LYMPHOCYTES NFR BLD: 49.7 %
MCH RBC QN AUTO: 39.6 PG
MCHC RBC AUTO-ENTMCNC: 34.4 G/DL
MCV RBC AUTO: 115 FL
MONOCYTES # BLD AUTO: 0.4 K/UL
MONOCYTES NFR BLD: 13.1 %
NEUTROPHILS # BLD AUTO: 0.9 K/UL
NEUTROPHILS NFR BLD: 31.2 %
NRBC BLD-RTO: 0 /100 WBC
PLATELET # BLD AUTO: 89 K/UL
PLATELET BLD QL SMEAR: ABNORMAL
PMV BLD AUTO: 8.8 FL
POTASSIUM SERPL-SCNC: 3.7 MMOL/L
PROT SERPL-MCNC: 6.6 G/DL
RBC # BLD AUTO: 3.18 M/UL
SATURATED IRON: 52 %
SODIUM SERPL-SCNC: 146 MMOL/L
TOTAL IRON BINDING CAPACITY: 252 UG/DL
TRANSFERRIN SERPL-MCNC: 170 MG/DL
WBC # BLD AUTO: 2.98 K/UL

## 2019-02-13 PROCEDURE — 80053 COMPREHEN METABOLIC PANEL: CPT

## 2019-02-13 PROCEDURE — 99999 PR PBB SHADOW E&M-EST. PATIENT-LVL III: CPT | Mod: PBBFAC,,, | Performed by: INTERNAL MEDICINE

## 2019-02-13 PROCEDURE — 99214 PR OFFICE/OUTPT VISIT, EST, LEVL IV, 30-39 MIN: ICD-10-PCS | Mod: S$PBB,,, | Performed by: INTERNAL MEDICINE

## 2019-02-13 PROCEDURE — 99213 OFFICE O/P EST LOW 20 MIN: CPT | Mod: PBBFAC,PN | Performed by: INTERNAL MEDICINE

## 2019-02-13 PROCEDURE — 99999 PR PBB SHADOW E&M-EST. PATIENT-LVL III: ICD-10-PCS | Mod: PBBFAC,,, | Performed by: INTERNAL MEDICINE

## 2019-02-13 PROCEDURE — 83540 ASSAY OF IRON: CPT

## 2019-02-13 PROCEDURE — 82728 ASSAY OF FERRITIN: CPT

## 2019-02-13 PROCEDURE — 36415 COLL VENOUS BLD VENIPUNCTURE: CPT

## 2019-02-13 PROCEDURE — 99214 OFFICE O/P EST MOD 30 MIN: CPT | Mod: S$PBB,,, | Performed by: INTERNAL MEDICINE

## 2019-02-13 PROCEDURE — 85025 COMPLETE CBC W/AUTO DIFF WBC: CPT

## 2019-02-13 NOTE — PROGRESS NOTES
Subjective:       Patient ID: Santy Anderson is a 73 y.o. male.    Chief Complaint: Anemia and Results    HPI 73-year-old male returns history of iron deficiency anemia.  Patient is doing reasonably well feels good no nausea vomiting fevers chills night sweats    Past Medical History:   Diagnosis Date    Alcoholic cirrhosis of liver with ascites     Arthritis     Gastric mass     GERD (gastroesophageal reflux disease)     Hyperlipidemia     Lung cancer      Family History   Problem Relation Age of Onset    Diabetes Mother     Hypertension Mother     Colon cancer Neg Hx      Social History     Socioeconomic History    Marital status:      Spouse name: Not on file    Number of children: Not on file    Years of education: Not on file    Highest education level: Not on file   Social Needs    Financial resource strain: Not on file    Food insecurity - worry: Not on file    Food insecurity - inability: Not on file    Transportation needs - medical: Not on file    Transportation needs - non-medical: Not on file   Occupational History    Not on file   Tobacco Use    Smoking status: Former Smoker     Packs/day: 0.50     Years: 25.00     Pack years: 12.50     Last attempt to quit: 10/16/1988     Years since quittin.3    Smokeless tobacco: Never Used   Substance and Sexual Activity    Alcohol use: Yes     Alcohol/week: 1.8 oz     Types: 3 Cans of beer per week     Comment: socailly    Drug use: No    Sexual activity: No   Other Topics Concern    Not on file   Social History Narrative         Past Surgical History:   Procedure Laterality Date    ABDOMINAL SURGERY      COLONOSCOPY N/A 2017    [facility]formed by [facility] at [facility]    EGD (ESOPHAGOGASTRODUODENOSCOPY) N/A 10/16/2013    Performed by Marvin Baez [facility] at [facility] ENDO    ESOPHAGOGASTRODUODENOSCOPY      ESOPHAGOGASTRODUODENOSCOPY (EGD) N/A 2018    Performed by [facility]le Tejeda [facility] at [facility] ENDO     ESOPHAGOGASTRODUODENOSCOPY (EGD) N/A 2/26/2018    Performed by [facility] at [facility] END[facility]    ESOPHAGOGASTRODUODENOSCOPY (EGD) N/A 10/20/2016    Performed by [facility]ne [facility] at [facility] ENDO    ESOPHAGOGASTRODUODENOSCOPY (EGD) N/A 9/22/2016    Performed by [facility]ne [facility] at [facility] END[facility]    FEMUR SURGERY Right     GASTRECTOMY      GASTRECTOMY, PARTIAL N/A 10/30/2013    Performed by Louis O. Jeansonne IV, MD at [facility] OR    GASTRECTOMY-LAPAROSCOPIC N/A 10/30/2013    Performed by Louis [facility]. [facility]ansonne IV, [facility] at [facility] OR    HAND SURGERY Left     KNEE SURGERY Right     laparotomy for trauma (negative)  1979    left lower lobectomy      REMOVAL-HARDWARE-LEG Right 6/20/2017    Performed by Red Eller MD at [facility] OR       Labs:  Lab Results   Component Value Date    WBC 2.98 (L) 02/13/2019    HGB 12.6 (L) 02/13/2019    HCT 36.6 (L) 02/13/2019     (H) 02/13/2019    PLT 89 (L) 02/13/2019     BMP  Lab Results   Component Value Date     (H) 02/13/2019    K 3.7 02/13/2019     02/13/2019    CO2 26 02/13/2019    BUN 4 (L) 02/13/2019    CREATININE 0.7 02/13/2019    CALCIUM 8.8 02/13/2019    ANIONGAP 11 02/13/2019    ESTGFRAFRICA >60 02/13/2019    EGFRNONAA >60 02/13/2019     Lab Results   Component Value Date    ALT 15 02/13/2019    AST 48 (H) 02/13/2019    ALKPHOS 78 02/13/2019    BILITOT 0.9 02/13/2019       Lab Results   Component Value Date    IRON 252 (H) 01/23/2018    TIBC 290 01/23/2018    FERRITIN 197 01/23/2018     Lab Results   Component Value Date    AUSKHHZN58 370 09/23/2016     Lab Results   Component Value Date    FOLATE 8.8 09/23/2016     Lab Results   Component Value Date    TSH 2.376 01/25/2017         Review of Systems   Constitutional: Positive for activity change and fatigue. Negative for appetite change, chills, diaphoresis, fever and unexpected weight change.   HENT: Negative for congestion, dental problem, drooling, ear discharge, ear pain, facial swelling, hearing loss,  mouth sores, nosebleeds, postnasal drip, rhinorrhea, sinus pressure, sneezing, sore throat, tinnitus, trouble swallowing and voice change.    Eyes: Negative for photophobia, pain, discharge, redness, itching and visual disturbance.   Respiratory: Negative for apnea, cough, choking, chest tightness, shortness of breath, wheezing and stridor.    Cardiovascular: Negative for chest pain, palpitations and leg swelling.   Gastrointestinal: Negative for abdominal distention, abdominal pain, anal bleeding, blood in stool, constipation, diarrhea, nausea, rectal pain and vomiting.   Endocrine: Negative for cold intolerance, heat intolerance, polydipsia, polyphagia and polyuria.   Genitourinary: Negative for decreased urine volume, difficulty urinating, discharge, dysuria, enuresis, flank pain, frequency, genital sores, hematuria, penile pain, penile swelling, scrotal swelling, testicular pain and urgency.   Musculoskeletal: Negative for arthralgias, back pain, gait problem, joint swelling, myalgias, neck pain and neck stiffness.   Skin: Negative for color change, pallor, rash and wound.   Allergic/Immunologic: Negative for environmental allergies, food allergies and immunocompromised state.   Neurological: Positive for weakness. Negative for dizziness, tremors, seizures, syncope, facial asymmetry, speech difficulty, light-headedness, numbness and headaches.   Hematological: Negative for adenopathy. Does not bruise/bleed easily.   Psychiatric/Behavioral: Positive for dysphoric mood. Negative for agitation, behavioral problems, confusion, decreased concentration, hallucinations, self-injury, sleep disturbance and suicidal ideas. The patient is nervous/anxious. The patient is not hyperactive.        Objective:      Physical Exam   Constitutional: He is oriented to person, place, and time. He appears well-developed and well-nourished. He appears distressed.   HENT:   Head: Normocephalic.   Right Ear: External ear normal.   Left  Ear: External ear normal.   Nose: Nose normal. Right sinus exhibits no maxillary sinus tenderness and no frontal sinus tenderness. Left sinus exhibits no maxillary sinus tenderness and no frontal sinus tenderness.   Mouth/Throat: Oropharynx is clear and moist. No oropharyngeal exudate.   Eyes: EOM and lids are normal. Pupils are equal, round, and reactive to light. Right eye exhibits no discharge. Left eye exhibits no discharge. Right conjunctiva is not injected. Right conjunctiva has no hemorrhage. Left conjunctiva is not injected. Left conjunctiva has no hemorrhage. No scleral icterus. Right eye exhibits normal extraocular motion. Left eye exhibits normal extraocular motion.   Neck: Normal range of motion. Neck supple. No JVD present. No tracheal deviation present. No thyromegaly present.   Cardiovascular: Normal rate and regular rhythm.   Pulmonary/Chest: Effort normal. No stridor. No respiratory distress.   Abdominal: Soft. He exhibits no mass. There is no hepatosplenomegaly, splenomegaly or hepatomegaly. There is no tenderness.   Musculoskeletal: Normal range of motion. He exhibits no edema or tenderness.   Lymphadenopathy:        Head (right side): No posterior auricular and no occipital adenopathy present.        Head (left side): No posterior auricular and no occipital adenopathy present.     He has no cervical adenopathy.        Right cervical: No superficial cervical, no deep cervical and no posterior cervical adenopathy present.       Left cervical: No superficial cervical, no deep cervical and no posterior cervical adenopathy present.     He has no axillary adenopathy.        Right: No supraclavicular adenopathy present.        Left: No supraclavicular adenopathy present.   Neurological: He is alert and oriented to person, place, and time. He has normal strength. No cranial nerve deficit. Coordination normal.   Skin: Skin is dry. No rash noted. He is not diaphoretic. No cyanosis or erythema. Nails show  no clubbing.   Psychiatric: He has a normal mood and affect. His behavior is normal. Judgment and thought content normal. Cognition and memory are normal.   Vitals reviewed.          Assessment:      1. Malignant neoplasm of lower lobe of left lung    2. Neoplasm of lung    3. Iron deficiency anemia due to chronic blood loss           Plan:   Patient's hemoglobin is stable at this point return in the 4-6 months with repeat laboratory studies as well as CT chest follow-up from non-small cell lung carcinoma 2013          Otto Lara Jr, MD FACP

## 2019-02-13 NOTE — LETTER
February 13, 2019      Joanie Peterson MD  [address]  [place] [address]           [facility]  [address] [facility] [address]  Phone: [phone]  Fax: [phone]          Patient: Santy Anderson   MR Number: [id number]   YOB: 1945   Date of Visit: 2/13/2019       Dear Dr. Joanie Peterson:    Thank you for referring Santy Anderson to me for evaluation. Attached you will find relevant portions of my assessment and plan of care.    If you have questions, please do not hesitate to call me. I look forward to following Santy Anderson along with you.    Sincerely,    Otto Lara MD    Enclosure  CC:  No Recipients    If you would like to receive this communication electronically, please contact [email or web address] or [phone] to request more information on [facility] access.    For providers and/or their staff who would like to refer a patient to [facility], please contact us through our one-stop-shop provider referral line, [facility], at [phone].    If you feel you have received this communication in error or would no longer like to receive these types of communications, please e-mail [email or web address]

## 2019-02-13 NOTE — TELEPHONE ENCOUNTER
Called and left a message for patient to call me back regarding questions he has for Dr. Simms about being okay to have cataract surgery.

## 2019-02-13 NOTE — PROGRESS NOTES
Distress Screening Results: Psychosocial Distress screening score of Distress Score: 0 {AMB ONC DISTRESS SCORE:42645}

## 2019-02-14 ENCOUNTER — OFFICE VISIT (OUTPATIENT)
Dept: INTERNAL MEDICINE | Facility: CLINIC | Age: 74
End: 2019-02-14
Payer: MEDICARE

## 2019-02-14 VITALS
DIASTOLIC BLOOD PRESSURE: 72 MMHG | WEIGHT: 143.06 LBS | HEART RATE: 94 BPM | HEIGHT: 69 IN | BODY MASS INDEX: 21.19 KG/M2 | TEMPERATURE: 97 F | SYSTOLIC BLOOD PRESSURE: 138 MMHG | OXYGEN SATURATION: 99 %

## 2019-02-14 DIAGNOSIS — H25.013 CORTICAL AGE-RELATED CATARACT OF BOTH EYES: Primary | ICD-10-CM

## 2019-02-14 PROCEDURE — 99499 NO LOS: ICD-10-PCS | Mod: S$PBB,,, | Performed by: INTERNAL MEDICINE

## 2019-02-14 PROCEDURE — 99213 OFFICE O/P EST LOW 20 MIN: CPT | Mod: PBBFAC,PN | Performed by: INTERNAL MEDICINE

## 2019-02-14 PROCEDURE — 99999 PR PBB SHADOW E&M-EST. PATIENT-LVL III: ICD-10-PCS | Mod: PBBFAC,,, | Performed by: INTERNAL MEDICINE

## 2019-02-14 PROCEDURE — 99999 PR PBB SHADOW E&M-EST. PATIENT-LVL III: CPT | Mod: PBBFAC,,, | Performed by: INTERNAL MEDICINE

## 2019-02-14 PROCEDURE — 99499 UNLISTED E&M SERVICE: CPT | Mod: S$PBB,,, | Performed by: INTERNAL MEDICINE

## 2019-02-14 NOTE — PROGRESS NOTES
"Subjective:      Patient ID: Santy Anderson is a 73 y.o. male.    Chief Complaint: Advice Only (cataract surgery)      HPI  [facility] vision says pt needs bilat cat surgery.    Updated/ annual due 5/19:  HM: ref fluvax, 2/19 today HAV, 6/16 [id number], 11/18 [id number], 6/16 TDaP, 1/17 BMD hi fx risk hip rec bisphos rep 2y, unk Cscope, 6/16 HCV neg, 7/17 Eye doc at [facility], 2/18 CXR clear, 11/17 Hep panel neg.       Review of Systems      Objective:   /72 (BP Location: Right arm, Patient Position: Sitting, BP Method: Medium (Manual))   Pulse 94   Temp 97.1 °F (36.2 °C) (Tympanic)   Ht 5' 9" (1.753 m)   Wt 64.9 kg (143 lb 1.3 oz)   SpO2 99%   BMI 21.13 kg/m²     Physical Exam        Assessment:       1. Cortical age-related cataract of both eyes          Plan:     Cortical age-related cataract of both eyes  -     Ambulatory referral to Ophthalmology      "

## 2019-02-21 ENCOUNTER — TELEPHONE (OUTPATIENT)
Dept: GASTROENTEROLOGY | Facility: CLINIC | Age: 74
End: 2019-02-21

## 2019-02-21 NOTE — TELEPHONE ENCOUNTER
----- Message from Judy Deluna sent at 2/21/2019  7:09 AM CST -----  Contact: [phone]   Pt would like to schedule a Liver check up  appt.  Please brandi back at [phone].  x-

## 2019-03-04 ENCOUNTER — NURSE TRIAGE (OUTPATIENT)
Dept: ADMINISTRATIVE | Facility: CLINIC | Age: 74
End: 2019-03-04

## 2019-03-04 ENCOUNTER — HOSPITAL ENCOUNTER (EMERGENCY)
Facility: HOSPITAL | Age: 74
Discharge: HOME | End: 2019-03-04
Attending: EMERGENCY MEDICINE
Payer: MEDICARE

## 2019-03-04 VITALS
WEIGHT: 141.56 LBS | HEART RATE: 110 BPM | TEMPERATURE: 98 F | OXYGEN SATURATION: 99 % | BODY MASS INDEX: 20.97 KG/M2 | HEIGHT: 69 IN | SYSTOLIC BLOOD PRESSURE: 114 MMHG | DIASTOLIC BLOOD PRESSURE: 71 MMHG | RESPIRATION RATE: 19 BRPM

## 2019-03-04 DIAGNOSIS — R10.13 EPIGASTRIC PAIN: ICD-10-CM

## 2019-03-04 DIAGNOSIS — R44.1 VISUAL HALLUCINATIONS: Primary | ICD-10-CM

## 2019-03-04 DIAGNOSIS — K70.30 ALCOHOLIC CIRRHOSIS OF LIVER WITHOUT ASCITES: ICD-10-CM

## 2019-03-04 DIAGNOSIS — F22 DELUSIONAL DISORDER: ICD-10-CM

## 2019-03-04 DIAGNOSIS — Z87.898 HISTORY OF ALCOHOL USE: ICD-10-CM

## 2019-03-04 DIAGNOSIS — R82.90 ABNORMAL URINE: ICD-10-CM

## 2019-03-04 PROBLEM — R41.0 DELIRIUM: Status: ACTIVE | Noted: 2019-03-04

## 2019-03-04 LAB
ALBUMIN SERPL BCP-MCNC: 3.8 G/DL
ALP SERPL-CCNC: 68 U/L
ALT SERPL W/O P-5'-P-CCNC: 16 U/L
AMMONIA PLAS-SCNC: 37 UMOL/L
ANION GAP SERPL CALC-SCNC: 20 MMOL/L
ANION GAP SERPL CALC-SCNC: 20 MMOL/L
APAP SERPL-MCNC: <3 UG/ML
AST SERPL-CCNC: 53 U/L
BACTERIA #/AREA URNS HPF: ABNORMAL /HPF
BILIRUB SERPL-MCNC: 2.6 MG/DL
BILIRUB UR QL STRIP: ABNORMAL
BUN SERPL-MCNC: 10 MG/DL
BUN SERPL-MCNC: 10 MG/DL
CALCIUM SERPL-MCNC: 9.9 MG/DL
CALCIUM SERPL-MCNC: 9.9 MG/DL
CHLORIDE SERPL-SCNC: 98 MMOL/L
CHLORIDE SERPL-SCNC: 98 MMOL/L
CLARITY UR: CLEAR
CO2 SERPL-SCNC: 22 MMOL/L
CO2 SERPL-SCNC: 22 MMOL/L
COLOR UR: ABNORMAL
CREAT SERPL-MCNC: 1 MG/DL
CREAT SERPL-MCNC: 1 MG/DL
ERYTHROCYTE [DISTWIDTH] IN BLOOD BY AUTOMATED COUNT: 17.4 %
EST. GFR  (AFRICAN AMERICAN): >60 ML/MIN/1.73 M^2
EST. GFR  (AFRICAN AMERICAN): >60 ML/MIN/1.73 M^2
EST. GFR  (NON AFRICAN AMERICAN): >60 ML/MIN/1.73 M^2
EST. GFR  (NON AFRICAN AMERICAN): >60 ML/MIN/1.73 M^2
ETHANOL SERPL-MCNC: <10 MG/DL
GLUCOSE SERPL-MCNC: 88 MG/DL
GLUCOSE SERPL-MCNC: 88 MG/DL
GLUCOSE UR QL STRIP: ABNORMAL
HCT VFR BLD AUTO: 41.1 %
HGB BLD-MCNC: 14.4 G/DL
HGB UR QL STRIP: ABNORMAL
HYALINE CASTS #/AREA URNS LPF: 30 /LPF
KETONES UR QL STRIP: ABNORMAL
LEUKOCYTE ESTERASE UR QL STRIP: ABNORMAL
LIPASE SERPL-CCNC: 3 U/L
MCH RBC QN AUTO: 40.8 PG
MCHC RBC AUTO-ENTMCNC: 35 G/DL
MCV RBC AUTO: 116 FL
MICROSCOPIC COMMENT: ABNORMAL
NITRITE UR QL STRIP: ABNORMAL
PH UR STRIP: ABNORMAL [PH] (ref 5–8)
PLATELET # BLD AUTO: 98 K/UL
PMV BLD AUTO: 10.2 FL
POTASSIUM SERPL-SCNC: 4.2 MMOL/L
POTASSIUM SERPL-SCNC: 4.2 MMOL/L
PROT SERPL-MCNC: 7.8 G/DL
PROT UR QL STRIP: ABNORMAL
RBC # BLD AUTO: 3.53 M/UL
SODIUM SERPL-SCNC: 140 MMOL/L
SODIUM SERPL-SCNC: 140 MMOL/L
SP GR UR STRIP: ABNORMAL (ref 1–1.03)
TSH SERPL DL<=0.005 MIU/L-ACNC: 2.52 UIU/ML
URN SPEC COLLECT METH UR: ABNORMAL
UROBILINOGEN UR STRIP-ACNC: ABNORMAL EU/DL
WBC # BLD AUTO: 8.63 K/UL
WBC #/AREA URNS HPF: 10 /HPF (ref 0–5)

## 2019-03-04 PROCEDURE — 83690 ASSAY OF LIPASE: CPT

## 2019-03-04 PROCEDURE — 84443 ASSAY THYROID STIM HORMONE: CPT

## 2019-03-04 PROCEDURE — 96365 THER/PROPH/DIAG IV INF INIT: CPT

## 2019-03-04 PROCEDURE — 80320 DRUG SCREEN QUANTALCOHOLS: CPT

## 2019-03-04 PROCEDURE — 36415 COLL VENOUS BLD VENIPUNCTURE: CPT

## 2019-03-04 PROCEDURE — G0425 INPT/ED TELECONSULT30: HCPCS | Mod: GT,,, | Performed by: PSYCHIATRY & NEUROLOGY

## 2019-03-04 PROCEDURE — 63600175 PHARM REV CODE 636 W HCPCS: Performed by: EMERGENCY MEDICINE

## 2019-03-04 PROCEDURE — 80329 ANALGESICS NON-OPIOID 1 OR 2: CPT

## 2019-03-04 PROCEDURE — 81000 URINALYSIS NONAUTO W/SCOPE: CPT | Mod: 59

## 2019-03-04 PROCEDURE — G0425 PR INPT TELEHEALTH CONSULT 30M: ICD-10-PCS | Mod: GT,,, | Performed by: PSYCHIATRY & NEUROLOGY

## 2019-03-04 PROCEDURE — 93005 ELECTROCARDIOGRAM TRACING: CPT

## 2019-03-04 PROCEDURE — 25000003 PHARM REV CODE 250: Performed by: EMERGENCY MEDICINE

## 2019-03-04 PROCEDURE — 80053 COMPREHEN METABOLIC PANEL: CPT

## 2019-03-04 PROCEDURE — 96375 TX/PRO/DX INJ NEW DRUG ADDON: CPT

## 2019-03-04 PROCEDURE — 93010 ELECTROCARDIOGRAM REPORT: CPT | Mod: ,,, | Performed by: INTERNAL MEDICINE

## 2019-03-04 PROCEDURE — 93010 EKG 12-LEAD: ICD-10-PCS | Mod: ,,, | Performed by: INTERNAL MEDICINE

## 2019-03-04 PROCEDURE — 82140 ASSAY OF AMMONIA: CPT

## 2019-03-04 PROCEDURE — 85027 COMPLETE CBC AUTOMATED: CPT

## 2019-03-04 PROCEDURE — 99285 EMERGENCY DEPT VISIT HI MDM: CPT | Mod: 25

## 2019-03-04 RX ORDER — OLANZAPINE 10 MG/2ML
1.25 INJECTION, POWDER, FOR SOLUTION INTRAMUSCULAR EVERY 8 HOURS PRN
Status: DISCONTINUED | OUTPATIENT
Start: 2019-03-04 | End: 2019-03-04 | Stop reason: HOSPADM

## 2019-03-04 RX ORDER — ACETAMINOPHEN 10 MG/ML
1000 INJECTION, SOLUTION INTRAVENOUS EVERY 8 HOURS
Status: DISCONTINUED | OUTPATIENT
Start: 2019-03-04 | End: 2019-03-04

## 2019-03-04 RX ORDER — IBUPROFEN 400 MG/1
400 TABLET ORAL
Status: COMPLETED | OUTPATIENT
Start: 2019-03-04 | End: 2019-03-04

## 2019-03-04 RX ORDER — ONDANSETRON 2 MG/ML
4 INJECTION INTRAMUSCULAR; INTRAVENOUS
Status: COMPLETED | OUTPATIENT
Start: 2019-03-04 | End: 2019-03-04

## 2019-03-04 RX ADMIN — ONDANSETRON 4 MG: 2 INJECTION INTRAMUSCULAR; INTRAVENOUS at 05:03

## 2019-03-04 RX ADMIN — IBUPROFEN 400 MG: 400 TABLET ORAL at 04:03

## 2019-03-04 RX ADMIN — CEFTRIAXONE 1 G: 1 INJECTION, SOLUTION INTRAVENOUS at 06:03

## 2019-03-04 NOTE — ED NOTES
vannesa from the lab called to state that she was unable to run the urine for a UDS, she tried the urine on two machines and neither would give a result. Vannesa is calling technical support to see if they have any ideas to make it run. MD notified.

## 2019-03-04 NOTE — ED NOTES
Belongings to be placed in [facility] locker 27:    ID, vitamins, cell phone, keys, socks, jacket, pants, wallet (with cash placed in sealed bag-212 dollars-witnessed by GEMA Matias, Ebony RN, KELI Tucker), underwear, belt, hat, shoes, shirt

## 2019-03-04 NOTE — ED NOTES
Admit packet faxed to [facility], [facility] [place] [facility], [facility] [place], [facility], [facility], [facility], [facility], [facility], [facility], [facility], and [facility].  Awaiting response.

## 2019-03-04 NOTE — ED NOTES
Patient had extreme difficulty swallowing PO ibuprofen but was successful in swallowing medication

## 2019-03-04 NOTE — HPI
"Santy Anderson is a 73 y.o. male patient with a PMHx of hepatic cirrhosis, GERD, HLD, Lung CA, gastric mass who presents to the Emergency Department for AMS. Medical Record is being used for HPI. It is reported that patient called 911 because he saw people in his house. When police arrived, no one was in the patients house. Patient was subsequently brought to ED for further evaluation. Patient states he came to ER to have his "chest pain evaluated'. When reminded about above, he states there were several people coming in and out of his home and staying there. Some are living in his garage as well. He is disoriented to time. He lives at home alone, and says he has a neighbor(Tnd) that comes to check on him daily. Phone number provided ([phone]), but no answer. Patient's son, Ben, contacted as well [phone] who does not live in town. He is unaware of patient's living condition or supportive care. He has been evaluated by telepsych this morning. He admits to come ETOH use. Labs and vitals are stable. Hospital medicine consulted for further recommendations.       "

## 2019-03-04 NOTE — ED NOTES
Case management called about social work consult, stated she would let one of the social workers know and send them to the ED to speak with the physicians.

## 2019-03-04 NOTE — ED NOTES
In and out catheter performed using sterile technique. Garrett Grimm RN present. Pt tolerated well.

## 2019-03-04 NOTE — ED NOTES
Pt lying in bed in NAD,VSS,RR equal and unlabored. Bed is low, locked, and call light in reach. Side rails up x 2. Lights turned off in pt room for comfort, no further needs at this time.

## 2019-03-04 NOTE — TELEPHONE ENCOUNTER
"Taking zofran and started hallucinating.    Reason for Disposition   [1] Patient is very confused (disoriented, slurred speech) AND [2] no other adult (e.g., friend or family member) available    Answer Assessment - Initial Assessment Questions  1. CONCERN: "What happened that made you call today?"      People coming in and sitting on my yard  2. SCHIZOPHRENIA SYMPTOM SCREENING: "Have you been hearing voices (or seeing things) that others do not seem to hear (or see)? "What are the voices telling you to do?"      Seeing things  3. RISK OF HARM - SUICIDAL IDEATION:  "Do you ever have thoughts of hurting or killing yourself?"  (e.g., yes, no, no but preoccupation with thoughts about death)    - INTENT:  "Do you have thoughts of hurting or killing yourself right NOW?" (e.g., yes, no, N/A)    - PLAN: "Do you have a specific plan for how you would do this?" (e.g., gun, knife, overdose, no plan, N/A)      Un answered  4. RISK OF HARM - HOMICIDAL IDEATION:  "Do you ever have thoughts of hurting or killing someone else?"  (e.g., yes, no, no but preoccupation with thoughts about death)    - INTENT:  "Do you have thoughts of hurting or killing someone right NOW?" (e.g., yes, no, N/A)    - PLAN: "Do you have a specific plan for how you would do this?" (e.g., gun, knife, no plan, N/A)       unanswered  5. FUNCTIONAL IMPAIRMENT: "How have things been going for you overall in your life? Have you had any more difficulties than usual doing your normal daily activities?"  (e.g., better, same, worse; self-care, school, work, interactions)      All of a sudden the people arrived  6. SUPPORT: "Who is with you now?" "Who do you live with?" "Do you have family or friends nearby who you can talk to?"       No one  7. THERAPIST: "Do you have a counselor or therapist? Name?"      unanswerd  8. STRESSORS: "Has there been any new stress or recent changes in your life?"      unanswered  9. DRUG ABUSE/ALCOHOL: "Do you drink alcohol or use any " "illegal drugs?"       alcohol  10. OTHER: "Do you have any other health or medical symptoms right now?" (e.g., fever)        Lung cancer  11. PREGNANCY: "Is there any chance you are pregnant?" "When was your last menstrual period?"        n/a    Protocols used: [facility]-      "

## 2019-03-04 NOTE — ED NOTES
Contact Numbers pt requested:    [phone] Ben (Son)  [phone] William Salmon (Cousin)  [phone] Old Folks (Brother)

## 2019-03-04 NOTE — ED NOTES
Dr. Tamayo discussed [facility] process and rights with pt. [facility] Rights packet left at bs. Pt refused to sign Acknowledgment of Notification of Rights, RN witness.   Pt did not want safeword.  Pt Anika storey, is emergency contact:[phone].

## 2019-03-04 NOTE — ED NOTES
Pt's room secured per protocol. Pt's belongings secured behind nurses station for RN to go through and pt placed in grey gown and yellow socks. Pt being directly monitored by dago Leal at this time. Will continue to monitor pt.

## 2019-03-04 NOTE — ED NOTES
Called C to get updated on Telepsych, coordinator stated that he was doing another consult at this time and will be calling after.

## 2019-03-04 NOTE — ED NOTES
Admit [facility] faxed to [facility], and [facility].

## 2019-03-04 NOTE — ED NOTES
Pt sees the computer as a female doll. The keyboard is the hands.   Pt is confused by dirty linen basket outside in hallway. Pt asks what it is multiple times and thought it moved.

## 2019-03-04 NOTE — ED NOTES
"Pt asks me to ask "Regina"  Who is the carlene at the door.   No one is in sight of the pt.   This is the 3rd mention of "Regina" with no explanation of who that is.  "

## 2019-03-04 NOTE — ED NOTES
Patient accepted by Micah at [facility] ([address], [place], [place]) for the service of .Report to be called to [phone].

## 2019-03-04 NOTE — ED PROVIDER NOTES
SCRIBE #1 NOTE: I, Griselda Prather, am scribing for, and in the presence of, Navi Gonzalez MD. I have scribed the HPI, ROS, and PEx.     SCRIBE #2 NOTE: I, Tatyana Ruby, am scribing for, and in the presence of,  Pratima Tamayo MD. I have scribed the remaining portions of the note not scribed by Scribe #1.     History      Chief Complaint   Patient presents with    Hallucinations     +Visual HA.       Review of patient's allergies indicates:  No Known Allergies     HPI   HPI    3/4/2019, 3:56 AM   History obtained from the patient   HPI limited secondary to AMS      History of Present Illness: Santy Anderson is a 73 y.o. male patient with a PMHx of hepatic cirrhosis, GERD, HLD, Lung CA, gastric mass who presents to the Emergency Department for AMS. EMS reports pt called 911 stating someone was in his house. When police arrived, no one was in the patients house. Pt is c/o epigastric abd pain. He states he is not taking lactulose and does not think he has hepatic cirrhosis. He denies any current visual hallucinations. He reports he drinks/drank 2-3 beers every other day. Pt believes the year is 1919 or 1990 and that the month is May. He thinks Jesus is president. No further complaints or concerns.        Arrival mode: EMS    PCP: Joanie Simms MD       Past Medical History:  Past Medical History:   Diagnosis Date    Alcoholic cirrhosis of liver with ascites     Arthritis     Gastric mass     GERD (gastroesophageal reflux disease)     Hyperlipidemia     Lung cancer        Past Surgical History:  Past Surgical History:   Procedure Laterality Date    ABDOMINAL SURGERY      COLONOSCOPY N/A 4/11/2017    Performed [facility]ne Wise [facility] at [facility]    EGD (ESOPHAGOGASTRODUODENOSCOPY) N/A 10/16/2013    Performed by Marvin Baez MD at [facility]    ESOPHAGOGASTRODUODENOSCOPY      ESOPHAGOGASTRODUODENOSCOPY (EGD) N/A 4/23/2018    Performed by Danielle Tejeda MD at [facility] END[facility]     ESOPHAGOGASTRODUODENOSCOPY (EGD) N/A 2018    Performed by [facility] at [facility]    ESOPHAGOGASTRODUODENOSCOPY (EGD) N/A 10/20/2016    Performed by [facility]ne [facility] at [facility] ENDO    ESOPHAGOGASTRODUODENOSCOPY (EGD) N/A 2016    Performed by [facility] at [facility] ENDO    FEMUR SURGERY Right     GASTRECTOMY      GASTRECTOMY, PARTIAL N/A 10/30/2013    Performed by Louis O. Jeansonne IV, MD at [facility] OR    GASTRECTOMY-LAPAROSCOPIC N/A 10/30/2013    Performed by Louis O. Jeansonne IV, MD at [facility] OR    HAND SURGERY Left     KNEE SURGERY Right     laparotomy for trauma (negative)  1979    left lower lobectomy      REMOVAL-HARDWARE-LEG Right 2017    Performed by Red Eller MD at [facility] OR         Family History:  Family History   Problem Relation Age of Onset    Diabetes Mother     Hypertension Mother     Colon cancer Neg Hx        Social History:  Social History     Tobacco Use    Smoking status: Former Smoker     Packs/day: 0.50     Years: 25.00     Pack years: 12.50     Last attempt to quit: 10/16/1988     Years since quittin.4    Smokeless tobacco: Never Used   Substance and Sexual Activity    Alcohol use: Yes     Alcohol/week: 1.8 oz     Types: 3 Cans of beer per week     Comment: socailly    Drug use: No    Sexual activity: No       ROS   Review of Systems   Unable to perform ROS: Mental status change       Physical Exam      Initial Vitals [19 0403]   BP Pulse Resp Temp SpO2   (!) 150/86 100 20 97.9 °F (36.6 °C) 100 %      MAP       --          Physical Exam  Nursing Notes and Vital Signs Reviewed.  Constitutional: Patient is in no acute distress. Well-developed and well-nourished.  Head: Atraumatic. Normocephalic.  Eyes: PERRL. EOM intact. Conjunctivae are not pale. No scleral icterus. Arcus senilis.   ENT: Mucous membranes are moist. Oropharynx is clear and symmetric.    Neck: Supple. Full ROM. No lymphadenopathy.  Cardiovascular: Regular rate.  "Regular rhythm. No murmurs, rubs, or gallops. Distal pulses are 2+ and symmetric.  Pulmonary/Chest: No respiratory distress. Clear to auscultation bilaterally. No wheezing or rales.  Abdominal: Soft and non-distended.  There is mild epigastric tenderness.  No rebound, guarding, or rigidity. Good bowel sounds.  Musculoskeletal: Moves all extremities. No obvious deformities. No edema. No calf tenderness.  Skin: Warm and dry.  Neurological:  AAOx1. Thinks the year is 1990 or 1919 and the month is May. Pt believes Jesus is president currently. Normal speech.  No acute focal neurological deficits are appreciated.  Psychiatric: Normal affect. Good eye contact.     ED Course    Procedures  ED Vital Signs:  Vitals:    03/04/19 0403 03/04/19 0616 03/04/19 0651 03/04/19 0732   BP: (!) 150/86 138/70 (!) 142/64 138/70   Pulse: 100 104 107 100   Resp: 20 20 19 16   Temp: 97.9 °F (36.6 °C)      TempSrc: Oral      SpO2: 100% 98% 99% 98%   Weight: 64.2 kg (141 lb 9 oz)      Height: 5' 9" (1.753 m)       03/04/19 0802 03/04/19 0902   BP: 138/74 121/67   Pulse: 109 110   Resp: 18 19   Temp:     TempSrc:     SpO2: 99% 99%   Weight:     Height:         Abnormal Lab Results:  Labs Reviewed   BASIC METABOLIC PANEL - Abnormal; Notable for the following components:       Result Value    CO2 22 (*)     Anion Gap 20 (*)     All other components within normal limits   COMPREHENSIVE METABOLIC PANEL - Abnormal; Notable for the following components:    CO2 22 (*)     Total Bilirubin 2.6 (*)     AST 53 (*)     Anion Gap 20 (*)     All other components within normal limits   URINALYSIS, REFLEX TO URINE CULTURE - Abnormal; Notable for the following components:    Color, UA Brown (*)     All other components within normal limits    Narrative:     Preferred Collection Type->Urine, Clean Catch   LIPASE - Abnormal; Notable for the following components:    Lipase 3 (*)     All other components within normal limits   ACETAMINOPHEN LEVEL - Abnormal; " Notable for the following components:    Acetaminophen (Tylenol), Serum <3.0 (*)     All other components within normal limits   URINALYSIS MICROSCOPIC - Abnormal; Notable for the following components:    WBC, UA 10 (*)     Hyaline Casts, UA 30 (*)     All other components within normal limits    Narrative:     Preferred Collection Type->Urine, Clean Catch   CBC WITHOUT DIFFERENTIAL - Abnormal; Notable for the following components:    RBC 3.53 (*)      (*)     MCH 40.8 (*)     RDW 17.4 (*)     Platelets 98 (*)     All other components within normal limits   AMMONIA   ALCOHOL,MEDICAL (ETHANOL)   CBC W/ AUTO DIFFERENTIAL   DRUG SCREEN PANEL, URINE EMERGENCY   TSH   TSH        All Lab Results:  Results for orders placed or performed during the hospital encounter of 03/04/19   Basic metabolic panel   Result Value Ref Range    Sodium 140 136 - 145 mmol/L    Potassium 4.2 3.5 - 5.1 mmol/L    Chloride 98 95 - 110 mmol/L    CO2 22 (L) 23 - 29 mmol/L    Glucose 88 70 - 110 mg/dL    BUN, Bld 10 8 - 23 mg/dL    Creatinine 1.0 0.5 - 1.4 mg/dL    Calcium 9.9 8.7 - 10.5 mg/dL    Anion Gap 20 (H) 8 - 16 mmol/L    eGFR if African American >60 >60 mL/min/1.73 m^2    eGFR if non African American >60 >60 mL/min/1.73 m^2   Comprehensive metabolic panel   Result Value Ref Range    Sodium 140 136 - 145 mmol/L    Potassium 4.2 3.5 - 5.1 mmol/L    Chloride 98 95 - 110 mmol/L    CO2 22 (L) 23 - 29 mmol/L    Glucose 88 70 - 110 mg/dL    BUN, Bld 10 8 - 23 mg/dL    Creatinine 1.0 0.5 - 1.4 mg/dL    Calcium 9.9 8.7 - 10.5 mg/dL    Total Protein 7.8 6.0 - 8.4 g/dL    Albumin 3.8 3.5 - 5.2 g/dL    Total Bilirubin 2.6 (H) 0.1 - 1.0 mg/dL    Alkaline Phosphatase 68 55 - 135 U/L    AST 53 (H) 10 - 40 U/L    ALT 16 10 - 44 U/L    Anion Gap 20 (H) 8 - 16 mmol/L    eGFR if African American >60 >60 mL/min/1.73 m^2    eGFR if non African American >60 >60 mL/min/1.73 m^2   Ammonia   Result Value Ref Range    Ammonia 37 10 - 50 umol/L   Urinalysis,  Reflex to Urine Culture Urine, Clean Catch   Result Value Ref Range    Specimen UA Urine, Catheterized     Color, UA Brown (A) Yellow, Straw, Tresa    Appearance, UA Clear Clear    pH, UA SEE COMMENT 5.0 - 8.0    Specific Gravity, UA SEE COMMENT 1.005 - 1.030    Protein, UA SEE COMMENT Negative    Glucose, UA SEE COMMENT Negative    Ketones, UA SEE COMMENT Negative    Bilirubin (UA) SEE COMMENT Negative    Occult Blood UA SEE COMMENT Negative    Nitrite, UA SEE COMMENT Negative    Urobilinogen, UA SEE COMMENT <2.0 EU/dL    Leukocytes, UA SEE COMMENT Negative   Lipase   Result Value Ref Range    Lipase 3 (L) 4 - 60 U/L   Ethanol   Result Value Ref Range    Alcohol, Medical, Serum <10 <10 mg/dL   Acetaminophen level   Result Value Ref Range    Acetaminophen (Tylenol), Serum <3.0 (L) 10.0 - 20.0 ug/mL   Urinalysis Microscopic   Result Value Ref Range    WBC, UA 10 (H) 0 - 5 /hpf    Bacteria, UA Occasional None-Occ /hpf    Hyaline Casts, UA 30 (A) 0-1/lpf /lpf    Microscopic Comment SEE COMMENT    CBC Without Differential   Result Value Ref Range    WBC 8.63 3.90 - 12.70 K/uL    RBC 3.53 (L) 4.60 - 6.20 M/uL    Hemoglobin 14.4 14.0 - 18.0 g/dL    Hematocrit 41.1 40.0 - 54.0 %     (H) 82 - 98 fL    MCH 40.8 (H) 27.0 - 31.0 pg    MCHC 35.0 32.0 - 36.0 g/dL    RDW 17.4 (H) 11.5 - 14.5 %    Platelets 98 (L) 150 - 350 K/uL    MPV 10.2 9.2 - 12.9 fL   TSH   Result Value Ref Range    TSH 2.517 0.400 - 4.000 uIU/mL         Imaging Results:  Imaging Results          CT Head Without Contrast (Final result)  Result time 03/04/19 08:01:35    Final result by Amol Magana MD (03/04/19 08:01:35)                 Impression:      No acute findings.  Atrophy.  Old left medial orbital wall blowout type fracture.      Electronically signed by: Amol Magana MD  Date:    03/04/2019  Time:    08:01             Narrative:    EXAMINATION:  CT HEAD WITHOUT CONTRAST    CLINICAL HISTORY:  Confusion.   TIA.    TECHNIQUE:  Standard noncontrast CT of the brain.    All CT scans at this facility are performed  using dose modulation techniques as appropriate to performed exam including the following:  automated exposure control; adjustment of mA and/or kV according to the patients size (this includes techniques or standardized protocols for targeted exams where dose is matched to indication/reason for exam: i.e. extremities or head);  iterative reconstruction technique.    COMPARISON:  MRI brain 10/09/2018    FINDINGS:  The ventricles are mild to moderately enlarged.  Extra-axial CSF spaces are prominent as well.  Mild periventricular white matter hypodensity is noted.    Intracranial vascular calcification is present.  Mildly prominent dural calcification is noted as well.    No definite acute process.    Skull is grossly unremarkable.  Old left medial orbital wall blowout fracture.                               The EKG was ordered, reviewed, and independently interpreted by the ED provider.  Interpretation time: 0435  Rate: 101 BPM  Rhythm: sinus tachycardia  Interpretation: Septal infarct. No STEMI.         The Emergency Provider reviewed the vital signs and test results, which are outlined above.    ED Discussion     6:00 AM: Dr. Gonzalez transfers care of pt to Dr. Tamayo, pending lab results.    8:12 AM: Dr. Tamayo evaluated pt. Pt is resting comfortably and is in no acute distress. Patient is elderly male. Patient is alert, awake, and oriented to person and place but not time. Appears confused to current situation, not suicidal or homicidal ideations. Reports visual hallucinations but no active hallucinations. Patient is cooperative. Will need tele psych consult as I feel there could be an underlying psych issue and it does not appear there is an acute organic/medical etiology for patient's current state. Patient reports he does live alone. He states he was seeing groups of people outside his house and garage  and they wouldn't leave so he called the police and when police got to his house there was no one there. No prior psych history known.  All labs reviewed, ammonia normal, ETOH normal, head CT does not show anything acute, UDS was recollected after lab called saying they could not run it b/c his urine was too dark.      9:50 AM:  Patient seen by Dr. Gomes with Tele Psych please see consult note.    10:45 AM:  Patient seen and evaluated by hospital medicine, case discussed at length, concern for patient's ability to care for himself in the setting of visual hallucinations and no clear organic etiology that requires observation or inpatient hospital stay. Multiple attempts made to contact family (has a son lives out of town) and neighbor. We feel it is in the patients best interest that he be PECd.       10:57 AM: The PEC hold has been issued by Dr. Tamayo at this time for patient being gravely disabled. Patient being transferred to Bed 27.     11:00 AM: Pt has been medically cleared by Dr. Tamayo at this time. Reassessed pt at this time. Pt is resting comfortably and appears in no acute distress. There are no psychiatric services offered at this facility. D/w pt all pertinent ED information and plan to transfer to psychiatric facility for psychiatric treatment. Pt verbalizes understanding. Patient being transferred by SPDfor ongoing personal protection en route. Pt will be transported by personnel trained in CPR and CPI. All questions and complaints have been addressed at this time. Pt condition is stable at this time and is clear to transfer to psychiatric facility at this time.   Accepting Facility: unknown at this time  Accepting Physician: unknown at this time        ED Medication(s):  Medications   OLANZapine injection 1.3 mg (not administered)   ibuprofen tablet 400 mg (400 mg Oral Given 3/4/19 8169)   ondansetron injection 4 mg (4 mg Intravenous Given 3/4/19 7204)   cefTRIAXone (ROCEPHIN) 1 g in dextrose 5  % 50 mL IVPB (0 g Intravenous Stopped 3/4/19 0647)               Medical Decision Making    Medical Decision Making:   Clinical Tests:   Lab Tests: Ordered and Reviewed  Radiological Study: Ordered and Reviewed  Medical Tests: Ordered and Reviewed           Scribe Attestation:   Scribe #1: I performed the above scribed service and the documentation accurately describes the services I performed. I attest to the accuracy of the note.    Attending:   Physician Attestation Statement for Scribe #1: I, Navi Gonzalez MD, personally performed the services described in this documentation, as scribed by Griselda Prather, in my presence, and it is both accurate and complete.       Scribe Attestation:   Scribe #2: I performed the above scribed service and the documentation accurately describes the services I performed. I attest to the accuracy of the note.    Attending Attestation:           Physician Attestation for Scribe:    Physician Attestation Statement for Scribe #2: I, Pratima Tamayo MD, reviewed documentation, as scribed by Tatyana Ruby in my presence, and it is both accurate and complete. I also acknowledge and confirm the content of the note done by Scribe #1.          Clinical Impression       ICD-10-CM ICD-9-CM   1. Visual hallucinations R44.1 368.16   2. Epigastric pain R10.13 789.06   3. Delusional disorder F22 297.1   4. Alcoholic cirrhosis of liver without ascites K70.30 571.2   5. History of alcohol use Z87.898 V11.3   6. Abnormal urine R82.90 791.9       Disposition:   Disposition: Transferred  Condition: Stable         Pratima Tamayo MD  03/04/19 9436

## 2019-03-04 NOTE — ED NOTES
CPT notified nurse Ebony to send urine to [facility] for processing for psych placement per CPT Lead.

## 2019-03-04 NOTE — PROVIDER PROGRESS NOTES - EMERGENCY DEPT.
Encounter Date: 3/4/2019      SCRIBE NOTE: I, Tatyana Ruby, am scribing for, and in the presence of,  Pratima Tamayo MD.  Physician Statement: I, Pratima Tamayo MD, personally performed the services described in this documentation as scribed by Tatyana Ruby in my presence, and it is both accurate and complete.   Physical Exam    Physical Exam     Vitals:    03/04/19 0902   BP: 121/67   Pulse: 110   Resp: 19   Temp:         3:58 PM: There are no psychiatric services offered at this facility. D/w pt all pertinent ED information and plan to transfer to psychiatric facility for psychiatric treatment. Pt verbalizes understanding. Patient being transferred by [facility] for ongoing personal protection en route. Pt will be transported by personnel trained in CPR and CPI. All questions and complaints have been addressed at this time. Pt condition is stable at this time and is clear to transfer to psychiatric facility at this time.   Accepting Facility: [facility]  Accepting Physician: Dr. Shanique Dewitt Attestation:   Scribe #1: I performed the above scribed service and the documentation accurately describes the services I performed. I attest to the accuracy of the note.    Attending:   Physician Attestation Statement for Scribe #1: I, Pratima Tamayo MD, personally performed the services described in this documentation, as scribed by Tatyana Ruby, in my presence, and it is both accurate and complete.

## 2019-03-04 NOTE — ASSESSMENT & PLAN NOTE
-plan as above  - ETOH and narcotic use could play a part as well  -Ethanol level wnl.   -unable to complete UDS given hyperbilirubinuria.

## 2019-03-04 NOTE — CONSULTS
"[facility] Medicine  Consult Note    Patient Name: Santy Anderson  MRN: [id number]  Admission Date: 3/4/2019  Hospital Length of Stay: 0 days  Attending Physician: Pratima Tamayo MD   Primary Care Provider: Joanie Simms MD           Patient information was obtained from EMS personnel, past medical records and ER records.     Consults  Subjective:     Principal Problem: Delirium    Chief Complaint:   Chief Complaint   Patient presents with    Hallucinations     +Visual HA.        HPI: Santy Anderson is a 73 y.o. male patient with a PMHx of hepatic cirrhosis, GERD, HLD, Lung CA, gastric mass who presents to the Emergency Department for AMS. Medical Record is being used for HPI. It is reported that patient called 911 because he saw people in his house. When police arrived, no one was in the patients house. Patient was subsequently brought to ED for further evaluation. Patient states he came to ER to have his "chest pain evaluated'. When reminded about above, he states there were several people coming in and out of his home and staying there. Some are living in his garage as well. He is disoriented to time. He lives at home alone, and says he has a neighbor(Toñito) that comes to check on him daily. Phone number provided ([phone]), but no answer. Patient's son, Ben, contacted as well [phone] who does not live in town. He is unaware of patient's living condition or supportive care. He has been evaluated by telepsych this morning. He admits to come ETOH use. Labs and vitals are stable. Hospital medicine consulted for further recommendations.         Past Medical History:   Diagnosis Date    Alcoholic cirrhosis of liver with ascites     Arthritis     Gastric mass     GERD (gastroesophageal reflux disease)     Hyperlipidemia     Lung cancer        Past Surgical History:   Procedure Laterality Date    ABDOMINAL SURGERY      COLONOSCOPY N/A 4/11/2017    Performed by " Brianne Wise MD at [facility] ENDO    EGD (ESOPHAGOGASTRODUODENOSCOPY) N/A 10/16/2013    Performed by Marvin Baez MD at [facility] ENDO    ESOPHAGOGASTRODUODENOSCOPY      ESOPHAGOGASTRODUODENOSCOPY (EGD) N/A 2018    Performed by Danielle Tejeda MD at [facility] ENDO    ESOPHAGOGASTRODUODENOSCOPY (EGD) N/A 2018    Performed by [facility]le [facility]eda MD at [facility] ENDO    ESOPHAGOGASTRODUODENOSCOPY (EGD) N/A 10/20/2016    Performed by [facility]ne [facility] MD at [facility] ENDO    ESOPHAGOGASTRODUODENOSCOPY (EGD) N/A 2016    Performed by [facility] MD at [facility] ENDO    FEMUR SURGERY Right     GASTRECTOMY      GASTRECTOMY, PARTIAL N/A 10/30/2013    Performed by Louis O. Jeansonne IV, MD at [facility] OR    GASTRECTOMY-LAPAROSCOPIC N/A 10/30/2013    Performed by Louis O. [facility]ansonne IV, MD at [facility] OR    HAND SURGERY Left     KNEE SURGERY Right     laparotomy for trauma (negative)  1979    left lower lobectomy      REMOVAL-HARDWARE-LEG Right 2017    Performed by Red Eller MD at [facility] OR       Review of patient's allergies indicates:  No Known Allergies    Current Facility-Administered Medications on File Prior to Encounter   Medication    hylan g-f 20 48 mg/6 mL injection 48 mg     Current Outpatient Medications on File Prior to Encounter   Medication Sig    cholecalciferol, vitamin D3, 2,000 unit Cap Take 2,000 Units by mouth.    folic acid (FOLVITE) 1 MG tablet Take 1 tablet (1 mg total) by mouth once daily.     Family History     Problem Relation (Age of Onset)    Diabetes Mother    Hypertension Mother        Tobacco Use    Smoking status: Former Smoker     Packs/day: 0.50     Years: 25.00     Pack years: 12.50     Last attempt to quit: 10/16/1988     Years since quittin.4    Smokeless tobacco: Never Used   Substance and Sexual Activity    Alcohol use: Yes     Alcohol/week: 1.8 oz     Types: 3 Cans of beer per week     Comment: socailly    Drug use: No    Sexual activity: No     Review of  Systems   Unable to perform ROS: Mental status change     Objective:     Vital Signs (Most Recent):  Temp: 97.9 °F (36.6 °C) (03/04/19 0403)  Pulse: 110 (03/04/19 0902)  Resp: 19 (03/04/19 0902)  BP: 121/67 (03/04/19 0902)  SpO2: 99 % (03/04/19 0902) Vital Signs (24h Range):  Temp:  [97.9 °F (36.6 °C)] 97.9 °F (36.6 °C)  Pulse:  [100-110] 110  Resp:  [16-20] 19  SpO2:  [98 %-100 %] 99 %  BP: (121-150)/(64-86) 121/67     Weight: 64.2 kg (141 lb 9 oz)  Body mass index is 20.91 kg/m².    Physical Exam   Constitutional: He appears well-developed and well-nourished. No distress.   HENT:   Head: Normocephalic and atraumatic.   Eyes: EOM are normal.   Neck: Normal range of motion. Neck supple.   Cardiovascular: Normal rate, regular rhythm and normal heart sounds.   Pulmonary/Chest: Effort normal and breath sounds normal. No respiratory distress.   Abdominal: Soft. Bowel sounds are normal. He exhibits no distension. There is no tenderness.   Musculoskeletal: Normal range of motion. He exhibits edema.   Neurological: He is alert. GCS eye subscore is 4. GCS verbal subscore is 5. GCS motor subscore is 6.   Skin: Skin is dry.   Psychiatric: He is withdrawn. Thought content is paranoid and delusional.   Nursing note and vitals reviewed.    Significant Labs:   CBC:   Recent Labs   Lab 03/04/19  0502   WBC 8.63   HGB 14.4   HCT 41.1   PLT 98*     CMP:   Recent Labs   Lab 03/04/19  0502     140   K 4.2  4.2   CL 98  98   CO2 22*  22*   GLU 88  88   BUN 10  10   CREATININE 1.0  1.0   CALCIUM 9.9  9.9   PROT 7.8   ALBUMIN 3.8   BILITOT 2.6*   ALKPHOS 68   AST 53*   ALT 16   ANIONGAP 20*  20*   EGFRNONAA >60  >60       Significant Imaging:   Imaging Results          CT Head Without Contrast (Final result)  Result time 03/04/19 08:01:35    Final result by Amol Magana MD (03/04/19 08:01:35)                 Impression:      No acute findings.  Atrophy.  Old left medial orbital wall blowout type  fracture.      Electronically signed by: Amol Magana MD  Date:    03/04/2019  Time:    08:01             Narrative:    EXAMINATION:  CT HEAD WITHOUT CONTRAST    CLINICAL HISTORY:  Confusion.  TIA.    TECHNIQUE:  Standard noncontrast CT of the brain.    All CT scans at this facility are performed  using dose modulation techniques as appropriate to performed exam including the following:  automated exposure control; adjustment of mA and/or kV according to the patients size (this includes techniques or standardized protocols for targeted exams where dose is matched to indication/reason for exam: i.e. extremities or head);  iterative reconstruction technique.    COMPARISON:  MRI brain 10/09/2018    FINDINGS:  The ventricles are mild to moderately enlarged.  Extra-axial CSF spaces are prominent as well.  Mild periventricular white matter hypodensity is noted.    Intracranial vascular calcification is present.  Mildly prominent dural calcification is noted as well.    No definite acute process.    Skull is grossly unremarkable.  Old left medial orbital wall blowout fracture.                                  Assessment/Plan:     * Delirium    -Agree with proceeding with PEC order. Patient is not presently not able to care for himself alone. Contacted son, Ben, who is en route to see patient  -TelePsych physician recommended Zyprexa PO.          Hallucinations    -plan as above  - ETOH and narcotic use could play a part as well  -Ethanol level wnl.   -unable to complete UDS given hyperbilirubinuria.        Alcoholic cirrhosis of liver without ascites    -counseled on ETOH use  -resume lactulose  -outpatient follow up         VTE Risk Mitigation (From admission, onward)    None              Thank you for your consult. I will sign off. Please contact us if you have any additional questions.    Chandler Deluna NP  Department of Hospital Medicine   [facility]

## 2019-03-04 NOTE — SUBJECTIVE & OBJECTIVE
Past Medical History:   Diagnosis Date    Alcoholic cirrhosis of liver with ascites     Arthritis     Gastric mass     GERD (gastroesophageal reflux disease)     Hyperlipidemia     Lung cancer        Past Surgical History:   Procedure Laterality Date    ABDOMINAL SURGERY      COLONOSCOPY N/A 4/11/2017    Performed by [facility] at [facility] ENDO    EGD (ESOPHAGOGASTRODUODENOSCOPY) N/A 10/16/2013    Performed by Marvin Baez MD at [facility] ENDO    ESOPHAGOGASTRODUODENOSCOPY      ESOPHAGOGASTRODUODENOSCOPY (EGD) N/A 4/23/2018    Performed by Danielle Tejeda MD at [facility] ENDO    ESOPHAGOGASTRODUODENOSCOPY (EGD) N/A 2/26/2018    Performed by Danielle Tejeda MD at [facility] ENDO    ESOPHAGOGASTRODUODENOSCOPY (EGD) N/A 10/20/2016    Performed by Brianne Wise MD at [facility] ENDO    ESOPHAGOGASTRODUODENOSCOPY (EGD) N/A 9/22/2016    Performed by Brianne Wise MD at [facility] ENDO    FEMUR SURGERY Right     GASTRECTOMY      GASTRECTOMY, PARTIAL N/A 10/30/2013    Performed by Louis O. Jeansonne IV, MD at [facility] OR    GASTRECTOMY-LAPAROSCOPIC N/A 10/30/2013    Performed by Louis O. Jeansonne IV, MD at [facility] OR    HAND SURGERY Left     KNEE SURGERY Right     laparotomy for trauma (negative)  1979    left lower lobectomy      REMOVAL-HARDWARE-LEG Right 6/20/2017    Performed by Red Eller MD at [facility] OR       Review of patient's allergies indicates:  No Known Allergies    Current Facility-Administered Medications on File Prior to Encounter   Medication    hylan g-f 20 48 mg/6 mL injection 48 mg     Current Outpatient Medications on File Prior to Encounter   Medication Sig    cholecalciferol, vitamin D3, 2,000 unit Cap Take 2,000 Units by mouth.    folic acid (FOLVITE) 1 MG tablet Take 1 tablet (1 mg total) by mouth once daily.     Family History     Problem Relation (Age of Onset)    Diabetes Mother    Hypertension Mother        Tobacco Use    Smoking status: Former Smoker     Packs/day: 0.50      Years: 25.00     Pack years: 12.50     Last attempt to quit: 10/16/1988     Years since quittin.4    Smokeless tobacco: Never Used   Substance and Sexual Activity    Alcohol use: Yes     Alcohol/week: 1.8 oz     Types: 3 Cans of beer per week     Comment: socailly    Drug use: No    Sexual activity: No     Review of Systems   Unable to perform ROS: Mental status change     Objective:     Vital Signs (Most Recent):  Temp: 97.9 °F (36.6 °C) (19 0403)  Pulse: 110 (19 09)  Resp: 19 (19)  BP: 121/67 (19 09)  SpO2: 99 % (19) Vital Signs (24h Range):  Temp:  [97.9 °F (36.6 °C)] 97.9 °F (36.6 °C)  Pulse:  [100-110] 110  Resp:  [16-20] 19  SpO2:  [98 %-100 %] 99 %  BP: (121-150)/(64-86) 121/67     Weight: 64.2 kg (141 lb 9 oz)  Body mass index is 20.91 kg/m².    Physical Exam   Constitutional: He appears well-developed and well-nourished. No distress.   HENT:   Head: Normocephalic and atraumatic.   Eyes: EOM are normal.   Neck: Normal range of motion. Neck supple.   Cardiovascular: Normal rate, regular rhythm and normal heart sounds.   Pulmonary/Chest: Effort normal and breath sounds normal. No respiratory distress.   Abdominal: Soft. Bowel sounds are normal. He exhibits no distension. There is no tenderness.   Musculoskeletal: Normal range of motion. He exhibits edema.   Neurological: He is alert. GCS eye subscore is 4. GCS verbal subscore is 5. GCS motor subscore is 6.   Skin: Skin is dry.   Psychiatric: He is withdrawn. Thought content is paranoid and delusional.   Nursing note and vitals reviewed.    Significant Labs:   CBC:   Recent Labs   Lab 19  0502   WBC 8.63   HGB 14.4   HCT 41.1   PLT 98*     CMP:   Recent Labs   Lab 19  0502     140   K 4.2  4.2   CL 98  98   CO2 22*  22*   GLU 88  88   BUN 10  10   CREATININE 1.0  1.0   CALCIUM 9.9  9.9   PROT 7.8   ALBUMIN 3.8   BILITOT 2.6*   ALKPHOS 68   AST 53*   ALT 16   ANIONGAP 20*  20*    EGFRNONAA >60  >60       Significant Imaging:   Imaging Results          CT Head Without Contrast (Final result)  Result time 03/04/19 08:01:35    Final result by Amol Magana MD (03/04/19 08:01:35)                 Impression:      No acute findings.  Atrophy.  Old left medial orbital wall blowout type fracture.      Electronically signed by: Amol Magana MD  Date:    03/04/2019  Time:    08:01             Narrative:    EXAMINATION:  CT HEAD WITHOUT CONTRAST    CLINICAL HISTORY:  Confusion.  TIA.    TECHNIQUE:  Standard noncontrast CT of the brain.    All CT scans at this facility are performed  using dose modulation techniques as appropriate to performed exam including the following:  automated exposure control; adjustment of mA and/or kV according to the patients size (this includes techniques or standardized protocols for targeted exams where dose is matched to indication/reason for exam: i.e. extremities or head);  iterative reconstruction technique.    COMPARISON:  MRI brain 10/09/2018    FINDINGS:  The ventricles are mild to moderately enlarged.  Extra-axial CSF spaces are prominent as well.  Mild periventricular white matter hypodensity is noted.    Intracranial vascular calcification is present.  Mildly prominent dural calcification is noted as well.    No definite acute process.    Skull is grossly unremarkable.  Old left medial orbital wall blowout fracture.

## 2019-03-04 NOTE — ED NOTES
Shell with hospital medicine spoke with pt son Nadia, son lives in [place] and is going to come to [place] to see his father in the hospital. Pt son's phone numbers are in the pt chart.

## 2019-03-04 NOTE — ASSESSMENT & PLAN NOTE
-Agree with proceeding with PEC order. Patient is not presently not able to care for himself alone. Contacted son, Ben, who is en route to see patient  -TelePsych physician recommended Zyprexa PO.

## 2019-03-04 NOTE — ED NOTES
Spoke with Ebony(ED Nurse), she stated that the patients urine drug screen could be processed on several attempts in lab. Urine drug screen needed for psych placement. CPT F/U with CPT lead for further instruction.

## 2019-03-13 ENCOUNTER — TELEPHONE (OUTPATIENT)
Dept: INTERNAL MEDICINE | Facility: CLINIC | Age: 74
End: 2019-03-13

## 2019-03-13 NOTE — TELEPHONE ENCOUNTER
----- Message from Gabriela Nolan sent at 3/13/2019  5:49 PM CDT -----  Pt called to speak with nurse to get the 3/15/19 appt back.      Pt callback number [phone]

## 2019-03-13 NOTE — TELEPHONE ENCOUNTER
Informed pt that Dr. Simms no longer has any available appts on Friday 3/15/19. Pt verbalized understanding and kept appt on Monday with Dr. Andrea.

## 2019-03-14 ENCOUNTER — OFFICE VISIT (OUTPATIENT)
Dept: OPHTHALMOLOGY | Facility: CLINIC | Age: 74
End: 2019-03-14
Payer: MEDICARE

## 2019-03-14 ENCOUNTER — LAB VISIT (OUTPATIENT)
Dept: LAB | Facility: HOSPITAL | Age: 74
End: 2019-03-14
Attending: NURSE PRACTITIONER
Payer: MEDICARE

## 2019-03-14 ENCOUNTER — OFFICE VISIT (OUTPATIENT)
Dept: GASTROENTEROLOGY | Facility: CLINIC | Age: 74
End: 2019-03-14
Payer: MEDICARE

## 2019-03-14 VITALS
HEART RATE: 64 BPM | BODY MASS INDEX: 22.21 KG/M2 | SYSTOLIC BLOOD PRESSURE: 100 MMHG | WEIGHT: 149.94 LBS | DIASTOLIC BLOOD PRESSURE: 60 MMHG | HEIGHT: 69 IN

## 2019-03-14 DIAGNOSIS — H25.13 NUCLEAR SCLEROSIS OF BOTH EYES: ICD-10-CM

## 2019-03-14 DIAGNOSIS — H40.1132 PRIMARY OPEN ANGLE GLAUCOMA (POAG) OF BOTH EYES, MODERATE STAGE: Primary | ICD-10-CM

## 2019-03-14 DIAGNOSIS — K70.30 ALCOHOLIC CIRRHOSIS OF LIVER WITHOUT ASCITES: Primary | ICD-10-CM

## 2019-03-14 DIAGNOSIS — K70.30 ALCOHOLIC CIRRHOSIS OF LIVER WITHOUT ASCITES: ICD-10-CM

## 2019-03-14 LAB
AMMONIA PLAS-SCNC: 24 UMOL/L
INR PPP: 1
PROTHROMBIN TIME: 10.6 SEC

## 2019-03-14 PROCEDURE — 99214 PR OFFICE/OUTPT VISIT, EST, LEVL IV, 30-39 MIN: ICD-10-PCS | Mod: S$PBB,,, | Performed by: NURSE PRACTITIONER

## 2019-03-14 PROCEDURE — 36415 COLL VENOUS BLD VENIPUNCTURE: CPT

## 2019-03-14 PROCEDURE — 92020 PR SPECIAL EYE EVAL,GONIOSCOPY: ICD-10-PCS | Mod: S$PBB,,, | Performed by: OPHTHALMOLOGY

## 2019-03-14 PROCEDURE — 99999 PR PBB SHADOW E&M-EST. PATIENT-LVL III: CPT | Mod: PBBFAC,,, | Performed by: NURSE PRACTITIONER

## 2019-03-14 PROCEDURE — 99999 PR PBB SHADOW E&M-EST. PATIENT-LVL II: CPT | Mod: PBBFAC,,, | Performed by: OPHTHALMOLOGY

## 2019-03-14 PROCEDURE — 92020 GONIOSCOPY: CPT | Mod: S$PBB,,, | Performed by: OPHTHALMOLOGY

## 2019-03-14 PROCEDURE — 99214 OFFICE O/P EST MOD 30 MIN: CPT | Mod: S$PBB,,, | Performed by: NURSE PRACTITIONER

## 2019-03-14 PROCEDURE — 92020 GONIOSCOPY: CPT | Mod: PBBFAC,PN | Performed by: OPHTHALMOLOGY

## 2019-03-14 PROCEDURE — 99999 PR PBB SHADOW E&M-EST. PATIENT-LVL II: ICD-10-PCS | Mod: PBBFAC,,, | Performed by: OPHTHALMOLOGY

## 2019-03-14 PROCEDURE — 99213 OFFICE O/P EST LOW 20 MIN: CPT | Mod: PBBFAC,PN,27,25 | Performed by: NURSE PRACTITIONER

## 2019-03-14 PROCEDURE — 92133 CPTRZD OPH DX IMG PST SGM ON: CPT | Mod: PBBFAC,PN | Performed by: OPHTHALMOLOGY

## 2019-03-14 PROCEDURE — 92133 POSTERIOR SEGMENT OCT OPTIC NERVE(OCULAR COHERENCE TOMOGRAPHY) - OU - BOTH EYES: ICD-10-PCS | Mod: 26,S$PBB,, | Performed by: OPHTHALMOLOGY

## 2019-03-14 PROCEDURE — 80053 COMPREHEN METABOLIC PANEL: CPT

## 2019-03-14 PROCEDURE — 82105 ALPHA-FETOPROTEIN SERUM: CPT

## 2019-03-14 PROCEDURE — 99999 PR PBB SHADOW E&M-EST. PATIENT-LVL III: ICD-10-PCS | Mod: PBBFAC,,, | Performed by: NURSE PRACTITIONER

## 2019-03-14 PROCEDURE — 85025 COMPLETE CBC W/AUTO DIFF WBC: CPT

## 2019-03-14 PROCEDURE — 82140 ASSAY OF AMMONIA: CPT

## 2019-03-14 PROCEDURE — 99212 OFFICE O/P EST SF 10 MIN: CPT | Mod: PBBFAC,PN,25 | Performed by: OPHTHALMOLOGY

## 2019-03-14 PROCEDURE — 92004 PR EYE EXAM, NEW PATIENT,COMPREHESV: ICD-10-PCS | Mod: S$PBB,,, | Performed by: OPHTHALMOLOGY

## 2019-03-14 PROCEDURE — 85610 PROTHROMBIN TIME: CPT

## 2019-03-14 PROCEDURE — 92004 COMPRE OPH EXAM NEW PT 1/>: CPT | Mod: S$PBB,,, | Performed by: OPHTHALMOLOGY

## 2019-03-14 RX ORDER — LANOLIN ALCOHOL/MO/W.PET/CERES
100 CREAM (GRAM) TOPICAL DAILY
Status: ON HOLD | COMMUNITY
End: 2020-07-20 | Stop reason: HOSPADM

## 2019-03-14 RX ORDER — KETOROLAC TROMETHAMINE 5 MG/ML
1 SOLUTION OPHTHALMIC 4 TIMES DAILY
Qty: 1 BOTTLE | Refills: 2 | Status: SHIPPED | OUTPATIENT
Start: 2019-03-14 | End: 2019-04-23

## 2019-03-14 RX ORDER — GATIFLOXACIN 5 MG/ML
1 SOLUTION/ DROPS OPHTHALMIC 2 TIMES DAILY
Qty: 1 BOTTLE | Refills: 2 | Status: SHIPPED | OUTPATIENT
Start: 2019-03-14 | End: 2019-04-23

## 2019-03-14 RX ORDER — LATANOPROST 50 UG/ML
1 SOLUTION/ DROPS OPHTHALMIC NIGHTLY
Qty: 1 BOTTLE | Refills: 12 | Status: ON HOLD | OUTPATIENT
Start: 2019-03-14 | End: 2020-06-12 | Stop reason: HOSPADM

## 2019-03-14 RX ORDER — SUCRALFATE 1 G/1
1 TABLET ORAL 4 TIMES DAILY
COMMUNITY
End: 2020-04-13

## 2019-03-14 RX ORDER — PANTOPRAZOLE SODIUM 40 MG/1
40 TABLET, DELAYED RELEASE ORAL DAILY
COMMUNITY
End: 2020-04-13

## 2019-03-14 RX ORDER — PREDNISOLONE ACETATE 10 MG/ML
1 SUSPENSION/ DROPS OPHTHALMIC 4 TIMES DAILY
Qty: 10 ML | Refills: 2 | Status: SHIPPED | OUTPATIENT
Start: 2019-03-14 | End: 2019-03-26 | Stop reason: SDUPTHER

## 2019-03-14 NOTE — LETTER
[facility] - Ophthalmology  [address]  [place] [address]  Phone: [phone]  Fax: [phone]   March 14, 2019    Abe Elizabeth, OD  [address]  [place] [address]    Patient: Santy Anderson   MR Number: [id number]   YOB: 1945   Date of Visit: 3/14/2019       Dear Dr. Elizabeth :    Thank you for referring Santy Anderson to me for evaluation. Here is my assessment and plan of care:          1. Primary open angle glaucoma (POAG) of both eyes, moderate stage IOP not within acceptable range relative to target IOP with risk of irreversible visual loss. Better IOP control is recommended. Discussed options, risks, and benefits of additional medication, SLT laser, and/or incisional glaucoma surgery. Reviewed importance of continued compliance with treatment and follow up.     Patient chooses to add Latanoprost ou qhs and the i stent inject OD     2. Nuclear sclerosis of both eyes  Visually Significant Cataract OU:   Patient reports decreased vision consistent with the clinical amount of lenticular opacity,  which reaches the level of visual significance and affects activities of daily living such as  watch TV. Risks, benefits, and alternatives to cataract surgery were  discussed.  IOL options were discussed, including Premium IOL'S and the associated  side effects and additional patient cost associated with them as well as patient's visual  goals. The pt expressed a desire to proceed with surgery with the potential for some  reasonable degree of visual improvement and was consented.  Risks of loss of vision and eye reviewed as well as possibility of need for spectacle correction after surgery even with premium implants. Verbal and written preop  instructions were provided to the patient.       Pt is interested in traditional monofocal IOL aiming for:    Distance OU and understands that glasses will be generally needed at all times for near vision and  often for finer distance correction especially for higher degrees of astigmatism.       Final visual acuity may be limited by astigmatism and glaucoma damage    Pt wishes to have Phaco/IOL with iStent inject OD     Requests a Monofocal IOL.    Will aim for distance    Other considerations: None                                   If you have questions, please do not hesitate to call me. I look forward to following  Santy Anderson along with you.    Sincerely,        Chandra Zapata MD       CC  No Recipients

## 2019-03-14 NOTE — PROGRESS NOTES
SUBJECTIVE:   Santy Anderson is a 73 y.o. male   Corrected distance visual acuity was 20/40 in the right eye and 20/40 in the left eye.   Chief Complaint   Patient presents with    Cataract        HPI:  HPI     Patient was diagnosed with Cataracts at [facility] approx. One year ago,   patient c/o difficulty with oncoming headlight glare at night, and patient   also states he has to sit very close to the T.V.  To see.        NP-SELF REFERRED   CATS OU    Last edited by DOUGLAS Gutierrez on 3/14/2019  2:40 PM. (History)        Assessment /Plan :  1. Primary open angle glaucoma (POAG) of both eyes, moderate stage IOP not within acceptable range relative to target IOP with risk of irreversible visual loss. Better IOP control is recommended. Discussed options, risks, and benefits of additional medication, SLT laser, and/or incisional glaucoma surgery. Reviewed importance of continued compliance with treatment and follow up.     Patient chooses to add Latanoprost ou qhs and the i stent inject OD     2. Nuclear sclerosis of both eyes  Visually Significant Cataract OU:   Patient reports decreased vision consistent with the clinical amount of lenticular opacity,  which reaches the level of visual significance and affects activities of daily living such as  watch TV. Risks, benefits, and alternatives to cataract surgery were  discussed.  IOL options were discussed, including Premium IOL'S and the associated  side effects and additional patient cost associated with them as well as patient's visual  goals. The pt expressed a desire to proceed with surgery with the potential for some  reasonable degree of visual improvement and was consented.  Risks of loss of vision and eye reviewed as well as possibility of need for spectacle correction after surgery even with premium implants. Verbal and written preop  instructions were provided to the patient.       Pt is interested in traditional monofocal IOL aiming for:    Distance OU and  understands that glasses will be generally needed at all times for near vision and often for finer distance correction especially for higher degrees of astigmatism.       Final visual acuity may be limited by astigmatism and glaucoma damage    Pt wishes to have Phaco/IOL with iStent inject OD     Requests a Monofocal IOL.    Will aim for distance    Other considerations: None

## 2019-03-15 ENCOUNTER — TELEPHONE (OUTPATIENT)
Dept: RADIOLOGY | Facility: HOSPITAL | Age: 74
End: 2019-03-15

## 2019-03-15 LAB
AFP SERPL-MCNC: 4.3 NG/ML
ALBUMIN SERPL BCP-MCNC: 3.2 G/DL
ALP SERPL-CCNC: 71 U/L
ALT SERPL W/O P-5'-P-CCNC: 20 U/L
ANION GAP SERPL CALC-SCNC: 8 MMOL/L
AST SERPL-CCNC: 28 U/L
BASOPHILS # BLD AUTO: 0.08 K/UL
BASOPHILS NFR BLD: 0.9 %
BILIRUB SERPL-MCNC: 0.7 MG/DL
BUN SERPL-MCNC: 3 MG/DL
CALCIUM SERPL-MCNC: 9.4 MG/DL
CHLORIDE SERPL-SCNC: 104 MMOL/L
CO2 SERPL-SCNC: 25 MMOL/L
CREAT SERPL-MCNC: 0.6 MG/DL
DIFFERENTIAL METHOD: ABNORMAL
EOSINOPHIL # BLD AUTO: 0.2 K/UL
EOSINOPHIL NFR BLD: 1.8 %
ERYTHROCYTE [DISTWIDTH] IN BLOOD BY AUTOMATED COUNT: 15.6 %
EST. GFR  (AFRICAN AMERICAN): >60 ML/MIN/1.73 M^2
EST. GFR  (NON AFRICAN AMERICAN): >60 ML/MIN/1.73 M^2
GLUCOSE SERPL-MCNC: 73 MG/DL
HCT VFR BLD AUTO: 34.5 %
HGB BLD-MCNC: 11.1 G/DL
IMM GRANULOCYTES # BLD AUTO: 0.12 K/UL
IMM GRANULOCYTES NFR BLD AUTO: 1.3 %
LYMPHOCYTES # BLD AUTO: 1.7 K/UL
LYMPHOCYTES NFR BLD: 19.4 %
MCH RBC QN AUTO: 38.9 PG
MCHC RBC AUTO-ENTMCNC: 32.2 G/DL
MCV RBC AUTO: 121 FL
MONOCYTES # BLD AUTO: 1 K/UL
MONOCYTES NFR BLD: 10.8 %
NEUTROPHILS # BLD AUTO: 5.9 K/UL
NEUTROPHILS NFR BLD: 65.8 %
NRBC BLD-RTO: 0 /100 WBC
PLATELET # BLD AUTO: 432 K/UL
PMV BLD AUTO: 10.1 FL
POTASSIUM SERPL-SCNC: 3.7 MMOL/L
PROT SERPL-MCNC: 7 G/DL
RBC # BLD AUTO: 2.85 M/UL
SODIUM SERPL-SCNC: 137 MMOL/L
WBC # BLD AUTO: 8.99 K/UL

## 2019-03-15 NOTE — PROGRESS NOTES
Clinic Follow Up:  [facility] Follow Up Note    Reason for Follow Up:  The encounter diagnosis was Alcoholic cirrhosis of liver without ascites.    PCP: Joanie Simms       HPI:  This is a 73 y.o. male here for follow up of the above  Pt was recently discharged from [facility] due to delirium.  Unclear cause of the delirium.  His ammonia was checked at the ER and was WNL.   Pt states that he has been feeling well since his discharge without any reoccurrence of the delirium.   He denies any other GI complaints today.   Denies any abdominal pain.  No nausea or vomiting.  No change in bowel pattern.  No melena or hematochezia. No weight loss.  No upper GI bleeding.  No ascites or BLE.  No overt confusion.  He has not had Cirrhosis management since 2/18 for unclear reason.   He admits to continued occasional ETOH intake.     Review of Systems   Constitutional: Negative for chills, fever, malaise/fatigue and weight loss.   Respiratory: Negative for cough.    Cardiovascular: Negative for chest pain.   Gastrointestinal:        Per HPI   Musculoskeletal: Negative for myalgias.   Skin: Negative for itching and rash.   Neurological: Negative for headaches.   Psychiatric/Behavioral: Positive for hallucinations (none since discharge). The patient is not nervous/anxious.        Medical History:  Past Medical History:   Diagnosis Date    Alcoholic cirrhosis of liver with ascites     Arthritis     Cataract     Gastric mass     GERD (gastroesophageal reflux disease)     Hyperlipidemia     Lung cancer        Surgical History:   Past Surgical History:   Procedure Laterality Date    ABDOMINAL SURGERY      COLONOSCOPY N/A 4/11/2017    [facility]    EGD (ESOPHAGOGASTRODUODENOSCOPY) N/A 10/16/2013    [facility] by [facility]z [facility] at [facility] END[facility]    ESOPHAGOGASTRODUODENOSCOPY      ESOPHAGOGASTRODUODENOSCOPY (EGD) N/A 4/23/2018    Performed by [facility]le [facility]jeda MD  at [facility] ENDO    ESOPHAGOGASTRODUODENOSCOPY (EGD) N/A 2018    Per[facility] by [facility] at [facility] ENDO    ESOPHAGOGASTRODUODENOSCOPY (EGD) N/A 10/20/2016    Performed by [facility] at [facility] ENDO    ESOPHAGOGASTRODUODENOSCOPY (EGD) N/A 2016    [facility]formed by [facility] at [facility] ENDO    FEMUR SURGERY Right     GASTRECTOMY      GASTRECTOMY, PARTIAL N/A 10/30/2013    Performed by Louis O. Jeansonne IV, MD at [facility] OR    GASTRECTOMY-LAPAROSCOPIC N/A 10/30/2013    Performed by [facility]ansonne IV, [facility] at [facility] OR    HAND SURGERY Left     KNEE SURGERY Right     laparotomy for trauma (negative)  1979    left lower lobectomy      REMOVAL-HARDWARE-LEG Right 2017    Performed by Red Eller MD at [facility] OR       Family History:   Family History   Problem Relation Age of Onset    Diabetes Mother     Hypertension Mother     Colon cancer Neg Hx        Social History:   Social History     Tobacco Use    Smoking status: Former Smoker     Packs/day: 0.50     Years: 25.00     Pack years: 12.50     Last attempt to quit: 10/16/1988     Years since quittin.4    Smokeless tobacco: Never Used   Substance Use Topics    Alcohol use: Yes     Alcohol/week: 1.8 oz     Types: 3 Cans of beer per week     Comment: socailly    Drug use: No       Allergies: Reviewed    Home Medications:  Current Outpatient Medications on File Prior to Visit   Medication Sig Dispense Refill    cholecalciferol, vitamin D3, 2,000 unit Cap Take 2,000 Units by mouth.      folic acid (FOLVITE) 1 MG tablet Take 1 tablet (1 mg total) by mouth once daily. 30 tablet 0    multivitamin capsule Take 1 capsule by mouth once daily.      pantoprazole (PROTONIX) 40 MG tablet Take 40 mg by mouth once daily.      sucralfate (CARAFATE) 1 gram tablet Take 1 g by mouth 4 (four) times daily.      thiamine 100 MG tablet Take 100 mg by mouth once daily.      food supplemt, lactose-reduced (ENSURE ACTIVE CLEAR) Liqd Take  "by mouth.      gatifloxacin 0.5 % Drop drops Place 1 drop into the right eye 2 (two) times daily. Start one day before surgery 1 Bottle 2    ketorolac 0.5% (ACULAR) 0.5 % Drop Place 1 drop into the right eye 4 (four) times daily. Eyedrops to start one day before surgery 1 Bottle 2    latanoprost 0.005 % ophthalmic solution Place 1 drop into both eyes every evening. 1 Bottle 12    prednisoLONE acetate (PRED FORTE) 1 % DrpS Place 1 drop into the right eye 4 (four) times daily. Start one day before surgery 1 Bottle 2     Current Facility-Administered Medications on File Prior to Visit   Medication Dose Route Frequency Provider Last Rate Last Dose    hylan g-f 20 48 mg/6 mL injection 48 mg  48 mg Intra-articular 1 time in Clinic/HOD Stephanie Mo PA-C           Physical Exam:  Vital Signs:  /60   Pulse 64   Ht 5' 9" (1.753 m)   Wt 68 kg (149 lb 14.6 oz)   BMI 22.14 kg/m²   Body mass index is 22.14 kg/m².  Physical Exam   Constitutional: He is oriented to person, place, and time. He appears well-developed.   HENT:   Head: Normocephalic.   Eyes: No scleral icterus.   Neck: Normal range of motion.   Cardiovascular: Normal rate and regular rhythm.   Pulmonary/Chest: Effort normal and breath sounds normal.   Abdominal: Soft. Bowel sounds are normal. He exhibits no distension. There is no tenderness.   Musculoskeletal: Normal range of motion.   Neurological: He is alert and oriented to person, place, and time.   Skin: Skin is warm and dry.   Psychiatric: He has a normal mood and affect.   Vitals reviewed.      Labs: Pertinent labs reviewed.  MELD-Na score: 10 at 10/9/2018  9:44 AM  MELD score: 10 at 10/9/2018  9:44 AM  Calculated from:  Serum Creatinine: 0.7 mg/dL (Rounded to 1 mg/dL) at 10/9/2018  1:09 AM  Serum Sodium: 138 mmol/L (Rounded to 137 mmol/L) at 10/9/2018  1:09 AM  Total Bilirubin: 2.8 mg/dL at 10/9/2018  1:09 AM  INR(ratio): 1 at 10/9/2018  9:44 AM  Age: 72 years  EV: EGD 4/23/18 No EV " noted, repeat 3 years  HCC: US 2/26/18 No liver lesions or masses    Assessment:  1. Alcoholic cirrhosis of liver without ascites        Recommendations:  Alcoholic cirrhosis of liver without ascites  - ? If delirium is related to his ETOH cirrhosis with encephalopathy.   - will get updated MELD labs and HCC surveillance imaging  - he has no signs of confusion today.  Will check ammonia  - strongly encouraged complete abstinence of all ETOH   -     CBC auto differential; Future; Expected date: 03/14/2019  -     Comprehensive metabolic panel; Future; Expected date: 03/14/2019  -     Protime-INR; Future; Expected date: 03/14/2019  -     AFP tumor marker; Future; Expected date: 03/14/2019  -     AMMONIA; Future; Expected date: 03/14/2019  -     US Abdomen Complete; Future; Expected date: 03/14/2019          Return to Clinic:    Follow-up in about 3 months (around 6/14/2019).

## 2019-03-18 ENCOUNTER — HOSPITAL ENCOUNTER (OUTPATIENT)
Dept: RADIOLOGY | Facility: HOSPITAL | Age: 74
Discharge: HOME | End: 2019-03-18
Attending: NURSE PRACTITIONER
Payer: MEDICARE

## 2019-03-18 DIAGNOSIS — K70.30 ALCOHOLIC CIRRHOSIS OF LIVER WITHOUT ASCITES: ICD-10-CM

## 2019-03-18 PROCEDURE — 76700 US EXAM ABDOM COMPLETE: CPT | Mod: TC

## 2019-03-18 PROCEDURE — 76700 US ABDOMEN COMPLETE: ICD-10-PCS | Mod: 26,,, | Performed by: RADIOLOGY

## 2019-03-18 PROCEDURE — 76700 US EXAM ABDOM COMPLETE: CPT | Mod: 26,,, | Performed by: RADIOLOGY

## 2019-03-24 ENCOUNTER — HOSPITAL ENCOUNTER (EMERGENCY)
Facility: HOSPITAL | Age: 74
Discharge: HOME | End: 2019-03-24
Attending: EMERGENCY MEDICINE
Payer: COMMERCIAL

## 2019-03-24 VITALS
HEIGHT: 70 IN | SYSTOLIC BLOOD PRESSURE: 126 MMHG | BODY MASS INDEX: 21.78 KG/M2 | WEIGHT: 152.13 LBS | RESPIRATION RATE: 16 BRPM | OXYGEN SATURATION: 99 % | DIASTOLIC BLOOD PRESSURE: 68 MMHG | HEART RATE: 93 BPM | TEMPERATURE: 98 F

## 2019-03-24 DIAGNOSIS — S39.012A STRAIN OF LUMBAR REGION, INITIAL ENCOUNTER: Primary | ICD-10-CM
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
## 2019-03-24 DIAGNOSIS — S69.91XA INJURY, WRIST, RIGHT, INITIAL ENCOUNTER: ICD-10-CM

## 2019-03-24 PROCEDURE — 99283 EMERGENCY DEPT VISIT LOW MDM: CPT
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
## 2019-03-26 DIAGNOSIS — H25.13 NUCLEAR SCLEROSIS OF BOTH EYES: ICD-10-CM

## 2019-03-26 DIAGNOSIS — H40.1132 PRIMARY OPEN ANGLE GLAUCOMA (POAG) OF BOTH EYES, MODERATE STAGE: ICD-10-CM

## 2019-03-26 NOTE — TELEPHONE ENCOUNTER
----- Message from Marlen Wolf sent at 3/26/2019  9:58 AM CDT -----  Contact: [facility]   CALLING TO GET AN ALTERNATIVE MEDICATION FOR PRED FORTE FOR PATIENT. STATES THEY DON'T HAVE ON BACK ORDER FOR OTHER SURROUNDING PHARMACIES AS WELL  PLEASE CALL [facility] -[phone].

## 2019-03-26 NOTE — TELEPHONE ENCOUNTER
----- Message from Marlen Wolf sent at 3/26/2019  9:58 AM CDT -----  Contact: [facility]   CALLING TO GET AN ALTERNATIVE MEDICATION FOR PRED FORTE FOR PATIENT. STATES THEY DON'T HAVE ON BACK ORDER FOR OTHER SURROUNDING PHARMACIES AS WELL  PLEASE CALL [facility] -[phone].

## 2019-03-27 ENCOUNTER — OUTSIDE PLACE OF SERVICE (OUTPATIENT)
Dept: ADMINISTRATIVE | Facility: OTHER | Age: 74
End: 2019-03-27
Payer: MEDICARE

## 2019-03-27 PROCEDURE — 66984 PR REMOVAL, CATARACT, W/INSRT INTRAOC LENS, W/O ENDO CYCLO: ICD-10-PCS | Mod: RT,,, | Performed by: OPHTHALMOLOGY

## 2019-03-27 PROCEDURE — 0191T PR INSERT ANT SEGMENT DRAIN WO RESERVOIR, INTERNAL APPR, INTO TRABECULAR: CPT | Mod: 51,RT,, | Performed by: OPHTHALMOLOGY

## 2019-03-27 PROCEDURE — 0191T PR INSERT ANT SEGMENT DRAIN WO RESERVOIR, INTERNAL APPR, INTO TRABECULAR: ICD-10-PCS | Mod: 51,RT,, | Performed by: OPHTHALMOLOGY

## 2019-03-27 PROCEDURE — 0376T PR ANT SEGMENT INSERT DRAIN W/O RESERVOIR EA ADDL: ICD-10-PCS | Mod: 52,RT,, | Performed by: OPHTHALMOLOGY

## 2019-03-27 PROCEDURE — 0376T PR ANT SEGMENT INSERT DRAIN W/O RESERVOIR EA ADDL: CPT | Mod: 52,RT,, | Performed by: OPHTHALMOLOGY

## 2019-03-27 PROCEDURE — 66984 XCAPSL CTRC RMVL W/O ECP: CPT | Mod: RT,,, | Performed by: OPHTHALMOLOGY

## 2019-03-27 RX ORDER — PREDNISOLONE ACETATE 10 MG/ML
1 SUSPENSION/ DROPS OPHTHALMIC 4 TIMES DAILY
Qty: 10 ML | Refills: 2 | Status: SHIPPED | OUTPATIENT
Start: 2019-03-27 | End: 2019-05-01 | Stop reason: SDUPTHER

## 2019-03-28 ENCOUNTER — OFFICE VISIT (OUTPATIENT)
Dept: OPHTHALMOLOGY | Facility: CLINIC | Age: 74
End: 2019-03-28
Payer: MEDICARE

## 2019-03-28 DIAGNOSIS — Z98.890 POST-OPERATIVE STATE: Primary | ICD-10-CM

## 2019-03-28 PROCEDURE — 99999 PR PBB SHADOW E&M-EST. PATIENT-LVL II: CPT | Mod: PBBFAC,,, | Performed by: OPHTHALMOLOGY

## 2019-03-28 PROCEDURE — 99999 PR PBB SHADOW E&M-EST. PATIENT-LVL II: ICD-10-PCS | Mod: PBBFAC,,, | Performed by: OPHTHALMOLOGY

## 2019-03-28 PROCEDURE — 99024 PR POST-OP FOLLOW-UP VISIT: ICD-10-PCS | Mod: POP,,, | Performed by: OPHTHALMOLOGY

## 2019-03-28 PROCEDURE — 99212 OFFICE O/P EST SF 10 MIN: CPT | Mod: PBBFAC,PN | Performed by: OPHTHALMOLOGY

## 2019-03-28 PROCEDURE — 99024 POSTOP FOLLOW-UP VISIT: CPT | Mod: POP,,, | Performed by: OPHTHALMOLOGY

## 2019-03-28 RX ORDER — FOLIC ACID 1 MG/1
1 TABLET ORAL DAILY
COMMUNITY

## 2019-03-28 NOTE — PROGRESS NOTES
SUBJECTIVE:   Santy Anderson is a 73 y.o. male   Uncorrected distance visual acuity was 20/30 in the right eye and not recorded in the left eye.   Chief Complaint   Patient presents with    Post-op Evaluation        HPI:  HPI     Patient states 10/10 on pain scale this a.m.        NP-SELF REFERRED   CATS OS  PCIOL/I-Stent (inj) OD +20.5 SN60WF (distance) 3-27-19    OU latanoprost qhs   OD Pred Qid , kET Qid, Gat Bid,    Last edited by DOUGLAS Gutierrez on 3/28/2019  8:03 AM. (History)        Assessment /Plan :  1. Post-operative state Exam:  SLE:  S/C: normal  K : trace k fold  AC: 2+ cell and flare  Iris: normal  Lens: PCIOL    IMP:  PO Day 1 S/P Phaco/IOL OD with I stent : Doing well.    Plan:  Continue gtts to operative eye:  Pred 1% QID  Ketorolac QID  Zymaxid BID  Reinstructed in importance of absolute compliance with Post-OP instructions including medications, shield at bedtime, and limitation of activities. Follow up appointments in approximately one and six weeks or call immediately for increased pain, redness or vision loss.   Continue Latanoprost OU qhs

## 2019-04-02 ENCOUNTER — TELEPHONE (OUTPATIENT)
Dept: OPHTHALMOLOGY | Facility: CLINIC | Age: 74
End: 2019-04-02

## 2019-04-02 NOTE — TELEPHONE ENCOUNTER
----- Message from Marlen Wolf sent at 4/2/2019 10:08 AM CDT -----  Contact: pt  [phone] has questions and concerns about eyedrops since proc pls advise

## 2019-04-02 NOTE — TELEPHONE ENCOUNTER
Spoke with pt.  Advised to continue latanoprost in both eyes.  He is to stay on post op drops OD, following same regimen until Friday.  At that time, Dr. Zapata will tell him how to use drops.

## 2019-04-05 ENCOUNTER — OFFICE VISIT (OUTPATIENT)
Dept: OPHTHALMOLOGY | Facility: CLINIC | Age: 74
End: 2019-04-05
Payer: MEDICARE

## 2019-04-05 DIAGNOSIS — Z98.890 POST-OPERATIVE STATE: Primary | ICD-10-CM

## 2019-04-05 DIAGNOSIS — H40.1132 PRIMARY OPEN ANGLE GLAUCOMA (POAG) OF BOTH EYES, MODERATE STAGE: ICD-10-CM

## 2019-04-05 DIAGNOSIS — H25.12 SENILE NUCLEAR CATARACT, LEFT: ICD-10-CM

## 2019-04-05 PROCEDURE — 92136 OPHTHALMIC BIOMETRY: CPT | Mod: PBBFAC,PN,RT | Performed by: OPHTHALMOLOGY

## 2019-04-05 PROCEDURE — 99999 PR PBB SHADOW E&M-EST. PATIENT-LVL II: ICD-10-PCS | Mod: PBBFAC,,, | Performed by: OPHTHALMOLOGY

## 2019-04-05 PROCEDURE — 99212 OFFICE O/P EST SF 10 MIN: CPT | Mod: PBBFAC,PN | Performed by: OPHTHALMOLOGY

## 2019-04-05 PROCEDURE — 99999 PR PBB SHADOW E&M-EST. PATIENT-LVL II: CPT | Mod: PBBFAC,,, | Performed by: OPHTHALMOLOGY

## 2019-04-05 PROCEDURE — 99024 POSTOP FOLLOW-UP VISIT: CPT | Mod: POP,,, | Performed by: OPHTHALMOLOGY

## 2019-04-05 PROCEDURE — 92136 IOL MASTER - OD - RIGHT EYE: ICD-10-PCS | Mod: 26,S$PBB,LT, | Performed by: OPHTHALMOLOGY

## 2019-04-05 PROCEDURE — 99024 PR POST-OP FOLLOW-UP VISIT: ICD-10-PCS | Mod: POP,,, | Performed by: OPHTHALMOLOGY

## 2019-04-05 RX ORDER — GATIFLOXACIN 5 MG/ML
1 SOLUTION/ DROPS OPHTHALMIC 2 TIMES DAILY
Qty: 5 ML | Refills: 2 | Status: SHIPPED | OUTPATIENT
Start: 2019-04-05 | End: 2019-04-23

## 2019-04-05 RX ORDER — KETOROLAC TROMETHAMINE 5 MG/ML
1 SOLUTION OPHTHALMIC 4 TIMES DAILY
Qty: 5 ML | Refills: 2 | Status: SHIPPED | OUTPATIENT
Start: 2019-04-05 | End: 2019-04-23

## 2019-04-05 RX ORDER — PREDNISOLONE ACETATE 10 MG/ML
1 SUSPENSION/ DROPS OPHTHALMIC 4 TIMES DAILY
Qty: 10 ML | Refills: 2 | Status: SHIPPED | OUTPATIENT
Start: 2019-04-05 | End: 2019-09-19 | Stop reason: SDUPTHER

## 2019-04-05 NOTE — PROGRESS NOTES
SUBJECTIVE:   Santy Anderson is a 73 y.o. male   Uncorrected distance visual acuity was 20/30 -1 in the right eye and not recorded in the left eye.   Chief Complaint   Patient presents with    Post-op Evaluation     1 week PCIOL OD Istent (inj)        HPI:  HPI     Post-op Evaluation      Additional comments: 1 week PCIOL OD Istent (inj)              Comments     The patient states his right eye is feeling fine and he denies any ocular   pain at this time.    REFERRED by Dr. Abe Elizabeth fax number [phone]    CATS OS  PCIOL/I-Stent (inj) OD +20.5 SN60WF (distance) 3-27-19    OD latanoprost qhs   OD Pred Qid , kET Qid, Gat Bid,          Last edited by Mckenzie Pina on 4/5/2019 11:39 AM. (History)        Assessment /Plan :  1. Post-operative state Slit lamp exam:  L/L: nl  K: clear, wound sealed  AC: 1+ cell and flare  Iris/Lens: IOL centered and stable      IMP:  PO Week 1 S/P Phaco/ IOL with istent inj OD : Doing well with no evidence of infection or abnormal inflammation.     Plan:  Pt given and instructed in one week PO instructions. D/C zymaxid and start to taper off Ketorlac and Pred Forte 1% weekly. Can resume normal activitites and d/c eye shield. OTC reading glasses can be used until evaluated for final MR. Follow up in one month or PRN pain, redness, vision loss, or other concerns.     2. Primary open angle glaucoma (POAG) of both eyes, moderate stage    3. Senile nuclear cataract, left   Patient reports decreased vision in the fellow eye consistent with the clinical amount of lenticular opacity, which reaches the level of visual significance and affects activities of daily living including reading and glare. Risks, benefits, and alternatives to cataract surgery were discussed and pt desired to schedule cataract surgery. Pt was consented and the biometry and lens options were reviewed.    Schedule cataract surgery in OS       Pt is interested in traditional monofocal IOL aiming for:    Distance OU  and understands that glasses will be generally needed at all times for near vision and often for finer distance correction especially for higher degrees of astigmatism.       Final visual acuity may be limited by astigmatism and glaucoma damage    Pt wishes to have Phaco/IOL with iStent inj  OS     Requests a Monofocal IOL.    Will aim for distance    Other considerations: Corneal Precautions

## 2019-04-11 ENCOUNTER — OFFICE VISIT (OUTPATIENT)
Dept: OPHTHALMOLOGY | Facility: CLINIC | Age: 74
End: 2019-04-11
Payer: MEDICARE

## 2019-04-11 DIAGNOSIS — Z98.890 POST-OPERATIVE STATE: Primary | ICD-10-CM

## 2019-04-11 PROCEDURE — 99999 PR PBB SHADOW E&M-EST. PATIENT-LVL II: CPT | Mod: PBBFAC,,, | Performed by: OPHTHALMOLOGY

## 2019-04-11 PROCEDURE — 99212 OFFICE O/P EST SF 10 MIN: CPT | Mod: PBBFAC,PN | Performed by: OPHTHALMOLOGY

## 2019-04-11 PROCEDURE — 99024 PR POST-OP FOLLOW-UP VISIT: ICD-10-PCS | Mod: POP,,, | Performed by: OPHTHALMOLOGY

## 2019-04-11 PROCEDURE — 99999 PR PBB SHADOW E&M-EST. PATIENT-LVL II: ICD-10-PCS | Mod: PBBFAC,,, | Performed by: OPHTHALMOLOGY

## 2019-04-11 PROCEDURE — 99024 POSTOP FOLLOW-UP VISIT: CPT | Mod: POP,,, | Performed by: OPHTHALMOLOGY

## 2019-04-11 NOTE — PROGRESS NOTES
SUBJECTIVE:   Santy Anderson is a 73 y.o. male   Uncorrected distance visual acuity was 20/60 +1 in the right eye and 20/30 in the left eye.   Chief Complaint   Patient presents with    Post-op Evaluation     1 day s/p phaco/istent inject OS     Blurred Vision     pt c/o blurred vision in OD         HPI:  HPI     Post-op Evaluation      Additional comments: 1 day s/p phaco/istent inject OS               Blurred Vision      Additional comments: pt c/o blurred vision in OD               Comments     PCIOL/istent inject OS 4/10/2019  PCIOL/I-Stent (inj) OD +20.5 SN60WF (distance) 3-27-19      OU: gat bid, pred qid, ket qid   OD latanoprost qhs          Last edited by Clau Vuong MA on 4/11/2019  8:09 AM. (History)        Assessment /Plan :  1. Post-operative state Exam:  SLE:  S/C: normal  K : trace k fold  AC: 2+ cell and flare  Iris: normal  Lens: PCIOL    IMP:  PO Day 1 S/P Phaco/IOL OS with I stent : Doing well.    Plan:  Continue gtts to operative eye:  Pred 1% QID  Ketorolac QID  Zymaxid BID  Reinstructed in importance of absolute compliance with Post-OP instructions including medications, shield at bedtime, and limitation of activities. Follow up appointments in approximately one and six weeks or call immediately for increased pain, redness or vision loss.   Hold Latanoprost for now  Use Pred Acetate QID OD

## 2019-04-16 ENCOUNTER — OFFICE VISIT (OUTPATIENT)
Dept: OPHTHALMOLOGY | Facility: CLINIC | Age: 74
End: 2019-04-16
Payer: MEDICARE

## 2019-04-16 DIAGNOSIS — H40.1132 PRIMARY OPEN ANGLE GLAUCOMA (POAG) OF BOTH EYES, MODERATE STAGE: ICD-10-CM

## 2019-04-16 DIAGNOSIS — Z98.890 POST-OPERATIVE STATE: Primary | ICD-10-CM

## 2019-04-16 PROCEDURE — 99024 POSTOP FOLLOW-UP VISIT: CPT | Mod: POP,,, | Performed by: OPHTHALMOLOGY

## 2019-04-16 PROCEDURE — 99999 PR PBB SHADOW E&M-EST. PATIENT-LVL II: ICD-10-PCS | Mod: PBBFAC,,, | Performed by: OPHTHALMOLOGY

## 2019-04-16 PROCEDURE — 99024 PR POST-OP FOLLOW-UP VISIT: ICD-10-PCS | Mod: POP,,, | Performed by: OPHTHALMOLOGY

## 2019-04-16 PROCEDURE — 99999 PR PBB SHADOW E&M-EST. PATIENT-LVL II: CPT | Mod: PBBFAC,,, | Performed by: OPHTHALMOLOGY

## 2019-04-16 PROCEDURE — 99212 OFFICE O/P EST SF 10 MIN: CPT | Mod: PBBFAC | Performed by: OPHTHALMOLOGY

## 2019-04-16 NOTE — PROGRESS NOTES
SUBJECTIVE:   Santy Anderson is a 73 y.o. male   Uncorrected distance visual acuity was 20/60 in the right eye and 20/100 -1 in the left eye.   Chief Complaint   Patient presents with    Post-op Evaluation     no eyedrops        HPI:  HPI     Post-op Evaluation      Additional comments: no eyedrops              Comments     Patient states that his vision started to get real blurry 2 days ago. It   is starting to get better. He stopped all of the eyedrops yesterday   secondary to burning.Pt has not been wearing shield at night.  No eye pain  The left eye itches.    REFERRED by Dr. Abe Elizabeth fax number [phone]    PCIOL/istent inject OS +19.5 SN60WF (distance) 4/10/2019  PCIOL/I-Stent (inj) OD +20.5 SN60WF (distance) 3-27-19    No eyedrops  Hold Latanoprost for now          Last edited by Chandra Zapata MD on 4/16/2019  8:32 AM.   (History)        Assessment /Plan :  1. Post-operative state Slit lamp exam:  L/L: nl  K: clear, wound sealed  AC: 2+ cell and flare  Iris/Lens: IOL centered and stable      IMP:  PO Week 1 S/P Phaco/ IOL OS : Doing well with no evidence of infection or abnormal inflammation.     Plan:  Pt given and instructed in one week PO instructions. D/C zymaxid and start to taper off Ketorlac and Pred Forte 1% weekly. Can resume normal activitites and d/c eye shield. OTC reading glasses can be used until evaluated for final MR. Follow up in one month or PRN pain, redness, vision loss, or other concerns.      New corneal superficial laceration OS and not wearing shield and very confused about gtt, stopped all gtts yesterday due to burning. Written instruction and lengthy explanation of impt of compliance and eye protection.   resume PA OU qid and latanoprost OU qhs ( hold gatafloxacin and ketorolac)    RTC in 1-2 weeks and wear shield   2. Primary open angle glaucoma (POAG) of both eyes, moderate stage
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
## 2019-04-23 ENCOUNTER — OFFICE VISIT (OUTPATIENT)
Dept: OPHTHALMOLOGY | Facility: CLINIC | Age: 74
End: 2019-04-23
Payer: MEDICARE

## 2019-04-23 DIAGNOSIS — Z98.890 POST-OPERATIVE STATE: Primary | ICD-10-CM

## 2019-04-23 PROCEDURE — 99999 PR PBB SHADOW E&M-EST. PATIENT-LVL II: ICD-10-PCS | Mod: PBBFAC,,, | Performed by: OPHTHALMOLOGY

## 2019-04-23 PROCEDURE — 99999 PR PBB SHADOW E&M-EST. PATIENT-LVL II: CPT | Mod: PBBFAC,,, | Performed by: OPHTHALMOLOGY

## 2019-04-23 PROCEDURE — 99024 POSTOP FOLLOW-UP VISIT: CPT | Mod: POP,,, | Performed by: OPHTHALMOLOGY

## 2019-04-23 PROCEDURE — 99024 PR POST-OP FOLLOW-UP VISIT: ICD-10-PCS | Mod: POP,,, | Performed by: OPHTHALMOLOGY

## 2019-04-23 PROCEDURE — 99212 OFFICE O/P EST SF 10 MIN: CPT | Mod: PBBFAC | Performed by: OPHTHALMOLOGY

## 2019-04-23 NOTE — PROGRESS NOTES
SUBJECTIVE:   Santy Anderson is a 73 y.o. male   Uncorrected distance visual acuity was 20/40 in the right eye and 20/400 in the left eye.   Chief Complaint   Patient presents with    Post-op Evaluation     Pred Acetate QID OU and Latanoprost ou qhs        HPI:  HPI     Post-op Evaluation      Additional comments: Pred Acetate QID OU and Latanoprost ou qhs              Comments     One to two week post op check and cornea check    blurry vision in his left eye (problems reading)  No eye pain    PCIOL/istent inject OS +19.5 SN60WF (distance) 4/10/2019  PCIOL/I-Stent (inj) OD +20.5 SN60WF (distance) 3-27-19    Pred Acetate QID OU  Latanoprost qhs OU          Last edited by Gely Lagunas on 4/23/2019  8:41 AM. (History)        Assessment /Plan :  1. Post-operative state Slit lamp exam:  L/L: nl  K: wound sealed  AC: 1+ cell and flare  Iris/Lens: IOL centered and stable      IMP:  PO Week 2 S/P Phaco/ IOL OS with I stent : Doing well with no evidence of infection or abnormal inflammation.     Plan:  Can resume normal activitites and d/c eye shield. OTC reading glasses can be used until evaluated for final MR. Follow up in one month or PRN pain, redness, vision loss, or other concerns.  Corneal laceration is healing OS  Continue Pred Acetate QID OU and Latanoprost ou qhs  Recommend +2.75 readers  Return to clinic in 2-3 weeks  or as needed    superficial K lac OS healing but still K irregularity. This appeared between POD#1 and POW#1, pt denies knowledge of injury but wasn't wearing shield at night and has long fingernails so this is most likely cause of self inflicted injury.

## 2019-05-01 ENCOUNTER — OFFICE VISIT (OUTPATIENT)
Dept: FAMILY MEDICINE | Facility: CLINIC | Age: 74
End: 2019-05-01
Payer: MEDICARE

## 2019-05-01 ENCOUNTER — LAB VISIT (OUTPATIENT)
Dept: LAB | Facility: HOSPITAL | Age: 74
End: 2019-05-01
Attending: INTERNAL MEDICINE
Payer: MEDICARE

## 2019-05-01 VITALS
OXYGEN SATURATION: 99 % | TEMPERATURE: 100 F | HEART RATE: 86 BPM | HEIGHT: 69 IN | BODY MASS INDEX: 20.97 KG/M2 | SYSTOLIC BLOOD PRESSURE: 108 MMHG | WEIGHT: 141.56 LBS | DIASTOLIC BLOOD PRESSURE: 66 MMHG

## 2019-05-01 DIAGNOSIS — C34.32 MALIGNANT NEOPLASM OF LOWER LOBE OF LEFT LUNG: ICD-10-CM

## 2019-05-01 DIAGNOSIS — Z12.5 ENCOUNTER FOR SCREENING FOR MALIGNANT NEOPLASM OF PROSTATE: ICD-10-CM

## 2019-05-01 DIAGNOSIS — E55.9 VITAMIN D DEFICIENCY: ICD-10-CM

## 2019-05-01 DIAGNOSIS — Z00.00 ENCOUNTER FOR PREVENTIVE HEALTH EXAMINATION: ICD-10-CM

## 2019-05-01 DIAGNOSIS — K70.30 ALCOHOLIC CIRRHOSIS OF LIVER WITHOUT ASCITES: ICD-10-CM

## 2019-05-01 DIAGNOSIS — R63.4 WEIGHT LOSS: ICD-10-CM

## 2019-05-01 DIAGNOSIS — M81.0 AGE-RELATED OSTEOPOROSIS WITHOUT CURRENT PATHOLOGICAL FRACTURE: Primary | ICD-10-CM

## 2019-05-01 DIAGNOSIS — D69.6 THROMBOCYTOPENIA: ICD-10-CM

## 2019-05-01 DIAGNOSIS — M89.9 BONE DISORDER: ICD-10-CM

## 2019-05-01 DIAGNOSIS — M81.0 AGE-RELATED OSTEOPOROSIS WITHOUT CURRENT PATHOLOGICAL FRACTURE: ICD-10-CM

## 2019-05-01 LAB
25(OH)D3+25(OH)D2 SERPL-MCNC: 22 NG/ML (ref 30–96)
BASOPHILS # BLD AUTO: 0.06 K/UL (ref 0–0.2)
BASOPHILS NFR BLD: 0.8 % (ref 0–1.9)
COMPLEXED PSA SERPL-MCNC: 0.54 NG/ML (ref 0–4)
DIFFERENTIAL METHOD: ABNORMAL
EOSINOPHIL # BLD AUTO: 0.2 K/UL (ref 0–0.5)
EOSINOPHIL NFR BLD: 2.9 % (ref 0–8)
ERYTHROCYTE [DISTWIDTH] IN BLOOD BY AUTOMATED COUNT: 14.7 % (ref 11.5–14.5)
HCT VFR BLD AUTO: 45.8 % (ref 40–54)
HGB BLD-MCNC: 14.4 G/DL (ref 14–18)
IMM GRANULOCYTES # BLD AUTO: 0.03 K/UL (ref 0–0.04)
IMM GRANULOCYTES NFR BLD AUTO: 0.4 % (ref 0–0.5)
LYMPHOCYTES # BLD AUTO: 2.5 K/UL (ref 1–4.8)
LYMPHOCYTES NFR BLD: 32.9 % (ref 18–48)
MCH RBC QN AUTO: 32.1 PG (ref 27–31)
MCHC RBC AUTO-ENTMCNC: 31.4 G/DL (ref 32–36)
MCV RBC AUTO: 102 FL (ref 82–98)
MONOCYTES # BLD AUTO: 1.2 K/UL (ref 0.3–1)
MONOCYTES NFR BLD: 15.6 % (ref 4–15)
NEUTROPHILS # BLD AUTO: 3.6 K/UL (ref 1.8–7.7)
NEUTROPHILS NFR BLD: 47.4 % (ref 38–73)
NRBC BLD-RTO: 0 /100 WBC
PLATELET # BLD AUTO: 251 K/UL (ref 150–350)
PMV BLD AUTO: 10.3 FL (ref 9.2–12.9)
RBC # BLD AUTO: 4.48 M/UL (ref 4.6–6.2)
TSH SERPL DL<=0.005 MIU/L-ACNC: 1.38 UIU/ML (ref 0.4–4)
WBC # BLD AUTO: 7.5 K/UL (ref 3.9–12.7)

## 2019-05-01 PROCEDURE — 99214 PR OFFICE/OUTPT VISIT, EST, LEVL IV, 30-39 MIN: ICD-10-PCS | Mod: S$PBB,,, | Performed by: INTERNAL MEDICINE

## 2019-05-01 PROCEDURE — 99214 OFFICE O/P EST MOD 30 MIN: CPT | Mod: S$PBB,,, | Performed by: INTERNAL MEDICINE

## 2019-05-01 PROCEDURE — 99999 PR PBB SHADOW E&M-EST. PATIENT-LVL III: CPT | Mod: PBBFAC,,, | Performed by: INTERNAL MEDICINE

## 2019-05-01 PROCEDURE — 84443 ASSAY THYROID STIM HORMONE: CPT

## 2019-05-01 PROCEDURE — 99999 PR PBB SHADOW E&M-EST. PATIENT-LVL III: ICD-10-PCS | Mod: PBBFAC,,, | Performed by: INTERNAL MEDICINE

## 2019-05-01 PROCEDURE — 36415 COLL VENOUS BLD VENIPUNCTURE: CPT | Mod: PO

## 2019-05-01 PROCEDURE — 99213 OFFICE O/P EST LOW 20 MIN: CPT | Mod: PBBFAC,PO | Performed by: INTERNAL MEDICINE

## 2019-05-01 PROCEDURE — 85025 COMPLETE CBC W/AUTO DIFF WBC: CPT

## 2019-05-01 PROCEDURE — 82306 VITAMIN D 25 HYDROXY: CPT

## 2019-05-01 PROCEDURE — 84153 ASSAY OF PSA TOTAL: CPT

## 2019-05-01 RX ORDER — SILDENAFIL 100 MG/1
100 TABLET, FILM COATED ORAL DAILY PRN
Refills: 11 | COMMUNITY
Start: 2019-03-22

## 2019-05-03 ENCOUNTER — TELEPHONE (OUTPATIENT)
Dept: OPHTHALMOLOGY | Facility: CLINIC | Age: 74
End: 2019-05-03

## 2019-05-03 NOTE — TELEPHONE ENCOUNTER
Dr. Zapata spoke with Mr. Anderson this morning. Mr. Anderson stopped his steroid drops and only using his Latanoprost in the left eye. Dr. Zapata advised Mr. Anderson to restart his drops both eyes, the Prednisolone three times a day both eyes, and the latanoprost at night both eyes. He does not want him to stop any of his drops unless instructed to. Mr. Anderson expressed understanding and will resume his drops as directed.

## 2019-05-03 NOTE — TELEPHONE ENCOUNTER
Dr. Zapata recommended pt come in today. Pt would like to speak with someone prior to scheduling the appointment. Will notify Dr. Zapata and contact patient prior to scheduling.     ----- Message from Marlen Wolf sent at 5/3/2019  8:27 AM CDT -----  Contact: pt  Please give pt a call at  [phone] (home) he states that his vision is very blurry in left eye want to be advised this morning

## 2019-05-07 ENCOUNTER — OFFICE VISIT (OUTPATIENT)
Dept: OPHTHALMOLOGY | Facility: CLINIC | Age: 74
End: 2019-05-07
Payer: MEDICARE

## 2019-05-07 DIAGNOSIS — H40.1132 PRIMARY OPEN ANGLE GLAUCOMA (POAG) OF BOTH EYES, MODERATE STAGE: ICD-10-CM

## 2019-05-07 DIAGNOSIS — Z98.890 POST-OPERATIVE STATE: Primary | ICD-10-CM

## 2019-05-07 PROCEDURE — 99999 PR PBB SHADOW E&M-EST. PATIENT-LVL II: CPT | Mod: PBBFAC,,, | Performed by: OPHTHALMOLOGY

## 2019-05-07 PROCEDURE — 99999 PR PBB SHADOW E&M-EST. PATIENT-LVL II: ICD-10-PCS | Mod: PBBFAC,,, | Performed by: OPHTHALMOLOGY

## 2019-05-07 PROCEDURE — 99024 PR POST-OP FOLLOW-UP VISIT: ICD-10-PCS | Mod: POP,,, | Performed by: OPHTHALMOLOGY

## 2019-05-07 PROCEDURE — 99024 POSTOP FOLLOW-UP VISIT: CPT | Mod: POP,,, | Performed by: OPHTHALMOLOGY

## 2019-05-07 PROCEDURE — 99212 OFFICE O/P EST SF 10 MIN: CPT | Mod: PBBFAC | Performed by: OPHTHALMOLOGY

## 2019-05-07 NOTE — PROGRESS NOTES
SUBJECTIVE:   Santy Anderson is a 73 y.o. male   Uncorrected distance visual acuity was 20/30 +2 in the right eye and 20/400 - in the left eye.   No chief complaint on file.       HPI:  HPI     PO1 month phaco iStent OS still blurry and using PA ou tid and   latanoprost OU qhs    Last edited by Chandra Zapata MD on 5/7/2019  8:11 AM. (History)          Assessment /Plan :  1. Post-operative state   Exam:    Slit lamp exam:  L/L: nl  S/C: quiet and uninflamed  K: clear, wound sealed  AC: no cell or flare  Iris/Lens: IOL centered and stable      Assessment:    PO Month 1 S/P Phaco/IOL OS w iStent: Doing Well and completing healing process. Final visual correction options discussed and appropriate prescriptions and/or OTC reading glass recommendations offered to patient. Pt desires OTC Readers and to keep current glasses. Pt instructed to follow up with Dr. Zapata in 6 months for next eye exam or PRN any pain, redness, vision changes or other concerns.    Still low grade iritis OS>OD and iatrogenic injury OS ?fingernail laceration. Will cont OU: PA tid, latanoprost and add reyna 128 3-4 x a day  Return to clinic in 1 month  or as needed.  With IOP Check     2. Primary open angle glaucoma (POAG) of both eyes, moderate stage

## 2019-05-14 ENCOUNTER — OFFICE VISIT (OUTPATIENT)
Dept: OPHTHALMOLOGY | Facility: CLINIC | Age: 74
End: 2019-05-14
Payer: MEDICARE

## 2019-05-14 DIAGNOSIS — Z98.890 POST-OPERATIVE STATE: Primary | ICD-10-CM

## 2019-05-14 PROCEDURE — 99024 PR POST-OP FOLLOW-UP VISIT: ICD-10-PCS | Mod: POP,,, | Performed by: OPHTHALMOLOGY

## 2019-05-14 PROCEDURE — 99212 OFFICE O/P EST SF 10 MIN: CPT | Mod: PBBFAC | Performed by: OPHTHALMOLOGY

## 2019-05-14 PROCEDURE — 99999 PR PBB SHADOW E&M-EST. PATIENT-LVL II: ICD-10-PCS | Mod: PBBFAC,,, | Performed by: OPHTHALMOLOGY

## 2019-05-14 PROCEDURE — 99024 POSTOP FOLLOW-UP VISIT: CPT | Mod: POP,,, | Performed by: OPHTHALMOLOGY

## 2019-05-14 PROCEDURE — 99999 PR PBB SHADOW E&M-EST. PATIENT-LVL II: CPT | Mod: PBBFAC,,, | Performed by: OPHTHALMOLOGY

## 2019-05-14 NOTE — PROGRESS NOTES
SUBJECTIVE:   Santy Anderson is a 73 y.o. male   Uncorrected distance visual acuity was 20/20 -1 in the right eye and 20/300 in the left eye.   Chief Complaint   Patient presents with    Post-op Evaluation        HPI:  HPI     Pt states that his left eye is exhibiting no improvement from last appt.   His right eye isn't bad, but his left eye is still blurry. 100% compliant   with gtts.     REFERRED by Dr. Abe Elizabeth fax number [phone]    1. Moderate COAG dx 3/19 with Tmax 27/23 Goal=19  2.PCIOL/istent inject OS +19.5 SN60WF (distance) 4/10/2019  PCIOL/I-Stent (inj) OD +20.5 SN60WF (distance) 3-27-19  3. Superficial K laceration OS probably pt fingernail injury between POD1   and POW1 while pt neglected to wear shield at night    Pred Acetate TID OU  Latanoprost qhs OU  Ace 128 3-4 times a day OU    Last edited by Bird Champagne, Patient Care Assistant on 5/14/2019  7:45   AM. (History)        Assessment /Plan :  1. Post-operative state   Exam:    Slit lamp exam:  L/L: nl  S/C: quiet and uninflamed  K: clear, wound sealed  AC: no cell or flare  Iris/Lens: IOL centered and stable      Assessment:    PO Month 1 S/P Phaco/IOL OS with I stent: with healing corneal laceration. Final visual correction options discussed and appropriate prescriptions and/or OTC reading glass recommendations offered to patient. Pt desires OTC Readers. Pt instructed to follow up with Dr. Zapata in 1 month for next eye exam or PRN any pain, redness, vision changes or other concerns.  Continue Latanoprost OU qhs  Decrease Pred Acetate to BID OU  Continue Ace 128 QID OS

## 2019-06-10 ENCOUNTER — OFFICE VISIT (OUTPATIENT)
Dept: GASTROENTEROLOGY | Facility: CLINIC | Age: 74
End: 2019-06-10
Payer: MEDICARE

## 2019-06-10 VITALS
WEIGHT: 141.13 LBS | HEART RATE: 88 BPM | DIASTOLIC BLOOD PRESSURE: 56 MMHG | BODY MASS INDEX: 20.9 KG/M2 | HEIGHT: 69 IN | SYSTOLIC BLOOD PRESSURE: 100 MMHG

## 2019-06-10 DIAGNOSIS — K70.30 ALCOHOLIC CIRRHOSIS OF LIVER WITHOUT ASCITES: Primary | ICD-10-CM

## 2019-06-10 PROCEDURE — 99999 PR PBB SHADOW E&M-EST. PATIENT-LVL III: CPT | Mod: PBBFAC,,, | Performed by: NURSE PRACTITIONER

## 2019-06-10 PROCEDURE — 99213 OFFICE O/P EST LOW 20 MIN: CPT | Mod: PBBFAC | Performed by: NURSE PRACTITIONER

## 2019-06-10 PROCEDURE — 99213 OFFICE O/P EST LOW 20 MIN: CPT | Mod: S$PBB,,, | Performed by: NURSE PRACTITIONER

## 2019-06-10 PROCEDURE — 99999 PR PBB SHADOW E&M-EST. PATIENT-LVL III: ICD-10-PCS | Mod: PBBFAC,,, | Performed by: NURSE PRACTITIONER

## 2019-06-10 PROCEDURE — 99213 PR OFFICE/OUTPT VISIT, EST, LEVL III, 20-29 MIN: ICD-10-PCS | Mod: S$PBB,,, | Performed by: NURSE PRACTITIONER

## 2019-06-10 NOTE — PROGRESS NOTES
Clinic Follow Up:  [facility] Follow Up Note    Reason for Follow Up:  The encounter diagnosis was Alcoholic cirrhosis of liver without ascites.    PCP: Joanie Simms       HPI:  This is a 73 y.o. male here for follow up of the above  Pt states that since his last visit, he has had no return of the hallucinations.   He has been feeling well without any GI complaints  Denies any abdominal pain.  No nausea or vomiting.  No change in bowel pattern.  No melena or hematochezia. No weight loss.  No upper GI bleeding.  No ascites or BLE.  No overt confusion.      Review of Systems   Constitutional: Negative for chills, fever, malaise/fatigue and weight loss.   Respiratory: Negative for cough.    Cardiovascular: Negative for chest pain.   Gastrointestinal:        Per HPI   Musculoskeletal: Negative for myalgias.   Skin: Negative for itching and rash.   Neurological: Negative for headaches.   Psychiatric/Behavioral: The patient is not nervous/anxious.        Medical History:  Past Medical History:   Diagnosis Date    Alcoholic cirrhosis of liver with ascites     Arthritis     Cataract     Gastric mass     GERD (gastroesophageal reflux disease)     Hyperlipidemia     Lung cancer     Primary open angle glaucoma (POAG) of both eyes, moderate stage 4/16/2019       Surgical History:   Past Surgical History:   Procedure Laterality Date    1 STENT Right 03/27/2019    DR. CLARK    ABDOMINAL SURGERY      CATARACT EXTRACTION      CATARACT EXTRACTION W/  INTRAOCULAR LENS IMPLANT Left 04/10/2019    istent inject     COLONOSCOPY N/A 4/11/2017    Performed by Brianne Wise MD at [facility] ENDO    EGD (ESOPHAGOGASTRODUODENOSCOPY) N/A 10/16/2013    Performed by Marvin Baez MD at [facility] ENDO    ESOPHAGOGASTRODUODENOSCOPY      ESOPHAGOGASTRODUODENOSCOPY (EGD) N/A 4/23/2018    Performed by Danielle Tejeda MD at [facility] ENDO    ESOPHAGOGASTRODUODENOSCOPY (EGD) N/A 2/26/2018    Performed by Danielle Tejeda  MD at [facility] ENDO    ESOPHAGOGASTRODUODENOSCOPY (EGD) N/A 10/20/2016    Performed by Brianne Wise MD at [facility] ENDO    ESOPHAGOGASTRODUODENOSCOPY (EGD) N/A 2016    Performed by Brianne Wise MD at [facility] ENDO    FEMUR SURGERY Right     GASTRECTOMY      GASTRECTOMY, PARTIAL N/A 10/30/2013    Performed by Louis O. Jeansonne IV, MD at [facility] OR    GASTRECTOMY-LAPAROSCOPIC N/A 10/30/2013    Performed by Louis O. Jeansonne IV, MD at [facility] OR    HAND SURGERY Left     KNEE SURGERY Right     laparotomy for trauma (negative)  1979    left lower lobectomy      PCIOL Right 2019    DR. CLARK    REMOVAL-HARDWARE-LEG Right 2017    Performed by Red Eller MD at [facility] OR       Family History:   Family History   Problem Relation Age of Onset    Diabetes Mother     Hypertension Mother     Colon cancer Neg Hx        Social History:   Social History     Tobacco Use    Smoking status: Former Smoker     Packs/day: 0.50     Years: 25.00     Pack years: 12.50     Last attempt to quit: 10/16/1988     Years since quittin.6    Smokeless tobacco: Never Used   Substance Use Topics    Alcohol use: Yes     Alcohol/week: 1.8 oz     Types: 3 Cans of beer per week     Comment: socailly    Drug use: No       Allergies: Reviewed    Home Medications:  Current Outpatient Medications on File Prior to Visit   Medication Sig Dispense Refill    cholecalciferol, vitamin D3, 2,000 unit Cap Take 2,000 Units by mouth. Patient states that he takes this medication 2 times per week      folic acid (FOLVITE) 1 MG tablet Take 1 mg by mouth once daily.      latanoprost 0.005 % ophthalmic solution Place 1 drop into both eyes every evening. 1 Bottle 12    multivitamin capsule Take 1 capsule by mouth once daily.      pantoprazole (PROTONIX) 40 MG tablet Take 40 mg by mouth once daily.      prednisoLONE acetate (PRED FORTE) 1 % DrpS Place 1 drop into the left eye 4 (four) times daily. Eyedrops to start one day  "before surgery 10 mL 2    sildenafil (VIAGRA) 100 MG tablet Take 100 mg by mouth daily as needed.  11    thiamine 100 MG tablet Take 100 mg by mouth once daily.      food supplemt, lactose-reduced (ENSURE ACTIVE CLEAR) Liqd Take by mouth.      sucralfate (CARAFATE) 1 gram tablet Take 1 g by mouth 4 (four) times daily.       Current Facility-Administered Medications on File Prior to Visit   Medication Dose Route Frequency Provider Last Rate Last Dose    hylan g-f 20 48 mg/6 mL injection 48 mg  48 mg Intra-articular 1 time in Clinic/HOD Stephanie Mo PA-C           Physical Exam:  Vital Signs:  BP (!) 100/56   Pulse 88   Ht 5' 9" (1.753 m)   Wt 64 kg (141 lb 1.5 oz)   BMI 20.84 kg/m²   Body mass index is 20.84 kg/m².  Physical Exam   Constitutional: He is oriented to person, place, and time. He appears well-developed.   HENT:   Head: Normocephalic.   Eyes: No scleral icterus.   Neck: Normal range of motion.   Cardiovascular: Normal rate and regular rhythm.   Pulmonary/Chest: Effort normal and breath sounds normal.   Abdominal: Soft. Bowel sounds are normal. He exhibits no distension. There is no tenderness.   Musculoskeletal: Normal range of motion.   Neurological: He is alert and oriented to person, place, and time.   Skin: Skin is warm and dry.   Psychiatric: He has a normal mood and affect.   Vitals reviewed.      Labs: Pertinent labs reviewed.  MELD-Na score: 6 at 3/14/2019  4:28 PM  MELD score: 6 at 3/14/2019  4:28 PM  Calculated from:  Serum Creatinine: 0.6 mg/dL (Rounded to 1 mg/dL) at 3/14/2019  4:28 PM  Serum Sodium: 137 mmol/L at 3/14/2019  4:28 PM  Total Bilirubin: 0.7 mg/dL (Rounded to 1 mg/dL) at 3/14/2019  4:28 PM  INR(ratio): 1.0 at 3/14/2019  4:25 PM  Age: 73 years      Assessment:  1. Alcoholic cirrhosis of liver without ascites        Recommendations:  - stable without any new complaints  - Will plan for MELD labs and HCC surveillance in 3 months  - ETOH abstinence discussed and " encouraged.     Return to Clinic:    September with pre-clinic labs and imaging

## 2019-06-11 ENCOUNTER — APPOINTMENT (OUTPATIENT)
Dept: RADIOLOGY | Facility: HOSPITAL | Age: 74
End: 2019-06-11
Attending: INTERNAL MEDICINE
Payer: MEDICARE

## 2019-06-11 ENCOUNTER — OFFICE VISIT (OUTPATIENT)
Dept: OPHTHALMOLOGY | Facility: CLINIC | Age: 74
End: 2019-06-11
Payer: MEDICARE

## 2019-06-11 DIAGNOSIS — Z98.890 POST-OPERATIVE STATE: Primary | ICD-10-CM

## 2019-06-11 DIAGNOSIS — M89.9 BONE DISORDER: ICD-10-CM

## 2019-06-11 DIAGNOSIS — Z00.00 ENCOUNTER FOR PREVENTIVE HEALTH EXAMINATION: ICD-10-CM

## 2019-06-11 DIAGNOSIS — M81.0 AGE-RELATED OSTEOPOROSIS WITHOUT CURRENT PATHOLOGICAL FRACTURE: ICD-10-CM

## 2019-06-11 PROCEDURE — 77080 DXA BONE DENSITY AXIAL: CPT | Mod: TC

## 2019-06-11 PROCEDURE — 99999 PR PBB SHADOW E&M-EST. PATIENT-LVL II: CPT | Mod: PBBFAC,,, | Performed by: OPHTHALMOLOGY

## 2019-06-11 PROCEDURE — 99024 POSTOP FOLLOW-UP VISIT: CPT | Mod: POP,,, | Performed by: OPHTHALMOLOGY

## 2019-06-11 PROCEDURE — 77080 DEXA BONE DENSITY SPINE HIP: ICD-10-PCS | Mod: 26,,, | Performed by: RADIOLOGY

## 2019-06-11 PROCEDURE — 99212 OFFICE O/P EST SF 10 MIN: CPT | Mod: PBBFAC | Performed by: OPHTHALMOLOGY

## 2019-06-11 PROCEDURE — 99024 PR POST-OP FOLLOW-UP VISIT: ICD-10-PCS | Mod: POP,,, | Performed by: OPHTHALMOLOGY

## 2019-06-11 PROCEDURE — 99999 PR PBB SHADOW E&M-EST. PATIENT-LVL II: ICD-10-PCS | Mod: PBBFAC,,, | Performed by: OPHTHALMOLOGY

## 2019-06-11 PROCEDURE — 77080 DXA BONE DENSITY AXIAL: CPT | Mod: 26,,, | Performed by: RADIOLOGY

## 2019-06-11 NOTE — PROGRESS NOTES
SUBJECTIVE:   Santy Anderson is a 73 y.o. male   Uncorrected distance visual acuity was 20/25 -2 in the right eye and 20/300 -1 in the left eye.   Chief Complaint   Patient presents with    Post-op Evaluation        HPI:  HPI     Pt here for 1m chk. No pain or discomfort. VA is still blurry OS. 100%   compliant with gtts.     REFERRED by Dr. Abe Elizabeth fax number [phone]    1. Moderate COAG dx 3/19 with Tmax 27/23 Goal=19  2.PCIOL/istent inject OS +19.5 SN60WF (distance) 4/10/2019  PCIOL/I-Stent (inj) OD +20.5 SN60WF (distance) 3-27-19  3. Superficial K laceration OS probably pt fingernail injury between POD1   and POW1 while pt neglected to wear shield at night    Pred Acetate BID OU  Latanoprost qhs OU  Ace 128 QID OS (Pt states he is taking TID)    Last edited by Bird Champagne, Patient Care Assistant on 6/11/2019  7:47   AM. (History)        Assessment /Plan :  1. Post-operative state   Exam:    Slit lamp exam:  L/L: nl  S/C: quiet and uninflamed  K: clear, wound sealed  AC: no cell or flare  Iris/Lens: IOL centered and stable      Assessment:    PO Month 2 S/P Phaco/IOL OS with I stent : Doing Well. Final visual correction options discussed and appropriate prescriptions and/or OTC reading glass recommendations offered to patient. Pt desires Bifocal Rx. Pt instructed to follow up with Dr. Zapata in 2 months for next eye exam or PRN any pain, redness, vision changes or other concerns.       Decrease Pred Acetate to BID OU  Continue Latanoprost qhs OU  Continue Ace 128 TID-QID OS    RTC in

## 2019-08-06 ENCOUNTER — OFFICE VISIT (OUTPATIENT)
Dept: OPHTHALMOLOGY | Facility: CLINIC | Age: 74
End: 2019-08-06
Payer: MEDICARE

## 2019-08-06 DIAGNOSIS — Z96.1 PSEUDOPHAKIA OF BOTH EYES: ICD-10-CM

## 2019-08-06 DIAGNOSIS — H40.1132 PRIMARY OPEN ANGLE GLAUCOMA (POAG) OF BOTH EYES, MODERATE STAGE: Primary | ICD-10-CM

## 2019-08-06 PROCEDURE — 92012 INTRM OPH EXAM EST PATIENT: CPT | Mod: S$PBB,,, | Performed by: OPHTHALMOLOGY

## 2019-08-06 PROCEDURE — 99999 PR PBB SHADOW E&M-EST. PATIENT-LVL II: ICD-10-PCS | Mod: PBBFAC,,, | Performed by: OPHTHALMOLOGY

## 2019-08-06 PROCEDURE — 99999 PR PBB SHADOW E&M-EST. PATIENT-LVL II: CPT | Mod: PBBFAC,,, | Performed by: OPHTHALMOLOGY

## 2019-08-06 PROCEDURE — 99212 OFFICE O/P EST SF 10 MIN: CPT | Mod: PBBFAC | Performed by: OPHTHALMOLOGY

## 2019-08-06 PROCEDURE — 92012 PR EYE EXAM, EST PATIENT,INTERMED: ICD-10-PCS | Mod: S$PBB,,, | Performed by: OPHTHALMOLOGY

## 2019-08-06 NOTE — PROGRESS NOTES
SUBJECTIVE:   Santy Anderson is a 73 y.o. male   Corrected distance visual acuity was 20/25 -1 in the right eye and 20/60 in the left eye.   Chief Complaint   Patient presents with    Glaucoma        HPI:  HPI     Pt here for iop check       REFERRED by Dr. Abe Elizabeth fax number [phone]    1. Moderate COAG dx 3/19 with Tmax 27/23 Goal=19  2.PCIOL/istent inject OS +19.5 SN60WF (distance) 4/10/2019  PCIOL/I-Stent (inj) OD +20.5 SN60WF (distance) 3-27-19  3. Superficial K laceration OS probably pt fingernail injury between POD1   and POW1 while pt neglected to wear shield at night    Pred Acetate QD OU-not really using  Latanoprost qhs OU-pt not using  Ace 128 QID OS (Pt states he is taking TID)-unsure     Last edited by Hanane Smith MA on 8/6/2019  9:03 AM. (History)        Assessment /Plan :  1. Primary open angle glaucoma (POAG) of both eyes, moderate stage Doing well, IOP within acceptable range relative to target IOP and no evidence of progression. Continue current treatment. Reviewed importance of continued compliance with treatment and follow up.  Resume Latanoprost ou qhs   2. Pseudophakia of both eyes  -- Condition stable, no therapeutic change required. Monitoring routinely.       Return to clinic in 3 months  or as needed.  With GOCT, Dilation and HVF 24-2

## 2019-08-14 ENCOUNTER — TELEPHONE (OUTPATIENT)
Dept: OPHTHALMOLOGY | Facility: CLINIC | Age: 74
End: 2019-08-14

## 2019-08-14 NOTE — TELEPHONE ENCOUNTER
----- Message from Danuta Torres sent at 8/14/2019  4:08 PM CDT -----  Patient needs call back to schedule appointment..[phone]

## 2019-08-21 ENCOUNTER — TELEPHONE (OUTPATIENT)
Dept: OPHTHALMOLOGY | Facility: CLINIC | Age: 74
End: 2019-08-21

## 2019-08-21 ENCOUNTER — TELEPHONE (OUTPATIENT)
Dept: GASTROENTEROLOGY | Facility: CLINIC | Age: 74
End: 2019-08-21

## 2019-08-21 ENCOUNTER — TELEPHONE (OUTPATIENT)
Dept: HEMATOLOGY/ONCOLOGY | Facility: CLINIC | Age: 74
End: 2019-08-21

## 2019-08-21 DIAGNOSIS — C34.32 MALIGNANT NEOPLASM OF LOWER LOBE OF LEFT LUNG: Primary | ICD-10-CM

## 2019-08-21 NOTE — TELEPHONE ENCOUNTER
Good morning Mr. Anderson, please contact Barbara in [place] [facility] office. We need to reschedule your September 13th appointment. Contact number is [phone].

## 2019-08-21 NOTE — TELEPHONE ENCOUNTER
Contacted patient and he advised that he needed copy of latest eyeglass RX.  I printed it and left at check-out desk.  Patient will

## 2019-08-21 NOTE — TELEPHONE ENCOUNTER
----- Message from Vidal Hinds sent at 8/21/2019 10:17 AM CDT -----  Contact: Pt  Pt called in regards to scheduling a appointment. Pt can be reached at [phone] (ozum) .

## 2019-08-21 NOTE — TELEPHONE ENCOUNTER
----- Message from RT Erlinda sent at 8/21/2019 10:26 AM CDT -----  Regarding: pt needs STAT creatinine and lab appointment booked  Mr Madera needs STAT creatinine ordered before his CT scan Monday at 9:50am. Please book lab appointment and STAT creatinine at least 1 1/2 hours before CT appointment or if patient wants to come in  tomorrow, Friday, or the weekend.  Please make sure they are STAT. Patients exam can not be done without lab results.     Please contact patient to make them aware of the changes. We can not do patient until lab results are received.     Patients 60 years and older must have labs within a 30 day window.         Thanks Crystal Aparicio   Please call with any question [phone]

## 2019-08-21 NOTE — PROGRESS NOTES
"Subjective:      Patient ID: Santy Anderson is a 73 y.o. male.    Chief Complaint: Follow-up (4 months )      HPI  Here for follow up of medical problems.  Lost 4# in 4mo, 6#  In past 6mo.  No bleeding from gums.  No blood in stool.  No f/c/sw/cough.  Cscope scheduled.  BMs normal.  Not falling anymore.  Says 1-2 beers a day.      6/19 BMD:  FINDINGS:  The L1 to L4 vertebral bone mineral density is equal to 1.237 g/cm squared with a T score of 0.5.  There has been no significant change relative to the prior study.    The left femoral neck bone mineral density is equal to 0.769 g/cm squared with a T score of -1.9.  There has been  no significant change relative to the prior study.    There is a 4.8% risk of a major osteoporotic fracture and a 1.5% risk of hip fracture in the next 10 years (FRAX).      Impression       Osteopenia         Updated/ annual due 5/20:  HM: ref fluvax, 2/19 HAV, 6/16 [id number], 11/18 [id number], 6/16 TDaP, 6/19 BMD rep 2y, unk Cscope, 6/16 HCV neg, Eye Dr. Zapata, 2/18 CXR clear, 11/17 Hep panel neg.     Review of Systems   Constitutional: Negative for chills, diaphoresis, fatigue and fever.   Respiratory: Negative for cough, chest tightness and shortness of breath.    Cardiovascular: Negative for chest pain, palpitations and leg swelling.   Gastrointestinal: Negative for blood in stool, constipation, diarrhea, nausea and vomiting.   Genitourinary: Negative for difficulty urinating and frequency.   Musculoskeletal: Negative for arthralgias.         Objective:   Pulse (!) 112   Temp 98.2 °F (36.8 °C) (Temporal)   Ht 5' 9" (1.753 m)   Wt 62.3 kg (137 lb 5.6 oz)   SpO2 96%   BMI 20.28 kg/m²     Physical Exam   Constitutional: He is oriented to person, place, and time. He appears well-developed and well-nourished.   HENT:   Mouth/Throat: Oropharynx is clear and moist.   Neck: Normal range of motion. Neck supple.   Cardiovascular: Normal rate, regular rhythm and intact distal pulses. Exam " reveals no gallop and no friction rub.   No murmur heard.  Pulmonary/Chest: Effort normal and breath sounds normal. He has no wheezes. He has no rales.   Abdominal: Soft. Bowel sounds are normal. He exhibits no mass. There is no tenderness.   Musculoskeletal: He exhibits no edema.   Lymphadenopathy:     He has no cervical adenopathy.   Neurological: He is alert and oriented to person, place, and time.   Psychiatric: He has a normal mood and affect.         Results for JUAN TANG (MRN [id number]) as of 9/4/2019 13:22   Ref. Range 5/1/2019 15:29   Vit D, 25-Hydroxy Latest Ref Range: 30 - 96 ng/mL 22 (L)   TSH Latest Ref Range: 0.400 - 4.000 uIU/mL 1.384   PSA, SCREEN Latest Ref Range: 0.00 - 4.00 ng/mL 0.54     Assessment:       1. Weight loss    2. Thrombocytopenia    3. Alcoholic cirrhosis of liver without ascites    4. Osteopenia of multiple sites          Plan:     Weight loss, 6# in 6mo.  RTC  4mo.    Thrombocytopenia- no sign bleeding, latest labs normal.    Alcoholic cirrhosis of liver without ascites    Osteopenia of multiple sites- no rx needed.

## 2019-08-23 ENCOUNTER — TELEPHONE (OUTPATIENT)
Dept: RADIOLOGY | Facility: HOSPITAL | Age: 74
End: 2019-08-23

## 2019-08-25 ENCOUNTER — NURSE TRIAGE (OUTPATIENT)
Dept: ADMINISTRATIVE | Facility: CLINIC | Age: 74
End: 2019-08-25

## 2019-08-26 ENCOUNTER — HOSPITAL ENCOUNTER (OUTPATIENT)
Dept: RADIOLOGY | Facility: HOSPITAL | Age: 74
Discharge: HOME | End: 2019-08-26
Attending: INTERNAL MEDICINE
Payer: MEDICARE

## 2019-08-26 DIAGNOSIS — D50.0 IRON DEFICIENCY ANEMIA DUE TO CHRONIC BLOOD LOSS: ICD-10-CM

## 2019-08-26 DIAGNOSIS — D49.1 NEOPLASM OF LUNG: ICD-10-CM

## 2019-08-26 DIAGNOSIS — C34.32 MALIGNANT NEOPLASM OF LOWER LOBE OF LEFT LUNG: ICD-10-CM

## 2019-08-26 PROCEDURE — 71260 CT THORAX DX C+: CPT | Mod: 26,,, | Performed by: RADIOLOGY

## 2019-08-26 PROCEDURE — 71260 CT THORAX DX C+: CPT | Mod: TC

## 2019-08-26 PROCEDURE — 71260 CT CHEST WITH CONTRAST: ICD-10-PCS | Mod: 26,,, | Performed by: RADIOLOGY

## 2019-08-26 PROCEDURE — 25500020 PHARM REV CODE 255: Performed by: INTERNAL MEDICINE

## 2019-08-26 RX ADMIN — IOHEXOL 75 ML: 350 INJECTION, SOLUTION INTRAVENOUS at 09:08

## 2019-08-26 NOTE — TELEPHONE ENCOUNTER
Reason for Disposition   Question about upcoming scheduled test, no triage required and triager able to answer question    Additional Information   Negative: RN needs further essential information from caller in order to complete triage   Negative: Requesting regular office appointment   Negative: [1] Caller requesting NON-URGENT health information AND [2] PCP's office is the best resource   Negative: Health Information question, no triage required and triager able to answer question   Negative: General information question, no triage required and triager able to answer question    Protocols used: INFORMATION ONLY CALL-A-AH

## 2019-08-27 ENCOUNTER — OFFICE VISIT (OUTPATIENT)
Dept: OPHTHALMOLOGY | Facility: CLINIC | Age: 74
End: 2019-08-27
Payer: MEDICARE

## 2019-08-27 DIAGNOSIS — H52.7 REFRACTIVE ERROR: Primary | ICD-10-CM

## 2019-08-27 PROCEDURE — 99212 OFFICE O/P EST SF 10 MIN: CPT | Mod: PBBFAC | Performed by: OPHTHALMOLOGY

## 2019-08-27 PROCEDURE — 99999 PR PBB SHADOW E&M-EST. PATIENT-LVL II: ICD-10-PCS | Mod: PBBFAC,,, | Performed by: OPHTHALMOLOGY

## 2019-08-27 PROCEDURE — 99499 UNLISTED E&M SERVICE: CPT | Mod: S$PBB,,, | Performed by: OPHTHALMOLOGY

## 2019-08-27 PROCEDURE — 99999 PR PBB SHADOW E&M-EST. PATIENT-LVL II: CPT | Mod: PBBFAC,,, | Performed by: OPHTHALMOLOGY

## 2019-08-27 PROCEDURE — 99499 NO LOS: ICD-10-PCS | Mod: S$PBB,,, | Performed by: OPHTHALMOLOGY

## 2019-08-27 NOTE — PROGRESS NOTES
SUBJECTIVE:   Santy Anderson is a 73 y.o. male   Corrected distance visual acuity was 20/30 in the right eye and 20/30 in the left eye.   Chief Complaint   Patient presents with    Glasses chk        HPI:  HPI     Pt here due to needed his glasses changed. Pt states that the bifocal in   his glasses is messing him up and would like to have that removed. He also   wants to know if his Rx in his left eye has changed. 100% compliant with   gtts.     REFERRED by Dr. Abe Elizabeth fax number [phone]    1. Moderate COAG dx 3/19 with Tmax 27/23 Goal=19  2.PCIOL/istent inject OS +19.5 SN60WF (distance) 4/10/2019  PCIOL/I-Stent (inj) OD +20.5 SN60WF (distance) 3-27-19  3. Superficial K laceration OS probably pt fingernail injury between POD1   and POW1 while pt neglected to wear shield at night    Pred Acetate QD OU- Not using  Latanoprost qhs OU  Ace 128 QID OS (Pt states he is taking TID)- Not using    Last edited by Bird Champagne, Patient Care Assistant on 8/27/2019  8:07   AM. (History)        Assessment /Plan :  1. Refractive error new rx for single vision distance glasses     RTC as scheduled or prn

## 2019-08-29 ENCOUNTER — TELEPHONE (OUTPATIENT)
Dept: HEMATOLOGY/ONCOLOGY | Facility: CLINIC | Age: 74
End: 2019-08-29

## 2019-08-29 NOTE — TELEPHONE ENCOUNTER
Called patient after he no showed his appt. Patient verbalized he got his days mixed up. I've rescheduled the patient from today to 9/6/19 for follow up and labs. Patient verbalized his understanding of all appt changes.

## 2019-09-04 ENCOUNTER — OFFICE VISIT (OUTPATIENT)
Dept: FAMILY MEDICINE | Facility: CLINIC | Age: 74
End: 2019-09-04
Payer: MEDICARE

## 2019-09-04 VITALS
TEMPERATURE: 98 F | HEIGHT: 69 IN | HEART RATE: 112 BPM | BODY MASS INDEX: 20.35 KG/M2 | OXYGEN SATURATION: 96 % | WEIGHT: 137.38 LBS

## 2019-09-04 DIAGNOSIS — D69.6 THROMBOCYTOPENIA: ICD-10-CM

## 2019-09-04 DIAGNOSIS — M85.89 OSTEOPENIA OF MULTIPLE SITES: ICD-10-CM

## 2019-09-04 DIAGNOSIS — K70.30 ALCOHOLIC CIRRHOSIS OF LIVER WITHOUT ASCITES: ICD-10-CM

## 2019-09-04 DIAGNOSIS — R63.4 WEIGHT LOSS: Primary | ICD-10-CM

## 2019-09-04 PROCEDURE — 99213 OFFICE O/P EST LOW 20 MIN: CPT | Mod: PBBFAC,PO | Performed by: INTERNAL MEDICINE

## 2019-09-04 PROCEDURE — 99999 PR PBB SHADOW E&M-EST. PATIENT-LVL III: ICD-10-PCS | Mod: PBBFAC,,, | Performed by: INTERNAL MEDICINE

## 2019-09-04 PROCEDURE — 99213 OFFICE O/P EST LOW 20 MIN: CPT | Mod: S$PBB,,, | Performed by: INTERNAL MEDICINE

## 2019-09-04 PROCEDURE — 99213 PR OFFICE/OUTPT VISIT, EST, LEVL III, 20-29 MIN: ICD-10-PCS | Mod: S$PBB,,, | Performed by: INTERNAL MEDICINE

## 2019-09-04 PROCEDURE — 99999 PR PBB SHADOW E&M-EST. PATIENT-LVL III: CPT | Mod: PBBFAC,,, | Performed by: INTERNAL MEDICINE

## 2019-09-06 ENCOUNTER — LAB VISIT (OUTPATIENT)
Dept: LAB | Facility: HOSPITAL | Age: 74
End: 2019-09-06
Attending: INTERNAL MEDICINE
Payer: MEDICARE

## 2019-09-06 ENCOUNTER — TELEPHONE (OUTPATIENT)
Dept: RADIOLOGY | Facility: HOSPITAL | Age: 74
End: 2019-09-06

## 2019-09-06 ENCOUNTER — OFFICE VISIT (OUTPATIENT)
Dept: HEMATOLOGY/ONCOLOGY | Facility: CLINIC | Age: 74
End: 2019-09-06
Payer: MEDICARE

## 2019-09-06 VITALS
SYSTOLIC BLOOD PRESSURE: 107 MMHG | HEIGHT: 69 IN | OXYGEN SATURATION: 98 % | TEMPERATURE: 98 F | WEIGHT: 135.56 LBS | DIASTOLIC BLOOD PRESSURE: 69 MMHG | BODY MASS INDEX: 20.08 KG/M2 | HEART RATE: 99 BPM

## 2019-09-06 DIAGNOSIS — C34.32 MALIGNANT NEOPLASM OF LOWER LOBE OF LEFT LUNG: ICD-10-CM

## 2019-09-06 DIAGNOSIS — D75.89 MACROCYTOSIS: Primary | ICD-10-CM

## 2019-09-06 DIAGNOSIS — K70.30 ALCOHOLIC CIRRHOSIS OF LIVER WITHOUT ASCITES: ICD-10-CM

## 2019-09-06 DIAGNOSIS — E53.8 B12 DEFICIENCY: ICD-10-CM

## 2019-09-06 DIAGNOSIS — D49.1 NEOPLASM OF LUNG: ICD-10-CM

## 2019-09-06 DIAGNOSIS — D50.0 IRON DEFICIENCY ANEMIA DUE TO CHRONIC BLOOD LOSS: ICD-10-CM

## 2019-09-06 LAB
ALBUMIN SERPL BCP-MCNC: 3.3 G/DL (ref 3.5–5.2)
ALP SERPL-CCNC: 78 U/L (ref 55–135)
ALT SERPL W/O P-5'-P-CCNC: 16 U/L (ref 10–44)
ANION GAP SERPL CALC-SCNC: 11 MMOL/L (ref 8–16)
AST SERPL-CCNC: 47 U/L (ref 10–40)
BASOPHILS # BLD AUTO: 0.05 K/UL (ref 0–0.2)
BASOPHILS NFR BLD: 1.2 % (ref 0–1.9)
BILIRUB SERPL-MCNC: 0.9 MG/DL (ref 0.1–1)
BUN SERPL-MCNC: 3 MG/DL (ref 8–23)
CALCIUM SERPL-MCNC: 9.1 MG/DL (ref 8.7–10.5)
CHLORIDE SERPL-SCNC: 102 MMOL/L (ref 95–110)
CO2 SERPL-SCNC: 23 MMOL/L (ref 23–29)
CREAT SERPL-MCNC: 0.7 MG/DL (ref 0.5–1.4)
DIFFERENTIAL METHOD: ABNORMAL
EOSINOPHIL # BLD AUTO: 0.2 K/UL (ref 0–0.5)
EOSINOPHIL NFR BLD: 5.3 % (ref 0–8)
ERYTHROCYTE [DISTWIDTH] IN BLOOD BY AUTOMATED COUNT: 12.9 % (ref 11.5–14.5)
EST. GFR  (AFRICAN AMERICAN): >60 ML/MIN/1.73 M^2
EST. GFR  (NON AFRICAN AMERICAN): >60 ML/MIN/1.73 M^2
FERRITIN SERPL-MCNC: 86 NG/ML (ref 20–300)
GLUCOSE SERPL-MCNC: 82 MG/DL (ref 70–110)
HCT VFR BLD AUTO: 44.6 % (ref 40–54)
HGB BLD-MCNC: 15.8 G/DL (ref 14–18)
IRON SERPL-MCNC: 68 UG/DL (ref 45–160)
LYMPHOCYTES # BLD AUTO: 1.6 K/UL (ref 1–4.8)
LYMPHOCYTES NFR BLD: 38.4 % (ref 18–48)
MCH RBC QN AUTO: 37.7 PG (ref 27–31)
MCHC RBC AUTO-ENTMCNC: 35.4 G/DL (ref 32–36)
MCV RBC AUTO: 106 FL (ref 82–98)
MONOCYTES # BLD AUTO: 0.6 K/UL (ref 0.3–1)
MONOCYTES NFR BLD: 13.8 % (ref 4–15)
NEUTROPHILS # BLD AUTO: 1.7 K/UL (ref 1.8–7.7)
NEUTROPHILS NFR BLD: 41.5 % (ref 38–73)
PLATELET # BLD AUTO: 195 K/UL (ref 150–350)
PMV BLD AUTO: 9.3 FL (ref 9.2–12.9)
POTASSIUM SERPL-SCNC: 4 MMOL/L (ref 3.5–5.1)
PROT SERPL-MCNC: 7.1 G/DL (ref 6–8.4)
RBC # BLD AUTO: 4.19 M/UL (ref 4.6–6.2)
SATURATED IRON: 19 % (ref 20–50)
SODIUM SERPL-SCNC: 136 MMOL/L (ref 136–145)
TOTAL IRON BINDING CAPACITY: 354 UG/DL (ref 250–450)
TRANSFERRIN SERPL-MCNC: 239 MG/DL (ref 200–375)
WBC # BLD AUTO: 4.19 K/UL (ref 3.9–12.7)

## 2019-09-06 PROCEDURE — 99214 OFFICE O/P EST MOD 30 MIN: CPT | Mod: S$PBB,,, | Performed by: INTERNAL MEDICINE

## 2019-09-06 PROCEDURE — 85025 COMPLETE CBC W/AUTO DIFF WBC: CPT

## 2019-09-06 PROCEDURE — 80053 COMPREHEN METABOLIC PANEL: CPT

## 2019-09-06 PROCEDURE — 99999 PR PBB SHADOW E&M-EST. PATIENT-LVL III: CPT | Mod: PBBFAC,,, | Performed by: INTERNAL MEDICINE

## 2019-09-06 PROCEDURE — 83540 ASSAY OF IRON: CPT

## 2019-09-06 PROCEDURE — 82728 ASSAY OF FERRITIN: CPT

## 2019-09-06 PROCEDURE — 99213 OFFICE O/P EST LOW 20 MIN: CPT | Mod: PBBFAC | Performed by: INTERNAL MEDICINE

## 2019-09-06 PROCEDURE — 36415 COLL VENOUS BLD VENIPUNCTURE: CPT

## 2019-09-06 PROCEDURE — 99214 PR OFFICE/OUTPT VISIT, EST, LEVL IV, 30-39 MIN: ICD-10-PCS | Mod: S$PBB,,, | Performed by: INTERNAL MEDICINE

## 2019-09-06 PROCEDURE — 99999 PR PBB SHADOW E&M-EST. PATIENT-LVL III: ICD-10-PCS | Mod: PBBFAC,,, | Performed by: INTERNAL MEDICINE

## 2019-09-06 NOTE — PROGRESS NOTES
Subjective:       Patient ID: Santy Anderson is a 73 y.o. male.    Chief Complaint: Results; Anemia; and Lung Cancer    HPI 73-year-old male history non-small cell lung carcinoma status post resection.  Patient returns for review and repeat imaging     Past Medical History:   Diagnosis Date    Alcoholic cirrhosis of liver with ascites     Arthritis     Cataract     Gastric mass     GERD (gastroesophageal reflux disease)     Hyperlipidemia     Lung cancer     Primary open angle glaucoma (POAG) of both eyes, moderate stage 2019     Family History   Problem Relation Age of Onset    Diabetes Mother     Hypertension Mother     Colon cancer Neg Hx      Social History     Socioeconomic History    Marital status:      Spouse name: Not on file    Number of children: Not on file    Years of education: Not on file    Highest education level: Not on file   Occupational History    Not on file   Social Needs    Financial resource strain: Not on file    Food insecurity:     Worry: Not on file     Inability: Not on file    Transportation needs:     Medical: Not on file     Non-medical: Not on file   Tobacco Use    Smoking status: Former Smoker     Packs/day: 0.50     Years: 25.00     Pack years: 12.50     Last attempt to quit: 10/16/1988     Years since quittin.9    Smokeless tobacco: Never Used   Substance and Sexual Activity    Alcohol use: Yes     Alcohol/week: 1.8 oz     Types: 3 Cans of beer per week     Comment: socailly    Drug use: No    Sexual activity: Never   Lifestyle    Physical activity:     Days per week: Not on file     Minutes per session: Not on file    Stress: Not on file   Relationships    Social connections:     Talks on phone: Not on file     Gets together: Not on file     Attends Jew service: Not on file     Active member of club or organization: Not on file     Attends meetings of clubs or organizations: Not on file     Relationship status: Not on file    Other Topics Concern    Not on file   Social History Narrative         Past Surgical History:   Procedure Laterality Date    1 STENT Right 03/27/2019    DR. CLARK    ABDOMINAL SURGERY      CATARACT EXTRACTION      CATARACT EXTRACTION W/  INTRAOCULAR LENS IMPLANT Left 04/10/2019    istent inject     COLONOSCOPY N/A 4/11/2017    [facility] at [facility]    EGD (ESOPHAGOGASTRODUODENOSCOPY) N/A 10/16/2013    [facility] by Marvin Baez [facility] at [facility] END[facility]    ESOPHAGOGASTRODUODENOSCOPY      ESOPHAGOGASTRODUODENOSCOPY (EGD) N/A 4/23/2018    Performed by [facility]le [facility]jeda MD at [facility] END[facility]    ESOPHAGOGASTRODUODENOSCOPY (EGD) N/A 2/26/2018    Per[facility] by [facility] at [facility] END[facility]    ESOPHAGOGASTRODUODENOSCOPY (EGD) N/A 10/20/2016    [facility]formed by [facility] at [facility]    ESOPHAGOGASTRODUODENOSCOPY (EGD) N/A 9/22/2016    [facility] by [facility] at [facility] END[facility]    FEMUR SURGERY Right     GASTRECTOMY      GASTRECTOMY, PARTIAL N/A 10/30/2013    Performed by Louis O. Jeansonne IV, MD at [facility] OR    GASTRECTOMY-LAPAROSCOPIC N/A 10/30/2013    Performed by [facility]ne IV, MD at [facility] OR    HAND SURGERY Left     KNEE SURGERY Right     laparotomy for trauma (negative)  1979    left lower lobectomy      PCIOL Right 03/27/2019    DR. CLARK    REMOVAL-HARDWARE-LEG Right 6/20/2017    Performed by Red Eller MD at [facility] OR       Labs:  Lab Results   Component Value Date    WBC 4.19 09/06/2019    HGB 15.8 09/06/2019    HCT 44.6 09/06/2019     (H) 09/06/2019     09/06/2019     BMP  Lab Results   Component Value Date     03/14/2019    K 3.7 03/14/2019     03/14/2019    CO2 25 03/14/2019    BUN 3 (L) 03/14/2019    CREATININE 0.7 08/26/2019    CALCIUM 9.4 03/14/2019    ANIONGAP 8 03/14/2019    ESTGFRAFRICA >60 08/26/2019    EGFRNONAA >60 08/26/2019     Lab Results   Component Value Date    ALT 20 03/14/2019    AST 28 03/14/2019     ALKPHOS 71 03/14/2019    BILITOT 0.7 03/14/2019       Lab Results   Component Value Date    IRON 130 02/13/2019    TIBC 252 02/13/2019    FERRITIN 137 02/13/2019     Lab Results   Component Value Date    GDGCRTEI95 370 09/23/2016     Lab Results   Component Value Date    FOLATE 8.8 09/23/2016     Lab Results   Component Value Date    TSH 1.384 05/01/2019         Review of Systems   Constitutional: Positive for fatigue. Negative for activity change, appetite change, chills, diaphoresis, fever and unexpected weight change.   HENT: Negative for congestion, dental problem, drooling, ear discharge, ear pain, facial swelling, hearing loss, mouth sores, nosebleeds, postnasal drip, rhinorrhea, sinus pressure, sneezing, sore throat, tinnitus, trouble swallowing and voice change.    Eyes: Negative for photophobia, pain, discharge, redness, itching and visual disturbance.   Respiratory: Negative for apnea, cough, choking, chest tightness, shortness of breath, wheezing and stridor.    Cardiovascular: Negative for chest pain, palpitations and leg swelling.   Gastrointestinal: Negative for abdominal distention, abdominal pain, anal bleeding, blood in stool, constipation, diarrhea, nausea, rectal pain and vomiting.   Endocrine: Negative for cold intolerance, heat intolerance, polydipsia, polyphagia and polyuria.   Genitourinary: Negative for decreased urine volume, difficulty urinating, discharge, dysuria, enuresis, flank pain, frequency, genital sores, hematuria, penile pain, penile swelling, scrotal swelling, testicular pain and urgency.   Musculoskeletal: Negative for arthralgias, back pain, gait problem, joint swelling, myalgias, neck pain and neck stiffness.   Skin: Negative for color change, pallor, rash and wound.   Allergic/Immunologic: Negative for environmental allergies, food allergies and immunocompromised state.   Neurological: Positive for weakness. Negative for dizziness, tremors, seizures, syncope, facial  asymmetry, speech difficulty, light-headedness, numbness and headaches.   Hematological: Negative for adenopathy. Does not bruise/bleed easily.   Psychiatric/Behavioral: Positive for dysphoric mood. Negative for agitation, behavioral problems, confusion, decreased concentration, hallucinations, self-injury, sleep disturbance and suicidal ideas. The patient is nervous/anxious. The patient is not hyperactive.        Objective:      Physical Exam   Constitutional: He is oriented to person, place, and time. He appears well-developed and well-nourished. He appears distressed.   HENT:   Head: Normocephalic.   Right Ear: External ear normal.   Left Ear: External ear normal.   Nose: Nose normal. Right sinus exhibits no maxillary sinus tenderness and no frontal sinus tenderness. Left sinus exhibits no maxillary sinus tenderness and no frontal sinus tenderness.   Mouth/Throat: Oropharynx is clear and moist. No oropharyngeal exudate.   Eyes: Pupils are equal, round, and reactive to light. EOM and lids are normal. Right eye exhibits no discharge. Left eye exhibits no discharge. Right conjunctiva is not injected. Right conjunctiva has no hemorrhage. Left conjunctiva is not injected. Left conjunctiva has no hemorrhage. No scleral icterus. Right eye exhibits normal extraocular motion. Left eye exhibits normal extraocular motion.   Neck: Normal range of motion. Neck supple. No JVD present. No tracheal deviation present. No thyromegaly present.   Cardiovascular: Normal rate and regular rhythm.   Pulmonary/Chest: Effort normal. No stridor. No respiratory distress.   Abdominal: Soft. He exhibits no mass. There is no hepatosplenomegaly, splenomegaly or hepatomegaly. There is no tenderness.   Musculoskeletal: Normal range of motion. He exhibits no edema or tenderness.   Lymphadenopathy:        Head (right side): No posterior auricular and no occipital adenopathy present.        Head (left side): No posterior auricular and no occipital  adenopathy present.     He has no cervical adenopathy.        Right cervical: No superficial cervical, no deep cervical and no posterior cervical adenopathy present.       Left cervical: No superficial cervical, no deep cervical and no posterior cervical adenopathy present.     He has no axillary adenopathy.        Right: No supraclavicular adenopathy present.        Left: No supraclavicular adenopathy present.   Neurological: He is alert and oriented to person, place, and time. He has normal strength. No cranial nerve deficit. Coordination normal.   Skin: Skin is dry. No rash noted. He is not diaphoretic. No cyanosis or erythema. Nails show no clubbing.   Psychiatric: He has a normal mood and affect. His behavior is normal. Judgment and thought content normal. Cognition and memory are normal.   Vitals reviewed.          Assessment:      1. Macrocytosis    2. B12 deficiency    3. Malignant neoplasm of lower lobe of left lung    4. Alcoholic cirrhosis of liver without ascites           Plan:       Reviewed laboratory studies will check B12 level.  Previously checked 2 years ago macrocytosis noted patient denies excess ethanol use.  Although continues to actively drink patient CT demonstrates no evidence of recurrent or 2nd lung primary reviewed abnormality for in right lung would recommend repeat imaging in 1 year      Otto Lara Jr, MD FACP

## 2019-09-09 ENCOUNTER — HOSPITAL ENCOUNTER (OUTPATIENT)
Dept: RADIOLOGY | Facility: HOSPITAL | Age: 74
Discharge: HOME | End: 2019-09-09
Attending: NURSE PRACTITIONER
Payer: MEDICARE

## 2019-09-09 DIAGNOSIS — K70.30 ALCOHOLIC CIRRHOSIS OF LIVER WITHOUT ASCITES: ICD-10-CM

## 2019-09-09 PROCEDURE — 76700 US EXAM ABDOM COMPLETE: CPT | Mod: 26,,, | Performed by: RADIOLOGY

## 2019-09-09 PROCEDURE — 76700 US ABDOMEN COMPLETE: ICD-10-PCS | Mod: 26,,, | Performed by: RADIOLOGY

## 2019-09-09 PROCEDURE — 76700 US EXAM ABDOM COMPLETE: CPT | Mod: TC

## 2019-09-12 ENCOUNTER — OFFICE VISIT (OUTPATIENT)
Dept: GASTROENTEROLOGY | Facility: CLINIC | Age: 74
End: 2019-09-12
Payer: MEDICARE

## 2019-09-12 VITALS
WEIGHT: 135.81 LBS | SYSTOLIC BLOOD PRESSURE: 110 MMHG | HEIGHT: 69 IN | DIASTOLIC BLOOD PRESSURE: 70 MMHG | BODY MASS INDEX: 20.11 KG/M2

## 2019-09-12 DIAGNOSIS — Z78.9 ALCOHOL USE: ICD-10-CM

## 2019-09-12 DIAGNOSIS — K70.30 ALCOHOLIC CIRRHOSIS OF LIVER WITHOUT ASCITES: Primary | ICD-10-CM

## 2019-09-12 PROCEDURE — 99213 OFFICE O/P EST LOW 20 MIN: CPT | Mod: PBBFAC | Performed by: NURSE PRACTITIONER

## 2019-09-12 PROCEDURE — 99999 PR PBB SHADOW E&M-EST. PATIENT-LVL III: ICD-10-PCS | Mod: PBBFAC,,, | Performed by: NURSE PRACTITIONER

## 2019-09-12 PROCEDURE — 99214 PR OFFICE/OUTPT VISIT, EST, LEVL IV, 30-39 MIN: ICD-10-PCS | Mod: S$PBB,,, | Performed by: NURSE PRACTITIONER

## 2019-09-12 PROCEDURE — 99999 PR PBB SHADOW E&M-EST. PATIENT-LVL III: CPT | Mod: PBBFAC,,, | Performed by: NURSE PRACTITIONER

## 2019-09-12 PROCEDURE — 99214 OFFICE O/P EST MOD 30 MIN: CPT | Mod: S$PBB,,, | Performed by: NURSE PRACTITIONER

## 2019-09-12 NOTE — PROGRESS NOTES
Clinic Follow Up:  [facility] Follow Up Note    Reason for Follow Up:  The primary encounter diagnosis was Alcoholic cirrhosis of liver without ascites. A diagnosis of Alcohol use was also pertinent to this visit.    PCP: Joanie Simms       HPI:  This is a 73 y.o. male here for follow up of the above  Pt states that he has been feeling well without any GI complaints.   Recent labs are stable, however, there is a slight increase in bili.  Pt admits to continued ETOH use weekly.   US is stable.   Denies any abdominal pain.  No nausea or vomiting.  No change in bowel pattern.  No melena or hematochezia. No weight loss.  No upper GI bleeding.  No ascites or BLE.  No overt confusion.      Review of Systems   Constitutional: Negative for chills, fever, malaise/fatigue and weight loss.   Respiratory: Negative for cough.    Cardiovascular: Negative for chest pain.   Gastrointestinal:        Per HPI   Musculoskeletal: Negative for myalgias.   Skin: Negative for itching and rash.   Neurological: Negative for headaches.   Psychiatric/Behavioral: The patient is not nervous/anxious.        Medical History:  Past Medical History:   Diagnosis Date    Alcoholic cirrhosis of liver with ascites     Arthritis     Cataract     Gastric mass     GERD (gastroesophageal reflux disease)     Hyperlipidemia     Lung cancer     Primary open angle glaucoma (POAG) of both eyes, moderate stage 4/16/2019       Surgical History:   Past Surgical History:   Procedure Laterality Date    1 STENT Right 03/27/2019    DR. CLARK    ABDOMINAL SURGERY      CATARACT EXTRACTION      CATARACT EXTRACTION W/  INTRAOCULAR LENS IMPLANT Left 04/10/2019    istent inject     COLONOSCOPY N/A 4/11/2017    [facility]    EGD (ESOPHAGOGASTRODUODENOSCOPY) N/A 10/16/2013    [facility] at [facility] END[facility]    ESOPHAGOGASTRODUODENOSCOPY      ESOPHAGOGASTRODUODENOSCOPY (EGD) N/A  2018    Performed by [facility]le Tejeda MD at [facility] ENDO    ESOPHAGOGASTRODUODENOSCOPY (EGD) N/A 2018    Performed by Danielle [facility]jeda MD at [facility] ENDO    ESOPHAGOGASTRODUODENOSCOPY (EGD) N/A 10/20/2016    Performed by Brianne Wise MD at [facility] ENDO    ESOPHAGOGASTRODUODENOSCOPY (EGD) N/A 2016    Performed by Brianne Wise MD at [facility] ENDO    FEMUR SURGERY Right     GASTRECTOMY      GASTRECTOMY, PARTIAL N/A 10/30/2013    Performed by Louis O. Jeansonne IV, MD at [facility] OR    GASTRECTOMY-LAPAROSCOPIC N/A 10/30/2013    Performed by [facility] O. Jeansonne IV, MD at [facility] OR    HAND SURGERY Left     KNEE SURGERY Right     laparotomy for trauma (negative)  1979    left lower lobectomy      PCIOL Right 2019    DR. CLARK    REMOVAL-HARDWARE-LEG Right 2017    Performed by Red Eller MD at [facility] OR       Family History:   Family History   Problem Relation Age of Onset    Diabetes Mother     Hypertension Mother     Colon cancer Neg Hx        Social History:   Social History     Tobacco Use    Smoking status: Former Smoker     Packs/day: 0.50     Years: 25.00     Pack years: 12.50     Last attempt to quit: 10/16/1988     Years since quittin.9    Smokeless tobacco: Never Used   Substance Use Topics    Alcohol use: Yes     Alcohol/week: 1.8 oz     Types: 3 Cans of beer per week     Comment: socailly    Drug use: No       Allergies: Reviewed    Home Medications:  Current Outpatient Medications on File Prior to Visit   Medication Sig Dispense Refill    cholecalciferol, vitamin D3, 2,000 unit Cap Take 2,000 Units by mouth. Patient states that he takes this medication 2 times per week      folic acid (FOLVITE) 1 MG tablet Take 1 mg by mouth once daily.      food supplemt, lactose-reduced (ENSURE ACTIVE CLEAR) Liqd Take by mouth.      latanoprost 0.005 % ophthalmic solution Place 1 drop into both eyes every evening. 1 Bottle 12    multivitamin capsule Take 1 capsule by mouth once  "daily.      pantoprazole (PROTONIX) 40 MG tablet Take 40 mg by mouth once daily.      prednisoLONE acetate (PRED FORTE) 1 % DrpS Place 1 drop into the left eye 4 (four) times daily. Eyedrops to start one day before surgery 10 mL 2    sildenafil (VIAGRA) 100 MG tablet Take 100 mg by mouth daily as needed.  11    sucralfate (CARAFATE) 1 gram tablet Take 1 g by mouth 4 (four) times daily.      thiamine 100 MG tablet Take 100 mg by mouth once daily.       Current Facility-Administered Medications on File Prior to Visit   Medication Dose Route Frequency Provider Last Rate Last Dose    hylan g-f 20 48 mg/6 mL injection 48 mg  48 mg Intra-articular 1 time in Clinic/HOD Stephanie Mo PA-C           Physical Exam:  Vital Signs:  /70   Ht 5' 9" (1.753 m)   Wt 61.6 kg (135 lb 12.9 oz)   BMI 20.05 kg/m²   Body mass index is 20.05 kg/m².  Physical Exam   Constitutional: He is oriented to person, place, and time. He appears well-developed.   HENT:   Head: Normocephalic.   Neck: Normal range of motion.   Cardiovascular: Normal rate and regular rhythm.   Pulmonary/Chest: Effort normal and breath sounds normal.   Abdominal: Soft. Bowel sounds are normal. He exhibits no distension. There is no tenderness.   Musculoskeletal: Normal range of motion.   Neurological: He is alert and oriented to person, place, and time.   Skin: Skin is warm and dry.   Psychiatric: He has a normal mood and affect.   Vitals reviewed.      Labs: Pertinent labs reviewed.  MELD-Na score: 8 at 9/9/2019  9:23 AM  MELD score: 8 at 9/9/2019  9:23 AM  Calculated from:  Serum Creatinine: 0.7 mg/dL (Rounded to 1 mg/dL) at 9/9/2019  9:23 AM  Serum Sodium: 140 mmol/L (Rounded to 137 mmol/L) at 9/9/2019  9:23 AM  Total Bilirubin: 1.7 mg/dL at 9/9/2019  9:23 AM  INR(ratio): 1.0 at 9/9/2019  9:23 AM  Age: 73 years  EV: EGD 2018 without EV  HCC: US 9/19 without liver lesion or masses.     Assessment:  1. Alcoholic cirrhosis of liver without ascites  "   2. Alcohol use        Recommendations:  - stable without complaints  - continue with MELD labs and HCC surveillance every 6 months  - strongly encouraged ETOH abstinence. Pt states understanding.       Return to Clinic:  6 months with pre-clinic labs and imaging.

## 2019-09-19 ENCOUNTER — OFFICE VISIT (OUTPATIENT)
Dept: OPHTHALMOLOGY | Facility: CLINIC | Age: 74
End: 2019-09-19
Payer: MEDICARE

## 2019-09-19 DIAGNOSIS — Z96.1 PSEUDOPHAKIA OF BOTH EYES: ICD-10-CM

## 2019-09-19 DIAGNOSIS — H20.9 IRITIS OF LEFT EYE: Primary | ICD-10-CM

## 2019-09-19 DIAGNOSIS — H40.1132 PRIMARY OPEN ANGLE GLAUCOMA (POAG) OF BOTH EYES, MODERATE STAGE: ICD-10-CM

## 2019-09-19 PROCEDURE — 92012 PR EYE EXAM, EST PATIENT,INTERMED: ICD-10-PCS | Mod: S$PBB,,, | Performed by: OPHTHALMOLOGY

## 2019-09-19 PROCEDURE — 92012 INTRM OPH EXAM EST PATIENT: CPT | Mod: S$PBB,,, | Performed by: OPHTHALMOLOGY

## 2019-09-19 PROCEDURE — 99999 PR PBB SHADOW E&M-EST. PATIENT-LVL II: ICD-10-PCS | Mod: PBBFAC,,, | Performed by: OPHTHALMOLOGY

## 2019-09-19 PROCEDURE — 99212 OFFICE O/P EST SF 10 MIN: CPT | Mod: PBBFAC | Performed by: OPHTHALMOLOGY

## 2019-09-19 PROCEDURE — 99999 PR PBB SHADOW E&M-EST. PATIENT-LVL II: CPT | Mod: PBBFAC,,, | Performed by: OPHTHALMOLOGY

## 2019-09-19 RX ORDER — PREDNISOLONE ACETATE 10 MG/ML
1 SUSPENSION/ DROPS OPHTHALMIC EVERY 12 HOURS
Qty: 5 ML | Refills: 3 | Status: SHIPPED | OUTPATIENT
Start: 2019-09-19 | End: 2020-05-04

## 2019-09-19 NOTE — PROGRESS NOTES
SUBJECTIVE:   Santy Anderson is a 73 y.o. male   Corrected distance visual acuity was 20/20 in the right eye and 20/30 in the left eye.   Chief Complaint   Patient presents with    Eye Problem        HPI:  HPI     The patient states his left eye was doing very good but now its very   watery and irritated.     REFERRED by Dr. Abe Elizabeth fax number [phone]    1. Moderate COAG dx 3/19 with Tmax 27/23 Goal=19  2.PCIOL/istent inject OS +19.5 SN60WF (distance) 4/10/2019  PCIOL/I-Stent (inj) OD +20.5 SN60WF (distance) 3-27-19  3. Superficial K laceration OS probably pt fingernail injury between POD1   and POW1 while pt neglected to wear shield at night      Latanoprost qhs OU (Stopped Monday)  Ace 128 QID OS (not using)    Last edited by Mckenzie Pina on 9/19/2019  1:55 PM. (History)        Assessment /Plan :  1. Iritis of left eye add Pred Acetate BID OS until better then qd   2. Primary open angle glaucoma (POAG) of both eyes, moderate stage IOP not within acceptable range relative to target IOP with risk of irreversible visual loss. Better IOP control is recommended. Discussed options, risks, and benefits of additional medication, SLT laser, and/or incisional glaucoma surgery. Reviewed importance of continued compliance with treatment and follow up.     Patient chooses to resume Latanoprost ou qhs     3. Pseudophakia of both eyes  -- Condition stable, no therapeutic change required. Monitoring routinely.       RTC in one month

## 2019-09-23 ENCOUNTER — HOSPITAL ENCOUNTER (EMERGENCY)
Facility: HOSPITAL | Age: 74
Discharge: HOME | End: 2019-09-23
Attending: FAMILY MEDICINE
Payer: MEDICARE

## 2019-09-23 VITALS
DIASTOLIC BLOOD PRESSURE: 72 MMHG | SYSTOLIC BLOOD PRESSURE: 114 MMHG | BODY MASS INDEX: 20.09 KG/M2 | HEIGHT: 70 IN | WEIGHT: 140.31 LBS | TEMPERATURE: 98 F | OXYGEN SATURATION: 100 % | RESPIRATION RATE: 18 BRPM | HEART RATE: 100 BPM

## 2019-09-23 DIAGNOSIS — M43.6 TORTICOLLIS: ICD-10-CM

## 2019-09-23 DIAGNOSIS — M62.838 NECK MUSCLE SPASM: Primary | ICD-10-CM

## 2019-09-23 PROCEDURE — 96372 THER/PROPH/DIAG INJ SC/IM: CPT

## 2019-09-23 PROCEDURE — 99284 EMERGENCY DEPT VISIT MOD MDM: CPT | Mod: 25

## 2019-09-23 PROCEDURE — 63600175 PHARM REV CODE 636 W HCPCS: Performed by: PHYSICIAN ASSISTANT

## 2019-09-23 RX ORDER — METHOCARBAMOL 500 MG/1
1000 TABLET, FILM COATED ORAL 3 TIMES DAILY
Qty: 30 TABLET | Refills: 0 | Status: SHIPPED | OUTPATIENT
Start: 2019-09-23 | End: 2019-09-28

## 2019-09-23 RX ORDER — DICLOFENAC SODIUM 50 MG/1
50 TABLET, DELAYED RELEASE ORAL 2 TIMES DAILY
Qty: 20 TABLET | Refills: 0 | Status: ON HOLD | OUTPATIENT
Start: 2019-09-23 | End: 2020-06-12 | Stop reason: HOSPADM

## 2019-09-23 RX ORDER — KETOROLAC TROMETHAMINE 30 MG/ML
30 INJECTION, SOLUTION INTRAMUSCULAR; INTRAVENOUS
Status: COMPLETED | OUTPATIENT
Start: 2019-09-23 | End: 2019-09-23

## 2019-09-23 RX ADMIN — KETOROLAC TROMETHAMINE 30 MG: 30 INJECTION, SOLUTION INTRAMUSCULAR at 04:09

## 2019-09-23 NOTE — ED PROVIDER NOTES
SCRIBE #1 NOTE: I, Jeet Melgoza, am scribing for, and in the presence of, EMMANUELLE Da Silva. I have scribed the entire note.      History      Chief Complaint   Patient presents with    Neck Pain     pt reports pain and stiffness to L side of neck and down his shoulder, states he saw his PCP and they gave him meds but they arent working, reports pain when turning head to side        Review of patient's allergies indicates:  No Known Allergies     HPI   HPI    9/23/2019, 4:06 PM   History obtained from the patient      History of Present Illness: Santy Anderson is a 73 y.o. male patient who presents to the Emergency Department for L-sided neck pain, onset 3 days PTA. Symptoms are constant and moderate in severity. Pain is worse with head movement. Associated sxs include L-sided neck stiffness. Patient denies any fever, chills, n/v/d, SOB, CP, weakness, numbness, dizziness, headache, injury, and all other sxs at this time. Pt was given medication by his PCP, but they have not improved his sxs. No further complaints or concerns at this time.     Arrival mode: Personal vehicle    PCP: Joanie Simms MD       Past Medical History:  Past Medical History:   Diagnosis Date    Alcoholic cirrhosis of liver with ascites     Arthritis     Cataract     Gastric mass     GERD (gastroesophageal reflux disease)     Hyperlipidemia     Lung cancer     Primary open angle glaucoma (POAG) of both eyes, moderate stage 4/16/2019       Past Surgical History:  Past Surgical History:   Procedure Laterality Date    1 STENT Right 03/27/2019    DR. CLARK    ABDOMINAL SURGERY      CATARACT EXTRACTION      CATARACT EXTRACTION W/  INTRAOCULAR LENS IMPLANT Left 04/10/2019    istent inject     COLONOSCOPY N/A 4/11/2017    [facility]    EGD (ESOPHAGOGASTRODUODENOSCOPY) N/A 10/16/2013    Per[facility] by Marvin Baez [facility] at [facility]    ESOPHAGOGASTRODUODENOSCOPY       ESOPHAGOGASTRODUODENOSCOPY (EGD) N/A 2018    Performed by [facility]le [facility]jeda [facility] at [facility] ENDO    ESOPHAGOGASTRODUODENOSCOPY (EGD) N/A 2018    Performed by [facility]le [facility]eda [facility] at [facility] ENDO    ESOPHAGOGASTRODUODENOSCOPY (EGD) N/A 10/20/2016    Performed by [facility]ne [facility] at [facility] ENDO    ESOPHAGOGASTRODUODENOSCOPY (EGD) N/A 2016    Performed by [facility]ne Wise [facility] at [facility] ENDO    FEMUR SURGERY Right     GASTRECTOMY      GASTRECTOMY, PARTIAL N/A 10/30/2013    Performed by Louis O. Jeansonne IV, MD at [facility] OR    GASTRECTOMY-LAPAROSCOPIC N/A 10/30/2013    Performed by Louis O. Jeansonne IV, MD at [facility] OR    HAND SURGERY Left     KNEE SURGERY Right     laparotomy for trauma (negative)  1979    left lower lobectomy      PCIOL Right 2019    DR. CLARK    REMOVAL-HARDWARE-LEG Right 2017    Performed by Red Eller MD at [facility] OR         Family History:  Family History   Problem Relation Age of Onset    Diabetes Mother     Hypertension Mother     Colon cancer Neg Hx        Social History:  Social History     Tobacco Use    Smoking status: Former Smoker     Packs/day: 0.50     Years: 25.00     Pack years: 12.50     Last attempt to quit: 10/16/1988     Years since quittin.9    Smokeless tobacco: Never Used   Substance and Sexual Activity    Alcohol use: Yes     Alcohol/week: 3.0 standard drinks     Types: 3 Cans of beer per week     Comment: socailly    Drug use: No    Sexual activity: Never       ROS   Review of Systems   Constitutional: Negative for chills, diaphoresis, fatigue and fever.   HENT: Negative for sore throat.    Respiratory: Negative for shortness of breath.    Cardiovascular: Negative for chest pain.   Gastrointestinal: Negative for abdominal pain, diarrhea, nausea and vomiting.   Genitourinary: Negative for dysuria.   Musculoskeletal: Positive for neck pain (L) and neck stiffness (L). Negative for back pain.   Skin: Negative for rash and wound.  "  Neurological: Negative for dizziness, seizures, weakness, light-headedness, numbness and headaches.   Hematological: Does not bruise/bleed easily.   All other systems reviewed and are negative.    Physical Exam      Initial Vitals [09/23/19 1601]   BP Pulse Resp Temp SpO2   114/72 100 18 97.5 °F (36.4 °C) 100 %      MAP       --          Physical Exam  Nursing Notes and Vital Signs Reviewed.  Constitutional: Patient is in no acute distress. Well-developed and well-nourished.  Head: Atraumatic. Normocephalic.  Eyes: PERRL. EOM intact. Conjunctivae are not pale. No scleral icterus.  ENT: Mucous membranes are moist. Oropharynx is clear and symmetric.    Neck: Supple. No lymphadenopathy. L cervical paraspinal muscle tenderness. No cervical midline bony tenderness, deformities, or step-offs.   Cardiovascular: Regular rate. Regular rhythm. No murmurs, rubs, or gallops. Distal pulses are 2+ and symmetric.  Pulmonary/Chest: No respiratory distress. Clear to auscultation bilaterally. No wheezing or rales.  Abdominal: Soft and non-distended.  There is no tenderness.  No rebound, guarding, or rigidity.   Musculoskeletal: Moves all extremities. No obvious deformities. No edema.  Skin: Warm and dry.  Neurological:  Alert, awake, and appropriate.  Normal speech.  No acute focal neurological deficits are appreciated.  Psychiatric: Normal affect. Good eye contact. Appropriate in content.    ED Course    Procedures  ED Vital Signs:  Vitals:    09/23/19 1601   BP: 114/72   Pulse: 100   Resp: 18   Temp: 97.5 °F (36.4 °C)   TempSrc: Oral   SpO2: 100%   Weight: 63.6 kg (140 lb 5.2 oz)   Height: 5' 9.5" (1.765 m)            The Emergency Provider reviewed the vital signs and test results, which are outlined above.    ED Discussion     4:10 PM: Discussed with pt all pertinent ED information. Discussed pt dx and plan of tx. Gave pt all f/u and return to the ED instructions. All questions and concerns were addressed at this time. Pt " expresses understanding of information and instructions, and is comfortable with plan to discharge. Pt is stable for discharge.    I discussed with patient and/or family/caretaker that evaluation in the ED does not suggest any emergent or life threatening medical conditions requiring immediate intervention beyond what was provided in the ED, and I believe patient is safe for discharge.  Regardless, an unremarkable evaluation in the ED does not preclude the development or presence of a serious of life threatening condition. As such, patient was instructed to return immediately for any worsening or change in current symptoms.    ED Medication(s):  Medications   ketorolac injection 30 mg (has no administration in time range)       Follow-up Information     Joanie Simms MD. Go in 2 days.    Specialty:  Internal Medicine  Contact information:  [address]  [place] [address] [phone]                  New Prescriptions    DICLOFENAC (VOLTAREN) 50 MG EC TABLET    Take 1 tablet (50 mg total) by mouth 2 (two) times daily.    METHOCARBAMOL (ROBAXIN) 500 MG TAB    Take 2 tablets (1,000 mg total) by mouth 3 (three) times daily. for 5 days         Medical Decision Making              Scribe Attestation:   Scribe #1: I performed the above scribed service and the documentation accurately describes the services I performed. I attest to the accuracy of the note.    Attending:   Physician Attestation Statement for Scribe #1: I, EMMANUELLE Da Silva, personally performed the services described in this documentation, as scribed by Jeet Melgoza, in my presence, and it is both accurate and complete.          Clinical Impression       ICD-10-CM ICD-9-CM   1. Neck muscle spasm M62.838 728.85   2. Torticollis M43.6 723.5       Disposition:   Disposition: Discharged  Condition: Stable         EMMANUELLE Da Silva  09/23/19 [id number]

## 2019-09-23 NOTE — ED NOTES
"Patient identifiers verified and correct for Santy Anderson.    Pt to ED c/o L sided neck pain that feels "tight". Pt reports it hurts worse when moving head.     LOC: The patient is awake, alert and aware of environment with an appropriate affect, the patient is oriented x 3 and speaking appropriately.  APPEARANCE: Patient resting comfortably and in no acute distress, patient is clean and well groomed, patient's clothing is properly fastened.  SKIN: The skin is warm and dry, color consistent with ethnicity, patient has normal skin turgor and moist mucus membranes, skin intact, no breakdown or bruising noted.  MUSCULOSKELETAL: Patient moving all extremities spontaneously. L sided neck pain.   RESPIRATORY: Airway is open and patent, respirations are spontaneous.  CARDIAC: Patient has a normal rate, no periphreal edema noted, capillary refill < 3 seconds.  ABDOMEN: Soft and non tender to palpation.    "

## 2019-09-25 ENCOUNTER — TELEPHONE (OUTPATIENT)
Dept: FAMILY MEDICINE | Facility: CLINIC | Age: 74
End: 2019-09-25

## 2019-09-25 NOTE — TELEPHONE ENCOUNTER
Pt states that he was seen in ER for neck pain on 9/23 and was instructed to f/u with PCP.  Advised that Dr. Peterson does not have an availability until next week & offered appt with different provider.  Pt stated that the pain is improving and he does not think it is necessary to f/u.  Instructed to call back if anything changes.  Verbalized understanding./rpr

## 2019-09-25 NOTE — TELEPHONE ENCOUNTER
----- Message from Obdulia Nolan sent at 9/24/2019  5:52 PM CDT -----  Contact: Pt/[phone]  Patient would like a call back in regards to scheduling appt.    Please call.      Phone [phone]

## 2019-10-16 ENCOUNTER — LAB VISIT (OUTPATIENT)
Dept: LAB | Facility: HOSPITAL | Age: 74
End: 2019-10-16
Attending: INTERNAL MEDICINE
Payer: MEDICARE

## 2019-10-16 DIAGNOSIS — Z01.812 PRE-OPERATIVE LABORATORY EXAMINATION: ICD-10-CM

## 2019-10-16 DIAGNOSIS — Z01.812 PRE-OPERATIVE LABORATORY EXAMINATION: Primary | ICD-10-CM

## 2019-10-16 DIAGNOSIS — Z79.01 LONG TERM (CURRENT) USE OF ANTICOAGULANTS: ICD-10-CM

## 2019-10-16 LAB
APTT BLDCRRT: 26.6 SEC (ref 21–32)
BASOPHILS # BLD AUTO: 0.05 K/UL (ref 0–0.2)
BASOPHILS NFR BLD: 1 % (ref 0–1.9)
DIFFERENTIAL METHOD: ABNORMAL
EOSINOPHIL # BLD AUTO: 0.2 K/UL (ref 0–0.5)
EOSINOPHIL NFR BLD: 4.3 % (ref 0–8)
ERYTHROCYTE [DISTWIDTH] IN BLOOD BY AUTOMATED COUNT: 13.2 % (ref 11.5–14.5)
HCT VFR BLD AUTO: 47.1 % (ref 40–54)
HGB BLD-MCNC: 16.2 G/DL (ref 14–18)
IMM GRANULOCYTES # BLD AUTO: 0.01 K/UL (ref 0–0.04)
IMM GRANULOCYTES NFR BLD AUTO: 0.2 % (ref 0–0.5)
INR PPP: 1.2 (ref 0.8–1.2)
LYMPHOCYTES # BLD AUTO: 1.9 K/UL (ref 1–4.8)
LYMPHOCYTES NFR BLD: 36.6 % (ref 18–48)
MCH RBC QN AUTO: 38.8 PG (ref 27–31)
MCHC RBC AUTO-ENTMCNC: 34.4 G/DL (ref 32–36)
MCV RBC AUTO: 113 FL (ref 82–98)
MONOCYTES # BLD AUTO: 0.7 K/UL (ref 0.3–1)
MONOCYTES NFR BLD: 13.2 % (ref 4–15)
NEUTROPHILS # BLD AUTO: 2.3 K/UL (ref 1.8–7.7)
NEUTROPHILS NFR BLD: 44.7 % (ref 38–73)
NRBC BLD-RTO: 0 /100 WBC
PLATELET # BLD AUTO: 207 K/UL (ref 150–350)
PMV BLD AUTO: 9.8 FL (ref 9.2–12.9)
PROTHROMBIN TIME: 11.7 SEC (ref 9–12.5)
RBC # BLD AUTO: 4.17 M/UL (ref 4.6–6.2)
WBC # BLD AUTO: 5.16 K/UL (ref 3.9–12.7)

## 2019-10-16 PROCEDURE — 85025 COMPLETE CBC W/AUTO DIFF WBC: CPT

## 2019-10-16 PROCEDURE — 85610 PROTHROMBIN TIME: CPT

## 2019-10-16 PROCEDURE — 85730 THROMBOPLASTIN TIME PARTIAL: CPT

## 2019-10-16 PROCEDURE — 36415 COLL VENOUS BLD VENIPUNCTURE: CPT

## 2019-10-29 ENCOUNTER — OFFICE VISIT (OUTPATIENT)
Dept: OPHTHALMOLOGY | Facility: CLINIC | Age: 74
End: 2019-10-29
Payer: MEDICARE

## 2019-10-29 DIAGNOSIS — H40.1132 PRIMARY OPEN ANGLE GLAUCOMA (POAG) OF BOTH EYES, MODERATE STAGE: Primary | ICD-10-CM

## 2019-10-29 DIAGNOSIS — Z96.1 PSEUDOPHAKIA OF BOTH EYES: ICD-10-CM

## 2019-10-29 DIAGNOSIS — H20.9 IRITIS OF LEFT EYE: ICD-10-CM

## 2019-10-29 PROCEDURE — 92012 INTRM OPH EXAM EST PATIENT: CPT | Mod: S$PBB,,, | Performed by: OPHTHALMOLOGY

## 2019-10-29 PROCEDURE — 99212 OFFICE O/P EST SF 10 MIN: CPT | Mod: PBBFAC | Performed by: OPHTHALMOLOGY

## 2019-10-29 PROCEDURE — 92012 PR EYE EXAM, EST PATIENT,INTERMED: ICD-10-PCS | Mod: S$PBB,,, | Performed by: OPHTHALMOLOGY

## 2019-10-29 PROCEDURE — 99999 PR PBB SHADOW E&M-EST. PATIENT-LVL II: ICD-10-PCS | Mod: PBBFAC,,, | Performed by: OPHTHALMOLOGY

## 2019-10-29 PROCEDURE — 99999 PR PBB SHADOW E&M-EST. PATIENT-LVL II: CPT | Mod: PBBFAC,,, | Performed by: OPHTHALMOLOGY

## 2019-10-29 NOTE — PROGRESS NOTES
SUBJECTIVE:   Santy Anderson is a 73 y.o. male   Corrected distance visual acuity was 20/20 -1 in the right eye and 20/40 -2 in the left eye.   Chief Complaint   Patient presents with    Follow-up    Glaucoma        HPI:  HPI     Pt states os is getting better using pred qd pt has not resumed   latanoprost    Last edited by Hanane Smith MA on 10/29/2019  8:13 AM. (History)        Assessment /Plan :  1. Primary open angle glaucoma (POAG) of both eyes, moderate stage IOP not within acceptable range relative to target IOP with risk of irreversible visual loss. Better IOP control is recommended. Discussed options, risks, and benefits of additional medication, SLT laser, and/or incisional glaucoma surgery. Reviewed importance of continued compliance with treatment and follow up.     Patient chooses to use the Latanoprost OU qhs     2. Pseudophakia of both eyes  -- Condition stable, no therapeutic change required. Monitoring routinely.     3. Iritis of left eye improving, continue Pred Acetate qam OS   Patient was using the Pred Acetate in his right eye. Discontinue the Pred Acetate OD    Return to clinic in 1 month  or as needed.  With IOP Check

## 2019-11-06 PROBLEM — D69.6 THROMBOCYTOPENIA: Status: RESOLVED | Noted: 2019-02-06 | Resolved: 2019-11-06

## 2019-11-07 ENCOUNTER — TELEPHONE (OUTPATIENT)
Dept: FAMILY MEDICINE | Facility: CLINIC | Age: 74
End: 2019-11-07

## 2019-11-11 ENCOUNTER — APPOINTMENT (OUTPATIENT)
Dept: OPHTHALMOLOGY | Facility: CLINIC | Age: 74
End: 2019-11-11
Payer: MEDICARE

## 2019-11-11 ENCOUNTER — OFFICE VISIT (OUTPATIENT)
Dept: OPHTHALMOLOGY | Facility: CLINIC | Age: 74
End: 2019-11-11
Payer: MEDICARE

## 2019-11-11 DIAGNOSIS — H20.9 IRITIS OF LEFT EYE: ICD-10-CM

## 2019-11-11 DIAGNOSIS — H17.9 CORNEAL SCAR, LEFT EYE: ICD-10-CM

## 2019-11-11 DIAGNOSIS — Z96.1 PSEUDOPHAKIA OF BOTH EYES: ICD-10-CM

## 2019-11-11 DIAGNOSIS — H40.1132 PRIMARY OPEN ANGLE GLAUCOMA (POAG) OF BOTH EYES, MODERATE STAGE: Primary | ICD-10-CM

## 2019-11-11 PROCEDURE — 99999 PR PBB SHADOW E&M-EST. PATIENT-LVL II: ICD-10-PCS | Mod: PBBFAC,,, | Performed by: OPHTHALMOLOGY

## 2019-11-11 PROCEDURE — 92133 POSTERIOR SEGMENT OCT OPTIC NERVE(OCULAR COHERENCE TOMOGRAPHY) - OU - BOTH EYES: ICD-10-PCS | Mod: 26,S$PBB,, | Performed by: OPHTHALMOLOGY

## 2019-11-11 PROCEDURE — 92012 PR EYE EXAM, EST PATIENT,INTERMED: ICD-10-PCS | Mod: S$PBB,,, | Performed by: OPHTHALMOLOGY

## 2019-11-11 PROCEDURE — 99212 OFFICE O/P EST SF 10 MIN: CPT | Mod: PBBFAC,25 | Performed by: OPHTHALMOLOGY

## 2019-11-11 PROCEDURE — 92012 INTRM OPH EXAM EST PATIENT: CPT | Mod: S$PBB,,, | Performed by: OPHTHALMOLOGY

## 2019-11-11 PROCEDURE — 92133 CPTRZD OPH DX IMG PST SGM ON: CPT | Mod: PBBFAC | Performed by: OPHTHALMOLOGY

## 2019-11-11 PROCEDURE — 92083 EXTENDED VISUAL FIELD XM: CPT | Mod: PBBFAC | Performed by: OPHTHALMOLOGY

## 2019-11-11 PROCEDURE — 92083 HUMPHREY VISUAL FIELD - OU - BOTH EYES: ICD-10-PCS | Mod: 26,S$PBB,, | Performed by: OPHTHALMOLOGY

## 2019-11-11 PROCEDURE — 99999 PR PBB SHADOW E&M-EST. PATIENT-LVL II: CPT | Mod: PBBFAC,,, | Performed by: OPHTHALMOLOGY

## 2019-11-11 NOTE — PROGRESS NOTES
SUBJECTIVE:   Santy Anderson is a 73 y.o. male   Corrected distance visual acuity was 20/20 -1 in the right eye and 20/60 in the left eye.   Chief Complaint   Patient presents with    Glaucoma    Iritis        HPI:  HPI     2 wk Iritis OS check/patient was already scheduled for 3 month HVF  No drops since Sat 11-09-19  No pain or discomfort     REFERRED by Dr. Abe Elizabeth fax number [phone]    1. Moderate COAG dx 3/19 with Tmax 27/23 Goal=19  2.PCIOL/istent inject OS +19.5 SN60WF (distance) 4/10/2019  PCIOL/I-Stent (inj) OD +20.5 SN60WF (distance) 3-27-19  3. Superficial K laceration OS probably pt fingernail injury between POD1   and POW1 while pt neglected to wear shield at night      Latanoprost qhs OU  Pred Acetate qam OS    Last edited by Nu Zarco on 11/11/2019  2:12 PM. (History)        Assessment /Plan :  1. Primary open angle glaucoma (POAG) of both eyes, moderate stage IOP not within acceptable range relative to target IOP with risk of irreversible visual loss. Better IOP control is recommended. Discussed options, risks, and benefits of additional medication, SLT laser, and/or incisional glaucoma surgery. Reviewed importance of continued compliance with treatment and follow up.     Patient chooses defer and recheck IOP next visit  Continue Latanoprost OU qhs   2. Pseudophakia of both eyes stable   3. Iritis of left eye improved, decrease Pred Acetate to Mondays,Wednesdays, and Fridays OS   4. Corneal scar, left eye monitor for now       Return to clinic in 3 months  or as needed.  With IOP Check

## 2019-11-14 ENCOUNTER — OFFICE VISIT (OUTPATIENT)
Dept: INTERNAL MEDICINE | Facility: CLINIC | Age: 74
End: 2019-11-14
Payer: MEDICARE

## 2019-11-14 VITALS
WEIGHT: 145.5 LBS | TEMPERATURE: 96 F | BODY MASS INDEX: 20.83 KG/M2 | HEIGHT: 70 IN | SYSTOLIC BLOOD PRESSURE: 108 MMHG | HEART RATE: 93 BPM | OXYGEN SATURATION: 99 % | DIASTOLIC BLOOD PRESSURE: 66 MMHG

## 2019-11-14 DIAGNOSIS — K70.30 ALCOHOLIC CIRRHOSIS OF LIVER WITHOUT ASCITES: ICD-10-CM

## 2019-11-14 DIAGNOSIS — R10.10 PAIN OF UPPER ABDOMEN: ICD-10-CM

## 2019-11-14 DIAGNOSIS — M54.16 LUMBAR RADICULOPATHY: ICD-10-CM

## 2019-11-14 DIAGNOSIS — R44.3 HALLUCINATIONS: ICD-10-CM

## 2019-11-14 DIAGNOSIS — Z00.00 ENCOUNTER FOR PREVENTIVE HEALTH EXAMINATION: Primary | ICD-10-CM

## 2019-11-14 DIAGNOSIS — K20.90 ESOPHAGITIS: ICD-10-CM

## 2019-11-14 DIAGNOSIS — M85.89 OSTEOPENIA OF MULTIPLE SITES: ICD-10-CM

## 2019-11-14 DIAGNOSIS — R74.8 ELEVATED LIVER ENZYMES: ICD-10-CM

## 2019-11-14 DIAGNOSIS — M16.0 BILATERAL HIP JOINT ARTHRITIS: ICD-10-CM

## 2019-11-14 DIAGNOSIS — M47.816 LUMBAR SPONDYLOSIS: ICD-10-CM

## 2019-11-14 DIAGNOSIS — Z96.1 PSEUDOPHAKIA OF BOTH EYES: ICD-10-CM

## 2019-11-14 DIAGNOSIS — R53.1 WEAKNESS: ICD-10-CM

## 2019-11-14 DIAGNOSIS — D02.0 CARCINOMA IN SITU OF LARYNX: ICD-10-CM

## 2019-11-14 DIAGNOSIS — E55.9 VITAMIN D DEFICIENCY: ICD-10-CM

## 2019-11-14 DIAGNOSIS — R63.4 WEIGHT LOSS: ICD-10-CM

## 2019-11-14 DIAGNOSIS — D50.0 IRON DEFICIENCY ANEMIA DUE TO CHRONIC BLOOD LOSS: ICD-10-CM

## 2019-11-14 DIAGNOSIS — C34.32 MALIGNANT NEOPLASM OF LOWER LOBE OF LEFT LUNG: ICD-10-CM

## 2019-11-14 DIAGNOSIS — E87.6 HYPOKALEMIA: ICD-10-CM

## 2019-11-14 DIAGNOSIS — K80.20 CALCULUS OF GALLBLADDER WITHOUT CHOLECYSTITIS WITHOUT OBSTRUCTION: ICD-10-CM

## 2019-11-14 DIAGNOSIS — K44.9 HIATAL HERNIA: ICD-10-CM

## 2019-11-14 DIAGNOSIS — M51.36 DDD (DEGENERATIVE DISC DISEASE), LUMBAR: ICD-10-CM

## 2019-11-14 DIAGNOSIS — M17.31 POST-TRAUMATIC OSTEOARTHRITIS OF RIGHT KNEE: ICD-10-CM

## 2019-11-14 PROCEDURE — 99999 PR PBB SHADOW E&M-EST. PATIENT-LVL IV: CPT | Mod: PBBFAC,,, | Performed by: NURSE PRACTITIONER

## 2019-11-14 PROCEDURE — G0439 PPPS, SUBSEQ VISIT: HCPCS | Mod: S$GLB,,, | Performed by: NURSE PRACTITIONER

## 2019-11-14 PROCEDURE — 99999 PR PBB SHADOW E&M-EST. PATIENT-LVL IV: ICD-10-PCS | Mod: PBBFAC,,, | Performed by: NURSE PRACTITIONER

## 2019-11-14 PROCEDURE — G0439 PR MEDICARE ANNUAL WELLNESS SUBSEQUENT VISIT: ICD-10-PCS | Mod: S$GLB,,, | Performed by: NURSE PRACTITIONER

## 2019-11-14 PROCEDURE — 99214 OFFICE O/P EST MOD 30 MIN: CPT | Mod: PBBFAC | Performed by: NURSE PRACTITIONER
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
## 2019-11-14 NOTE — PATIENT INSTRUCTIONS
Counseling and Referral of Other Preventative  (Italic type indicates deductible and co-insurance are waived)    Patient Name: Santy Anderson  Today's Date: 11/14/2019    Health Maintenance       Date Due Completion Date    Shingles Vaccine (1 of 2) 12/04/1997 ---    Influenza Vaccine (1) 09/01/2019 ---    Lipid Panel 03/11/2020 3/11/2019    Colonoscopy 04/11/2020 4/11/2017    TETANUS VACCINE 06/22/2026 6/22/2016        No orders of the defined types were placed in this encounter.    The following information is provided to all patients.  This information is to help you find resources for any of the problems found today that may be affecting your health:                Living healthy guide: www.[facility].[place].gov      Understanding Diabetes: www.diabetes.org      Eating healthy: www.cdc.gov/healthyweight      [facility] home safety checklist: www.cdc.gov/steadi/patient.html      Agency on Aging: www.goea.[place].[facility]      Alcoholics anonymous (AA): www.aa.org      Physical Activity: www.faiza.nih.gov/tl4rxrw      Tobacco use: www.quitwithusla.org

## 2019-12-24 NOTE — PROGRESS NOTES
"Subjective:      Patient ID: Santy Anderson is a 74 y.o. male.    Chief Complaint: Follow-up (4 months )      HPI  Here for follow up of medical problems.  No more wt loss.  Right shoulder pain, seeing Ortho outside, has had injections.  BMs good.  No bleeding when brush teeth.  No abd pain.  No f/c/sw/cough.  No cp/sob/palp.  BMs normal.  Urine normal, no nocturia.    Updated/ annual due 5/20:  HM: ref fluvax, 2/19 HAV, 6/16 [id number], 11/18 [id number], 6/16 TDaP, 6/19 BMD rep 2y, unk Cscope, 6/16 HCV neg, Eye Dr. Zapata, 2/18 CXR clear, 11/17 Hep panel neg.     Review of Systems   Constitutional: Negative for chills, diaphoresis and fever.   Respiratory: Negative for cough and shortness of breath.    Cardiovascular: Negative for chest pain, palpitations and leg swelling.   Gastrointestinal: Negative for blood in stool, constipation, diarrhea, nausea and vomiting.   Genitourinary: Negative for dysuria, frequency and hematuria.   Psychiatric/Behavioral: The patient is not nervous/anxious.          Objective:   /70 (BP Location: Left arm, Patient Position: Sitting, BP Method: Medium (Manual))   Pulse 98   Temp 97.5 °F (36.4 °C) (Tympanic)   Ht 5' 9.5" (1.765 m)   Wt 62.4 kg (137 lb 9.1 oz)   SpO2 100%   BMI 20.02 kg/m²     Physical Exam   Constitutional: He is oriented to person, place, and time. He appears well-developed and well-nourished.   HENT:   Mouth/Throat: Oropharynx is clear and moist.   Neck: Normal range of motion. Neck supple.   Cardiovascular: Normal rate, regular rhythm and intact distal pulses. Exam reveals no gallop and no friction rub.   No murmur heard.  Pulmonary/Chest: Effort normal and breath sounds normal. He has no wheezes. He has no rales.   Abdominal: Soft. Bowel sounds are normal. He exhibits no mass. There is no tenderness.   Musculoskeletal: He exhibits no edema.   Lymphadenopathy:     He has no cervical adenopathy.   Neurological: He is alert and oriented to person, place, and " time.   Psychiatric: He has a normal mood and affect.           Assessment:       1. Weight loss    2. Malignant neoplasm of lower lobe of left lung    3. Alcoholic cirrhosis of liver without ascites    4. Thrombocytopenia    5. Preventive measure    6. Vitamin D deficiency    7. Encounter for screening for malignant neoplasm of prostate           Plan:     Weight loss- now stable in past 3-4mo.  -     TSH; Future; Expected date: 01/07/2020    Malignant neoplasm of lower lobe of left lung s/p resection, Dr. Lara in 9/20.    Alcoholic cirrhosis of liver without ascites- f/w Gastro 6mo.    Thrombocytopenia- being followed.    Preventive measure- due in 4mo.  PSA, vit D, TSH.  -     Vitamin D; Future  -     TSH; Future; Expected date: 01/07/2020    Vitamin D deficiency- cont supp, recheck in March.  -     Vitamin D; Future

## 2020-01-07 ENCOUNTER — TELEPHONE (OUTPATIENT)
Dept: SPORTS MEDICINE | Facility: CLINIC | Age: 75
End: 2020-01-07

## 2020-01-07 ENCOUNTER — OFFICE VISIT (OUTPATIENT)
Dept: FAMILY MEDICINE | Facility: CLINIC | Age: 75
End: 2020-01-07
Payer: MEDICARE

## 2020-01-07 VITALS
OXYGEN SATURATION: 100 % | HEIGHT: 70 IN | TEMPERATURE: 98 F | SYSTOLIC BLOOD PRESSURE: 126 MMHG | WEIGHT: 137.56 LBS | BODY MASS INDEX: 19.69 KG/M2 | DIASTOLIC BLOOD PRESSURE: 70 MMHG | HEART RATE: 98 BPM

## 2020-01-07 DIAGNOSIS — G89.29 CHRONIC PAIN OF LEFT KNEE: ICD-10-CM

## 2020-01-07 DIAGNOSIS — K70.30 ALCOHOLIC CIRRHOSIS OF LIVER WITHOUT ASCITES: ICD-10-CM

## 2020-01-07 DIAGNOSIS — E55.9 VITAMIN D DEFICIENCY: ICD-10-CM

## 2020-01-07 DIAGNOSIS — Z29.9 PREVENTIVE MEASURE: ICD-10-CM

## 2020-01-07 DIAGNOSIS — D69.6 THROMBOCYTOPENIA: ICD-10-CM

## 2020-01-07 DIAGNOSIS — Z12.5 ENCOUNTER FOR SCREENING FOR MALIGNANT NEOPLASM OF PROSTATE: ICD-10-CM

## 2020-01-07 DIAGNOSIS — G89.29 CHRONIC PAIN OF RIGHT KNEE: ICD-10-CM

## 2020-01-07 DIAGNOSIS — M25.561 CHRONIC PAIN OF RIGHT KNEE: ICD-10-CM

## 2020-01-07 DIAGNOSIS — M25.562 CHRONIC PAIN OF LEFT KNEE: ICD-10-CM

## 2020-01-07 DIAGNOSIS — R63.4 WEIGHT LOSS: Primary | ICD-10-CM

## 2020-01-07 DIAGNOSIS — C34.32 MALIGNANT NEOPLASM OF LOWER LOBE OF LEFT LUNG: ICD-10-CM

## 2020-01-07 PROCEDURE — 99999 PR PBB SHADOW E&M-EST. PATIENT-LVL III: ICD-10-PCS | Mod: PBBFAC,,, | Performed by: INTERNAL MEDICINE

## 2020-01-07 PROCEDURE — 1159F PR MEDICATION LIST DOCUMENTED IN MEDICAL RECORD: ICD-10-PCS | Mod: S$GLB,,, | Performed by: INTERNAL MEDICINE

## 2020-01-07 PROCEDURE — 99214 PR OFFICE/OUTPT VISIT, EST, LEVL IV, 30-39 MIN: ICD-10-PCS | Mod: S$GLB,,, | Performed by: INTERNAL MEDICINE

## 2020-01-07 PROCEDURE — 1125F PR PAIN SEVERITY QUANTIFIED, PAIN PRESENT: ICD-10-PCS | Mod: S$GLB,,, | Performed by: INTERNAL MEDICINE

## 2020-01-07 PROCEDURE — 99214 OFFICE O/P EST MOD 30 MIN: CPT | Mod: S$GLB,,, | Performed by: INTERNAL MEDICINE

## 2020-01-07 PROCEDURE — 99999 PR PBB SHADOW E&M-EST. PATIENT-LVL III: CPT | Mod: PBBFAC,,, | Performed by: INTERNAL MEDICINE

## 2020-01-07 PROCEDURE — 1159F MED LIST DOCD IN RCRD: CPT | Mod: S$GLB,,, | Performed by: INTERNAL MEDICINE

## 2020-01-07 PROCEDURE — 1125F AMNT PAIN NOTED PAIN PRSNT: CPT | Mod: S$GLB,,, | Performed by: INTERNAL MEDICINE

## 2020-01-07 PROCEDURE — 99213 OFFICE O/P EST LOW 20 MIN: CPT | Mod: PBBFAC,PO | Performed by: INTERNAL MEDICINE

## 2020-01-07 NOTE — TELEPHONE ENCOUNTER
Called pt in regards to ortho referral and pt requested to see an orthopedic surgeon who specialized in joint replacements. Scheduled apt and pt was informed to arrive 30 min early for x-ray.

## 2020-01-13 ENCOUNTER — TELEPHONE (OUTPATIENT)
Dept: ORTHOPEDICS | Facility: CLINIC | Age: 75
End: 2020-01-13

## 2020-01-13 NOTE — TELEPHONE ENCOUNTER
1st attempt.... Left a message for the patient to give the office a call back in regards to their message below. FP          ----- Message from Chris Butler sent at 1/11/2020 11:37 AM CST -----  Contact: self  Pt needs appt on Feb 7th change to a sooner appt date    Pt states he is in pain and needs to see provider sooner    Please advise pt can be reached at [phone]

## 2020-01-15 DIAGNOSIS — M25.562 PAIN IN BOTH KNEES, UNSPECIFIED CHRONICITY: Primary | ICD-10-CM

## 2020-01-15 DIAGNOSIS — M25.561 PAIN IN BOTH KNEES, UNSPECIFIED CHRONICITY: Primary | ICD-10-CM

## 2020-01-16 ENCOUNTER — HOSPITAL ENCOUNTER (OUTPATIENT)
Dept: RADIOLOGY | Facility: HOSPITAL | Age: 75
Discharge: HOME | End: 2020-01-16
Attending: PHYSICIAN ASSISTANT
Payer: MEDICARE

## 2020-01-16 ENCOUNTER — OFFICE VISIT (OUTPATIENT)
Dept: SPORTS MEDICINE | Facility: CLINIC | Age: 75
End: 2020-01-16
Payer: MEDICARE

## 2020-01-16 VITALS
DIASTOLIC BLOOD PRESSURE: 82 MMHG | SYSTOLIC BLOOD PRESSURE: 130 MMHG | BODY MASS INDEX: 19.69 KG/M2 | HEART RATE: 98 BPM | WEIGHT: 137.56 LBS | HEIGHT: 70 IN

## 2020-01-16 DIAGNOSIS — R74.8 ELEVATED LIVER ENZYMES: ICD-10-CM

## 2020-01-16 DIAGNOSIS — M17.12 PRIMARY OSTEOARTHRITIS OF LEFT KNEE: ICD-10-CM

## 2020-01-16 DIAGNOSIS — M25.561 CHRONIC PAIN OF RIGHT KNEE: ICD-10-CM

## 2020-01-16 DIAGNOSIS — M17.31 POST-TRAUMATIC OSTEOARTHRITIS OF RIGHT KNEE: Primary | ICD-10-CM

## 2020-01-16 DIAGNOSIS — K44.9 HIATAL HERNIA: ICD-10-CM

## 2020-01-16 DIAGNOSIS — M25.561 PAIN IN BOTH KNEES, UNSPECIFIED CHRONICITY: ICD-10-CM

## 2020-01-16 DIAGNOSIS — K20.90 ESOPHAGITIS: ICD-10-CM

## 2020-01-16 DIAGNOSIS — M25.562 PAIN IN BOTH KNEES, UNSPECIFIED CHRONICITY: ICD-10-CM

## 2020-01-16 DIAGNOSIS — G89.29 CHRONIC PAIN OF RIGHT KNEE: ICD-10-CM

## 2020-01-16 PROCEDURE — 20610 PR DRAIN/INJECT LARGE JOINT/BURSA: ICD-10-PCS | Mod: RT,S$GLB,, | Performed by: PHYSICIAN ASSISTANT

## 2020-01-16 PROCEDURE — 99999 PR PBB SHADOW E&M-EST. PATIENT-LVL IV: ICD-10-PCS | Mod: PBBFAC,,, | Performed by: PHYSICIAN ASSISTANT

## 2020-01-16 PROCEDURE — 1159F PR MEDICATION LIST DOCUMENTED IN MEDICAL RECORD: ICD-10-PCS | Mod: S$GLB,,, | Performed by: PHYSICIAN ASSISTANT

## 2020-01-16 PROCEDURE — 1125F AMNT PAIN NOTED PAIN PRSNT: CPT | Mod: S$GLB,,, | Performed by: PHYSICIAN ASSISTANT

## 2020-01-16 PROCEDURE — 73564 XR KNEE ORTHO BILAT WITH FLEXION: ICD-10-PCS | Mod: 26,50,, | Performed by: RADIOLOGY

## 2020-01-16 PROCEDURE — 73564 X-RAY EXAM KNEE 4 OR MORE: CPT | Mod: 26,50,, | Performed by: RADIOLOGY

## 2020-01-16 PROCEDURE — 20610 DRAIN/INJ JOINT/BURSA W/O US: CPT | Mod: PBBFAC | Performed by: PHYSICIAN ASSISTANT

## 2020-01-16 PROCEDURE — 99214 OFFICE O/P EST MOD 30 MIN: CPT | Mod: 25,S$GLB,, | Performed by: PHYSICIAN ASSISTANT

## 2020-01-16 PROCEDURE — 99214 OFFICE O/P EST MOD 30 MIN: CPT | Mod: PBBFAC,25 | Performed by: PHYSICIAN ASSISTANT

## 2020-01-16 PROCEDURE — 20610 DRAIN/INJ JOINT/BURSA W/O US: CPT | Mod: RT,S$GLB,, | Performed by: PHYSICIAN ASSISTANT

## 2020-01-16 PROCEDURE — 1125F PR PAIN SEVERITY QUANTIFIED, PAIN PRESENT: ICD-10-PCS | Mod: S$GLB,,, | Performed by: PHYSICIAN ASSISTANT

## 2020-01-16 PROCEDURE — 99999 PR PBB SHADOW E&M-EST. PATIENT-LVL IV: CPT | Mod: PBBFAC,,, | Performed by: PHYSICIAN ASSISTANT

## 2020-01-16 PROCEDURE — 99214 PR OFFICE/OUTPT VISIT, EST, LEVL IV, 30-39 MIN: ICD-10-PCS | Mod: 25,S$GLB,, | Performed by: PHYSICIAN ASSISTANT

## 2020-01-16 PROCEDURE — 1159F MED LIST DOCD IN RCRD: CPT | Mod: S$GLB,,, | Performed by: PHYSICIAN ASSISTANT

## 2020-01-16 PROCEDURE — 73564 X-RAY EXAM KNEE 4 OR MORE: CPT | Mod: TC,50

## 2020-01-16 RX ORDER — METHYLPREDNISOLONE ACETATE 80 MG/ML
80 INJECTION, SUSPENSION INTRA-ARTICULAR; INTRALESIONAL; INTRAMUSCULAR; SOFT TISSUE ONCE
Status: COMPLETED | OUTPATIENT
Start: 2020-01-16 | End: 2020-01-16

## 2020-01-16 RX ADMIN — METHYLPREDNISOLONE ACETATE 80 MG: 80 INJECTION, SUSPENSION INTRALESIONAL; INTRAMUSCULAR; INTRASYNOVIAL; SOFT TISSUE at 11:01
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
## 2020-01-22 ENCOUNTER — TELEPHONE (OUTPATIENT)
Dept: FAMILY MEDICINE | Facility: CLINIC | Age: 75
End: 2020-01-22

## 2020-01-22 ENCOUNTER — TELEPHONE (OUTPATIENT)
Dept: ORTHOPEDICS | Facility: CLINIC | Age: 75
End: 2020-01-22

## 2020-01-22 DIAGNOSIS — G89.29 CHRONIC PAIN OF BOTH KNEES: Primary | ICD-10-CM

## 2020-01-22 DIAGNOSIS — M25.562 CHRONIC PAIN OF BOTH KNEES: Primary | ICD-10-CM

## 2020-01-22 DIAGNOSIS — M25.561 CHRONIC PAIN OF BOTH KNEES: Primary | ICD-10-CM

## 2020-01-22 NOTE — TELEPHONE ENCOUNTER
Lvm for pt stating that a referral for physical therapy has been done by Dr. Peterson and faxed off today.

## 2020-01-22 NOTE — TELEPHONE ENCOUNTER
----- Message from Johann Jensen sent at 1/22/2020  9:05 AM CST -----  Contact: Michelle ([facility] )   Has pt in today for physical therapy referred by [facility], received incomplete demographics sheet and missing some. Would like to get referral faxed back over .       Fax [phone]   [phone]

## 2020-01-22 NOTE — TELEPHONE ENCOUNTER
Spoke with [facility] and informed her that physical therapy was not ordered by [place] Arzate PA-C for Mr Anderson. Understanding verbalized.

## 2020-01-22 NOTE — TELEPHONE ENCOUNTER
----- Message from Nicole Pérez sent at 1/22/2020 12:27 PM CST -----  Contact: Justin Madera  Patient states the nurse can call him at [phone] once she finds out whether or not Dr. Simms will approve and/or send order for him to go to [facility]. Thanks/elr

## 2020-02-05 ENCOUNTER — TELEPHONE (OUTPATIENT)
Dept: ORTHOPEDICS | Facility: CLINIC | Age: 75
End: 2020-02-05

## 2020-02-05 NOTE — TELEPHONE ENCOUNTER
Spoke with patient and informed him that it would be okay to check in early for his appointment. Patient verbalized understanding.

## 2020-02-05 NOTE — TELEPHONE ENCOUNTER
----- Message from Renata Ascencio sent at 2/5/2020  3:46 PM CST -----  Contact: self  Requesting call back regarding questions on if pt can be seen sooner due to he will be at facility that morning for his eye appt. Please call back at [phone].    Thanks,  Renata Ascencio

## 2020-02-07 ENCOUNTER — OFFICE VISIT (OUTPATIENT)
Dept: ORTHOPEDICS | Facility: CLINIC | Age: 75
End: 2020-02-07
Payer: COMMERCIAL

## 2020-02-07 ENCOUNTER — HOSPITAL ENCOUNTER (OUTPATIENT)
Dept: RADIOLOGY | Facility: HOSPITAL | Age: 75
Discharge: HOME | End: 2020-02-07
Attending: ORTHOPAEDIC SURGERY
Payer: MEDICARE

## 2020-02-07 VITALS
HEART RATE: 95 BPM | HEIGHT: 69 IN | BODY MASS INDEX: 20.29 KG/M2 | WEIGHT: 137 LBS | DIASTOLIC BLOOD PRESSURE: 72 MMHG | SYSTOLIC BLOOD PRESSURE: 114 MMHG

## 2020-02-07 DIAGNOSIS — M17.11 ARTHRITIS OF KNEE, RIGHT: Primary | ICD-10-CM

## 2020-02-07 DIAGNOSIS — M25.531 RIGHT WRIST PAIN: ICD-10-CM

## 2020-02-07 DIAGNOSIS — M25.562 CHRONIC PAIN OF LEFT KNEE: ICD-10-CM

## 2020-02-07 DIAGNOSIS — G89.29 CHRONIC PAIN OF RIGHT KNEE: ICD-10-CM

## 2020-02-07 DIAGNOSIS — G89.29 CHRONIC PAIN OF LEFT KNEE: ICD-10-CM

## 2020-02-07 DIAGNOSIS — M25.561 CHRONIC PAIN OF RIGHT KNEE: ICD-10-CM

## 2020-02-07 DIAGNOSIS — M25.531 RIGHT WRIST PAIN: Primary | ICD-10-CM

## 2020-02-07 DIAGNOSIS — M19.031 PRIMARY OSTEOARTHRITIS OF RIGHT WRIST: ICD-10-CM

## 2020-02-07 DIAGNOSIS — M89.8X6 TIBIAL PAIN: ICD-10-CM

## 2020-02-07 PROCEDURE — 1101F PR PT FALLS ASSESS DOC 0-1 FALLS W/OUT INJ PAST YR: ICD-10-PCS | Mod: CPTII,S$GLB,, | Performed by: ORTHOPAEDIC SURGERY

## 2020-02-07 PROCEDURE — 1125F AMNT PAIN NOTED PAIN PRSNT: CPT | Mod: S$GLB,,, | Performed by: ORTHOPAEDIC SURGERY

## 2020-02-07 PROCEDURE — 73110 XR WRIST COMPLETE 3 VIEWS RIGHT: ICD-10-PCS | Mod: 26,RT,, | Performed by: RADIOLOGY

## 2020-02-07 PROCEDURE — 99214 OFFICE O/P EST MOD 30 MIN: CPT | Mod: 25,S$GLB,, | Performed by: ORTHOPAEDIC SURGERY

## 2020-02-07 PROCEDURE — 99999 PR PBB SHADOW E&M-EST. PATIENT-LVL III: ICD-10-PCS | Mod: PBBFAC,,, | Performed by: ORTHOPAEDIC SURGERY

## 2020-02-07 PROCEDURE — 1159F MED LIST DOCD IN RCRD: CPT | Mod: S$GLB,,, | Performed by: ORTHOPAEDIC SURGERY

## 2020-02-07 PROCEDURE — 99999 PR PBB SHADOW E&M-EST. PATIENT-LVL III: CPT | Mod: PBBFAC,,, | Performed by: ORTHOPAEDIC SURGERY

## 2020-02-07 PROCEDURE — 73590 XR TIBIA FIBULA 2 VIEW RIGHT: ICD-10-PCS | Mod: 26,RT,, | Performed by: RADIOLOGY

## 2020-02-07 PROCEDURE — 1101F PT FALLS ASSESS-DOCD LE1/YR: CPT | Mod: CPTII,S$GLB,, | Performed by: ORTHOPAEDIC SURGERY

## 2020-02-07 PROCEDURE — 1125F PR PAIN SEVERITY QUANTIFIED, PAIN PRESENT: ICD-10-PCS | Mod: S$GLB,,, | Performed by: ORTHOPAEDIC SURGERY

## 2020-02-07 PROCEDURE — 73590 X-RAY EXAM OF LOWER LEG: CPT | Mod: TC,RT

## 2020-02-07 PROCEDURE — 73110 X-RAY EXAM OF WRIST: CPT | Mod: 26,RT,, | Performed by: RADIOLOGY

## 2020-02-07 PROCEDURE — 20610 LARGE JOINT ASPIRATION/INJECTION: R KNEE: ICD-10-PCS | Mod: RT,S$GLB,, | Performed by: ORTHOPAEDIC SURGERY

## 2020-02-07 PROCEDURE — 99214 PR OFFICE/OUTPT VISIT, EST, LEVL IV, 30-39 MIN: ICD-10-PCS | Mod: 25,S$GLB,, | Performed by: ORTHOPAEDIC SURGERY

## 2020-02-07 PROCEDURE — 73110 X-RAY EXAM OF WRIST: CPT | Mod: TC,RT

## 2020-02-07 PROCEDURE — 20610 DRAIN/INJ JOINT/BURSA W/O US: CPT | Mod: RT,S$GLB,, | Performed by: ORTHOPAEDIC SURGERY

## 2020-02-07 PROCEDURE — 1159F PR MEDICATION LIST DOCUMENTED IN MEDICAL RECORD: ICD-10-PCS | Mod: S$GLB,,, | Performed by: ORTHOPAEDIC SURGERY

## 2020-02-07 PROCEDURE — 73590 X-RAY EXAM OF LOWER LEG: CPT | Mod: 26,RT,, | Performed by: RADIOLOGY
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
## 2020-02-07 NOTE — PATIENT INSTRUCTIONS
Received Synvisc-One injection into the right knee today 02/07/2020  Ice the knee next few days of it bothers you  Wrist x-ray obtained today 02/07/2020 compared with 3/24/19 with findings of midcarpal arthritis but no fractures no difference  Continue with physical therapy  Return to clinic in 6 weeks

## 2020-02-07 NOTE — LETTER
February 7, 2020      Joanie Peterson MD  [address]  [facility] [address]           [facility]  [address] [place] [address]  Phone: [phone]  Fax: [phone]          Patient: Santy Anderson   MR Number: [id number]   YOB: 1945   Date of Visit: 2/7/2020       Dear Dr. Joanie Peterson:    Thank you for referring Santy Anderson to me for evaluation. Attached you will find relevant portions of my assessment and plan of care.    If you have questions, please do not hesitate to call me. I look forward to following Santy Anderson along with you.    Sincerely,    Naif Whitaker MD    Enclosure  CC:  No Recipients    If you would like to receive this communication electronically, please contact [email or web address] or [phone] to request more information on [employer] access.    For providers and/or their staff who would like to refer a patient to [facility], please contact us through our one-stop-shop provider referral line, [facility], at [phone].    If you feel you have received this communication in error or would no longer like to receive these types of communications, please e-mail [email or web address]

## 2020-02-07 NOTE — PROCEDURES
Large Joint Aspiration/Injection: R knee  Performed by: Naif Whitaker MD  Authorized by: Naif Whitaker MD  Date/Time: 2/7/2020 9:40 AM    Consent Done?:  Yes (Verbal)  Indications:   Timeout: Immediately prior to procedure a time out was called to verify the correct patient, procedure, equipment, support staff and site/side marked as required.  Procedure site marked: Yes     Anesthesia  Local anesthesia used        Details:   Needle size: 22 G    Ultrasonic Guidance for needle placement: No   Approach: anterolateral  Location:  Knee  Site:  R knee    Medications: 48 mg hylan g-f 20 48 mg/6 mL  Patient tolerance:  patient tolerated the procedure well with no immediate complications

## 2020-02-11 ENCOUNTER — OFFICE VISIT (OUTPATIENT)
Dept: OPHTHALMOLOGY | Facility: CLINIC | Age: 75
End: 2020-02-11
Payer: MEDICARE

## 2020-02-11 DIAGNOSIS — H17.9 CORNEAL SCAR, LEFT EYE: ICD-10-CM

## 2020-02-11 DIAGNOSIS — Z96.1 PSEUDOPHAKIA OF BOTH EYES: ICD-10-CM

## 2020-02-11 DIAGNOSIS — H40.1132 PRIMARY OPEN ANGLE GLAUCOMA (POAG) OF BOTH EYES, MODERATE STAGE: Primary | ICD-10-CM

## 2020-02-11 PROCEDURE — 92012 INTRM OPH EXAM EST PATIENT: CPT | Mod: S$GLB,,, | Performed by: OPHTHALMOLOGY

## 2020-02-11 PROCEDURE — 99999 PR PBB SHADOW E&M-EST. PATIENT-LVL II: ICD-10-PCS | Mod: PBBFAC,,, | Performed by: OPHTHALMOLOGY

## 2020-02-11 PROCEDURE — 99999 PR PBB SHADOW E&M-EST. PATIENT-LVL II: CPT | Mod: PBBFAC,,, | Performed by: OPHTHALMOLOGY

## 2020-02-11 PROCEDURE — 92012 PR EYE EXAM, EST PATIENT,INTERMED: ICD-10-PCS | Mod: S$GLB,,, | Performed by: OPHTHALMOLOGY

## 2020-02-11 NOTE — PROGRESS NOTES
SUBJECTIVE  Santy Anderson is 74 y.o. male  Uncorrected distance visual acuity was 20/25 in the right eye and 20/40 in the left eye.   Chief Complaint   Patient presents with    Glaucoma          HPI     Pt here for 3 month iop check per pt doing well with drops no complaints   va OS has improved         REFERRED by Dr. Abe Elizabeth fax number [phone]    1. Moderate COAG dx 3/19 with Tmax 27/23 Goal=19  2.PCIOL/istent inject OS +19.5 SN60WF (distance) 4/10/2019  PCIOL/I-Stent (inj) OD +20.5 SN60WF (distance) 3-27-19  3. Superficial K laceration OS probably pt fingernail injury between POD1   and POW1 while pt neglected to wear shield at night      Latanoprost qhs OU  Pred Acetate qam OS    Last edited by Hanane Smith MA on 2/11/2020  7:50 AM. (History)         Assessment /Plan :  1. Primary open angle glaucoma (POAG) of both eyes, moderate stage   IOP OD not within acceptable range relative to target IOP with risk of irreversible visual loss. Better IOP control is recommended. Discussed options, risks, and benefits of additional medication, SLT laser, and/or incisional glaucoma surgery. Reviewed importance of continued compliance with treatment and follow up.     Patient chooses defer and recheck IOP next visit     Doing well, IOP OS within acceptable range relative to target IOP and no evidence of progression. Continue current treatment. Reviewed importance of continued compliance with treatment and follow up.       2. Pseudophakia of both eyes  -- Condition stable, no therapeutic change required. Monitoring routinely.     3. Corneal scar, left eye monitor for now         Return to clinic in 3 months  or as needed.  With IOP Check

## 2020-02-21 ENCOUNTER — TELEPHONE (OUTPATIENT)
Dept: FAMILY MEDICINE | Facility: CLINIC | Age: 75
End: 2020-02-21

## 2020-02-21 DIAGNOSIS — M79.642 BILATERAL HAND PAIN: ICD-10-CM

## 2020-02-21 DIAGNOSIS — M54.2 NECK PAIN: ICD-10-CM

## 2020-02-21 DIAGNOSIS — G89.29 CHRONIC PAIN OF LEFT KNEE: Primary | ICD-10-CM

## 2020-02-21 DIAGNOSIS — M79.641 BILATERAL HAND PAIN: ICD-10-CM

## 2020-02-21 DIAGNOSIS — M25.562 CHRONIC PAIN OF LEFT KNEE: Primary | ICD-10-CM

## 2020-02-21 DIAGNOSIS — M54.9 BACK PAIN, UNSPECIFIED BACK LOCATION, UNSPECIFIED BACK PAIN LATERALITY, UNSPECIFIED CHRONICITY: ICD-10-CM

## 2020-02-21 DIAGNOSIS — M25.561 CHRONIC PAIN OF RIGHT KNEE: ICD-10-CM

## 2020-02-21 DIAGNOSIS — G89.29 CHRONIC PAIN OF RIGHT KNEE: ICD-10-CM

## 2020-02-21 NOTE — TELEPHONE ENCOUNTER
Pt has requested a referral to [facility] instead of [facility].  Please sign new referral and send back if appropriate./rpr

## 2020-03-03 ENCOUNTER — TELEPHONE (OUTPATIENT)
Dept: ORTHOPEDICS | Facility: CLINIC | Age: 75
End: 2020-03-03

## 2020-03-03 NOTE — TELEPHONE ENCOUNTER
1st attempt..... Called the patient in regards to their appointment on 03/20/20 with Dr. Whitaker. Called to reschedule the patient's appointment due tot he fact that Dr. Whitaker will be in surgery on that day. Left a message for the patient to give the office a call back.FP

## 2020-03-06 ENCOUNTER — TELEPHONE (OUTPATIENT)
Dept: RADIOLOGY | Facility: HOSPITAL | Age: 75
End: 2020-03-06

## 2020-03-09 ENCOUNTER — LAB VISIT (OUTPATIENT)
Dept: LAB | Facility: HOSPITAL | Age: 75
End: 2020-03-09
Attending: NURSE PRACTITIONER
Payer: MEDICARE

## 2020-03-09 ENCOUNTER — TELEPHONE (OUTPATIENT)
Dept: ORTHOPEDICS | Facility: CLINIC | Age: 75
End: 2020-03-09

## 2020-03-09 ENCOUNTER — HOSPITAL ENCOUNTER (OUTPATIENT)
Dept: RADIOLOGY | Facility: HOSPITAL | Age: 75
Discharge: HOME | End: 2020-03-09
Attending: NURSE PRACTITIONER
Payer: MEDICARE

## 2020-03-09 DIAGNOSIS — Z29.9 PREVENTIVE MEASURE: ICD-10-CM

## 2020-03-09 DIAGNOSIS — R63.4 WEIGHT LOSS: ICD-10-CM

## 2020-03-09 DIAGNOSIS — Z12.5 ENCOUNTER FOR SCREENING FOR MALIGNANT NEOPLASM OF PROSTATE: ICD-10-CM

## 2020-03-09 DIAGNOSIS — K70.30 ALCOHOLIC CIRRHOSIS OF LIVER WITHOUT ASCITES: ICD-10-CM

## 2020-03-09 DIAGNOSIS — Z78.9 ALCOHOL USE: ICD-10-CM

## 2020-03-09 DIAGNOSIS — E55.9 VITAMIN D DEFICIENCY: ICD-10-CM

## 2020-03-09 LAB
25(OH)D3+25(OH)D2 SERPL-MCNC: 13 NG/ML (ref 30–96)
AFP SERPL-MCNC: 5.8 NG/ML (ref 0–8.4)
ALBUMIN SERPL BCP-MCNC: 3.4 G/DL (ref 3.5–5.2)
ALP SERPL-CCNC: 85 U/L (ref 55–135)
ALT SERPL W/O P-5'-P-CCNC: 12 U/L (ref 10–44)
ANION GAP SERPL CALC-SCNC: 10 MMOL/L (ref 8–16)
AST SERPL-CCNC: 24 U/L (ref 10–40)
BASOPHILS # BLD AUTO: 0.02 K/UL (ref 0–0.2)
BASOPHILS NFR BLD: 0.2 % (ref 0–1.9)
BILIRUB SERPL-MCNC: 1.2 MG/DL (ref 0.1–1)
BUN SERPL-MCNC: 4 MG/DL (ref 8–23)
CALCIUM SERPL-MCNC: 9 MG/DL (ref 8.7–10.5)
CHLORIDE SERPL-SCNC: 106 MMOL/L (ref 95–110)
CO2 SERPL-SCNC: 30 MMOL/L (ref 23–29)
COMPLEXED PSA SERPL-MCNC: 0.54 NG/ML (ref 0–4)
CREAT SERPL-MCNC: 0.7 MG/DL (ref 0.5–1.4)
DIFFERENTIAL METHOD: ABNORMAL
EOSINOPHIL # BLD AUTO: 0 K/UL (ref 0–0.5)
EOSINOPHIL NFR BLD: 0.1 % (ref 0–8)
ERYTHROCYTE [DISTWIDTH] IN BLOOD BY AUTOMATED COUNT: 13.3 % (ref 11.5–14.5)
EST. GFR  (AFRICAN AMERICAN): >60 ML/MIN/1.73 M^2
EST. GFR  (NON AFRICAN AMERICAN): >60 ML/MIN/1.73 M^2
GLUCOSE SERPL-MCNC: 100 MG/DL (ref 70–110)
HCT VFR BLD AUTO: 48.2 % (ref 40–54)
HGB BLD-MCNC: 16.1 G/DL (ref 14–18)
IMM GRANULOCYTES # BLD AUTO: 0.05 K/UL (ref 0–0.04)
IMM GRANULOCYTES NFR BLD AUTO: 0.6 % (ref 0–0.5)
INR PPP: 1 (ref 0.8–1.2)
LYMPHOCYTES # BLD AUTO: 1.8 K/UL (ref 1–4.8)
LYMPHOCYTES NFR BLD: 21.2 % (ref 18–48)
MCH RBC QN AUTO: 38.7 PG (ref 27–31)
MCHC RBC AUTO-ENTMCNC: 33.4 G/DL (ref 32–36)
MCV RBC AUTO: 116 FL (ref 82–98)
MONOCYTES # BLD AUTO: 1 K/UL (ref 0.3–1)
MONOCYTES NFR BLD: 12 % (ref 4–15)
NEUTROPHILS # BLD AUTO: 5.4 K/UL (ref 1.8–7.7)
NEUTROPHILS NFR BLD: 65.9 % (ref 38–73)
NRBC BLD-RTO: 0 /100 WBC
PLATELET # BLD AUTO: 197 K/UL (ref 150–350)
PMV BLD AUTO: 10.6 FL (ref 9.2–12.9)
POTASSIUM SERPL-SCNC: 3.8 MMOL/L (ref 3.5–5.1)
PROT SERPL-MCNC: 6.7 G/DL (ref 6–8.4)
PROTHROMBIN TIME: 10.9 SEC (ref 9–12.5)
RBC # BLD AUTO: 4.16 M/UL (ref 4.6–6.2)
SODIUM SERPL-SCNC: 146 MMOL/L (ref 136–145)
TSH SERPL DL<=0.005 MIU/L-ACNC: 1.38 UIU/ML (ref 0.4–4)
WBC # BLD AUTO: 8.25 K/UL (ref 3.9–12.7)

## 2020-03-09 PROCEDURE — 76700 US ABDOMEN COMPLETE: ICD-10-PCS | Mod: 26,,, | Performed by: RADIOLOGY

## 2020-03-09 PROCEDURE — 76700 US EXAM ABDOM COMPLETE: CPT | Mod: TC

## 2020-03-09 PROCEDURE — 84153 ASSAY OF PSA TOTAL: CPT

## 2020-03-09 PROCEDURE — 85025 COMPLETE CBC W/AUTO DIFF WBC: CPT

## 2020-03-09 PROCEDURE — 76700 US EXAM ABDOM COMPLETE: CPT | Mod: 26,,, | Performed by: RADIOLOGY

## 2020-03-09 PROCEDURE — 80053 COMPREHEN METABOLIC PANEL: CPT

## 2020-03-09 PROCEDURE — 84443 ASSAY THYROID STIM HORMONE: CPT

## 2020-03-09 PROCEDURE — 36415 COLL VENOUS BLD VENIPUNCTURE: CPT

## 2020-03-09 PROCEDURE — 82306 VITAMIN D 25 HYDROXY: CPT

## 2020-03-09 PROCEDURE — 82105 ALPHA-FETOPROTEIN SERUM: CPT

## 2020-03-09 PROCEDURE — 85610 PROTHROMBIN TIME: CPT

## 2020-03-09 NOTE — TELEPHONE ENCOUNTER
----- Message from Elsie Dinero MA sent at 3/9/2020  2:34 PM CDT -----  Contact: pt      ----- Message -----  From: Varsha More  Sent: 3/9/2020   1:31 PM CDT  To: Danyel ELLIOTT Staff    Pt is returning the staff call    Pt call back [phone]      Thanks

## 2020-03-09 NOTE — TELEPHONE ENCOUNTER
2nd attempt..... Called the patient in regards to their appointment on 03/20/20 with Dr. Whitaker. Called to reschedule the patient's appointment due tot he fact that Dr. Whitaker will be in surgery on that day. Left a message for the patient to give the office a call back.FP

## 2020-03-09 NOTE — TELEPHONE ENCOUNTER
Spoke to pt an got him rescheduled for 4/2/2020 a day that Dr. Whitaker is in clinic at first the patient was scheuuled for a day that Dr. Whitaker decided to do surgery. Patient verbalized understanding

## 2020-03-11 ENCOUNTER — PATIENT MESSAGE (OUTPATIENT)
Dept: GASTROENTEROLOGY | Facility: CLINIC | Age: 75
End: 2020-03-11

## 2020-03-13 ENCOUNTER — PATIENT OUTREACH (OUTPATIENT)
Dept: ADMINISTRATIVE | Facility: OTHER | Age: 75
End: 2020-03-13

## 2020-03-16 ENCOUNTER — OFFICE VISIT (OUTPATIENT)
Dept: GASTROENTEROLOGY | Facility: CLINIC | Age: 75
End: 2020-03-16
Payer: COMMERCIAL

## 2020-03-16 VITALS
HEART RATE: 86 BPM | WEIGHT: 134.69 LBS | BODY MASS INDEX: 19.28 KG/M2 | HEIGHT: 70 IN | SYSTOLIC BLOOD PRESSURE: 118 MMHG | DIASTOLIC BLOOD PRESSURE: 70 MMHG

## 2020-03-16 DIAGNOSIS — K70.30 ALCOHOLIC CIRRHOSIS OF LIVER WITHOUT ASCITES: Primary | ICD-10-CM

## 2020-03-16 PROCEDURE — 99214 PR OFFICE/OUTPT VISIT, EST, LEVL IV, 30-39 MIN: ICD-10-PCS | Mod: S$GLB,,, | Performed by: NURSE PRACTITIONER

## 2020-03-16 PROCEDURE — 1101F PR PT FALLS ASSESS DOC 0-1 FALLS W/OUT INJ PAST YR: ICD-10-PCS | Mod: CPTII,S$GLB,, | Performed by: NURSE PRACTITIONER

## 2020-03-16 PROCEDURE — 99999 PR PBB SHADOW E&M-EST. PATIENT-LVL III: ICD-10-PCS | Mod: PBBFAC,,, | Performed by: NURSE PRACTITIONER

## 2020-03-16 PROCEDURE — 1126F AMNT PAIN NOTED NONE PRSNT: CPT | Mod: S$GLB,,, | Performed by: NURSE PRACTITIONER

## 2020-03-16 PROCEDURE — 99214 OFFICE O/P EST MOD 30 MIN: CPT | Mod: S$GLB,,, | Performed by: NURSE PRACTITIONER

## 2020-03-16 PROCEDURE — 1159F MED LIST DOCD IN RCRD: CPT | Mod: S$GLB,,, | Performed by: NURSE PRACTITIONER

## 2020-03-16 PROCEDURE — 1126F PR PAIN SEVERITY QUANTIFIED, NO PAIN PRESENT: ICD-10-PCS | Mod: S$GLB,,, | Performed by: NURSE PRACTITIONER

## 2020-03-16 PROCEDURE — 1159F PR MEDICATION LIST DOCUMENTED IN MEDICAL RECORD: ICD-10-PCS | Mod: S$GLB,,, | Performed by: NURSE PRACTITIONER

## 2020-03-16 PROCEDURE — 1101F PT FALLS ASSESS-DOCD LE1/YR: CPT | Mod: CPTII,S$GLB,, | Performed by: NURSE PRACTITIONER

## 2020-03-16 PROCEDURE — 99999 PR PBB SHADOW E&M-EST. PATIENT-LVL III: CPT | Mod: PBBFAC,,, | Performed by: NURSE PRACTITIONER

## 2020-03-16 NOTE — PROGRESS NOTES
Clinic Follow Up:  [facility] Follow Up Note    Reason for Follow Up:  The encounter diagnosis was Alcoholic cirrhosis of liver without ascites.    PCP: Joanie Simms       HPI:  This is a 74 y.o. male here for follow up of the above  Pt states that he has been feeling overall well without any GI complaints  Recent labs and imaging are stable  Denies any abdominal pain.  No nausea or vomiting.  No change in bowel pattern.  No melena or hematochezia. No weight loss.  No upper GI bleeding.  No ascites or BLE.  No overt confusion.      Review of Systems   Constitutional: Negative for chills, fever, malaise/fatigue and weight loss.   Respiratory: Negative for cough.    Cardiovascular: Negative for chest pain.   Gastrointestinal:        Per HPI   Musculoskeletal: Negative for myalgias.   Skin: Negative for itching and rash.   Neurological: Negative for headaches.   Psychiatric/Behavioral: The patient is not nervous/anxious.        Medical History:  Past Medical History:   Diagnosis Date    Alcoholic cirrhosis of liver with ascites     Arthritis     Cataract     Gastric mass     GERD (gastroesophageal reflux disease)     Hyperlipidemia     Lung cancer     Primary open angle glaucoma (POAG) of both eyes, moderate stage 4/16/2019       Surgical History:   Past Surgical History:   Procedure Laterality Date    1 STENT Right 03/27/2019    DR. CLARK    ABDOMINAL SURGERY      CATARACT EXTRACTION      CATARACT EXTRACTION W/  INTRAOCULAR LENS IMPLANT Left 04/10/2019    istent inject     COLONOSCOPY N/A 4/11/2017    Procedure: COLONOSCOPY;  Surgeon: Brianne Wise MD;  Location: [facility];  Service: Endoscopy;  Laterality: N/A;    ESOPHAGOGASTRODUODENOSCOPY      FEMUR SURGERY Right     GASTRECTOMY      HAND SURGERY Left     KNEE SURGERY Right     laparotomy for trauma (negative)  1979    left lower lobectomy      PCIOL Right 03/27/2019    DR. CLARK       Family History:    Family History   Problem Relation Age of Onset    Diabetes Mother     Hypertension Mother     Colon cancer Neg Hx        Social History:   Social History     Tobacco Use    Smoking status: Former Smoker     Packs/day: 0.50     Years: 25.00     Pack years: 12.50     Last attempt to quit: 10/16/1988     Years since quittin.4    Smokeless tobacco: Never Used   Substance Use Topics    Alcohol use: Yes     Alcohol/week: 3.0 standard drinks     Types: 3 Cans of beer per week     Comment: socailly    Drug use: No       Allergies: Reviewed    Home Medications:  Current Outpatient Medications on File Prior to Visit   Medication Sig Dispense Refill    cholecalciferol, vitamin D3, 2,000 unit Cap Take 1 capsule by mouth once daily. Patient states that he takes this medication 2 times per week      sildenafil (VIAGRA) 100 MG tablet Take 100 mg by mouth daily as needed.  11    diclofenac (VOLTAREN) 50 MG EC tablet Take 1 tablet (50 mg total) by mouth 2 (two) times daily. (Patient not taking: Reported on 3/16/2020) 20 tablet 0    folic acid (FOLVITE) 1 MG tablet Take 1 mg by mouth once daily.      food supplemt, lactose-reduced (ENSURE ACTIVE CLEAR) Liqd Take by mouth.      latanoprost 0.005 % ophthalmic solution Place 1 drop into both eyes every evening. (Patient not taking: Reported on 3/16/2020) 1 Bottle 12    multivitamin capsule Take 1 capsule by mouth once daily.      pantoprazole (PROTONIX) 40 MG tablet Take 40 mg by mouth once daily.      prednisoLONE acetate (PRED FORTE) 1 % DrpS Place 1 drop into the left eye every 12 (twelve) hours. Until better then one drop into the left eye once daily (Patient not taking: Reported on 3/16/2020) 5 mL 3    sucralfate (CARAFATE) 1 gram tablet Take 1 g by mouth 4 (four) times daily.      thiamine 100 MG tablet Take 100 mg by mouth once daily.       Current Facility-Administered Medications on File Prior to Visit   Medication Dose Route Frequency Provider Last  "Rate Last Dose    hylan g-f 20 (SYNVISC ONE) 48 mg/6 mL injection 48 mg  48 mg Intra-articular 1 time in Clinic/HOD [place] Arzate PA-C           Physical Exam:  Vital Signs:  /70   Pulse 86   Ht 5' 10" (1.778 m)   Wt 61.1 kg (134 lb 11.2 oz)   BMI 19.33 kg/m²   Body mass index is 19.33 kg/m².  Physical Exam   Constitutional: He is oriented to person, place, and time. He appears well-developed.   HENT:   Head: Normocephalic.   Neck: Normal range of motion.   Cardiovascular: Normal rate.   Pulmonary/Chest: Effort normal.   Abdominal: He exhibits no distension.   Musculoskeletal: Normal range of motion.   Neurological: He is alert and oriented to person, place, and time.   Skin: Skin is warm and dry.   Psychiatric: He has a normal mood and affect.   Vitals reviewed.      Labs: Pertinent labs reviewed.  MELD-Na score: 7 at 3/9/2020  9:41 AM  MELD score: 7 at 3/9/2020  9:41 AM  Calculated from:  Serum Creatinine: 0.7 mg/dL (Rounded to 1 mg/dL) at 3/9/2020  9:41 AM  Serum Sodium: 146 mmol/L (Rounded to 137 mmol/L) at 3/9/2020  9:41 AM  Total Bilirubin: 1.2 mg/dL at 3/9/2020  9:41 AM  INR(ratio): 1.0 at 3/9/2020  9:41 AM  Age: 74 years  EV: EGD 2018 without EV  HCC: US 2/2020 without lesion or mass    Assessment:  1. Alcoholic cirrhosis of liver without ascites        Recommendations:  Stable with low MELD  - continue current medications  - continue with MELD labs and HCC surveillance every 6 months  - Repeat EGD due 2021    Return to Clinic:  6 months with pre-clinic labs and imaging.     "

## 2020-03-24 ENCOUNTER — TELEPHONE (OUTPATIENT)
Dept: ORTHOPEDICS | Facility: CLINIC | Age: 75
End: 2020-03-24

## 2020-03-24 NOTE — TELEPHONE ENCOUNTER
Spoke with the patient in regards to their appointment on 04/02/20. Informed the patient that  due to the Covid-19 concerns we are rescheduling patient to a virtual visit with Dr. Whitaker. Patient verbalized understanding.FP

## 2020-03-27 ENCOUNTER — PATIENT MESSAGE (OUTPATIENT)
Dept: ORTHOPEDICS | Facility: CLINIC | Age: 75
End: 2020-03-27

## 2020-04-02 ENCOUNTER — PATIENT OUTREACH (OUTPATIENT)
Dept: ADMINISTRATIVE | Facility: OTHER | Age: 75
End: 2020-04-02

## 2020-04-02 ENCOUNTER — TELEPHONE (OUTPATIENT)
Dept: ORTHOPEDICS | Facility: CLINIC | Age: 75
End: 2020-04-02

## 2020-04-02 NOTE — TELEPHONE ENCOUNTER
Patient stated that his computer is not currently working - states he will call back to set up his appointment.

## 2020-04-02 NOTE — TELEPHONE ENCOUNTER
----- Message from Anastacio Kearney sent at 4/2/2020 12:07 PM CDT -----  ..Type:  Patient Returning Call    Who Called:pt   Who Left Message for Patient:  Does the patient know what this is regarding?:video appt   Would the patient rather a call back or a response via [facility]? Call back   Best Call Back Number: [phone]  Additional Information: Pt is requesting a call from nurse. Pt states he has been waiting on video appt and no one has come

## 2020-04-06 ENCOUNTER — TELEPHONE (OUTPATIENT)
Dept: ORTHOPEDICS | Facility: CLINIC | Age: 75
End: 2020-04-06

## 2020-04-06 NOTE — TELEPHONE ENCOUNTER
Left a message for the patient to give the a call back.FP        ----- Message from Gia Gonzalez sent at 4/6/2020  1:52 PM CDT -----  Contact: self  duplicate message regarding being rescheduled...[phone] (home)

## 2020-04-07 ENCOUNTER — TELEPHONE (OUTPATIENT)
Dept: GASTROENTEROLOGY | Facility: CLINIC | Age: 75
End: 2020-04-07

## 2020-04-07 NOTE — TELEPHONE ENCOUNTER
----- Message from Mariam Chester sent at 4/7/2020  2:19 PM CDT -----  Contact: self/[phone]  Would like to consult with nurse regarding some stomach issues. Please call back at [phone]. Thanks/ar

## 2020-04-07 NOTE — TELEPHONE ENCOUNTER
Returned patients call and he informed me that when he eats he has discomfort. I asked patient if he was taking any medications he stated he was not. I informed him I would check with Vilma and get back with her recommendations. He verbalized understanding.

## 2020-04-09 ENCOUNTER — TELEPHONE (OUTPATIENT)
Dept: GASTROENTEROLOGY | Facility: CLINIC | Age: 75
End: 2020-04-09

## 2020-04-09 NOTE — TELEPHONE ENCOUNTER
Returned call to pt who stated he feels as if his food is not digesting. Pt does not want a virtual visit and requested to be seen in clinic on 4/13/20. Appt scheduled.

## 2020-04-09 NOTE — TELEPHONE ENCOUNTER
----- Message from Salma Sukhdev sent at 4/9/2020 12:45 PM CDT -----  Contact: pt   Type:  Sooner Apoointment Request    Caller is requesting a sooner appointment.  Caller declined first available appointment listed below.  Caller will not accept being placed on the waitlist and is requesting a message be sent to doctor.  Name of Caller: pt  When is the first available appointment? 05/04  Symptoms: stomach problems  Would the patient rather a call back or a response via [facility]? Call back  Best Call Back Number: [phone]  Additional Information:

## 2020-04-13 ENCOUNTER — OFFICE VISIT (OUTPATIENT)
Dept: GASTROENTEROLOGY | Facility: CLINIC | Age: 75
End: 2020-04-13
Payer: MEDICARE

## 2020-04-13 VITALS
BODY MASS INDEX: 19.63 KG/M2 | SYSTOLIC BLOOD PRESSURE: 120 MMHG | HEART RATE: 109 BPM | WEIGHT: 137.13 LBS | HEIGHT: 70 IN | DIASTOLIC BLOOD PRESSURE: 70 MMHG

## 2020-04-13 DIAGNOSIS — K20.90 ESOPHAGITIS: ICD-10-CM

## 2020-04-13 DIAGNOSIS — R09.A2 GLOBUS SENSATION: Primary | ICD-10-CM

## 2020-04-13 PROCEDURE — 99999 PR PBB SHADOW E&M-EST. PATIENT-LVL III: ICD-10-PCS | Mod: PBBFAC,,, | Performed by: NURSE PRACTITIONER

## 2020-04-13 PROCEDURE — 1159F MED LIST DOCD IN RCRD: CPT | Mod: S$GLB,,, | Performed by: NURSE PRACTITIONER

## 2020-04-13 PROCEDURE — 99214 OFFICE O/P EST MOD 30 MIN: CPT | Mod: S$GLB,,, | Performed by: NURSE PRACTITIONER

## 2020-04-13 PROCEDURE — 99214 PR OFFICE/OUTPT VISIT, EST, LEVL IV, 30-39 MIN: ICD-10-PCS | Mod: S$GLB,,, | Performed by: NURSE PRACTITIONER

## 2020-04-13 PROCEDURE — 1159F PR MEDICATION LIST DOCUMENTED IN MEDICAL RECORD: ICD-10-PCS | Mod: S$GLB,,, | Performed by: NURSE PRACTITIONER

## 2020-04-13 PROCEDURE — 1101F PT FALLS ASSESS-DOCD LE1/YR: CPT | Mod: CPTII,S$GLB,, | Performed by: NURSE PRACTITIONER

## 2020-04-13 PROCEDURE — 1125F AMNT PAIN NOTED PAIN PRSNT: CPT | Mod: S$GLB,,, | Performed by: NURSE PRACTITIONER

## 2020-04-13 PROCEDURE — 1125F PR PAIN SEVERITY QUANTIFIED, PAIN PRESENT: ICD-10-PCS | Mod: S$GLB,,, | Performed by: NURSE PRACTITIONER

## 2020-04-13 PROCEDURE — 99999 PR PBB SHADOW E&M-EST. PATIENT-LVL III: CPT | Mod: PBBFAC,,, | Performed by: NURSE PRACTITIONER

## 2020-04-13 PROCEDURE — 1101F PR PT FALLS ASSESS DOC 0-1 FALLS W/OUT INJ PAST YR: ICD-10-PCS | Mod: CPTII,S$GLB,, | Performed by: NURSE PRACTITIONER

## 2020-04-13 RX ORDER — SUCRALFATE 1 G/10ML
1 SUSPENSION ORAL
Qty: 560 ML | Refills: 0 | Status: SHIPPED | OUTPATIENT
Start: 2020-04-13 | End: 2020-04-27

## 2020-04-13 RX ORDER — PANTOPRAZOLE SODIUM 40 MG/1
40 TABLET, DELAYED RELEASE ORAL 2 TIMES DAILY
Qty: 60 TABLET | Refills: 11 | Status: ON HOLD | OUTPATIENT
Start: 2020-04-13 | End: 2020-06-12 | Stop reason: HOSPADM

## 2020-04-13 NOTE — PROGRESS NOTES
Clinic Follow Up:  [facility] Follow Up Note    Reason for Follow Up:  The primary encounter diagnosis was Globus sensation. A diagnosis of Esophagitis was also pertinent to this visit.    PCP: Joanie Simms       HPI:  This is a 74 y.o. male here for follow up of the above  Pt states that over the last 3 weeks, he has had a return of his globus sensation.  He states that the sensation is only present with solids.  No issues with liquids.  It does take him extra swallows to take his medicine.   He had similar symptoms in 2018.  At that time, EGD was completed which showed no stricutrres.  Had LA grade D esophagitis.  Was given PPI and carafate and reports symptoms resolution at that time.   He denies any known exacerbating factors.  Has tried nothing to treat the symptoms.   He denies any associated N/V/abd pain.  No melena or hematochezia.         Review of Systems   Constitutional: Negative for chills, fever, malaise/fatigue and weight loss.   Respiratory: Negative for cough.    Cardiovascular: Negative for chest pain.   Gastrointestinal:        Per HPI   Musculoskeletal: Negative for myalgias.   Skin: Negative for itching and rash.   Neurological: Negative for headaches.   Psychiatric/Behavioral: The patient is not nervous/anxious.        Medical History:  Past Medical History:   Diagnosis Date    Alcoholic cirrhosis of liver with ascites     Arthritis     Cataract     Gastric mass     GERD (gastroesophageal reflux disease)     Hyperlipidemia     Lung cancer     Primary open angle glaucoma (POAG) of both eyes, moderate stage 4/16/2019       Surgical History:   Past Surgical History:   Procedure Laterality Date    1 STENT Right 03/27/2019    DR. CLARK    ABDOMINAL SURGERY      CATARACT EXTRACTION      CATARACT EXTRACTION W/  INTRAOCULAR LENS IMPLANT Left 04/10/2019    istent inject     COLONOSCOPY N/A 4/11/2017    Procedure: COLONOSCOPY;  Surgeon: Brianne Wise MD;   [facility];  Service: Endoscopy;  Laterality: N/A;    ESOPHAGOGASTRODUODENOSCOPY      FEMUR SURGERY Right     GASTRECTOMY      HAND SURGERY Left     KNEE SURGERY Right     laparotomy for trauma (negative)      left lower lobectomy      PCIOL Right 2019    DR. CLARK       Family History:   Family History   Problem Relation Age of Onset    Diabetes Mother     Hypertension Mother     Colon cancer Neg Hx        Social History:   Social History     Tobacco Use    Smoking status: Former Smoker     Packs/day: 0.50     Years: 25.00     Pack years: 12.50     Last attempt to quit: 10/16/1988     Years since quittin.5    Smokeless tobacco: Never Used   Substance Use Topics    Alcohol use: Yes     Alcohol/week: 3.0 standard drinks     Types: 3 Cans of beer per week     Comment: socailly    Drug use: No       Allergies: Reviewed    Home Medications:  Current Outpatient Medications on File Prior to Visit   Medication Sig Dispense Refill    cholecalciferol, vitamin D3, 2,000 unit Cap Take 1 capsule by mouth once daily. Patient states that he takes this medication 2 times per week      multivitamin capsule Take 1 capsule by mouth once daily.      diclofenac (VOLTAREN) 50 MG EC tablet Take 1 tablet (50 mg total) by mouth 2 (two) times daily. (Patient not taking: Reported on 3/16/2020) 20 tablet 0    folic acid (FOLVITE) 1 MG tablet Take 1 mg by mouth once daily.      food supplemt, lactose-reduced (ENSURE ACTIVE CLEAR) Liqd Take by mouth.      latanoprost 0.005 % ophthalmic solution Place 1 drop into both eyes every evening. (Patient not taking: Reported on 3/16/2020) 1 Bottle 12    prednisoLONE acetate (PRED FORTE) 1 % DrpS Place 1 drop into the left eye every 12 (twelve) hours. Until better then one drop into the left eye once daily (Patient not taking: Reported on 3/16/2020) 5 mL 3    sildenafil (VIAGRA) 100 MG tablet Take 100 mg by mouth daily as needed.  11    thiamine 100 MG  "tablet Take 100 mg by mouth once daily.      [DISCONTINUED] pantoprazole (PROTONIX) 40 MG tablet Take 40 mg by mouth once daily.      [DISCONTINUED] sucralfate (CARAFATE) 1 gram tablet Take 1 g by mouth 4 (four) times daily.       Current Facility-Administered Medications on File Prior to Visit   Medication Dose Route Frequency Provider Last Rate Last Dose    hylan g-f 20 (SYNVISC ONE) 48 mg/6 mL injection 48 mg  48 mg Intra-articular 1 time in Clinic/HOD [place] Arzate PA-C           Physical Exam:  Vital Signs:  /70   Pulse 109   Ht 5' 9.5" (1.765 m)   Wt 62.2 kg (137 lb 2 oz)   BMI 19.96 kg/m²   Body mass index is 19.96 kg/m².  Physical Exam   Constitutional: He is oriented to person, place, and time. He appears well-developed.   HENT:   Head: Normocephalic.   Neck: Normal range of motion.   Cardiovascular: Normal rate.   Pulmonary/Chest: Effort normal.   Abdominal: He exhibits no distension.   Musculoskeletal: Normal range of motion.   Neurological: He is alert and oriented to person, place, and time.   Skin: Skin is warm and dry.   Psychiatric: He has a normal mood and affect.   Vitals reviewed.      Labs: Pertinent labs reviewed.    Assessment:  1. Globus sensation    2. Esophagitis        Recommendations:  Case discussed with Dr. Mars  - will start PPI BID and Carafate QID  - If symptoms do no improve or if they worsen, will plan for an esophagram vs urgent EGD.  - If N/V or abdominal pain starts, pt to contact office.       Return to Clinic:  8 weeks to ensure improved symptoms.   "

## 2020-04-24 ENCOUNTER — TELEPHONE (OUTPATIENT)
Dept: ORTHOPEDICS | Facility: CLINIC | Age: 75
End: 2020-04-24

## 2020-04-24 DIAGNOSIS — M25.531 RIGHT WRIST PAIN: Primary | ICD-10-CM

## 2020-04-24 DIAGNOSIS — M25.521 RIGHT ELBOW PAIN: ICD-10-CM

## 2020-04-24 NOTE — TELEPHONE ENCOUNTER
----- Message from Johann Jensen sent at 4/24/2020 10:16 AM CDT -----  Contact: Thom Hills([facility])           Would like to get pt an appt within a week, for a fractured elbow.         [phone]

## 2020-04-24 NOTE — TELEPHONE ENCOUNTER
Tripped and fell in driveway -   2 days ago  Right elbow / right hand  Has paperwork from [facility] stating type of fracture  Patient does have a hard splint  Patient has appt set up for Tuesday 04/28/20 with xrays  States he will bring all discharge paperwork with disk  Pt verbalized understanding and thankful for call

## 2020-04-27 ENCOUNTER — PATIENT OUTREACH (OUTPATIENT)
Dept: ADMINISTRATIVE | Facility: OTHER | Age: 75
End: 2020-04-27

## 2020-04-27 ENCOUNTER — TELEPHONE (OUTPATIENT)
Dept: ORTHOPEDICS | Facility: CLINIC | Age: 75
End: 2020-04-27

## 2020-04-27 RX ORDER — HYDROCODONE BITARTRATE AND ACETAMINOPHEN 7.5; 325 MG/1; MG/1
TABLET ORAL
COMMUNITY
Start: 2020-04-24

## 2020-04-27 NOTE — PROGRESS NOTES
Chart reviewed.   Requested updates from Care Everywhere.  Immunizations reconciled.   HM updated.  Updated patient history with colon polyps.

## 2020-04-28 ENCOUNTER — OFFICE VISIT (OUTPATIENT)
Dept: ORTHOPEDICS | Facility: CLINIC | Age: 75
End: 2020-04-28
Payer: COMMERCIAL

## 2020-04-28 VITALS
WEIGHT: 137 LBS | DIASTOLIC BLOOD PRESSURE: 67 MMHG | HEIGHT: 70 IN | HEART RATE: 110 BPM | BODY MASS INDEX: 19.61 KG/M2 | SYSTOLIC BLOOD PRESSURE: 110 MMHG

## 2020-04-28 DIAGNOSIS — S52.514A CLOSED NONDISPLACED FRACTURE OF STYLOID PROCESS OF RIGHT RADIUS, INITIAL ENCOUNTER: Primary | ICD-10-CM

## 2020-04-28 PROCEDURE — 1159F MED LIST DOCD IN RCRD: CPT | Mod: S$GLB,,, | Performed by: ORTHOPAEDIC SURGERY

## 2020-04-28 PROCEDURE — 99213 OFFICE O/P EST LOW 20 MIN: CPT | Mod: S$GLB,,, | Performed by: ORTHOPAEDIC SURGERY

## 2020-04-28 PROCEDURE — 1125F AMNT PAIN NOTED PAIN PRSNT: CPT | Mod: S$GLB,,, | Performed by: ORTHOPAEDIC SURGERY

## 2020-04-28 PROCEDURE — 1159F PR MEDICATION LIST DOCUMENTED IN MEDICAL RECORD: ICD-10-PCS | Mod: S$GLB,,, | Performed by: ORTHOPAEDIC SURGERY

## 2020-04-28 PROCEDURE — 1125F PR PAIN SEVERITY QUANTIFIED, PAIN PRESENT: ICD-10-PCS | Mod: S$GLB,,, | Performed by: ORTHOPAEDIC SURGERY

## 2020-04-28 PROCEDURE — 1101F PR PT FALLS ASSESS DOC 0-1 FALLS W/OUT INJ PAST YR: ICD-10-PCS | Mod: CPTII,S$GLB,, | Performed by: ORTHOPAEDIC SURGERY

## 2020-04-28 PROCEDURE — 99999 PR PBB SHADOW E&M-EST. PATIENT-LVL III: ICD-10-PCS | Mod: PBBFAC,,, | Performed by: ORTHOPAEDIC SURGERY

## 2020-04-28 PROCEDURE — 1101F PT FALLS ASSESS-DOCD LE1/YR: CPT | Mod: CPTII,S$GLB,, | Performed by: ORTHOPAEDIC SURGERY

## 2020-04-28 PROCEDURE — 99213 PR OFFICE/OUTPT VISIT, EST, LEVL III, 20-29 MIN: ICD-10-PCS | Mod: S$GLB,,, | Performed by: ORTHOPAEDIC SURGERY

## 2020-04-28 PROCEDURE — 99999 PR PBB SHADOW E&M-EST. PATIENT-LVL III: CPT | Mod: PBBFAC,,, | Performed by: ORTHOPAEDIC SURGERY

## 2020-04-28 NOTE — PROGRESS NOTES
Subjective:     Patient ID: Santy Anderson is a 74 y.o. male.    Chief Complaint: Pain of the Right Elbow and Pain of the Right Hand    The patient is a 74-year-old male who apparently fell in injured his right wrist 04/24/2020.  [facility] was given a [facility]      Past Medical History:   Diagnosis Date    Alcoholic cirrhosis of liver with ascites     Arthritis     Cataract     Gastric mass     GERD (gastroesophageal reflux disease)     Hx of colonic polyps 04/11/2017    per colonoscopy report in     Hyperlipidemia     Lung cancer     Primary open angle glaucoma (POAG) of both eyes, moderate stage 4/16/2019     Past Surgical History:   Procedure Laterality Date    1 STENT Right 03/27/2019    DR. CLARK    ABDOMINAL SURGERY      CATARACT EXTRACTION      CATARACT EXTRACTION W/  INTRAOCULAR LENS IMPLANT Left 04/10/2019    istent inject     COLONOSCOPY N/A 4/11/2017    Procedure: COLONOSCOPY;  Surgeon: Brianne Wise MD;  Location: [facility];  Service: Endoscopy;  Laterality: N/A;    ESOPHAGOGASTRODUODENOSCOPY      FEMUR SURGERY Right     GASTRECTOMY      HAND SURGERY Left     KNEE SURGERY Right     laparotomy for trauma (negative)  1979    left lower lobectomy      PCIOL Right 03/27/2019    DR. CLARK     Family History   Problem Relation Age of Onset    Diabetes Mother     Hypertension Mother     Colon cancer Neg Hx      Social History     Socioeconomic History    Marital status:      Spouse name: Not on file    Number of children: Not on file    Years of education: Not on file    Highest education level: Not on file   Occupational History    Not on file   Social Needs    Financial resource strain: Not on file    Food insecurity:     Worry: Not on file     Inability: Not on file    Transportation needs:     Medical: Not on file     Non-medical: Not on file   Tobacco Use    Smoking status: Former Smoker     Packs/day: 0.50     Years: 25.00     Pack  years: 12.50     Last attempt to quit: 10/16/1988     Years since quittin.5    Smokeless tobacco: Never Used   Substance and Sexual Activity    Alcohol use: Yes     Alcohol/week: 3.0 standard drinks     Types: 3 Cans of beer per week     Comment: socailly    Drug use: No    Sexual activity: Never   Lifestyle    Physical activity:     Days per week: Not on file     Minutes per session: Not on file    Stress: Not on file   Relationships    Social connections:     Talks on phone: Not on file     Gets together: Not on file     Attends Taoist service: Not on file     Active member of club or organization: Not on file     Attends meetings of clubs or organizations: Not on file     Relationship status: Not on file   Other Topics Concern    Not on file   Social History Narrative         Medication List with Changes/Refills   Current Medications    CHOLECALCIFEROL, VITAMIN D3, 2,000 UNIT CAP    Take 1 capsule by mouth once daily. Patient states that he takes this medication 2 times per week    DICLOFENAC (VOLTAREN) 50 MG EC TABLET    Take 1 tablet (50 mg total) by mouth 2 (two) times daily.    FOLIC ACID (FOLVITE) 1 MG TABLET    Take 1 mg by mouth once daily.    FOOD SUPPLEMT, LACTOSE-REDUCED (ENSURE ACTIVE CLEAR) LIQD    Take by mouth.    HYDROCODONE-ACETAMINOPHEN (NORCO) 7.5-325 MG PER TABLET        LATANOPROST 0.005 % OPHTHALMIC SOLUTION    Place 1 drop into both eyes every evening.    MULTIVITAMIN CAPSULE    Take 1 capsule by mouth once daily.    PANTOPRAZOLE (PROTONIX) 40 MG TABLET    Take 1 tablet (40 mg total) by mouth 2 (two) times daily.    PREDNISOLONE ACETATE (PRED FORTE) 1 % DRPS    Place 1 drop into the left eye every 12 (twelve) hours. Until better then one drop into the left eye once daily    SILDENAFIL (VIAGRA) 100 MG TABLET    Take 100 mg by mouth daily as needed.    THIAMINE 100 MG TABLET    Take 100 mg by mouth once daily.     Review of patient's allergies indicates:  No Known  Allergies  Review of Systems   Constitution: Negative for malaise/fatigue.   HENT: Negative for hearing loss.    Eyes: Positive for visual disturbance. Negative for double vision.   Cardiovascular: Positive for chest pain.   Respiratory: Negative for shortness of breath.    Endocrine: Negative for cold intolerance.   Hematologic/Lymphatic: Does not bruise/bleed easily.   Skin: Negative for poor wound healing and suspicious lesions.   Musculoskeletal: Positive for arthritis, back pain, joint pain, joint swelling, muscle weakness and stiffness. Negative for gout.   Gastrointestinal: Positive for nausea and vomiting.   Genitourinary: Negative for dysuria.   Neurological: Negative for numbness, paresthesias and sensory change.   Psychiatric/Behavioral: Positive for altered mental status and substance abuse. Negative for depression and memory loss. The patient is not nervous/anxious.    Allergic/Immunologic: Negative for persistent infections.       Objective:   Body mass index is 19.94 kg/m².  Vitals:    04/28/20 1018   BP: 110/67   Pulse: 110                General    Constitutional: He is oriented to person, place, and time. He appears well-developed and well-nourished. No distress.   HENT:   Head: Normocephalic.   Eyes: EOM are normal.   Neck: Normal range of motion.   Pulmonary/Chest: Effort normal.   Neurological: He is oriented to person, place, and time.   Psychiatric: He has a normal mood and affect.             Right Hand/Wrist Exam     Inspection   Scars: Wrist - absent   Effusion: Wrist - present     Tenderness   The patient is tender to palpation of the radial area.    Other     Neuorologic Exam    Median Distribution: normal  Ulnar Distribution: normal  Radial Distribution: normal    Comments:  The patient has tenderness about the radial styloid right wrist.  There is no deformity.  There are no motor or sensory deficits.          Vascular Exam       Capillary Refill  Right Hand: normal capillary  refill      Relevant imaging results reviewed and interpreted by me, discussed with the patient and / or family today radiographs right wrist showed a nondisplaced radial styloid fracture  Assessment:     Encounter Diagnosis   Name Primary?    Closed nondisplaced fracture of styloid process of right radius, initial encounter Yes        Plan:       The patient is already in a wrist splint.  Activity limitations were discussed.  He has arthritis of his right knee and is referred to  for that issue.              Disclaimer: This note was prepared using a voice recognition system and is likely to have sound alike errors within the text.

## 2020-04-28 NOTE — PATIENT INSTRUCTIONS
If you are experiencing pain/discomfort ,or have questions after 5pm and would like to be connected to the [facility] on-call team, please call this number and specify which Sports Medicine provider is treating you: [phone]

## 2020-04-30 ENCOUNTER — TELEPHONE (OUTPATIENT)
Dept: ORTHOPEDICS | Facility: CLINIC | Age: 75
End: 2020-04-30

## 2020-04-30 NOTE — TELEPHONE ENCOUNTER
----- Message from Marybeth Gruber sent at 4/30/2020  4:47 PM CDT -----  Contact: pt  Pt stated that he did xrays already and would like to know if he is supposed to do it again on 5/1/20. Pt can be reached at [phone]      Thanks,  Marybeth Gruber

## 2020-05-04 ENCOUNTER — PATIENT OUTREACH (OUTPATIENT)
Dept: ADMINISTRATIVE | Facility: OTHER | Age: 75
End: 2020-05-04

## 2020-05-04 ENCOUNTER — OFFICE VISIT (OUTPATIENT)
Dept: OPHTHALMOLOGY | Facility: CLINIC | Age: 75
End: 2020-05-04
Payer: COMMERCIAL

## 2020-05-04 DIAGNOSIS — H40.1132 PRIMARY OPEN ANGLE GLAUCOMA (POAG) OF BOTH EYES, MODERATE STAGE: Primary | ICD-10-CM

## 2020-05-04 DIAGNOSIS — Z96.1 PSEUDOPHAKIA OF BOTH EYES: ICD-10-CM

## 2020-05-04 DIAGNOSIS — H17.9 CORNEAL SCAR, LEFT EYE: ICD-10-CM

## 2020-05-04 PROCEDURE — 92012 PR EYE EXAM, EST PATIENT,INTERMED: ICD-10-PCS | Mod: S$GLB,,, | Performed by: OPHTHALMOLOGY

## 2020-05-04 PROCEDURE — 99999 PR PBB SHADOW E&M-EST. PATIENT-LVL II: CPT | Mod: PBBFAC,,, | Performed by: OPHTHALMOLOGY

## 2020-05-04 PROCEDURE — 92012 INTRM OPH EXAM EST PATIENT: CPT | Mod: S$GLB,,, | Performed by: OPHTHALMOLOGY

## 2020-05-04 PROCEDURE — 92133 CPTRZD OPH DX IMG PST SGM ON: CPT | Mod: S$GLB,,, | Performed by: OPHTHALMOLOGY

## 2020-05-04 PROCEDURE — 99999 PR PBB SHADOW E&M-EST. PATIENT-LVL II: ICD-10-PCS | Mod: PBBFAC,,, | Performed by: OPHTHALMOLOGY

## 2020-05-04 PROCEDURE — 92133 POSTERIOR SEGMENT OCT OPTIC NERVE(OCULAR COHERENCE TOMOGRAPHY) - OU - BOTH EYES: ICD-10-PCS | Mod: S$GLB,,, | Performed by: OPHTHALMOLOGY
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
## 2020-05-07 ENCOUNTER — OFFICE VISIT (OUTPATIENT)
Dept: FAMILY MEDICINE | Facility: CLINIC | Age: 75
End: 2020-05-07
Payer: COMMERCIAL

## 2020-05-07 VITALS
OXYGEN SATURATION: 99 % | BODY MASS INDEX: 19.76 KG/M2 | HEIGHT: 70 IN | DIASTOLIC BLOOD PRESSURE: 84 MMHG | SYSTOLIC BLOOD PRESSURE: 138 MMHG | TEMPERATURE: 98 F | WEIGHT: 138 LBS | HEART RATE: 101 BPM

## 2020-05-07 DIAGNOSIS — M25.561 CHRONIC PAIN OF RIGHT KNEE: Primary | ICD-10-CM

## 2020-05-07 DIAGNOSIS — G89.29 CHRONIC PAIN OF RIGHT KNEE: Primary | ICD-10-CM

## 2020-05-07 DIAGNOSIS — Z00.00 ENCOUNTER FOR PREVENTIVE HEALTH EXAMINATION: ICD-10-CM

## 2020-05-07 DIAGNOSIS — C34.32 MALIGNANT NEOPLASM OF LOWER LOBE OF LEFT LUNG: ICD-10-CM

## 2020-05-07 DIAGNOSIS — K70.30 ALCOHOLIC CIRRHOSIS OF LIVER WITHOUT ASCITES: ICD-10-CM

## 2020-05-07 DIAGNOSIS — M85.89 OSTEOPENIA OF MULTIPLE SITES: ICD-10-CM

## 2020-05-07 DIAGNOSIS — R63.4 WEIGHT LOSS: ICD-10-CM

## 2020-05-07 DIAGNOSIS — Z12.11 SCREEN FOR COLON CANCER: ICD-10-CM

## 2020-05-07 DIAGNOSIS — E55.9 VITAMIN D DEFICIENCY: ICD-10-CM

## 2020-05-07 PROCEDURE — 99214 OFFICE O/P EST MOD 30 MIN: CPT | Mod: S$GLB,,, | Performed by: INTERNAL MEDICINE

## 2020-05-07 PROCEDURE — 3288F FALL RISK ASSESSMENT DOCD: CPT | Mod: CPTII,S$GLB,, | Performed by: INTERNAL MEDICINE

## 2020-05-07 PROCEDURE — 1159F MED LIST DOCD IN RCRD: CPT | Mod: S$GLB,,, | Performed by: INTERNAL MEDICINE

## 2020-05-07 PROCEDURE — 99214 PR OFFICE/OUTPT VISIT, EST, LEVL IV, 30-39 MIN: ICD-10-PCS | Mod: S$GLB,,, | Performed by: INTERNAL MEDICINE

## 2020-05-07 PROCEDURE — 3288F PR FALLS RISK ASSESSMENT DOCUMENTED: ICD-10-PCS | Mod: CPTII,S$GLB,, | Performed by: INTERNAL MEDICINE

## 2020-05-07 PROCEDURE — 1125F AMNT PAIN NOTED PAIN PRSNT: CPT | Mod: S$GLB,,, | Performed by: INTERNAL MEDICINE

## 2020-05-07 PROCEDURE — 1159F PR MEDICATION LIST DOCUMENTED IN MEDICAL RECORD: ICD-10-PCS | Mod: S$GLB,,, | Performed by: INTERNAL MEDICINE

## 2020-05-07 PROCEDURE — 99999 PR PBB SHADOW E&M-EST. PATIENT-LVL III: CPT | Mod: PBBFAC,,, | Performed by: INTERNAL MEDICINE

## 2020-05-07 PROCEDURE — 1125F PR PAIN SEVERITY QUANTIFIED, PAIN PRESENT: ICD-10-PCS | Mod: S$GLB,,, | Performed by: INTERNAL MEDICINE

## 2020-05-07 PROCEDURE — 1100F PR PT FALLS ASSESS DOC 2+ FALLS/FALL W/INJURY/YR: ICD-10-PCS | Mod: CPTII,S$GLB,, | Performed by: INTERNAL MEDICINE

## 2020-05-07 PROCEDURE — 99999 PR PBB SHADOW E&M-EST. PATIENT-LVL III: ICD-10-PCS | Mod: PBBFAC,,, | Performed by: INTERNAL MEDICINE

## 2020-05-07 PROCEDURE — 1100F PTFALLS ASSESS-DOCD GE2>/YR: CPT | Mod: CPTII,S$GLB,, | Performed by: INTERNAL MEDICINE

## 2020-05-19 ENCOUNTER — PATIENT OUTREACH (OUTPATIENT)
Dept: ADMINISTRATIVE | Facility: OTHER | Age: 75
End: 2020-05-19

## 2020-05-21 ENCOUNTER — OFFICE VISIT (OUTPATIENT)
Dept: ORTHOPEDICS | Facility: CLINIC | Age: 75
End: 2020-05-21
Payer: MEDICARE

## 2020-05-21 VITALS
SYSTOLIC BLOOD PRESSURE: 123 MMHG | HEIGHT: 70 IN | DIASTOLIC BLOOD PRESSURE: 85 MMHG | BODY MASS INDEX: 19.76 KG/M2 | HEART RATE: 117 BPM | WEIGHT: 138 LBS

## 2020-05-21 DIAGNOSIS — I73.9 PERIPHERAL ARTERIAL DISEASE: ICD-10-CM

## 2020-05-21 DIAGNOSIS — M17.11 ARTHRITIS OF KNEE, RIGHT: Primary | ICD-10-CM

## 2020-05-21 DIAGNOSIS — M25.561 CHRONIC PAIN OF RIGHT KNEE: ICD-10-CM

## 2020-05-21 DIAGNOSIS — G89.29 CHRONIC PAIN OF RIGHT KNEE: ICD-10-CM

## 2020-05-21 PROCEDURE — 99999 PR PBB SHADOW E&M-EST. PATIENT-LVL III: ICD-10-PCS | Mod: PBBFAC,,, | Performed by: ORTHOPAEDIC SURGERY

## 2020-05-21 PROCEDURE — 99214 OFFICE O/P EST MOD 30 MIN: CPT | Mod: S$GLB,,, | Performed by: ORTHOPAEDIC SURGERY

## 2020-05-21 PROCEDURE — 1125F PR PAIN SEVERITY QUANTIFIED, PAIN PRESENT: ICD-10-PCS | Mod: S$GLB,,, | Performed by: ORTHOPAEDIC SURGERY

## 2020-05-21 PROCEDURE — 1101F PT FALLS ASSESS-DOCD LE1/YR: CPT | Mod: CPTII,S$GLB,, | Performed by: ORTHOPAEDIC SURGERY

## 2020-05-21 PROCEDURE — 1101F PR PT FALLS ASSESS DOC 0-1 FALLS W/OUT INJ PAST YR: ICD-10-PCS | Mod: CPTII,S$GLB,, | Performed by: ORTHOPAEDIC SURGERY

## 2020-05-21 PROCEDURE — 1159F PR MEDICATION LIST DOCUMENTED IN MEDICAL RECORD: ICD-10-PCS | Mod: S$GLB,,, | Performed by: ORTHOPAEDIC SURGERY

## 2020-05-21 PROCEDURE — 1159F MED LIST DOCD IN RCRD: CPT | Mod: S$GLB,,, | Performed by: ORTHOPAEDIC SURGERY

## 2020-05-21 PROCEDURE — 99999 PR PBB SHADOW E&M-EST. PATIENT-LVL III: CPT | Mod: PBBFAC,,, | Performed by: ORTHOPAEDIC SURGERY

## 2020-05-21 PROCEDURE — 99214 PR OFFICE/OUTPT VISIT, EST, LEVL IV, 30-39 MIN: ICD-10-PCS | Mod: S$GLB,,, | Performed by: ORTHOPAEDIC SURGERY

## 2020-05-21 PROCEDURE — 1125F AMNT PAIN NOTED PAIN PRSNT: CPT | Mod: S$GLB,,, | Performed by: ORTHOPAEDIC SURGERY

## 2020-05-21 NOTE — PATIENT INSTRUCTIONS
[facility] with [facility]/in the sleep lacks  Will obtain nerve conduction study and EMG to the right leg to see why weakness in the foot  Needs to have peripheral arterial disease evaluated we will refer to cardiology or vascular surgeon  Return to clinic in 8weeks

## 2020-05-21 NOTE — LETTER
May 25, 2020      Amol Hussein MD  [address]  [place] [address]           [facility]  [address] [place] [id number]  Phone: [phone]  Fax: [phone]          Patient: Santy Anderson   MR Number: [id number]   YOB: 1945   Date of Visit: 5/21/2020       Dear Dr. Amol Hussein:    Thank you for referring Santy Anderson to me for evaluation. Attached you will find relevant portions of my assessment and plan of care.    If you have questions, please do not hesitate to call me. I look forward to following Santy Anderson along with you.    Sincerely,    Naif Whitaker MD    Enclosure  CC:  No Recipients    If you would like to receive this communication electronically, please contact [email or web address] or [phone] to request more information on [facility] access.    For providers and/or their staff who would like to refer a patient to [facility], please contact us through our one-stop-shop provider referral line, [facility], at [phone].    If you feel you have received this communication in error or would no longer like to receive these types of communications, please e-mail [email or web address]

## 2020-05-22 DIAGNOSIS — M25.561 CHRONIC PAIN OF RIGHT KNEE: ICD-10-CM

## 2020-05-22 DIAGNOSIS — R20.2 NUMBNESS AND TINGLING: ICD-10-CM

## 2020-05-22 DIAGNOSIS — R20.0 NUMBNESS AND TINGLING: ICD-10-CM

## 2020-05-22 DIAGNOSIS — I73.9 PERIPHERAL ARTERIAL DISEASE: Primary | ICD-10-CM

## 2020-05-22 DIAGNOSIS — G89.29 CHRONIC PAIN OF RIGHT KNEE: ICD-10-CM

## 2020-05-25 ENCOUNTER — PATIENT MESSAGE (OUTPATIENT)
Dept: ORTHOPEDICS | Facility: CLINIC | Age: 75
End: 2020-05-25
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
## 2020-05-26 DIAGNOSIS — Z76.89 ENCOUNTER TO ESTABLISH CARE: Primary | ICD-10-CM

## 2020-05-27 ENCOUNTER — PATIENT OUTREACH (OUTPATIENT)
Dept: ADMINISTRATIVE | Facility: OTHER | Age: 75
End: 2020-05-27

## 2020-05-29 ENCOUNTER — TELEPHONE (OUTPATIENT)
Dept: ENDOSCOPY | Facility: HOSPITAL | Age: 75
End: 2020-05-29

## 2020-05-29 DIAGNOSIS — Z12.11 SCREEN FOR COLON CANCER: Primary | ICD-10-CM

## 2020-05-29 RX ORDER — SODIUM, POTASSIUM,MAG SULFATES 17.5-3.13G
1 SOLUTION, RECONSTITUTED, ORAL ORAL DAILY
Qty: 1 KIT | Refills: 0 | Status: SHIPPED | OUTPATIENT
Start: 2020-05-29 | End: 2020-05-31

## 2020-05-29 NOTE — TELEPHONE ENCOUNTER
COVID Screening     1. Have you had a fever in the last 7 days or have you used fever reducing medicines for a fever in the last 7 days?  no    2. Are you experiencing shortness of breath, cough, muscle aches, loss of taste or loss of smell?  no    3. Are you residing with anyone who has tested positive for Covid?  no    If answered yes to any of the above questions, the pt must be scheduled for an appointment with their PCP.    A message also needs to be sent to the endoscopist to ensure the patient gets rescheduled at a later date.     ENDO screening    1. Have you been admitted overnight to the hospital in the past 3 months? no   If yes, schedule an appointment with PCP before scheduling endoscopic procedure.     2. Have you had a stent placed in the last 12 months? no   If yes, for a screening visit, cancel and message the ordering provider.  The patient will need a new order when the time is appropriate.     3. Have you had a stroke or heart attack in the past 6 months? no   If yes, cancel and refer patient to ordering provider for clearance, also message ordering provider to inform.     4. Have you had any chest pain in the past 3 months? no   If yes, Have you been evaluated by your PCP and/or cardiologist and it was determined to not be heart related? not applicable   If No, Pt needs to be seen by PCP or Cardiologist .  Pt can be scheduled once clearance obtained by either of those providers.     5. Do you take prescription weight loss medications?  no   If yes, must stop for 2 weeks prior to procedure.     6. Have you been diagnosed with diverticulitis within the past 3 months? no   If yes, must have been seen by GI within the last 3 months, if not schedule with GI ANANYA.    If pt has been seen by GI, schedule procedure 8-12 weeks post antibiotic treatment.     7. Are you on Dialysis? no  If yes, schedule procedure for the day AFTER dialysis.  Appt time should be 9am or later, patient arrival time is 2 hours  "prior.  Nulytely or    miralax prep for all patients with kidney disease.     8. Are you diabetic?  no   If yes, schedule morning appt. Advise pt to hold all diabetic meds day of procedure.     9. If pt is older than 80 years of age and HAS NOT been seen by GI or PCP within the last 6 months, needs appt with GI ANANYA.   If pt has been seen by the GI provider or PCP within the past 6  months AND meets criteria, schedule procedure AND send message to the endoscopist.     10. Is patient on a "high risk" medication (blood thinner/antiplatelet agent)?  no   If yes, has cardiac clearance been obtained within the last 60 days? N/A   If no, a new clearance needs to be obtained.       I have reviewed the last colonoscopy for recommendations regarding next procedure bowel prep.  yes  I have reviewed medications and allergies.  yes  I have verified the pharmacy information and appropriate prep sent if needed. yes  Prep instructions have been mailed or sent to portal per patient request. yes    If answers yes to any of the following, schedule at [facility] ONLY. If No, OK for either location.     Is BMI over 45?   Any complaints of chest pain, new onset or at rest?  Does pt have an AICD?  Is there a diagnosis of heart failure?  Does patient have an insulin pump?  If procedure for esophageal banding?  "

## 2020-06-02 ENCOUNTER — OFFICE VISIT (OUTPATIENT)
Dept: GASTROENTEROLOGY | Facility: CLINIC | Age: 75
End: 2020-06-02
Payer: COMMERCIAL

## 2020-06-02 ENCOUNTER — PATIENT OUTREACH (OUTPATIENT)
Dept: ADMINISTRATIVE | Facility: OTHER | Age: 75
End: 2020-06-02

## 2020-06-02 VITALS
BODY MASS INDEX: 18.85 KG/M2 | SYSTOLIC BLOOD PRESSURE: 112 MMHG | DIASTOLIC BLOOD PRESSURE: 62 MMHG | WEIGHT: 131.63 LBS | HEIGHT: 70 IN | HEART RATE: 65 BPM

## 2020-06-02 DIAGNOSIS — R09.A2 GLOBUS SENSATION: ICD-10-CM

## 2020-06-02 DIAGNOSIS — R13.10 DYSPHAGIA, UNSPECIFIED TYPE: Primary | ICD-10-CM

## 2020-06-02 PROCEDURE — 1159F MED LIST DOCD IN RCRD: CPT | Mod: S$GLB,,, | Performed by: NURSE PRACTITIONER

## 2020-06-02 PROCEDURE — 99999 PR PBB SHADOW E&M-EST. PATIENT-LVL IV: CPT | Mod: PBBFAC,,, | Performed by: NURSE PRACTITIONER

## 2020-06-02 PROCEDURE — 1100F PTFALLS ASSESS-DOCD GE2>/YR: CPT | Mod: CPTII,S$GLB,, | Performed by: NURSE PRACTITIONER

## 2020-06-02 PROCEDURE — 1100F PR PT FALLS ASSESS DOC 2+ FALLS/FALL W/INJURY/YR: ICD-10-PCS | Mod: CPTII,S$GLB,, | Performed by: NURSE PRACTITIONER

## 2020-06-02 PROCEDURE — 1125F PR PAIN SEVERITY QUANTIFIED, PAIN PRESENT: ICD-10-PCS | Mod: S$GLB,,, | Performed by: NURSE PRACTITIONER

## 2020-06-02 PROCEDURE — 3288F PR FALLS RISK ASSESSMENT DOCUMENTED: ICD-10-PCS | Mod: CPTII,S$GLB,, | Performed by: NURSE PRACTITIONER

## 2020-06-02 PROCEDURE — 99214 PR OFFICE/OUTPT VISIT, EST, LEVL IV, 30-39 MIN: ICD-10-PCS | Mod: S$GLB,,, | Performed by: NURSE PRACTITIONER

## 2020-06-02 PROCEDURE — 1159F PR MEDICATION LIST DOCUMENTED IN MEDICAL RECORD: ICD-10-PCS | Mod: S$GLB,,, | Performed by: NURSE PRACTITIONER

## 2020-06-02 PROCEDURE — 3288F FALL RISK ASSESSMENT DOCD: CPT | Mod: CPTII,S$GLB,, | Performed by: NURSE PRACTITIONER

## 2020-06-02 PROCEDURE — 1125F AMNT PAIN NOTED PAIN PRSNT: CPT | Mod: S$GLB,,, | Performed by: NURSE PRACTITIONER

## 2020-06-02 PROCEDURE — 99214 OFFICE O/P EST MOD 30 MIN: CPT | Mod: S$GLB,,, | Performed by: NURSE PRACTITIONER

## 2020-06-02 PROCEDURE — 99999 PR PBB SHADOW E&M-EST. PATIENT-LVL IV: ICD-10-PCS | Mod: PBBFAC,,, | Performed by: NURSE PRACTITIONER

## 2020-06-02 NOTE — PROGRESS NOTES
Clinic Follow Up:  [facility] Follow Up Note    Reason for Follow Up:  The primary encounter diagnosis was Dysphagia, unspecified type. A diagnosis of Globus sensation was also pertinent to this visit.    PCP: Joanie Simms       HPI:  This is a 74 y.o. male here for follow up of the above  Pt states that since his last visit, he has continued with a globus sensation daily.  WOrse when eating meat.  Reprots needing extra swallows with meals to get food to go down.  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 Workup unremarkable.   Pt denies any abdominal pain. No nausea or vomiting.  No melena   Has had weight loss of about 6 pounds since January.  Is scheduled for Colonoscopy 6/12/2020 for CRC screening.     Review of Systems   Constitutional: Positive for weight loss (pt was unaware of loss). Negative for chills, fever and malaise/fatigue.   Respiratory: Negative for cough.    Cardiovascular: Negative for chest pain.   Gastrointestinal:        Per HPI   Musculoskeletal: Negative for myalgias.   Skin: Negative for itching and rash.   Neurological: Negative for headaches.   Psychiatric/Behavioral: The patient is not nervous/anxious.        Medical History:  Past Medical History:   Diagnosis Date    Alcoholic cirrhosis of liver with ascites     Arthritis     Cataract     Gastric mass     GERD (gastroesophageal reflux disease)     Hx of colonic polyps 04/11/2017    per colonoscopy report in     Hyperlipidemia     Lung cancer     Primary open angle glaucoma (POAG) of both eyes, moderate stage 4/16/2019       Surgical History:   Past Surgical History:   Procedure Laterality Date    1 STENT Right 03/27/2019    DR. CLARK    ABDOMINAL SURGERY      CATARACT EXTRACTION      CATARACT EXTRACTION W/  INTRAOCULAR LENS IMPLANT Left 04/10/2019    istent inject     COLONOSCOPY N/A 4/11/2017    Procedure: COLONOSCOPY;  Surgeon:  Brianne Wise MD;  Location: [facility];  Service: Endoscopy;  Laterality: N/A;    ESOPHAGOGASTRODUODENOSCOPY      FEMUR SURGERY Right     GASTRECTOMY      HAND SURGERY Left     KNEE SURGERY Right     laparotomy for trauma (negative)      left lower lobectomy      PCIOL Right 2019    DR. CLARK       Family History:   Family History   Problem Relation Age of Onset    Diabetes Mother     Hypertension Mother     Colon cancer Neg Hx        Social History:   Social History     Tobacco Use    Smoking status: Former Smoker     Packs/day: 0.50     Years: 25.00     Pack years: 12.50     Last attempt to quit: 10/16/1988     Years since quittin.6    Smokeless tobacco: Never Used   Substance Use Topics    Alcohol use: Yes     Alcohol/week: 3.0 standard drinks     Types: 3 Cans of beer per week     Comment: socailly    Drug use: No       Allergies: Reviewed    Home Medications:  Current Outpatient Medications on File Prior to Visit   Medication Sig Dispense Refill    cholecalciferol, vitamin D3, 2,000 unit Cap Take 1 capsule by mouth once daily. Patient states that he takes this medication 2 times per week      diclofenac (VOLTAREN) 50 MG EC tablet Take 1 tablet (50 mg total) by mouth 2 (two) times daily. (Patient not taking: Reported on 2020) 20 tablet 0    folic acid (FOLVITE) 1 MG tablet Take 1 mg by mouth once daily.      food supplemt, lactose-reduced (ENSURE ACTIVE CLEAR) Liqd Take by mouth.      HYDROcodone-acetaminophen (NORCO) 7.5-325 mg per tablet       latanoprost 0.005 % ophthalmic solution Place 1 drop into both eyes every evening. (Patient not taking: Reported on 2020) 1 Bottle 12    multivitamin capsule Take 1 capsule by mouth once daily.      pantoprazole (PROTONIX) 40 MG tablet Take 1 tablet (40 mg total) by mouth 2 (two) times daily. (Patient not taking: Reported on 2020) 60 tablet 11    sildenafil (VIAGRA) 100 MG tablet Take 100 mg by mouth daily as  "needed.  11    thiamine 100 MG tablet Take 100 mg by mouth once daily.       Current Facility-Administered Medications on File Prior to Visit   Medication Dose Route Frequency Provider Last Rate Last Dose    hylan g-f 20 (SYNVISC ONE) 48 mg/6 mL injection 48 mg  48 mg Intra-articular 1 time in Clinic/HOD [place] Arzate PA-C           Physical Exam:  Vital Signs:  /62   Pulse 65   Ht 5' 9.5" (1.765 m)   Wt 59.7 kg (131 lb 9.8 oz)   BMI 19.16 kg/m²   Body mass index is 19.16 kg/m².  Physical Exam   Constitutional: He is oriented to person, place, and time. He appears well-developed.   HENT:   Head: Normocephalic.   Neck: Normal range of motion.   Cardiovascular: Normal rate.   Pulmonary/Chest: Effort normal.   Abdominal: He exhibits no distension.   Musculoskeletal: Normal range of motion.   Neurological: He is alert and oriented to person, place, and time.   Skin: Skin is warm and dry.   Psychiatric: He has a normal mood and affect.   Vitals reviewed.      Labs: Pertinent labs reviewed.    Assessment:  1. Dysphagia, unspecified type    2. Globus sensation        Recommendations:  Unclear etiology  - will plan for esophagram.  Depending on results, may need an EGD.  If EGD needed, can schedule at same time as colonoscopy .   - small, well chewed bites of food discussed  - avoid dry meat      Return to Clinic:  As previously planned for cirrhosis management.     "

## 2020-06-02 NOTE — H&P (VIEW-ONLY)
Clinic Follow Up:  [facility] Follow Up Note    Reason for Follow Up:  The primary encounter diagnosis was Dysphagia, unspecified type. A diagnosis of Globus sensation was also pertinent to this visit.    PCP: Joanie Simms       HPI:  This is a 74 y.o. male here for follow up of the above  Pt states that since his last visit, he has continued with a globus sensation daily.  WOrse when eating meat.  Reprots needing extra swallows with meals to get food to go down.  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 Workup unremarkable.   Pt denies any abdominal pain. No nausea or vomiting.  No melena   Has had weight loss of about 6 pounds since January.  Is scheduled for Colonoscopy 6/12/2020 for CRC screening.     Review of Systems   Constitutional: Positive for weight loss (pt was unaware of loss). Negative for chills, fever and malaise/fatigue.   Respiratory: Negative for cough.    Cardiovascular: Negative for chest pain.   Gastrointestinal:        Per HPI   Musculoskeletal: Negative for myalgias.   Skin: Negative for itching and rash.   Neurological: Negative for headaches.   Psychiatric/Behavioral: The patient is not nervous/anxious.        Medical History:  Past Medical History:   Diagnosis Date    Alcoholic cirrhosis of liver with ascites     Arthritis     Cataract     Gastric mass     GERD (gastroesophageal reflux disease)     Hx of colonic polyps 04/11/2017    per colonoscopy report in     Hyperlipidemia     Lung cancer     Primary open angle glaucoma (POAG) of both eyes, moderate stage 4/16/2019       Surgical History:   Past Surgical History:   Procedure Laterality Date    1 STENT Right 03/27/2019    DR. CLARK    ABDOMINAL SURGERY      CATARACT EXTRACTION      CATARACT EXTRACTION W/  INTRAOCULAR LENS IMPLANT Left 04/10/2019    istent inject     COLONOSCOPY N/A 4/11/2017    Procedure: COLONOSCOPY;  Surgeon:  Brianne Wise MD;  Location: [facility];  Service: Endoscopy;  Laterality: N/A;    ESOPHAGOGASTRODUODENOSCOPY      FEMUR SURGERY Right     GASTRECTOMY      HAND SURGERY Left     KNEE SURGERY Right     laparotomy for trauma (negative)      left lower lobectomy      PCIOL Right 2019    DR. CLARK       Family History:   Family History   Problem Relation Age of Onset    Diabetes Mother     Hypertension Mother     Colon cancer Neg Hx        Social History:   Social History     Tobacco Use    Smoking status: Former Smoker     Packs/day: 0.50     Years: 25.00     Pack years: 12.50     Last attempt to quit: 10/16/1988     Years since quittin.6    Smokeless tobacco: Never Used   Substance Use Topics    Alcohol use: Yes     Alcohol/week: 3.0 standard drinks     Types: 3 Cans of beer per week     Comment: socailly    Drug use: No       Allergies: Reviewed    Home Medications:  Current Outpatient Medications on File Prior to Visit   Medication Sig Dispense Refill    cholecalciferol, vitamin D3, 2,000 unit Cap Take 1 capsule by mouth once daily. Patient states that he takes this medication 2 times per week      diclofenac (VOLTAREN) 50 MG EC tablet Take 1 tablet (50 mg total) by mouth 2 (two) times daily. (Patient not taking: Reported on 2020) 20 tablet 0    folic acid (FOLVITE) 1 MG tablet Take 1 mg by mouth once daily.      food supplemt, lactose-reduced (ENSURE ACTIVE CLEAR) Liqd Take by mouth.      HYDROcodone-acetaminophen (NORCO) 7.5-325 mg per tablet       latanoprost 0.005 % ophthalmic solution Place 1 drop into both eyes every evening. (Patient not taking: Reported on 2020) 1 Bottle 12    multivitamin capsule Take 1 capsule by mouth once daily.      pantoprazole (PROTONIX) 40 MG tablet Take 1 tablet (40 mg total) by mouth 2 (two) times daily. (Patient not taking: Reported on 2020) 60 tablet 11    sildenafil (VIAGRA) 100 MG tablet Take 100 mg by mouth daily as  "needed.  11    thiamine 100 MG tablet Take 100 mg by mouth once daily.       Current Facility-Administered Medications on File Prior to Visit   Medication Dose Route Frequency Provider Last Rate Last Dose    hylan g-f 20 (SYNVISC ONE) 48 mg/6 mL injection 48 mg  48 mg Intra-articular 1 time in Clinic/HOD [place] Arzate PA-C           Physical Exam:  Vital Signs:  /62   Pulse 65   Ht 5' 9.5" (1.765 m)   Wt 59.7 kg (131 lb 9.8 oz)   BMI 19.16 kg/m²   Body mass index is 19.16 kg/m².  Physical Exam   Constitutional: He is oriented to person, place, and time. He appears well-developed.   HENT:   Head: Normocephalic.   Neck: Normal range of motion.   Cardiovascular: Normal rate.   Pulmonary/Chest: Effort normal.   Abdominal: He exhibits no distension.   Musculoskeletal: Normal range of motion.   Neurological: He is alert and oriented to person, place, and time.   Skin: Skin is warm and dry.   Psychiatric: He has a normal mood and affect.   Vitals reviewed.      Labs: Pertinent labs reviewed.    Assessment:  1. Dysphagia, unspecified type    2. Globus sensation        Recommendations:  Unclear etiology  - will plan for esophagram.  Depending on results, may need an EGD.  If EGD needed, can schedule at same time as colonoscopy .   - small, well chewed bites of food discussed  - avoid dry meat      Return to Clinic:  As previously planned for cirrhosis management.     "

## 2020-06-10 ENCOUNTER — HOSPITAL ENCOUNTER (OUTPATIENT)
Dept: RADIOLOGY | Facility: HOSPITAL | Age: 75
Discharge: HOME | End: 2020-06-10
Attending: NURSE PRACTITIONER
Payer: MEDICARE

## 2020-06-10 ENCOUNTER — TELEPHONE (OUTPATIENT)
Dept: GASTROENTEROLOGY | Facility: CLINIC | Age: 75
End: 2020-06-10

## 2020-06-10 ENCOUNTER — LAB VISIT (OUTPATIENT)
Dept: OTOLARYNGOLOGY | Facility: CLINIC | Age: 75
End: 2020-06-10
Payer: MEDICARE

## 2020-06-10 DIAGNOSIS — R09.A2 GLOBUS SENSATION: ICD-10-CM

## 2020-06-10 DIAGNOSIS — R13.10 DYSPHAGIA, UNSPECIFIED TYPE: ICD-10-CM

## 2020-06-10 DIAGNOSIS — Z12.11 SCREEN FOR COLON CANCER: ICD-10-CM

## 2020-06-10 DIAGNOSIS — R13.10 DYSPHAGIA, UNSPECIFIED TYPE: Primary | ICD-10-CM

## 2020-06-10 PROCEDURE — U0003 INFECTIOUS AGENT DETECTION BY NUCLEIC ACID (DNA OR RNA); SEVERE ACUTE RESPIRATORY SYNDROME CORONAVIRUS 2 (SARS-COV-2) (CORONAVIRUS DISEASE [COVID-19]), AMPLIFIED PROBE TECHNIQUE, MAKING USE OF HIGH THROUGHPUT TECHNOLOGIES AS DESCRIBED BY CMS-2020-01-R: HCPCS

## 2020-06-10 PROCEDURE — 25500020 PHARM REV CODE 255: Performed by: NURSE PRACTITIONER

## 2020-06-10 PROCEDURE — A9698 NON-RAD CONTRAST MATERIALNOC: HCPCS | Performed by: NURSE PRACTITIONER

## 2020-06-10 PROCEDURE — 74220 X-RAY XM ESOPHAGUS 1CNTRST: CPT | Mod: TC

## 2020-06-10 RX ADMIN — BARIUM SULFATE 65 ML: 980 POWDER, FOR SUSPENSION ORAL at 09:06

## 2020-06-10 NOTE — TELEPHONE ENCOUNTER
----- Message from Rosa Landa sent at 6/10/2020 11:46 AM CDT -----  Contact: JUAN TANG [[id number]]  Name of Who is Calling : JUAN TANG [[id number]]    Patient is requesting a call from staff in regards to results   .....Please contact to further discuss and advise.    Can the clinic reply by [facility] : No    What Number to Call Back :  [phone]

## 2020-06-10 NOTE — TELEPHONE ENCOUNTER
Returned patients call and informed him that Vilma wanted to have him complete and EGD due to issues completing the esophagram. Informed patient that he would need to be at the hospital off of [address] for 8am and that he would not need to prep like he was going to for the colonoscopy. Informed patient that he just needs to not have anything by mouth after midnight. Patient verbalized understanding. Procedure is scheduled 06/12 at 9am with arrival time of 8am with Dr. Correa. Colonoscopy dropped back in the depot for future date.

## 2020-06-11 LAB — SARS-COV-2 RNA RESP QL NAA+PROBE: NOT DETECTED

## 2020-06-12 ENCOUNTER — TELEPHONE (OUTPATIENT)
Dept: FAMILY MEDICINE | Facility: CLINIC | Age: 75
End: 2020-06-12

## 2020-06-12 ENCOUNTER — ANESTHESIA EVENT (OUTPATIENT)
Dept: ENDOSCOPY | Facility: HOSPITAL | Age: 75
End: 2020-06-12
Payer: MEDICARE

## 2020-06-12 ENCOUNTER — HOSPITAL ENCOUNTER (OUTPATIENT)
Facility: HOSPITAL | Age: 75
Discharge: HOME | End: 2020-06-12
Attending: INTERNAL MEDICINE | Admitting: INTERNAL MEDICINE
Payer: MEDICARE

## 2020-06-12 ENCOUNTER — ANESTHESIA (OUTPATIENT)
Dept: ENDOSCOPY | Facility: HOSPITAL | Age: 75
End: 2020-06-12
Payer: MEDICARE

## 2020-06-12 VITALS
TEMPERATURE: 98 F | SYSTOLIC BLOOD PRESSURE: 123 MMHG | WEIGHT: 129.63 LBS | HEIGHT: 70 IN | RESPIRATION RATE: 18 BRPM | OXYGEN SATURATION: 100 % | BODY MASS INDEX: 18.56 KG/M2 | DIASTOLIC BLOOD PRESSURE: 73 MMHG | HEART RATE: 94 BPM

## 2020-06-12 DIAGNOSIS — R13.12 DYSPHAGIA, OROPHARYNGEAL PHASE: ICD-10-CM

## 2020-06-12 PROCEDURE — 63600175 PHARM REV CODE 636 W HCPCS: Performed by: NURSE ANESTHETIST, CERTIFIED REGISTERED

## 2020-06-12 PROCEDURE — 37000009 HC ANESTHESIA EA ADD 15 MINS: Performed by: INTERNAL MEDICINE

## 2020-06-12 PROCEDURE — 43235 PR EGD, FLEX, DIAGNOSTIC: ICD-10-PCS | Mod: ,,, | Performed by: INTERNAL MEDICINE

## 2020-06-12 PROCEDURE — 37000008 HC ANESTHESIA 1ST 15 MINUTES: Performed by: INTERNAL MEDICINE

## 2020-06-12 PROCEDURE — 43235 EGD DIAGNOSTIC BRUSH WASH: CPT | Performed by: INTERNAL MEDICINE

## 2020-06-12 PROCEDURE — 43235 EGD DIAGNOSTIC BRUSH WASH: CPT | Mod: ,,, | Performed by: INTERNAL MEDICINE

## 2020-06-12 RX ORDER — SODIUM CHLORIDE, SODIUM LACTATE, POTASSIUM CHLORIDE, CALCIUM CHLORIDE 600; 310; 30; 20 MG/100ML; MG/100ML; MG/100ML; MG/100ML
INJECTION, SOLUTION INTRAVENOUS CONTINUOUS
Status: DISCONTINUED | OUTPATIENT
Start: 2020-06-12 | End: 2020-06-12 | Stop reason: HOSPADM

## 2020-06-12 RX ORDER — LANSOPRAZOLE 30 MG/1
30 TABLET, ORALLY DISINTEGRATING, DELAYED RELEASE ORAL DAILY
Qty: 90 TABLET | Refills: 3 | Status: SHIPPED | OUTPATIENT
Start: 2020-06-12 | End: 2020-09-10

## 2020-06-12 RX ORDER — SODIUM CHLORIDE, SODIUM LACTATE, POTASSIUM CHLORIDE, CALCIUM CHLORIDE 600; 310; 30; 20 MG/100ML; MG/100ML; MG/100ML; MG/100ML
INJECTION, SOLUTION INTRAVENOUS CONTINUOUS PRN
Status: DISCONTINUED | OUTPATIENT
Start: 2020-06-12 | End: 2020-06-12

## 2020-06-12 RX ORDER — PROPOFOL 10 MG/ML
VIAL (ML) INTRAVENOUS
Status: DISCONTINUED | OUTPATIENT
Start: 2020-06-12 | End: 2020-06-12

## 2020-06-12 RX ADMIN — PROPOFOL 200 MG: 10 INJECTION, EMULSION INTRAVENOUS at 09:06

## 2020-06-12 RX ADMIN — SODIUM CHLORIDE, SODIUM LACTATE, POTASSIUM CHLORIDE, AND CALCIUM CHLORIDE: .6; .31; .03; .02 INJECTION, SOLUTION INTRAVENOUS at 09:06

## 2020-06-12 NOTE — ANESTHESIA RELEASE NOTE
"Anesthesia Release from PACU Note    Patient: Santy Anderson    Procedure(s) Performed: Procedure(s) (LRB):  EGD (ESOPHAGOGASTRODUODENOSCOPY) (N/A)    Anesthesia type: MAC    Post pain: Adequate analgesia    Post assessment: no apparent anesthetic complications    Last Vitals:   Visit Vitals  /77 (BP Location: Left arm, Patient Position: Lying)   Pulse 107   Temp 36.2 °C (97.2 °F) (Temporal)   Resp 19   Ht 5' 9.5" (1.765 m)   Wt 58.8 kg (129 lb 10.1 oz)   SpO2 98%   BMI 18.87 kg/m²       Post vital signs: stable    Level of consciousness: responds to stimulation    Nausea/Vomiting: no nausea/no vomiting    Complications: none    Airway Patency: patent    Respiratory: unassisted, room air    Cardiovascular: stable and blood pressure at baseline    Hydration: euvolemic  "

## 2020-06-12 NOTE — TELEPHONE ENCOUNTER
----- Message from Anastacio Kearney sent at 6/12/2020 10:27 AM CDT -----  ..Type:  Patient Returning Call    Who Called:Vicky ( [facility] )   Who Left Message for Patient: Does the patient know what this is regarding?: order   Would the patient rather a call back or a response via [employer]?  Call back   Best Call Back Number: [phone] ext [phone]  Additional Information: Vicky ( [facility] ) is requesting a call from nurse to f/u on a plan of care [facility] 4/15/20. Please fax and sign for insurance requird [id number]

## 2020-06-12 NOTE — TRANSFER OF CARE
"Anesthesia Transfer of Care Note    Patient: Santy Anderson    Procedure(s) Performed: Procedure(s) (LRB):  EGD (ESOPHAGOGASTRODUODENOSCOPY) (N/A)    Patient location: GI    Anesthesia Type: MAC    Transport from OR: Transported from OR on room air with adequate spontaneous ventilation    Post pain: adequate analgesia    Post assessment: no apparent anesthetic complications    Post vital signs: stable    Level of consciousness: responds to stimulation    Nausea/Vomiting: no nausea/vomiting    Complications: none    Transfer of care protocol was followed      Last vitals:   Visit Vitals  /77 (BP Location: Left arm, Patient Position: Lying)   Pulse 107   Temp 36.2 °C (97.2 °F) (Temporal)   Resp 19   Ht 5' 9.5" (1.765 m)   Wt 58.8 kg (129 lb 10.1 oz)   SpO2 98%   BMI 18.87 kg/m²     "

## 2020-06-12 NOTE — ANESTHESIA PREPROCEDURE EVALUATION
06/12/2020  Santy Anderson is a 74 y.o., male.    Anesthesia Evaluation    I have reviewed the Patient Summary Reports.    I have reviewed the Nursing Notes. I have reviewed the NPO Status.   I have reviewed the Medications.     Review of Systems  Anesthesia Hx:  No problems with previous Anesthesia Denies Hx of Anesthetic complications  History of prior surgery of interest to airway management or planning: Previous anesthesia: General, MAC Denies Family Hx of Anesthesia complications.   Denies Personal Hx of Anesthesia complications.   Social:  No Alcohol Use, Alcohol Use    Hematology/Oncology:  Hematology Normal   Oncology Normal   Oncology Comments: History of laryngeal cancer     EENT/Dental:EENT/Dental Normal   Cardiovascular:   hyperlipidemia    Pulmonary:  Pulmonary Normal    Renal/:  Renal/ Normal     Hepatic/GI:   Hiatal Hernia, GERD, poorly controlled Liver Disease, (alcoholic cirrhosis) dysphagia   Musculoskeletal:   Arthritis     Neurological:   Neuromuscular Disease,    Endocrine:   Diabetes, type 2        Physical Exam  General:  Well nourished    Airway/Jaw/Neck:  Airway Findings: Mouth Opening: Normal Tongue: Normal  General Airway Assessment: Adult  Mallampati: II  TM Distance: Normal, at least 6 cm      Dental:  Dental Findings: lower partial dentures, Lower partial dentures   Chest/Lungs:  Chest/Lungs Findings: Clear to auscultation, Normal Respiratory Rate     Heart/Vascular:  Heart Findings: Rate: Normal  Rhythm: Regular Rhythm  Sounds: Normal        Mental Status:  Mental Status Findings:  Cooperative, Alert and Oriented         Anesthesia Plan  Type of Anesthesia, risks & benefits discussed:  Anesthesia Type:  MAC  Patient's Preference:   Intra-op Monitoring Plan: standard ASA monitors  Intra-op Monitoring Plan Comments:   Post Op Pain Control Plan: per primary service following  discharge from PACU  Post Op Pain Control Plan Comments:   Induction:   IV  Beta Blocker:  Patient is not currently on a Beta-Blocker (No further documentation required).       Informed Consent: Patient understands risks and agrees with Anesthesia plan.  Questions answered. Anesthesia consent signed with patient.  ASA Score: 3     Day of Surgery Review of History & Physical: I have interviewed and examined the patient. I have reviewed the patient's H&P dated:    H&P update referred to the surgeon.         Ready For Surgery From Anesthesia Perspective.

## 2020-06-12 NOTE — ANESTHESIA POSTPROCEDURE EVALUATION
Anesthesia Post Evaluation    Patient: Santy Anderson    Procedure(s) Performed: Procedure(s) (LRB):  EGD (ESOPHAGOGASTRODUODENOSCOPY) (N/A)    Final Anesthesia Type: MAC    Patient location during evaluation: GI PACU  Patient participation: No - Unable to Participate, Sedation  Level of consciousness: responds to stimulation  Post-procedure vital signs: reviewed and stable  Pain management: adequate  Airway patency: patent    PONV status at discharge: No PONV  Anesthetic complications: no      Cardiovascular status: blood pressure returned to baseline  Respiratory status: unassisted  Hydration status: euvolemic  Follow-up not needed.          Vitals Value Taken Time   /77 6/12/2020  8:36 AM   Temp 36.2 °C (97.2 °F) 6/12/2020  8:36 AM   Pulse 107 6/12/2020  8:36 AM   Resp 19 6/12/2020  8:36 AM   SpO2 98 % 6/12/2020  8:36 AM         No case tracking events are documented in the log.      Pain/Roberto Score: No data recorded

## 2020-06-12 NOTE — INTERVAL H&P NOTE
The patient has been examined and the H&P has been reviewed:    I concur with the findings and no changes have occurred since H&P was written.    Anesthesia/Surgery risks, benefits and alternative options discussed and understood by patient/family.          Active Hospital Problems    Diagnosis  POA    Dysphagia, oropharyngeal phase [R13.12]  Yes      Resolved Hospital Problems   No resolved problems to display.

## 2020-06-12 NOTE — [employer] PATIENT INSTRUCTIONS
Discharge Summary/Instructions after an Endoscopic Procedure  Patient Name: Santy Anderson  Patient MRN: [id number]  Patient YOB: 1945 Friday, June 12, 2020 Rufina Correa MD  RESTRICTIONS:  During your procedure today, you received medications for sedation.  These   medications may affect your judgment, balance and coordination.  Therefore,   for 24 hours, you have the following restrictions:   - DO NOT drive a car, operate machinery, make legal/financial decisions,   sign important papers or drink alcohol.    ACTIVITY:  Today: no heavy lifting, straining or running due to procedural   sedation/anesthesia.  The following day: return to full activity including work.  DIET:  Eat and drink normally unless instructed otherwise.     TREATMENT FOR COMMON SIDE EFFECTS:  - Mild abdominal pain, nausea, belching, bloating or excessive gas:  rest,   eat lightly and use a heating pad.  - Sore Throat: treat with throat lozenges and/or gargle with warm salt   water.  - Because air was used during the procedure, expelling large amounts of air   from your rectum or belching is normal.  - If a bowel prep was taken, you may not have a bowel movement for 1-3 days.    This is normal.  SYMPTOMS TO WATCH FOR AND REPORT TO YOUR PHYSICIAN:  1. Abdominal pain or bloating, other than gas cramps.  2. Chest pain.  3. Back pain.  4. Signs of infection such as: chills or fever occurring within 24 hours   after the procedure.  5. Rectal bleeding, which would show as bright red, maroon, or black stools.   (A tablespoon of blood from the rectum is not serious, especially if   hemorrhoids are present.)  6. Vomiting.  7. Weakness or dizziness.  GO DIRECTLY TO THE NEAREST EMERGENCY ROOM IF YOU HAVE ANY OF THE FOLLOWING:      Difficulty breathing              Chills and/or fever over 101 F   Persistent vomiting and/or vomiting blood   Severe abdominal pain   Severe chest pain   Black, tarry stools   Bleeding- more than one tablespoon   Any  other symptom or condition that you feel may need urgent attention  Your doctor recommends these additional instructions:  If any biopsies were taken, your doctors clinic will contact you in 1 to 2   weeks with any results.  - Discharge patient to home (with escort).   - Use Prevacid (lansoprazole) 30 mg PO daily.   - Alcohol cessation  - Pureed diet.   - No aspirin, ibuprofen, naproxen, or other non-steroidal anti-inflammatory   drugs.   - Repeat upper endoscopy in 2 weeks for retreatment.   - Return to GI clinic in 1 month.  For questions, problems or results please call your physician Rufina Correa MD at Work:  [phone]  If you have any questions about the above instructions, call the GI   department at [phone] or call the endoscopy unit at [phone]   from 7am until 3 pm.  [facility] [place], EMERGENCY ROOM PHONE NUMBER:   [phone]  IF A COMPLICATION OR EMERGENCY SITUATION ARISES AND YOU ARE UNABLE TO REACH   YOUR PHYSICIAN - GO DIRECTLY TO THE EMERGENCY ROOM.  I have read or have had read to me these discharge instructions for my   procedure and have received a written copy.  I understand these   instructions and will follow-up with my physician if I have any questions.     __________________________________       _____________________________________  Nurse Signature                                          Patient/Designated   Responsible Party Signature  MD Rufina Mccain MD  6/12/2020 9:55:51 AM  This report has been verified and signed electronically.  [employer]

## 2020-06-16 ENCOUNTER — PATIENT OUTREACH (OUTPATIENT)
Dept: ADMINISTRATIVE | Facility: OTHER | Age: 75
End: 2020-06-16

## 2020-06-16 NOTE — PROGRESS NOTES
Chart reviewed.   Immunizations: Triggered Imm Registry     Orders placed: n/a  Upcoming appts to satisfy TELLO topics: n/a

## 2020-06-17 ENCOUNTER — OFFICE VISIT (OUTPATIENT)
Dept: ORTHOPEDICS | Facility: CLINIC | Age: 75
End: 2020-06-17
Payer: MEDICARE

## 2020-06-17 ENCOUNTER — HOSPITAL ENCOUNTER (OUTPATIENT)
Dept: RADIOLOGY | Facility: HOSPITAL | Age: 75
Discharge: HOME | End: 2020-06-17
Attending: ORTHOPAEDIC SURGERY
Payer: MEDICARE

## 2020-06-17 VITALS
WEIGHT: 137 LBS | SYSTOLIC BLOOD PRESSURE: 136 MMHG | BODY MASS INDEX: 19.61 KG/M2 | HEART RATE: 102 BPM | HEIGHT: 70 IN | DIASTOLIC BLOOD PRESSURE: 86 MMHG

## 2020-06-17 DIAGNOSIS — M25.531 RIGHT WRIST PAIN: ICD-10-CM

## 2020-06-17 DIAGNOSIS — M25.521 RIGHT ELBOW PAIN: ICD-10-CM

## 2020-06-17 DIAGNOSIS — S52.514G: Primary | ICD-10-CM

## 2020-06-17 PROCEDURE — 73080 X-RAY EXAM OF ELBOW: CPT | Mod: TC,RT

## 2020-06-17 PROCEDURE — 99999 PR PBB SHADOW E&M-EST. PATIENT-LVL III: ICD-10-PCS | Mod: PBBFAC,,, | Performed by: ORTHOPAEDIC SURGERY

## 2020-06-17 PROCEDURE — 1125F AMNT PAIN NOTED PAIN PRSNT: CPT | Mod: S$GLB,,, | Performed by: ORTHOPAEDIC SURGERY

## 2020-06-17 PROCEDURE — 1100F PTFALLS ASSESS-DOCD GE2>/YR: CPT | Mod: CPTII,S$GLB,, | Performed by: ORTHOPAEDIC SURGERY

## 2020-06-17 PROCEDURE — 1125F PR PAIN SEVERITY QUANTIFIED, PAIN PRESENT: ICD-10-PCS | Mod: S$GLB,,, | Performed by: ORTHOPAEDIC SURGERY

## 2020-06-17 PROCEDURE — 3288F FALL RISK ASSESSMENT DOCD: CPT | Mod: CPTII,S$GLB,, | Performed by: ORTHOPAEDIC SURGERY

## 2020-06-17 PROCEDURE — 1159F PR MEDICATION LIST DOCUMENTED IN MEDICAL RECORD: ICD-10-PCS | Mod: S$GLB,,, | Performed by: ORTHOPAEDIC SURGERY

## 2020-06-17 PROCEDURE — 73080 XR ELBOW COMPLETE 3 VIEW RIGHT: ICD-10-PCS | Mod: 26,RT,, | Performed by: RADIOLOGY

## 2020-06-17 PROCEDURE — 73130 XR HAND COMPLETE 3 VIEW RIGHT: ICD-10-PCS | Mod: 26,RT,, | Performed by: RADIOLOGY

## 2020-06-17 PROCEDURE — 99213 PR OFFICE/OUTPT VISIT, EST, LEVL III, 20-29 MIN: ICD-10-PCS | Mod: S$GLB,,, | Performed by: ORTHOPAEDIC SURGERY

## 2020-06-17 PROCEDURE — 99999 PR PBB SHADOW E&M-EST. PATIENT-LVL III: CPT | Mod: PBBFAC,,, | Performed by: ORTHOPAEDIC SURGERY

## 2020-06-17 PROCEDURE — 73130 X-RAY EXAM OF HAND: CPT | Mod: TC,RT

## 2020-06-17 PROCEDURE — 99213 OFFICE O/P EST LOW 20 MIN: CPT | Mod: S$GLB,,, | Performed by: ORTHOPAEDIC SURGERY

## 2020-06-17 PROCEDURE — 73080 X-RAY EXAM OF ELBOW: CPT | Mod: 26,RT,, | Performed by: RADIOLOGY

## 2020-06-17 PROCEDURE — 1159F MED LIST DOCD IN RCRD: CPT | Mod: S$GLB,,, | Performed by: ORTHOPAEDIC SURGERY

## 2020-06-17 PROCEDURE — 1100F PR PT FALLS ASSESS DOC 2+ FALLS/FALL W/INJURY/YR: ICD-10-PCS | Mod: CPTII,S$GLB,, | Performed by: ORTHOPAEDIC SURGERY

## 2020-06-17 PROCEDURE — 73130 X-RAY EXAM OF HAND: CPT | Mod: 26,RT,, | Performed by: RADIOLOGY

## 2020-06-17 PROCEDURE — 3288F PR FALLS RISK ASSESSMENT DOCUMENTED: ICD-10-PCS | Mod: CPTII,S$GLB,, | Performed by: ORTHOPAEDIC SURGERY
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
## 2020-06-23 ENCOUNTER — NURSE TRIAGE (OUTPATIENT)
Dept: ADMINISTRATIVE | Facility: CLINIC | Age: 75
End: 2020-06-23

## 2020-06-23 NOTE — TELEPHONE ENCOUNTER
Reason for Disposition   Unable to complete triage due to phone connection issues     Recording reached that number no longer in service.    Additional Information   Negative: Caller is angry or rude (e.g., hangs up, verbally abusive, yelling)   Negative: Caller hangs up   Negative: Caller has already spoken with the PCP and has no further questions.   Negative: Caller has already spoken with another triager and has no further questions.   Negative: Caller has already spoken with another triager or PCP AND has further questions AND triager able to answer questions.   Negative: Busy signal.  First attempt to contact caller.  Follow-up call scheduled within 15 minutes.   Negative: No answer.  First attempt to contact caller.  Follow-up call scheduled within 15 minutes.   Negative: Message left on identified answering machine.   Negative: Message left on unidentified answering machine.  Answering service notified.   Negative: Message left with person in household.   Negative: Second attempt to contact family AND no contact made.  Answering service notified.   Negative: Wrong number reached.  Answering service notified.   Negative: Cell phone out of range.  Answering service notified.   Negative: Pager number given.  Answering service notified.   Negative: Already left for the hospital/clinic.   Negative: Caller has cancelled the call before the first contact.    Protocols used: ST NO CONTACT OR DUPLICATE CONTACT CALL-A-

## 2020-07-01 ENCOUNTER — ANESTHESIA (OUTPATIENT)
Dept: ENDOSCOPY | Facility: HOSPITAL | Age: 75
End: 2020-07-01
Payer: MEDICARE

## 2020-07-01 ENCOUNTER — HOSPITAL ENCOUNTER (OUTPATIENT)
Facility: HOSPITAL | Age: 75
Discharge: HOME | End: 2020-07-01
Attending: INTERNAL MEDICINE | Admitting: INTERNAL MEDICINE
Payer: MEDICARE

## 2020-07-01 ENCOUNTER — ANESTHESIA EVENT (OUTPATIENT)
Dept: ENDOSCOPY | Facility: HOSPITAL | Age: 75
End: 2020-07-01
Payer: MEDICARE

## 2020-07-01 VITALS
BODY MASS INDEX: 19.09 KG/M2 | HEART RATE: 85 BPM | RESPIRATION RATE: 18 BRPM | SYSTOLIC BLOOD PRESSURE: 146 MMHG | DIASTOLIC BLOOD PRESSURE: 72 MMHG | WEIGHT: 131.19 LBS | OXYGEN SATURATION: 99 % | TEMPERATURE: 98 F

## 2020-07-01 DIAGNOSIS — R13.12 DYSPHAGIA, OROPHARYNGEAL PHASE: Primary | ICD-10-CM

## 2020-07-01 LAB — SARS-COV-2 RDRP RESP QL NAA+PROBE: NEGATIVE

## 2020-07-01 PROCEDURE — 37000009 HC ANESTHESIA EA ADD 15 MINS: Performed by: INTERNAL MEDICINE

## 2020-07-01 PROCEDURE — 37000008 HC ANESTHESIA 1ST 15 MINUTES: Performed by: INTERNAL MEDICINE

## 2020-07-01 PROCEDURE — 43249 PR EGD, FLEX, W/BALL DILATION, < 30MM: ICD-10-PCS | Mod: ,,, | Performed by: INTERNAL MEDICINE

## 2020-07-01 PROCEDURE — 43249 ESOPH EGD DILATION <30 MM: CPT | Performed by: INTERNAL MEDICINE

## 2020-07-01 PROCEDURE — 43249 ESOPH EGD DILATION <30 MM: CPT | Mod: ,,, | Performed by: INTERNAL MEDICINE

## 2020-07-01 PROCEDURE — 63600175 PHARM REV CODE 636 W HCPCS: Performed by: NURSE ANESTHETIST, CERTIFIED REGISTERED

## 2020-07-01 PROCEDURE — C1726 CATH, BAL DIL, NON-VASCULAR: HCPCS | Performed by: INTERNAL MEDICINE

## 2020-07-01 PROCEDURE — U0002 COVID-19 LAB TEST NON-CDC: HCPCS

## 2020-07-01 PROCEDURE — 25000003 PHARM REV CODE 250: Performed by: NURSE ANESTHETIST, CERTIFIED REGISTERED

## 2020-07-01 RX ORDER — LIDOCAINE HCL/PF 100 MG/5ML
SYRINGE (ML) INTRAVENOUS
Status: DISCONTINUED | OUTPATIENT
Start: 2020-07-01 | End: 2020-07-01

## 2020-07-01 RX ORDER — PROPOFOL 10 MG/ML
INJECTION, EMULSION INTRAVENOUS
Status: DISCONTINUED | OUTPATIENT
Start: 2020-07-01 | End: 2020-07-01

## 2020-07-01 RX ORDER — SODIUM CHLORIDE, SODIUM LACTATE, POTASSIUM CHLORIDE, CALCIUM CHLORIDE 600; 310; 30; 20 MG/100ML; MG/100ML; MG/100ML; MG/100ML
INJECTION, SOLUTION INTRAVENOUS CONTINUOUS PRN
Status: DISCONTINUED | OUTPATIENT
Start: 2020-07-01 | End: 2020-07-01

## 2020-07-01 RX ADMIN — SODIUM CHLORIDE, SODIUM LACTATE, POTASSIUM CHLORIDE, AND CALCIUM CHLORIDE: 600; 310; 30; 20 INJECTION, SOLUTION INTRAVENOUS at 01:07

## 2020-07-01 RX ADMIN — PROPOFOL 30 MG: 10 INJECTION, EMULSION INTRAVENOUS at 02:07

## 2020-07-01 RX ADMIN — LIDOCAINE HYDROCHLORIDE 50 MG: 20 INJECTION, SOLUTION INTRAVENOUS at 02:07

## 2020-07-01 RX ADMIN — PROPOFOL 150 MG: 10 INJECTION, EMULSION INTRAVENOUS at 02:07

## 2020-07-01 NOTE — ANESTHESIA PREPROCEDURE EVALUATION
07/01/2020  Santy Anderson is a 74 y.o., male.    Pre-op Assessment    I have reviewed the Patient Summary Reports.     I have reviewed the Nursing Notes. I have reviewed the NPO Status.   I have reviewed the Medications.     Review of Systems  Anesthesia Hx:  No problems with previous Anesthesia Denies Hx of Anesthetic complications  History of prior surgery of interest to airway management or planning: Previous anesthesia: General, MAC Denies Family Hx of Anesthesia complications.   Denies Personal Hx of Anesthesia complications.   Social:  No Alcohol Use, Alcohol Use    Hematology/Oncology:  Hematology Normal   Oncology Normal   Oncology Comments: History of laryngeal cancer     EENT/Dental:EENT/Dental Normal   Cardiovascular:   hyperlipidemia    Pulmonary:  Pulmonary Normal    Renal/:  Renal/ Normal     Hepatic/GI:   Hiatal Hernia, GERD, poorly controlled Liver Disease, (alcoholic cirrhosis) dysphagia   Musculoskeletal:   Arthritis     Neurological:   Neuromuscular Disease,    Endocrine:   Diabetes, type 2        Physical Exam  General:  Well nourished    Airway/Jaw/Neck:  Airway Findings: Mouth Opening: Normal Tongue: Normal  General Airway Assessment: Adult  Mallampati: II  TM Distance: Normal, at least 6 cm      Dental:  Dental Findings: lower partial dentures, Lower partial dentures Very loose bottom teeth. Patient aware of potential loss of teeth   Chest/Lungs:  Chest/Lungs Findings: Clear to auscultation, Normal Respiratory Rate     Heart/Vascular:  Heart Findings: Rate: Normal  Rhythm: Regular Rhythm  Sounds: Normal        Mental Status:  Mental Status Findings:  Cooperative, Alert and Oriented         Anesthesia Plan  Type of Anesthesia, risks & benefits discussed:  Anesthesia Type:  MAC  Patient's Preference:   Intra-op Monitoring Plan: standard ASA monitors  Intra-op Monitoring Plan Comments:    Post Op Pain Control Plan: per primary service following discharge from PACU  Post Op Pain Control Plan Comments:   Induction:   IV  Beta Blocker:  Patient is not currently on a Beta-Blocker (No further documentation required).       Informed Consent: Patient understands risks and agrees with Anesthesia plan.  Questions answered. Anesthesia consent signed with patient.  ASA Score: 3     Day of Surgery Review of History & Physical: I have interviewed and examined the patient. I have reviewed the patient's H&P dated:    H&P update referred to the surgeon.         Ready For Surgery From Anesthesia Perspective.

## 2020-07-01 NOTE — [employer] PATIENT INSTRUCTIONS
Discharge Summary/Instructions after an Endoscopic Procedure  Patient Name: Santy Anderson  Patient MRN: [id number]  Patient YOB: 1945 Wednesday, July 1, 2020 Rufina Correa MD  RESTRICTIONS:  During your procedure today, you received medications for sedation.  These   medications may affect your judgment, balance and coordination.  Therefore,   for 24 hours, you have the following restrictions:   - DO NOT drive a car, operate machinery, make legal/financial decisions,   sign important papers or drink alcohol.    ACTIVITY:  Today: no heavy lifting, straining or running due to procedural   sedation/anesthesia.  The following day: return to full activity including work.  DIET:  Eat and drink normally unless instructed otherwise.     TREATMENT FOR COMMON SIDE EFFECTS:  - Mild abdominal pain, nausea, belching, bloating or excessive gas:  rest,   eat lightly and use a heating pad.  - Sore Throat: treat with throat lozenges and/or gargle with warm salt   water.  - Because air was used during the procedure, expelling large amounts of air   from your rectum or belching is normal.  - If a bowel prep was taken, you may not have a bowel movement for 1-3 days.    This is normal.  SYMPTOMS TO WATCH FOR AND REPORT TO YOUR PHYSICIAN:  1. Abdominal pain or bloating, other than gas cramps.  2. Chest pain.  3. Back pain.  4. Signs of infection such as: chills or fever occurring within 24 hours   after the procedure.  5. Rectal bleeding, which would show as bright red, maroon, or black stools.   (A tablespoon of blood from the rectum is not serious, especially if   hemorrhoids are present.)  6. Vomiting.  7. Weakness or dizziness.  GO DIRECTLY TO THE NEAREST EMERGENCY ROOM IF YOU HAVE ANY OF THE FOLLOWING:      Difficulty breathing              Chills and/or fever over 101 F   Persistent vomiting and/or vomiting blood   Severe abdominal pain   Severe chest pain   Black, tarry stools   Bleeding- more than one  tablespoon   Any other symptom or condition that you feel may need urgent attention  Your doctor recommends these additional instructions:  If any biopsies were taken, your doctors clinic will contact you in 1 to 2   weeks with any results.  - Discharge patient to home (with escort).   - Resume previous diet.   - Continue Prevacid (Lansoprazole) solutab at current dose.   - Patient has a contact number available for emergencies.  The signs and   symptoms of potential delayed complications were discussed with the   patient.  Return to normal activities tomorrow.  Written discharge   instructions were provided to the patient.   - Return to primary care physician.  For questions, problems or results please call your physician Rufina Correa MD at Work:  [phone]  If you have any questions about the above instructions, call the GI   department at [phone] or call the endoscopy unit at [phone]   from 7am until 3 pm.  [facility] [place], EMERGENCY ROOM PHONE NUMBER:   [phone]  IF A COMPLICATION OR EMERGENCY SITUATION ARISES AND YOU ARE UNABLE TO REACH   YOUR PHYSICIAN - GO DIRECTLY TO THE EMERGENCY ROOM.  I have read or have had read to me these discharge instructions for my   procedure and have received a written copy.  I understand these   instructions and will follow-up with my physician if I have any questions.     __________________________________       _____________________________________  Nurse Signature                                          Patient/Designated   Responsible Party Signature  MD Rufina Mccain MD  7/1/2020 2:28:42 PM  This report has been verified and signed electronically.  [employer]

## 2020-07-01 NOTE — ANESTHESIA POSTPROCEDURE EVALUATION
Anesthesia Post Evaluation    Patient: Santy Anderson    Procedure(s) Performed: Procedure(s) (LRB):  EGD (ESOPHAGOGASTRODUODENOSCOPY) needs rapid (N/A)    Final Anesthesia Type: MAC    Patient location during evaluation: PACU  Patient participation: Yes- Able to Participate  Level of consciousness: awake and alert  Post-procedure vital signs: reviewed and stable  Pain management: adequate  Airway patency: patent    PONV status at discharge: No PONV  Anesthetic complications: no      Cardiovascular status: blood pressure returned to baseline  Respiratory status: unassisted  Hydration status: euvolemic  Follow-up not needed.          Vitals Value Taken Time   /78 07/01/20 1425   Temp 36.3 °C (97.3 °F) 07/01/20 1425   Pulse 87 07/01/20 1425   Resp 18 07/01/20 1425   SpO2 100 % 07/01/20 1425         No case tracking events are documented in the log.      Pain/Roberto Score: Roberto Score: 9 (7/1/2020  2:25 PM)

## 2020-07-01 NOTE — TRANSFER OF CARE
Anesthesia Transfer of Care Note    Patient: Santy Anderson    Procedure(s) Performed: Procedure(s) (LRB):  EGD (ESOPHAGOGASTRODUODENOSCOPY) needs rapid (N/A)    Patient location: PACU    Anesthesia Type: MAC    Transport from OR: Transported from OR on room air with adequate spontaneous ventilation    Post pain: adequate analgesia    Post assessment: no apparent anesthetic complications    Post vital signs: stable    Level of consciousness: awake    Complications: none    Transfer of care protocol was followed      Last vitals:   Visit Vitals  BP (!) 142/76 (BP Location: Left arm)   Pulse 93   Temp 36.8 °C (98.2 °F) (Temporal)   Resp 16   Wt 59.5 kg (131 lb 2.8 oz)   SpO2 100%   BMI 19.09 kg/m²

## 2020-07-01 NOTE — H&P
PRE PROCEDURE H&P    Patient Name: Santy Anderson  MRN: [id number]  : 1945  Date of Procedure:  2020  Referring Physician: Joanie Peterson MD  Primary Physician: Joanie Peterson MD  Procedure Physician: Rufina Correa MD       Planned Procedure: EGD  Diagnosis: esophageal stenosis  Chief Complaint: Same as above    HPI: Patient is an 74 y.o. male is here for the above. Here last month for instrinsic esophageal stenosis. Diagnostic scope auto dilated and no further attempt to dilate him after that.       Past Medical History:   Past Medical History:   Diagnosis Date    Alcoholic cirrhosis of liver with ascites     Arthritis     Cataract     Gastric mass     GERD (gastroesophageal reflux disease)     Hx of colonic polyps 2017    per colonoscopy report in     Hyperlipidemia     Lung cancer     Primary open angle glaucoma (POAG) of both eyes, moderate stage 2019        Past Surgical History:  Past Surgical History:   Procedure Laterality Date    1 STENT Right 2019    DR. CLARK    ABDOMINAL SURGERY      CATARACT EXTRACTION      CATARACT EXTRACTION W/  INTRAOCULAR LENS IMPLANT Left 04/10/2019    istent inject     COLONOSCOPY N/A 2017    Procedure: COLONOSCOPY;  Surgeon: Brianne Wise MD;  Location: [facility];  Service: Endoscopy;  Laterality: N/A;    ESOPHAGOGASTRODUODENOSCOPY      ESOPHAGOGASTRODUODENOSCOPY N/A 2020    Procedure: EGD (ESOPHAGOGASTRODUODENOSCOPY);  Surgeon: Rufina Correa MD;  Location: [facility];  Service: Endoscopy;  Laterality: N/A;    FEMUR SURGERY Right     GASTRECTOMY      HAND SURGERY Left     KNEE SURGERY Right     laparotomy for trauma (negative)      left lower lobectomy      PCIOL Right 2019    DR. CLARK        Home Medications:  Prior to Admission medications    Medication Sig Start Date End Date Taking? Authorizing Provider   cholecalciferol, vitamin D3, 2,000 unit Cap Take 1 capsule by mouth once  daily. Patient states that he takes this medication 2 times per week 18  Yes Historical Provider, MD   folic acid (FOLVITE) 1 MG tablet Take 1 mg by mouth once daily.   Yes Historical Provider, MD   HYDROcodone-acetaminophen (NORCO) 7.5-325 mg per tablet  20  Yes Historical Provider, MD   lansoprazole (PREVACID SOLUTAB) 30 MG disintegrating tablet Take 1 tablet (30 mg total) by mouth once daily. 20 Yes Rufina Correa MD   multivitamin capsule Take 1 capsule by mouth once daily.   Yes Historical Provider, MD   thiamine 100 MG tablet Take 100 mg by mouth once daily.   Yes Historical Provider, MD   food supplemt, lactose-reduced (ENSURE ACTIVE CLEAR) Liqd Take by mouth.    Historical Provider, MD   sildenafil (VIAGRA) 100 MG tablet Take 100 mg by mouth daily as needed. 3/22/19   Historical Provider, MD        Allergies:  Review of patient's allergies indicates:  No Known Allergies     Social History:   Social History     Socioeconomic History    Marital status:      Spouse name: Not on file    Number of children: Not on file    Years of education: Not on file    Highest education level: Not on file   Occupational History    Not on file   Social Needs    Financial resource strain: Not on file    Food insecurity     Worry: Not on file     Inability: Not on file    Transportation needs     Medical: Not on file     Non-medical: Not on file   Tobacco Use    Smoking status: Former Smoker     Packs/day: 0.50     Years: 25.00     Pack years: 12.50     Quit date: 10/16/1988     Years since quittin.7    Smokeless tobacco: Never Used   Substance and Sexual Activity    Alcohol use: Yes     Comment: daily    Drug use: No    Sexual activity: Not on file   Lifestyle    Physical activity     Days per week: Not on file     Minutes per session: Not on file    Stress: Not on file   Relationships    Social connections     Talks on phone: Not on file     Gets together: Not on file      Attends Christian service: Not on file     Active member of club or organization: Not on file     Attends meetings of clubs or organizations: Not on file     Relationship status: Not on file   Other Topics Concern    Not on file   Social History Narrative           Family History:  Family History   Problem Relation Age of Onset    Diabetes Mother     Hypertension Mother     Colon cancer Neg Hx        ROS: No acute cardiac events, no acute respiratory complaints.     Physical Exam (all patients):    BP (!) 142/76 (BP Location: Left arm)   Pulse 93   Temp 98.2 °F (36.8 °C) (Temporal)   Resp 16   Wt 59.5 kg (131 lb 2.8 oz)   SpO2 100%   BMI 19.09 kg/m²   Lungs: Clear to auscultation bilaterally, respirations unlabored  Heart: Regular rate and rhythm, S1 and S2 normal, no obvious murmurs  Abdomen:         Soft, non-tender, bowel sounds normal, no masses, no organomegaly    Lab Results   Component Value Date    WBC 8.25 03/09/2020     (H) 03/09/2020    RDW 13.3 03/09/2020     03/09/2020    INR 1.0 03/09/2020     03/09/2020    HGBA1C 4.5 01/25/2017    BUN 4 (L) 03/09/2020     (H) 03/09/2020    K 3.8 03/09/2020     03/09/2020        SEDATION PLAN: per anesthesia      History reviewed, vital signs satisfactory, cardiopulmonary status satisfactory, sedation options, risks and plans have been discussed with the patient  All their questions were answered and the patient agrees to the sedation procedures as planned and the patient is deemed an appropriate candidate for the sedation as planned.    The risks, benefits and alternatives of the procedure were discussed with the patient in detail. This discussion was had in the presence of endoscopy staff. The risks include, risks of adverse reaction to sedation requiring the use of reversal agents, bleeding requiring blood transfusion, perforation requiring surgical intervention and technical failure. Other risks include aspiration  leading to respiratory distress and respiratory failure resulting in endotracheal intubation and mechanical ventilation including death. If anesthesia is being utilized for this procedure, it is up to the anesthesiologist to determine airway safety including elective endotracheal intubation. Questions were answered, they agree to proceed. There was no language barriers.     Procedure explained to patient, informed consent obtained and placed in chart.    Rufina Correa  7/1/2020  1:58 PM

## 2020-07-07 ENCOUNTER — TELEPHONE (OUTPATIENT)
Dept: ENDOSCOPY | Facility: HOSPITAL | Age: 75
End: 2020-07-07

## 2020-07-07 ENCOUNTER — TELEPHONE (OUTPATIENT)
Dept: ORTHOPEDICS | Facility: CLINIC | Age: 75
End: 2020-07-07

## 2020-07-07 NOTE — TELEPHONE ENCOUNTER
----- Message from Rosalia Branham sent at 7/7/2020  4:28 PM CDT -----  Contact: pt  Patient states he cant wait til appt on 7/13/20. Knee is swelling, giving out on him and pain. Patient states doesn't want to fall on  it. Please call back [phone]

## 2020-07-07 NOTE — TELEPHONE ENCOUNTER
Returned the patient's call. Left a message for the patient to give the office a call back.FP        ----- Message from Etta Rivera sent at 7/7/2020  3:22 PM CDT -----  Type:  Needs Medical Advice    Who Called:  Pt  Santy  Symptoms (please be specific):   pt is calling regarding injections in his knees//states your office had to order the med for his knees and was going to call him when it came in so he could come in for an appt to have done   How long has patient had these symptoms:     Pharmacy name and phone #:     Would the patient rather a call back or a response via [facility]?    Call back  Best Call Back Number:    [phone]  Additional Information:  Please call//[email or web address]/[email or web address]

## 2020-07-08 NOTE — TELEPHONE ENCOUNTER
Spoke with patient - states [facility] at 1pm - patient does have a f/u appt with shlomo on 07/13/2020 at 8am - pt will still keep that appt -

## 2020-07-10 ENCOUNTER — PATIENT OUTREACH (OUTPATIENT)
Dept: ADMINISTRATIVE | Facility: OTHER | Age: 75
End: 2020-07-10

## 2020-07-10 NOTE — PROGRESS NOTES
Requested updates within Care Everywhere.  Patient's chart was reviewed for overdue TELLO topics.  Immunizations reconciled.    Appt with GI scheduled 9/21/20.

## 2020-07-13 ENCOUNTER — OFFICE VISIT (OUTPATIENT)
Dept: GASTROENTEROLOGY | Facility: CLINIC | Age: 75
End: 2020-07-13
Payer: MEDICARE

## 2020-07-13 ENCOUNTER — OFFICE VISIT (OUTPATIENT)
Dept: ORTHOPEDICS | Facility: CLINIC | Age: 75
End: 2020-07-13
Payer: MEDICARE

## 2020-07-13 VITALS
SYSTOLIC BLOOD PRESSURE: 111 MMHG | DIASTOLIC BLOOD PRESSURE: 71 MMHG | HEIGHT: 68 IN | WEIGHT: 128.06 LBS | HEART RATE: 104 BPM | BODY MASS INDEX: 19.41 KG/M2

## 2020-07-13 VITALS
WEIGHT: 131 LBS | HEIGHT: 69 IN | DIASTOLIC BLOOD PRESSURE: 71 MMHG | BODY MASS INDEX: 19.4 KG/M2 | HEART RATE: 104 BPM | SYSTOLIC BLOOD PRESSURE: 111 MMHG

## 2020-07-13 DIAGNOSIS — M21.161 ACQUIRED VARUS DEFORMITY KNEE, RIGHT: ICD-10-CM

## 2020-07-13 DIAGNOSIS — G89.29 CHRONIC PAIN OF RIGHT KNEE: ICD-10-CM

## 2020-07-13 DIAGNOSIS — M25.561 CHRONIC PAIN OF RIGHT KNEE: ICD-10-CM

## 2020-07-13 DIAGNOSIS — K22.2 ESOPHAGEAL STENOSIS: ICD-10-CM

## 2020-07-13 DIAGNOSIS — Z86.010 HX OF COLONIC POLYPS: Primary | ICD-10-CM

## 2020-07-13 DIAGNOSIS — M17.11 ARTHRITIS OF KNEE, RIGHT: Primary | ICD-10-CM

## 2020-07-13 DIAGNOSIS — I73.9 PERIPHERAL ARTERIAL DISEASE: ICD-10-CM

## 2020-07-13 PROCEDURE — 99213 PR OFFICE/OUTPT VISIT, EST, LEVL III, 20-29 MIN: ICD-10-PCS | Mod: S$GLB,,, | Performed by: INTERNAL MEDICINE

## 2020-07-13 PROCEDURE — 1101F PT FALLS ASSESS-DOCD LE1/YR: CPT | Mod: CPTII,S$GLB,, | Performed by: INTERNAL MEDICINE

## 2020-07-13 PROCEDURE — 1159F PR MEDICATION LIST DOCUMENTED IN MEDICAL RECORD: ICD-10-PCS | Mod: S$GLB,,, | Performed by: ORTHOPAEDIC SURGERY

## 2020-07-13 PROCEDURE — 1125F PR PAIN SEVERITY QUANTIFIED, PAIN PRESENT: ICD-10-PCS | Mod: S$GLB,,, | Performed by: ORTHOPAEDIC SURGERY

## 2020-07-13 PROCEDURE — 1126F PR PAIN SEVERITY QUANTIFIED, NO PAIN PRESENT: ICD-10-PCS | Mod: S$GLB,,, | Performed by: INTERNAL MEDICINE

## 2020-07-13 PROCEDURE — 1101F PR PT FALLS ASSESS DOC 0-1 FALLS W/OUT INJ PAST YR: ICD-10-PCS | Mod: CPTII,S$GLB,, | Performed by: ORTHOPAEDIC SURGERY

## 2020-07-13 PROCEDURE — 3008F PR BODY MASS INDEX (BMI) DOCUMENTED: ICD-10-PCS | Mod: CPTII,S$GLB,, | Performed by: INTERNAL MEDICINE

## 2020-07-13 PROCEDURE — 99213 OFFICE O/P EST LOW 20 MIN: CPT | Mod: S$GLB,,, | Performed by: INTERNAL MEDICINE

## 2020-07-13 PROCEDURE — 99999 PR PBB SHADOW E&M-EST. PATIENT-LVL III: CPT | Mod: PBBFAC,,, | Performed by: ORTHOPAEDIC SURGERY

## 2020-07-13 PROCEDURE — 99214 PR OFFICE/OUTPT VISIT, EST, LEVL IV, 30-39 MIN: ICD-10-PCS | Mod: S$GLB,,, | Performed by: ORTHOPAEDIC SURGERY

## 2020-07-13 PROCEDURE — 99214 OFFICE O/P EST MOD 30 MIN: CPT | Mod: S$GLB,,, | Performed by: ORTHOPAEDIC SURGERY

## 2020-07-13 PROCEDURE — 1159F MED LIST DOCD IN RCRD: CPT | Mod: S$GLB,,, | Performed by: ORTHOPAEDIC SURGERY

## 2020-07-13 PROCEDURE — 99999 PR PBB SHADOW E&M-EST. PATIENT-LVL III: ICD-10-PCS | Mod: PBBFAC,,, | Performed by: ORTHOPAEDIC SURGERY

## 2020-07-13 PROCEDURE — 1159F PR MEDICATION LIST DOCUMENTED IN MEDICAL RECORD: ICD-10-PCS | Mod: S$GLB,,, | Performed by: INTERNAL MEDICINE

## 2020-07-13 PROCEDURE — 1125F AMNT PAIN NOTED PAIN PRSNT: CPT | Mod: S$GLB,,, | Performed by: ORTHOPAEDIC SURGERY

## 2020-07-13 PROCEDURE — 1101F PR PT FALLS ASSESS DOC 0-1 FALLS W/OUT INJ PAST YR: ICD-10-PCS | Mod: CPTII,S$GLB,, | Performed by: INTERNAL MEDICINE

## 2020-07-13 PROCEDURE — 1159F MED LIST DOCD IN RCRD: CPT | Mod: S$GLB,,, | Performed by: INTERNAL MEDICINE

## 2020-07-13 PROCEDURE — 1101F PT FALLS ASSESS-DOCD LE1/YR: CPT | Mod: CPTII,S$GLB,, | Performed by: ORTHOPAEDIC SURGERY

## 2020-07-13 PROCEDURE — 99999 PR PBB SHADOW E&M-EST. PATIENT-LVL III: CPT | Mod: PBBFAC,,, | Performed by: INTERNAL MEDICINE

## 2020-07-13 PROCEDURE — 99999 PR PBB SHADOW E&M-EST. PATIENT-LVL III: ICD-10-PCS | Mod: PBBFAC,,, | Performed by: INTERNAL MEDICINE

## 2020-07-13 PROCEDURE — 3008F PR BODY MASS INDEX (BMI) DOCUMENTED: ICD-10-PCS | Mod: CPTII,S$GLB,, | Performed by: ORTHOPAEDIC SURGERY

## 2020-07-13 PROCEDURE — 1126F AMNT PAIN NOTED NONE PRSNT: CPT | Mod: S$GLB,,, | Performed by: INTERNAL MEDICINE

## 2020-07-13 PROCEDURE — 3008F BODY MASS INDEX DOCD: CPT | Mod: CPTII,S$GLB,, | Performed by: ORTHOPAEDIC SURGERY

## 2020-07-13 PROCEDURE — 3008F BODY MASS INDEX DOCD: CPT | Mod: CPTII,S$GLB,, | Performed by: INTERNAL MEDICINE

## 2020-07-13 RX ORDER — SUCRALFATE 1 G/10ML
SUSPENSION ORAL
COMMUNITY
Start: 2020-07-01 | End: 2021-06-14

## 2020-07-13 NOTE — PATIENT INSTRUCTIONS
Patient received  injections which we believe given in both knees on 07/09/2020 [facility]/Dr. Silva  Follow-up with Dr. Silva for the spine in the knees  I believe this patient would need nerve conduction studies and EMG ease in the future  Return to clinic as needed

## 2020-07-13 NOTE — PROGRESS NOTES
Subjective:       Patient ID: Santy Anderson is a 74 y.o. male.    Chief Complaint: Follow-up    The patient has extensive history of GI pathology. He has history of Cirrhosis and a complaint of dysphagia. He has been having trouble swallowing in his recent pass and an attempt was made to do esophagram. He aspirataed with attempts at swallowing Barium so he was sent for EGD on 6/12 and 7/1. He had an esophageal stenosis found and it was dilated on the 1st and 2nd evaluations. He is handling foods better by mouth now so he can be prepped for colonoscopy. His last was in 2017 with 3 polyps removed. He is due for repeat colonoscopy this year.     Review of Systems   Constitutional: Positive for unexpected weight change. Negative for activity change, appetite change, chills, diaphoresis, fatigue and fever.   HENT: Negative for congestion, ear discharge, facial swelling, hearing loss, nosebleeds, postnasal drip, sinus pressure, sneezing, tinnitus, trouble swallowing and voice change.    Eyes: Negative for photophobia, redness and visual disturbance.   Respiratory: Negative for cough, chest tightness, shortness of breath and wheezing.    Cardiovascular: Negative for chest pain and palpitations.   Gastrointestinal: Negative for abdominal distention, abdominal pain, blood in stool, constipation, diarrhea, nausea, rectal pain and vomiting.   Genitourinary: Negative for difficulty urinating, discharge, dysuria, flank pain, frequency, hematuria, scrotal swelling, testicular pain and urgency.   Musculoskeletal: Positive for gait problem. Negative for arthralgias, back pain, joint swelling, myalgias and neck stiffness.        Joint stiffness   Skin: Negative for color change, pallor, rash and wound.   Neurological: Negative for dizziness, tremors, seizures, syncope, facial asymmetry, speech difficulty, weakness, light-headedness, numbness and headaches.   Hematological: Negative for adenopathy. Does not bruise/bleed easily.    Psychiatric/Behavioral: Negative for agitation, confusion, hallucinations, sleep disturbance and suicidal ideas.       Objective:      Physical Exam  Vitals signs reviewed.   Constitutional:       General: He is not in acute distress.     Appearance: He is well-developed. He is not diaphoretic.   HENT:      Head: Normocephalic and atraumatic.      Nose: Nose normal.      Mouth/Throat:      Pharynx: No oropharyngeal exudate.   Eyes:      General: No scleral icterus.     Conjunctiva/sclera: Conjunctivae normal.      Pupils: Pupils are equal, round, and reactive to light.   Neck:      Musculoskeletal: Normal range of motion and neck supple.      Thyroid: No thyromegaly.   Cardiovascular:      Rate and Rhythm: Regular rhythm. Tachycardia present.      Heart sounds: No murmur. No friction rub. No gallop.    Pulmonary:      Effort: Pulmonary effort is normal. No respiratory distress.      Breath sounds: Normal breath sounds. No wheezing or rales.   Abdominal:      General: Bowel sounds are normal. There is no distension.      Palpations: Abdomen is soft. There is no mass.      Tenderness: There is abdominal tenderness. There is no guarding or rebound.   Musculoskeletal:         General: No tenderness.   Lymphadenopathy:      Cervical: No cervical adenopathy.   Skin:     General: Skin is warm.      Findings: No rash.   Neurological:      Mental Status: He is alert and oriented to person, place, and time.      Motor: No abnormal muscle tone.      Coordination: Coordination normal.   Psychiatric:         Behavior: Behavior normal.         Thought Content: Thought content normal.         Judgment: Judgment normal.         Assessment:   History of colon polyps  Esophageal stenosis         Status post recent successful dilation    Plan:   Colonoscopy
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
## 2020-07-14 ENCOUNTER — TELEPHONE (OUTPATIENT)
Dept: ENDOSCOPY | Facility: HOSPITAL | Age: 75
End: 2020-07-14

## 2020-07-14 ENCOUNTER — NURSE TRIAGE (OUTPATIENT)
Dept: ADMINISTRATIVE | Facility: CLINIC | Age: 75
End: 2020-07-14

## 2020-07-14 NOTE — TELEPHONE ENCOUNTER
No contact made with pt through the Post Procedural Symptom Tracker for Day 13.  Pt had an office visit on today.  No reports or c/o fever, cough or sob noted.  No additional action needed at this time per protocol    Reason for Disposition   Caller has already spoken with the PCP and has no further questions.   Patient already left for the hospital/clinic.    Protocols used: NO CONTACT OR DUPLICATE CONTACT CALL-A-AH

## 2020-07-15 ENCOUNTER — TELEPHONE (OUTPATIENT)
Dept: ENDOSCOPY | Facility: HOSPITAL | Age: 75
End: 2020-07-15

## 2020-07-15 DIAGNOSIS — Z13.9 SCREENING PROCEDURE: Primary | ICD-10-CM

## 2020-07-15 NOTE — TELEPHONE ENCOUNTER
COVID Screening     1. Have you had a fever in the last 7 days or have you used fever reducing medicines for a fever in the last 7 days?  no    2. Are you experiencing shortness of breath, cough, muscle aches, loss of taste or loss of smell?  no    If answered yes to questions 1 and 2, the patient must seek medical attention with their PCP.  Do not schedule their procedure.     3. Are you residing with anyone who has tested positive for Covid?  no    If answered yes to question 3, recommend 14 day self-quarantine from the date of relative's positive test and place special needs note in the depot.  Wait to schedule.     ENDO screening    1. Have you been admitted for cardiac, kidney or pulmonary causes to the hospital in the past 3 months? no   If yes, schedule an appointment with PCP before scheduling endoscopic procedure.     2. Have you had a stent placed in the last 12 months? no   If yes, for a screening visit, cancel and message the ordering provider.  The patient will need a new order when the time is appropriate.     3. Have you had a stroke or heart attack in the past 6 months? no   If yes, cancel and refer patient to ordering provider for clearance, also message ordering provider to inform.     4. Have you had any chest pain in the past 3 months? no   If yes, Have you been evaluated by your PCP and/or cardiologist and it was determined to not be heart related? no   If No, Pt needs to be seen by PCP or Cardiologist .  Pt can be scheduled once clearance obtained by either of those providers.     5. Do you take prescription weight loss medications?  no   If yes, must stop for 2 weeks prior to procedure.     6. Have you been diagnosed with diverticulitis within the past 3 months? no   If yes, must have been seen by GI within the last 3 months, if not schedule with GI ANANYA.    If pt has been seen by GI, schedule procedure 8-12 weeks post antibiotic treatment.     7. Are you on Dialysis? no  If yes, schedule  "procedure for the day AFTER dialysis.  Appt time should be 9am or later, patient arrival time is 2 hours prior.  Nulytely or miralax prep for all patients with kidney disease.     8. Are you diabetic?  no   If yes, schedule morning appt. Advise pt to hold all diabetic meds day of procedure.     9. If pt is older than 80 years of age and HAS NOT been seen by GI or PCP within the last 6 months, needs appt with GI ANANYA.   If pt has been seen by the GI provider or PCP within the past 6  months AND meets criteria, schedule procedure AND send message to the endoscopist.     10. Is patient on a "high risk" medication (blood thinner/antiplatelet agent)?  no   If yes, has cardiac clearance been obtained within the last 60 days? No   If no, a new clearance needs to be obtained.       I have reviewed the last colonoscopy for recommendations regarding next procedure bowel prep.  yes  I have reviewed medications and allergies.  no  I have verified the pharmacy information and appropriate prep sent if needed. no  Prep instructions have been mailed or sent to portal per patient request. yes    If answers yes to any of the following, schedule at [place] ONLY. If No, OK for either location.    1. Is there a diagnosis of heart failure, severe heart valve disease (aortic stenosis) or mechanical valve? no  a. Is the Left Ventricle Ejection Fraction <30% ? no    2. Does the pt have pulmonary hypertension? no   a. Is pulmonary arterial pressure gradient >50mmHg? no   b. Is there evidence of right ventricular dysfunction? no    3. Does the pt have achalasia? no    4. Any history of negative reaction to anesthesia? no   a. Myasthenia gravis? no   b. Malignant hyperthermia? no   c. Other? no    5. Is procedure for esophageal banding? no      All schedulers will have ability to reach out to the ordering GI provider to clarify any issues.     07- Casey  "

## 2020-07-15 NOTE — TELEPHONE ENCOUNTER
Attempted to schedule procedure with patient, he declined.  Stated he could not talk at this time d/t being at another appointment.  Patient stated he would call the office to schedule when ready.

## 2020-07-16 ENCOUNTER — TELEPHONE (OUTPATIENT)
Dept: GASTROENTEROLOGY | Facility: CLINIC | Age: 75
End: 2020-07-16

## 2020-07-16 RX ORDER — SODIUM, POTASSIUM,MAG SULFATES 17.5-3.13G
1 SOLUTION, RECONSTITUTED, ORAL ORAL DAILY
Qty: 1 KIT | Refills: 0 | Status: SHIPPED | OUTPATIENT
Start: 2020-07-16 | End: 2020-07-18

## 2020-07-16 NOTE — TELEPHONE ENCOUNTER
Spoke to pt and he states he did not get his prep.   Confirmed pharmacy. Advised him that the RX will be sent to the pharmacy. Pt verbalized understanding.

## 2020-07-16 NOTE — TELEPHONE ENCOUNTER
Sent suprep for approval to Dr. Correa  ----- Message from Rufina Correa MD sent at 2020  2:41 PM CDT -----  Regarding: FW: pt advice  Mojgan    I'm not sure why this came to me. Can you take care of this. Thanks  ----- Message -----  From: Chon Alas MA  Sent: 2020   7:53 AM CDT  To: Rufina Correa MD  Subject: FW: pt advice                                    Pt would like suprep sent to pharmacy, procedure 2020. Santy Anderson > MRN [id number] >  1945  ----- Message -----  From: Naya Arroyo  Sent: 7/15/2020   5:22 PM CDT  To: Madeline Almaraz Staff  Subject: pt advice                                        Pt called and needs to know when you will send stuff to drink for colonoscopy   Pt can be reached at     Pt can be reached at [phone]

## 2020-07-16 NOTE — TELEPHONE ENCOUNTER
----- Message from Rufina Correa MD sent at 2020  2:41 PM CDT -----  Regarding: FW: pt advice  Mojgan    I'm not sure why this came to me. Can you take care of this. Thanks  ----- Message -----  From: Chon Alas MA  Sent: 2020   7:53 AM CDT  To: Rufina Correa MD  Subject: FW: pt advice                                    Pt would like suprep sent to pharmacy, procedure 2020. Santy Anderson > MRN [id number] >  1945  ----- Message -----  From: Naya Arroyo  Sent: 7/15/2020   5:22 PM CDT  To: Madeline Almaraz Staff  Subject: pt advice                                        Pt called and needs to know when you will send stuff to drink for colonoscopy   Pt can be reached at     Pt can be reached at [phone]

## 2020-07-16 NOTE — TELEPHONE ENCOUNTER
Returned pts call 7/16/20. Pt stated he has an EGD 7/20/20 and has yet to be sent out suprep to pharmacy. Sent to provider. Informed pt I will reach out once it is sent over.    ----- Message from Naya Arroyo sent at 7/15/2020  5:22 PM CDT -----  Regarding: pt advice  Pt called and needs to know when you will send stuff to drink for colonoscopy   Pt can be reached at     Pt can be reached at [phone]

## 2020-07-17 ENCOUNTER — LAB VISIT (OUTPATIENT)
Dept: OTOLARYNGOLOGY | Facility: CLINIC | Age: 75
End: 2020-07-17
Payer: MEDICARE

## 2020-07-17 DIAGNOSIS — Z13.9 SCREENING PROCEDURE: ICD-10-CM

## 2020-07-17 PROCEDURE — U0003 INFECTIOUS AGENT DETECTION BY NUCLEIC ACID (DNA OR RNA); SEVERE ACUTE RESPIRATORY SYNDROME CORONAVIRUS 2 (SARS-COV-2) (CORONAVIRUS DISEASE [COVID-19]), AMPLIFIED PROBE TECHNIQUE, MAKING USE OF HIGH THROUGHPUT TECHNOLOGIES AS DESCRIBED BY CMS-2020-01-R: HCPCS

## 2020-07-18 LAB — SARS-COV-2 RNA RESP QL NAA+PROBE: NOT DETECTED

## 2020-07-20 ENCOUNTER — ANESTHESIA (OUTPATIENT)
Dept: ENDOSCOPY | Facility: HOSPITAL | Age: 75
End: 2020-07-20
Payer: MEDICARE

## 2020-07-20 ENCOUNTER — HOSPITAL ENCOUNTER (OUTPATIENT)
Facility: HOSPITAL | Age: 75
Discharge: HOME | End: 2020-07-20
Attending: INTERNAL MEDICINE | Admitting: INTERNAL MEDICINE
Payer: MEDICARE

## 2020-07-20 ENCOUNTER — ANESTHESIA EVENT (OUTPATIENT)
Dept: ENDOSCOPY | Facility: HOSPITAL | Age: 75
End: 2020-07-20
Payer: MEDICARE

## 2020-07-20 VITALS
RESPIRATION RATE: 17 BRPM | DIASTOLIC BLOOD PRESSURE: 89 MMHG | HEART RATE: 101 BPM | HEIGHT: 70 IN | TEMPERATURE: 98 F | WEIGHT: 122.81 LBS | SYSTOLIC BLOOD PRESSURE: 130 MMHG | BODY MASS INDEX: 17.58 KG/M2 | OXYGEN SATURATION: 100 %

## 2020-07-20 DIAGNOSIS — Z12.11 ENCOUNTER FOR COLONOSCOPY DUE TO HISTORY OF ADENOMATOUS COLONIC POLYPS: ICD-10-CM

## 2020-07-20 DIAGNOSIS — Z86.010 ENCOUNTER FOR COLONOSCOPY DUE TO HISTORY OF ADENOMATOUS COLONIC POLYPS: ICD-10-CM

## 2020-07-20 PROCEDURE — 88305 TISSUE EXAM BY PATHOLOGIST: CPT | Mod: 59 | Performed by: PATHOLOGY

## 2020-07-20 PROCEDURE — 88305 TISSUE EXAM BY PATHOLOGIST: CPT | Mod: 26,,, | Performed by: PATHOLOGY

## 2020-07-20 PROCEDURE — 45380 COLONOSCOPY AND BIOPSY: CPT | Performed by: INTERNAL MEDICINE

## 2020-07-20 PROCEDURE — 45380 COLONOSCOPY AND BIOPSY: CPT | Mod: PT,,, | Performed by: INTERNAL MEDICINE

## 2020-07-20 PROCEDURE — 27201012 HC FORCEPS, HOT/COLD, DISP: Performed by: INTERNAL MEDICINE

## 2020-07-20 PROCEDURE — 37000009 HC ANESTHESIA EA ADD 15 MINS: Performed by: INTERNAL MEDICINE

## 2020-07-20 PROCEDURE — 45380 PR COLONOSCOPY,BIOPSY: ICD-10-PCS | Mod: PT,,, | Performed by: INTERNAL MEDICINE

## 2020-07-20 PROCEDURE — 63600175 PHARM REV CODE 636 W HCPCS: Performed by: NURSE ANESTHETIST, CERTIFIED REGISTERED

## 2020-07-20 PROCEDURE — 88305 TISSUE EXAM BY PATHOLOGIST: ICD-10-PCS | Mod: 26,,, | Performed by: PATHOLOGY

## 2020-07-20 PROCEDURE — 37000008 HC ANESTHESIA 1ST 15 MINUTES: Performed by: INTERNAL MEDICINE

## 2020-07-20 RX ORDER — SODIUM CHLORIDE, SODIUM LACTATE, POTASSIUM CHLORIDE, CALCIUM CHLORIDE 600; 310; 30; 20 MG/100ML; MG/100ML; MG/100ML; MG/100ML
INJECTION, SOLUTION INTRAVENOUS CONTINUOUS
Status: DISCONTINUED | OUTPATIENT
Start: 2020-07-20 | End: 2020-07-20 | Stop reason: HOSPADM

## 2020-07-20 RX ORDER — SODIUM CHLORIDE, SODIUM LACTATE, POTASSIUM CHLORIDE, CALCIUM CHLORIDE 600; 310; 30; 20 MG/100ML; MG/100ML; MG/100ML; MG/100ML
INJECTION, SOLUTION INTRAVENOUS CONTINUOUS PRN
Status: DISCONTINUED | OUTPATIENT
Start: 2020-07-20 | End: 2020-07-20

## 2020-07-20 RX ORDER — PROPOFOL 10 MG/ML
VIAL (ML) INTRAVENOUS
Status: DISCONTINUED | OUTPATIENT
Start: 2020-07-20 | End: 2020-07-20

## 2020-07-20 RX ADMIN — SODIUM CHLORIDE, SODIUM LACTATE, POTASSIUM CHLORIDE, AND CALCIUM CHLORIDE: .6; .31; .03; .02 INJECTION, SOLUTION INTRAVENOUS at 11:07

## 2020-07-20 RX ADMIN — PROPOFOL 240 MG: 10 INJECTION, EMULSION INTRAVENOUS at 12:07

## 2020-07-20 NOTE — DISCHARGE INSTRUCTIONS
Hemorrhoids    Hemorrhoids are swollen and inflamed veins inside the rectum and near the anus. The rectum is the last several inches of the colon. The anus is the passage between the rectum and the outside of the body.  Causes  The veins can become swollen due to increased pressure in them. This is most often caused by:  · Chronic constipation or diarrhea  · Straining when having a bowel movement  · Sitting too long on the toilet  · A low-fiber diet  · Pregnancy  Symptoms  · Bleeding from the rectum (this may be noticeable after bowel movements)  · Lump near the anus  · Itching around the anus  · Pain around the anus  There are different types of hemorrhoids. Depending on the type you have and the severity, you may be able to treat yourself at home. In some cases, a procedure may be the best treatment option. Your healthcare provider can tell you more about this, if needed.  Home care  General care  · To get relief from pain or itching, try:  ¨ Topical products. Your healthcare provider may prescribe or recommend creams, ointments, or pads that can be applied to the hemorrhoid. Use these exactly as directed.  ¨ Medicines. Your healthcare provider may recommend stool softeners, suppositories, or laxatives to help manage constipation. Use these exactly as directed.  ¨ Sitz baths. A sitz bath involves sitting in a few inches of warm bath water. Be careful not to make the water so hot that you burn yourself--test it before sitting in it. Soak for about 10 to 15 minutes a few times a day. This may help relieve pain.  Tips to help prevent hemorrhoids  · Eat more fiber. Fiber adds bulk to stool and absorbs water as it moves through your colon. This makes stool softer and easier to pass.  ¨ Increase the fiber in your diet with more fiber-rich foods. These include fresh fruit, vegetables, and whole grains.  ¨ Take a fiber supplement or bulking agent, if advised to by your provider. These include products such as psyllium  or methylcellulose.  · Drink plenty of water, if directed to by your provider. This can help keep stool soft.  · Be more active. Frequent exercise aids digestion and helps prevent constipation. It may also help make bowel movements more regular.  · Dont strain during bowel movements. This can make hemorrhoids more likely. Also, dont sit on the toilet for long periods of time.  Follow-up care  Follow up with your healthcare provider, or as advised. If a culture or imaging tests were done, you will be notified of the results when they are ready. This may take a few days or longer.  When to seek medical advice  Call your healthcare provider right away if any of these occur:  · Increased bleeding from the rectum  · Increased pain around the rectum or anus  · Weakness or dizziness  Call 911  Call 911 or return to the emergency department right away if any of these occur:  · Trouble breathing or swallowing  · Fainting or loss of consciousness  · Unusually fast heart rate  · Vomiting blood  · Large amounts of blood in stool  Date Last Reviewed: 6/22/2015 © 2000-2017 [employer]. [address] [place] [address]. All rights reserved. This information is not intended as a substitute for professional medical care. Always follow your healthcare professional's instructions.        Diverticulosis    Diverticulosis means that small pouches have formed in the wall of your large intestine (colon). Most often, this problem causes no symptoms and is common as people age. But the pouches in the colon are at risk of becoming infected. When this happens, the condition is called diverticulitis. Although most people with diverticulosis never develop diverticulitis, it is still not uncommon. Rectal bleeding can also occur and in less common situations, a type of colon inflammation called colitis.  While most people do not have symptoms, some people with diverticulosis may have:  · Abdominal cramps and  pain  · Bloating  · Constipation  · Change in bowel habits  Causes  The exact cause of diverticulosis (and diverticulitis) has not been proved, but a few things are associated with the condition:  · Low-fiber diet  · Constipation  · Lack of exercise  Your healthcare provider will talk with you about how to manage your condition. Diet changes may be all that are needed to help control diverticulosis and prevent progression to diverticulitis. If you develop diverticulitis, you will likely need other treatments.  Home care  You may be told to take fiber supplements daily. Fiber adds bulk to the stool so that it passes through the colon more easily. Stool softeners may be recommended. You may also be given medications for pain relief. Be sure to take all medications as directed.  In the past, people were told to avoid corn, nuts, and seeds. This is no longer necessary.  Follow these guidelines when caring for yourself at home:  · Eat unprocessed foods that are high in fiber. Whole grains, fruits, and vegetables are good choices.  · Drink 6 to 8 glasses of water every day unless your healthcare provider has you limit how much fluid you should have.  · Watch for changes in your bowel movements. Tell your provider if you notice any changes.  · Begin an exercise program. Ask your provider how to get started. Generally, walking is the best.  · Get plenty of rest and sleep.  Follow-up care  Follow up with your healthcare provider, or as advised. Regular visits may be needed to check on your health. Sometimes special procedures such as colonoscopy, are needed after an episode of diverticulitis or blooding. Be sure to keep all your appointments.  If a stool sample was taken, or cultures were done, you should be told if they are positive, or if your treatment needs to be changed. You can call as directed for the results.  If X-rays were done, a radiologist will look at them. You will be told if there is a change in your  treatment.  If antibiotics were prescribed, be sure to finish them all.  When to seek medical advice  Call your healthcare provider right away if any of these occur:  · Fever of 100.4°F (38°C) or higher, or as directed by your healthcare provider  · Severe cramps in the lower left side of the abdomen or pain that is getting worse  · Tenderness in the lower left side of the abdomen or worsening pain throughout the abdomen  · Diarrhea or constipation that doesn't get better within 24 hours  · Nausea and vomiting  · Bleeding from the rectum  Call 911  Call emergency services if any of the following occur:  · Trouble breathing  · Confusion  · Very drowsy or trouble awakening  · Fainting or loss of consciousness  · Rapid heart rate  · Chest pain  Date Last Reviewed: 12/30/2015 © 2000-2017 [employer]. [address] [place] [address], [place] [address]. All rights reserved. This information is not intended as a substitute for professional medical care. Always follow your healthcare professional's instructions.        Arteriovenous Malformation (AVM)  You have been told that you have an arteriovenous malformation (AVM). An AVM is an abnormal tangle of blood vessels within the brain. Although some AVMs never rupture, both known factors (such as an increase in blood pressure) and unknown factors can lead to rupture. If you have an AVM, you were probably born with it. But most people don't know they have one until a problem happens. Signs of an AVM include bad headaches, sudden or progressive paralysis or loss of sensation, blurred or double vision, and seizures (jerking movements that are out of your control).     Understanding an AVM  The brain controls the body. You can move and feel because of the brain. And it is the brain that makes you able to think, show emotions, remember, and make judgments. An AVM can damage the brain and put the rest of the body in danger.  Inside the skull  Under the scalp and the skull, a  tough membrane (called the dura) surrounds the brain. Beneath the dura, cerebrospinal fluid (CSF) cushions the brain. Blood vessels carry nutrients and oxygen-rich blood throughout the brain.    A problem with blood flow  An AVM is a tangle of blood vessels. It can cause pressure to build up in the blood vessel and prevent normal blood flow. If the pressure becomes too great or the wall of the AVM vessel weakens a blood vessel can burst and blood can leak or spurt into the brain. This can damage parts of the brain that control vital body functions, such as sight, sensation, language, critical thinking, and movements. In some cases, problems caused by an AVM can even lead to death. The high blood flow in an AVM can also shunt the oxygen from the arteries directly to the veins, bypassing the brain capillaries. This shunting can lead to strokes. But an AVM can be treated.  Date Last Reviewed: 10/8/2015  © 8773-8311 [employer]. [address] [place] [address]. All rights reserved. This information is not intended as a substitute for professional medical care. Always follow your healthcare professional's instructions.        Understanding Colon and Rectal Polyps    The colon (also called the large intestine) is a muscular tube that forms the last part of the digestive tract. It absorbs water and stores food waste. The colon is about 4 to 6 feet long. The rectum is the last 6 inches of the colon. The colon and rectum have a smooth lining composed of millions of cells. Changes in these cells can lead to growths in the colon that can become cancerous and should be removed. Multiple tests are available to screen for colon cancer, but the colonoscopy is the most recommended test. During colonoscopy, these polyps can be removed. How often you need this test depends on many things including your condition, your family history, symptoms, and what the findings were at the previous colonoscopy.   When the  colon lining changes  Changes that happen in the cells that line the colon or rectum can lead to growths called polyps. Over a period of years, polyps can turn cancerous. Removing polyps early may prevent cancer from ever forming.  Polyps  Polyps are fleshy clumps of tissue that form on the lining of the colon or rectum. Small polyps are usually benign (not cancerous). However, over time, cells in a polyp can change and become cancerous. Certain types of polyps known as adenomatous polyps are premalignant. The risk for invasive cancer increases with the size of the polyp and certain cell and gene features. This means that they can become cancerous if they're not removed. Hyperplastic polyps are benign. They can grow quite large and not turn cancerous.   Cancer  Almost all colorectal cancers start when polyp cells begin growing abnormally. As a cancerous tumor grows, it may involve more and more of the colon or rectum. In time, cancer can also grow beyond the colon or rectum and spread to nearby organs or to glands called lymph nodes. The cells can also travel to other parts of the body. This is known as metastasis. The earlier a cancerous tumor is removed, the better the chance of preventing its spread.    Date Last Reviewed: 8/1/2016  © 9343-2934 [employer]. [address] [place] [address], [place] [address]. All rights reserved. This information is not intended as a substitute for professional medical care. Always follow your healthcare professional's instructions.

## 2020-07-20 NOTE — ANESTHESIA PREPROCEDURE EVALUATION
07/20/2020  Santy Anderson is a 74 y.o., male.    Anesthesia Evaluation    I have reviewed the Patient Summary Reports.    I have reviewed the Nursing Notes. I have reviewed the NPO Status.   I have reviewed the Medications.     Review of Systems  Anesthesia Hx:  No problems with previous Anesthesia Denies Hx of Anesthetic complications  History of prior surgery of interest to airway management or planning: Previous anesthesia: General, MAC Denies Family Hx of Anesthesia complications.   Denies Personal Hx of Anesthesia complications.   Social:  No Alcohol Use, Alcohol Use    Hematology/Oncology:  Hematology Normal   Oncology Normal   Oncology Comments: History of laryngeal cancer     EENT/Dental:EENT/Dental Normal   Cardiovascular:   hyperlipidemia    Pulmonary:  Pulmonary Normal    Renal/:  Renal/ Normal     Hepatic/GI:   Bowel Prep. Hiatal Hernia, GERD, poorly controlled Liver Disease, (alcoholic cirrhosis) Dysphagia; history of gastric mass   Musculoskeletal:   Arthritis     Neurological:   Neuromuscular Disease,    Endocrine:   Diabetes, type 2        Physical Exam  General:  Well nourished    Airway/Jaw/Neck:  Airway Findings: Mouth Opening: Normal Tongue: Normal  General Airway Assessment: Adult  Mallampati: II  Improves to II with phonation.  TM Distance: Normal, at least 6 cm      Dental:  Dental Findings: lower partial dentures, Lower partial dentures Very loose bottom teeth. Patient aware of potential loss of teeth   Chest/Lungs:  Chest/Lungs Findings: Clear to auscultation, Normal Respiratory Rate     Heart/Vascular:  Heart Findings: Rate: Normal  Rhythm: Regular Rhythm  Sounds: Normal        Mental Status:  Mental Status Findings:  Cooperative, Alert and Oriented         Anesthesia Plan  Type of Anesthesia, risks & benefits discussed:  Anesthesia Type:  MAC  Patient's Preference:   Intra-op  Monitoring Plan: standard ASA monitors  Intra-op Monitoring Plan Comments:   Post Op Pain Control Plan: per primary service following discharge from PACU  Post Op Pain Control Plan Comments:   Induction:   IV  Beta Blocker:  Patient is not currently on a Beta-Blocker (No further documentation required).       Informed Consent: Patient understands risks and agrees with Anesthesia plan.  Questions answered. Anesthesia consent signed with patient.  ASA Score: 3     Day of Surgery Review of History & Physical: I have interviewed and examined the patient. I have reviewed the patient's H&P dated:    H&P update referred to the surgeon.         Ready For Surgery From Anesthesia Perspective.

## 2020-07-20 NOTE — TRANSFER OF CARE
"Anesthesia Transfer of Care Note    Patient: Santy Anderson    Procedure(s) Performed: Procedure(s) (LRB):  COLONOSCOPY (N/A)    Patient location: GI    Anesthesia Type: MAC    Transport from OR: Transported from OR on room air with adequate spontaneous ventilation    Post pain: adequate analgesia    Post assessment: no apparent anesthetic complications    Post vital signs: stable    Level of consciousness: responds to stimulation    Nausea/Vomiting: no nausea/vomiting    Complications: none    Transfer of care protocol was followed      Last vitals:   Visit Vitals  BP (!) 135/92 (BP Location: Left arm, Patient Position: Lying)   Pulse 98   Temp 36.6 °C (97.9 °F)   Resp 20   Ht 5' 9.5" (1.765 m)   Wt 55.7 kg (122 lb 12.7 oz)   SpO2 100%   BMI 17.87 kg/m²     "

## 2020-07-20 NOTE — ANESTHESIA POSTPROCEDURE EVALUATION
Anesthesia Post Evaluation    Patient: Santy Anderson    Procedure(s) Performed: Procedure(s) (LRB):  COLONOSCOPY (N/A)    Final Anesthesia Type: MAC    Patient location during evaluation: GI PACU  Patient participation: No - Unable to Participate, Sedation  Level of consciousness: responds to stimulation  Post-procedure vital signs: reviewed and stable  Pain management: adequate  Airway patency: patent    PONV status at discharge: No PONV  Anesthetic complications: no      Cardiovascular status: blood pressure returned to baseline  Respiratory status: unassisted and room air  Hydration status: euvolemic  Follow-up not needed.          Vitals Value Taken Time   /73 07/20/20 1221   Temp  07/20/20 1223   Pulse 102 07/20/20 1221   Resp 16 07/20/20 1221   SpO2 100 % 07/20/20 1221         No case tracking events are documented in the log.      Pain/Roberto Score: No data recorded

## 2020-07-20 NOTE — [employer] PATIENT INSTRUCTIONS
Discharge Summary/Instructions after an Endoscopic Procedure  Patient Name: Santy Anderson  Patient MRN: [id number]  Patient YOB: 1945 Monday, July 20, 2020 Rufina Corrae MD  RESTRICTIONS:  During your procedure today, you received medications for sedation.  These   medications may affect your judgment, balance and coordination.  Therefore,   for 24 hours, you have the following restrictions:   - DO NOT drive a car, operate machinery, make legal/financial decisions,   sign important papers or drink alcohol.    ACTIVITY:  Today: no heavy lifting, straining or running due to procedural   sedation/anesthesia.  The following day: return to full activity including work.  DIET:  Eat and drink normally unless instructed otherwise.     TREATMENT FOR COMMON SIDE EFFECTS:  - Mild abdominal pain, nausea, belching, bloating or excessive gas:  rest,   eat lightly and use a heating pad.  - Sore Throat: treat with throat lozenges and/or gargle with warm salt   water.  - Because air was used during the procedure, expelling large amounts of air   from your rectum or belching is normal.  - If a bowel prep was taken, you may not have a bowel movement for 1-3 days.    This is normal.  SYMPTOMS TO WATCH FOR AND REPORT TO YOUR PHYSICIAN:  1. Abdominal pain or bloating, other than gas cramps.  2. Chest pain.  3. Back pain.  4. Signs of infection such as: chills or fever occurring within 24 hours   after the procedure.  5. Rectal bleeding, which would show as bright red, maroon, or black stools.   (A tablespoon of blood from the rectum is not serious, especially if   hemorrhoids are present.)  6. Vomiting.  7. Weakness or dizziness.  GO DIRECTLY TO THE NEAREST EMERGENCY ROOM IF YOU HAVE ANY OF THE FOLLOWING:      Difficulty breathing              Chills and/or fever over 101 F   Persistent vomiting and/or vomiting blood   Severe abdominal pain   Severe chest pain   Black, tarry stools   Bleeding- more than one tablespoon   Any  other symptom or condition that you feel may need urgent attention  Your doctor recommends these additional instructions:  If any biopsies were taken, your doctors clinic will contact you in 1 to 2   weeks with any results.  - Discharge patient to home (with escort).   - Resume previous diet.   - Continue present medications.   - Await pathology results.   - Repeat colonoscopy in 5 years for surveillance based on pathology   results.  For questions, problems or results please call your physician Rufina Correa MD at Work:  [phone]  If you have any questions about the above instructions, call the GI   department at [phone] or call the endoscopy unit at [phone]   from 7am until 3 pm.  [facility] [place], EMERGENCY ROOM PHONE NUMBER:   [phone]  IF A COMPLICATION OR EMERGENCY SITUATION ARISES AND YOU ARE UNABLE TO REACH   YOUR PHYSICIAN - GO DIRECTLY TO THE EMERGENCY ROOM.  I have read or have had read to me these discharge instructions for my   procedure and have received a written copy.  I understand these   instructions and will follow-up with my physician if I have any questions.     __________________________________       _____________________________________  Nurse Signature                                          Patient/Designated   Responsible Party Signature  MD Rufina Mccain MD  7/20/2020 12:23:53 PM  This report has been verified and signed electronically.  [employer]

## 2020-07-20 NOTE — ANESTHESIA RELEASE NOTE
"Anesthesia Release from PACU Note    Patient: Santy Anderson    Procedure(s) Performed: Procedure(s) (LRB):  COLONOSCOPY (N/A)    Anesthesia type: MAC    Post pain: Adequate analgesia    Post assessment: no apparent anesthetic complications    Last Vitals:   Visit Vitals  BP (!) 135/92 (BP Location: Left arm, Patient Position: Lying)   Pulse 98   Temp 36.6 °C (97.9 °F)   Resp 20   Ht 5' 9.5" (1.765 m)   Wt 55.7 kg (122 lb 12.7 oz)   SpO2 100%   BMI 17.87 kg/m²       Post vital signs: stable    Level of consciousness: responds to stimulation    Nausea/Vomiting: no nausea/no vomiting    Complications: none    Airway Patency: patent    Respiratory: unassisted, room air    Cardiovascular: stable and blood pressure at baseline    Hydration: euvolemic  "

## 2020-07-20 NOTE — H&P
PRE PROCEDURE H&P    Patient Name: Santy Anderson  MRN: [id number]  : 1945  Date of Procedure:  2020  Referring Physician: Joanie Peterson MD  Primary Physician: Joanie ePterson MD  Procedure Physician: Rufina Correa MD       Planned Procedure: Colonoscopy  Diagnosis: previous adenomatous polyp  Chief Complaint: Same as above    HPI: Patient is an 74 y.o. male is here for the above. History of 3 sigmoid polyps on last colonoscopy in . He returns for surveillance today.     Last colonoscopy: 2017  Family history: none  Anticoagulation: none    Past Medical History:   Past Medical History:   Diagnosis Date    Alcoholic cirrhosis of liver with ascites     Arthritis     Cataract     Gastric mass     GERD (gastroesophageal reflux disease)     Hx of colonic polyps 2017    per colonoscopy report in     Hyperlipidemia     Lung cancer     Primary open angle glaucoma (POAG) of both eyes, moderate stage 2019        Past Surgical History:  Past Surgical History:   Procedure Laterality Date    1 STENT Right 2019    DR. CLARK    ABDOMINAL SURGERY      CATARACT EXTRACTION      CATARACT EXTRACTION W/  INTRAOCULAR LENS IMPLANT Left 04/10/2019    istent inject     COLONOSCOPY N/A 2017    Procedure: COLONOSCOPY;  Surgeon: Brianne Wise MD;  Location: [facility];  Service: Endoscopy;  Laterality: N/A;    ESOPHAGOGASTRODUODENOSCOPY      ESOPHAGOGASTRODUODENOSCOPY N/A 2020    Procedure: EGD (ESOPHAGOGASTRODUODENOSCOPY);  Surgeon: Rufina Correa MD;  Location: [facility];  Service: Endoscopy;  Laterality: N/A;    ESOPHAGOGASTRODUODENOSCOPY N/A 2020    Procedure: EGD (ESOPHAGOGASTRODUODENOSCOPY) needs rapid;  Surgeon: Rufina Correa MD;  Location: [facility];  Service: Endoscopy;  Laterality: N/A;    FEMUR SURGERY Right     GASTRECTOMY      HAND SURGERY Left     KNEE SURGERY Right     laparotomy for trauma (negative)      left lower lobectomy       PCIOL Right 2019    DR. CLARK        Home Medications:  Prior to Admission medications    Medication Sig Start Date End Date Taking? Authorizing Provider   CARAFATE 100 mg/mL suspension TK 10 MLS PO QID BEFORE MEALS AND QHS 20  Yes Historical Provider, MD   cholecalciferol, vitamin D3, 2,000 unit Cap Take 1 capsule by mouth once daily. Patient states that he takes this medication 2 times per week 18  Yes Historical Provider, MD   folic acid (FOLVITE) 1 MG tablet Take 1 mg by mouth once daily.   Yes Historical Provider, MD   lansoprazole (PREVACID SOLUTAB) 30 MG disintegrating tablet Take 1 tablet (30 mg total) by mouth once daily. 20 Yes Rufina Correa MD   multivitamin capsule Take 1 capsule by mouth once daily.   Yes Historical Provider, MD   food supplemt, lactose-reduced (ENSURE ACTIVE CLEAR) Liqd Take by mouth.    Historical Provider, MD   HYDROcodone-acetaminophen (NORCO) 7.5-325 mg per tablet  20   Historical Provider, MD   sildenafil (VIAGRA) 100 MG tablet Take 100 mg by mouth daily as needed. 3/22/19   Historical Provider, MD   thiamine 100 MG tablet Take 100 mg by mouth once daily.    Historical Provider, MD        Allergies:  Review of patient's allergies indicates:  No Known Allergies     Social History:   Social History     Socioeconomic History    Marital status:      Spouse name: Not on file    Number of children: Not on file    Years of education: Not on file    Highest education level: Not on file   Occupational History    Not on file   Social Needs    Financial resource strain: Not on file    Food insecurity     Worry: Not on file     Inability: Not on file    Transportation needs     Medical: Not on file     Non-medical: Not on file   Tobacco Use    Smoking status: Former Smoker     Packs/day: 0.50     Years: 25.00     Pack years: 12.50     Quit date: 10/16/1988     Years since quittin.7    Smokeless tobacco: Never Used   Substance  "and Sexual Activity    Alcohol use: Not Currently     Frequency: Never     Comment: quit 26 years ago    Drug use: No    Sexual activity: Yes     Partners: Female   Lifestyle    Physical activity     Days per week: Not on file     Minutes per session: Not on file    Stress: Not on file   Relationships    Social connections     Talks on phone: Not on file     Gets together: Not on file     Attends Mu-ism service: Not on file     Active member of club or organization: Not on file     Attends meetings of clubs or organizations: Not on file     Relationship status: Not on file   Other Topics Concern    Not on file   Social History Narrative           Family History:  Family History   Problem Relation Age of Onset    Diabetes Mother     Hypertension Mother     Colon cancer Neg Hx        ROS: No acute cardiac events, no acute respiratory complaints.     Physical Exam (all patients):    BP (!) 135/92 (BP Location: Left arm, Patient Position: Lying)   Pulse 98   Temp 97.9 °F (36.6 °C)   Resp 20   Ht 5' 9.5" (1.765 m)   Wt 55.7 kg (122 lb 12.7 oz)   SpO2 100%   BMI 17.87 kg/m²   Lungs: Clear to auscultation bilaterally, respirations unlabored  Heart: Regular rate and rhythm, S1 and S2 normal, no obvious murmurs  Abdomen:         Soft, non-tender, bowel sounds normal, no masses, no organomegaly    Lab Results   Component Value Date    WBC 8.25 03/09/2020     (H) 03/09/2020    RDW 13.3 03/09/2020     03/09/2020    INR 1.0 03/09/2020     03/09/2020    HGBA1C 4.5 01/25/2017    BUN 4 (L) 03/09/2020     (H) 03/09/2020    K 3.8 03/09/2020     03/09/2020        SEDATION PLAN: per anesthesia      History reviewed, vital signs satisfactory, cardiopulmonary status satisfactory, sedation options, risks and plans have been discussed with the patient  All their questions were answered and the patient agrees to the sedation procedures as planned and the patient is deemed an " appropriate candidate for the sedation as planned.    The risks, benefits and alternatives of the procedure were discussed with the patient in detail. This discussion was had in the presence of endoscopy staff. The risks include, risks of adverse reaction to sedation requiring the use of reversal agents, bleeding requiring blood transfusion, perforation requiring surgical intervention and technical failure. Other risks include aspiration leading to respiratory distress and respiratory failure resulting in endotracheal intubation and mechanical ventilation including death. If anesthesia is being utilized for this procedure, it is up to the anesthesiologist to determine airway safety including elective endotracheal intubation. Questions were answered, they agree to proceed. There was no language barriers.     Procedure explained to patient, informed consent obtained and placed in chart.    Rufina Correa  7/20/2020  11:13 AM

## 2020-07-23 LAB
FINAL PATHOLOGIC DIAGNOSIS: NORMAL
GROSS: NORMAL

## 2020-07-25 NOTE — PROGRESS NOTES
AN staff: please inform patient polyp removed was precancerous or adenomatous. Guidelines recommend repeat colonoscopy in 5 years. Place in recall.

## 2020-07-30 ENCOUNTER — TELEPHONE (OUTPATIENT)
Dept: ORTHOPEDICS | Facility: CLINIC | Age: 75
End: 2020-07-30

## 2020-07-30 ENCOUNTER — TELEPHONE (OUTPATIENT)
Dept: GASTROENTEROLOGY | Facility: CLINIC | Age: 75
End: 2020-07-30

## 2020-07-30 NOTE — TELEPHONE ENCOUNTER
Returned the patient's phone call. Informed the patient that Dr. Whitaker is completely booked and his next available date is 8/13 at 7:20. I got the patient schedule for 8/13 at 7:20 with Dr. Whitaker. Patient verbalized  Understanding. Patient was thankful for the call.FP         ----- Message from Lurdes Dwyer sent at 7/30/2020  8:58 AM CDT -----  Regarding: sooner appt  .Type:  Sooner Apoointment Request    Caller is requesting a sooner appointment.  Caller declined first available appointment listed below.  Caller will not accept being placed on the waitlist and is requesting a message be sent to doctor.  Name of Caller: pt  When is the first available appointment? 8/13  Symptoms: knee pain  Would the patient rather a call back or a response via [facility]?  Call back   Best Call Back Number: [phone] (home)     Additional Information:

## 2020-08-06 ENCOUNTER — TELEPHONE (OUTPATIENT)
Dept: GASTROENTEROLOGY | Facility: CLINIC | Age: 75
End: 2020-08-06

## 2020-08-06 NOTE — TELEPHONE ENCOUNTER
Notified of below results.  Verbalizes understanding.  Requested a copy be mailed.  KELI Zendejas    ----- Message from Rufina Correa MD sent at 7/25/2020  6:31 PM CDT -----  AN staff: please inform patient polyp removed was precancerous or adenomatous. Guidelines recommend repeat colonoscopy in 5 years. Place in recall.

## 2020-08-13 ENCOUNTER — TELEPHONE (OUTPATIENT)
Dept: ORTHOPEDICS | Facility: CLINIC | Age: 75
End: 2020-08-13

## 2020-08-13 ENCOUNTER — OFFICE VISIT (OUTPATIENT)
Dept: ORTHOPEDICS | Facility: CLINIC | Age: 75
End: 2020-08-13
Payer: MEDICARE

## 2020-08-13 VITALS
WEIGHT: 122 LBS | BODY MASS INDEX: 17.47 KG/M2 | DIASTOLIC BLOOD PRESSURE: 73 MMHG | HEIGHT: 70 IN | SYSTOLIC BLOOD PRESSURE: 112 MMHG | HEART RATE: 114 BPM

## 2020-08-13 DIAGNOSIS — I73.9 PERIPHERAL ARTERIAL DISEASE: ICD-10-CM

## 2020-08-13 DIAGNOSIS — M94.262 CHONDROMALACIA OF LEFT KNEE: ICD-10-CM

## 2020-08-13 DIAGNOSIS — M17.12 ARTHRITIS OF LEFT KNEE: Primary | ICD-10-CM

## 2020-08-13 DIAGNOSIS — M17.11 PRIMARY OSTEOARTHRITIS OF RIGHT KNEE: Primary | ICD-10-CM

## 2020-08-13 DIAGNOSIS — M21.161 ACQUIRED VARUS DEFORMITY KNEE, RIGHT: ICD-10-CM

## 2020-08-13 DIAGNOSIS — G89.29 CHRONIC PAIN OF RIGHT KNEE: ICD-10-CM

## 2020-08-13 DIAGNOSIS — M17.11 ARTHRITIS OF KNEE, RIGHT: Primary | ICD-10-CM

## 2020-08-13 DIAGNOSIS — M25.561 CHRONIC PAIN OF RIGHT KNEE: ICD-10-CM

## 2020-08-13 PROCEDURE — 3288F PR FALLS RISK ASSESSMENT DOCUMENTED: ICD-10-PCS | Mod: CPTII,S$GLB,, | Performed by: ORTHOPAEDIC SURGERY

## 2020-08-13 PROCEDURE — 1100F PR PT FALLS ASSESS DOC 2+ FALLS/FALL W/INJURY/YR: ICD-10-PCS | Mod: CPTII,S$GLB,, | Performed by: ORTHOPAEDIC SURGERY

## 2020-08-13 PROCEDURE — 99999 PR PBB SHADOW E&M-EST. PATIENT-LVL III: CPT | Mod: PBBFAC,,, | Performed by: ORTHOPAEDIC SURGERY

## 2020-08-13 PROCEDURE — 3008F BODY MASS INDEX DOCD: CPT | Mod: CPTII,S$GLB,, | Performed by: ORTHOPAEDIC SURGERY

## 2020-08-13 PROCEDURE — 1159F PR MEDICATION LIST DOCUMENTED IN MEDICAL RECORD: ICD-10-PCS | Mod: S$GLB,,, | Performed by: ORTHOPAEDIC SURGERY

## 2020-08-13 PROCEDURE — 99999 PR PBB SHADOW E&M-EST. PATIENT-LVL III: ICD-10-PCS | Mod: PBBFAC,,, | Performed by: ORTHOPAEDIC SURGERY

## 2020-08-13 PROCEDURE — 99213 PR OFFICE/OUTPT VISIT, EST, LEVL III, 20-29 MIN: ICD-10-PCS | Mod: 25,S$GLB,, | Performed by: ORTHOPAEDIC SURGERY

## 2020-08-13 PROCEDURE — 1159F MED LIST DOCD IN RCRD: CPT | Mod: S$GLB,,, | Performed by: ORTHOPAEDIC SURGERY

## 2020-08-13 PROCEDURE — 20610 DRAIN/INJ JOINT/BURSA W/O US: CPT | Mod: 50,S$GLB,, | Performed by: ORTHOPAEDIC SURGERY

## 2020-08-13 PROCEDURE — 20610 LARGE JOINT ASPIRATION/INJECTION: BILATERAL KNEE: ICD-10-PCS | Mod: 50,S$GLB,, | Performed by: ORTHOPAEDIC SURGERY

## 2020-08-13 PROCEDURE — 1125F PR PAIN SEVERITY QUANTIFIED, PAIN PRESENT: ICD-10-PCS | Mod: S$GLB,,, | Performed by: ORTHOPAEDIC SURGERY

## 2020-08-13 PROCEDURE — 1100F PTFALLS ASSESS-DOCD GE2>/YR: CPT | Mod: CPTII,S$GLB,, | Performed by: ORTHOPAEDIC SURGERY

## 2020-08-13 PROCEDURE — 99213 OFFICE O/P EST LOW 20 MIN: CPT | Mod: 25,S$GLB,, | Performed by: ORTHOPAEDIC SURGERY

## 2020-08-13 PROCEDURE — 1125F AMNT PAIN NOTED PAIN PRSNT: CPT | Mod: S$GLB,,, | Performed by: ORTHOPAEDIC SURGERY

## 2020-08-13 PROCEDURE — 3288F FALL RISK ASSESSMENT DOCD: CPT | Mod: CPTII,S$GLB,, | Performed by: ORTHOPAEDIC SURGERY

## 2020-08-13 PROCEDURE — 3008F PR BODY MASS INDEX (BMI) DOCUMENTED: ICD-10-PCS | Mod: CPTII,S$GLB,, | Performed by: ORTHOPAEDIC SURGERY

## 2020-08-13 RX ORDER — METHYLPREDNISOLONE ACETATE 80 MG/ML
80 INJECTION, SUSPENSION INTRA-ARTICULAR; INTRALESIONAL; INTRAMUSCULAR; SOFT TISSUE
Status: DISCONTINUED | OUTPATIENT
Start: 2020-08-13 | End: 2020-08-13 | Stop reason: HOSPADM

## 2020-08-13 RX ADMIN — METHYLPREDNISOLONE ACETATE 80 MG: 80 INJECTION, SUSPENSION INTRA-ARTICULAR; INTRALESIONAL; INTRAMUSCULAR; SOFT TISSUE at 07:08
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
## 2020-08-13 NOTE — PATIENT INSTRUCTIONS
Injection of both knees today with 80 mg Depo-Medrol mixed with 5 cc 1% lidocaine in each knee 08/13/2020  Needs MRI of the left knee  Approval for right knee Synvisc-One last injection 02/07/2020  Return to clinic in 3 weeks

## 2020-08-13 NOTE — PROCEDURES
Large Joint Aspiration/Injection: bilateral knee    Date/Time: 8/13/2020 7:20 AM  Performed by: Naif Whitaker MD  Authorized by: Naif Whitaker MD     Consent Done?:  Yes (Verbal)  Site marked: the procedure site was marked    Timeout: prior to procedure the correct patient, procedure, and site was verified      Local anesthesia used?: Yes    Local anesthetic:  Lidocaine 1% without epinephrine    Details:  Needle Size:  22 G  Ultrasonic Guidance for needle placement?: No    Approach:  Anterolateral  Location:  Knee  Laterality:  Bilateral  Site:  Bilateral knee  Medications (Right):  80 mg methylPREDNISolone acetate 80 mg/mL  Medications (Left):  80 mg methylPREDNISolone acetate 80 mg/mL  Patient tolerance:  Patient tolerated the procedure well with no immediate complications

## 2020-08-14 ENCOUNTER — HOSPITAL ENCOUNTER (OUTPATIENT)
Dept: RADIOLOGY | Facility: HOSPITAL | Age: 75
Discharge: HOME | End: 2020-08-14
Attending: ORTHOPAEDIC SURGERY
Payer: MEDICARE

## 2020-08-14 DIAGNOSIS — M17.11 PRIMARY OSTEOARTHRITIS OF RIGHT KNEE: Primary | ICD-10-CM

## 2020-08-14 DIAGNOSIS — M17.12 ARTHRITIS OF LEFT KNEE: ICD-10-CM

## 2020-08-14 PROCEDURE — 73721 MRI KNEE WITHOUT CONTRAST LEFT: ICD-10-PCS | Mod: 26,LT,, | Performed by: RADIOLOGY

## 2020-08-14 PROCEDURE — 73721 MRI JNT OF LWR EXTRE W/O DYE: CPT | Mod: TC,LT

## 2020-08-14 PROCEDURE — 73721 MRI JNT OF LWR EXTRE W/O DYE: CPT | Mod: 26,LT,, | Performed by: RADIOLOGY

## 2020-08-28 NOTE — PROGRESS NOTES
"Subjective:      Patient ID: Santy Anderson is a 74 y.o. male.    Chief Complaint: Follow-up      HPI  Here for follow up of medical problems.  Down 23# in past 4 months, but attributes to teeth problem.  Now has temporary dentures since yesterday, and says able to eat better now.  Breathing well.  No f/c/sw/cough.  No EtOH.  Gets lightheaded if leans over.  Gets off balance, attributes to right knee pain.  No cp/sob/palp.      Updated/ annual due 5/21:  HM: ref fluvax, 2/19 HAV, 6/16 [id number], 11/18 [id number], 6/16 TDaP, 6/19 BMD rep 2y, 4/17 Cscope rep 3y, 6/16 HCV neg, Eye Dr. Zapata, 2/18 CXR clear, 11/17 Hep panel neg.     Review of Systems   Constitutional: Negative for chills, diaphoresis and fever.   Respiratory: Negative for cough and shortness of breath.    Cardiovascular: Negative for chest pain, palpitations and leg swelling.   Gastrointestinal: Negative for blood in stool, constipation, diarrhea, nausea and vomiting.   Genitourinary: Negative for dysuria, frequency and hematuria.   Psychiatric/Behavioral: The patient is not nervous/anxious.          Objective:   /64   Pulse 100   Temp 98.2 °F (36.8 °C) (Temporal)   Ht 5' 9" (1.753 m)   Wt 52.4 kg (115 lb 8.3 oz)   SpO2 96%   BMI 17.06 kg/m²     Physical Exam  Constitutional:       Appearance: He is well-developed.   Neck:      Musculoskeletal: Normal range of motion and neck supple.   Cardiovascular:      Rate and Rhythm: Normal rate and regular rhythm.      Heart sounds: No murmur. No friction rub. No gallop.    Pulmonary:      Effort: Pulmonary effort is normal.      Breath sounds: Normal breath sounds. No wheezing or rales.   Abdominal:      General: Bowel sounds are normal.      Palpations: Abdomen is soft. There is no mass.      Tenderness: There is no abdominal tenderness.   Lymphadenopathy:      Cervical: No cervical adenopathy.   Neurological:      Mental Status: He is alert and oriented to person, place, and time.      Comments: MICHELLE " and nose to finger tests normal.             Assessment:       1. Weight loss    2. Vitamin D deficiency    3. Malignant neoplasm of lower lobe of left lung    4. Ataxia          Plan:     Weight loss- 23# in 4mo, recheck 3mo.    Vitamin D deficiency- cont supplement.    Malignant neoplasm of lower lobe of left lung    Ataxia, likely just age related, r/o vit Def.  -     Vitamin B12; Future; Expected date: 09/10/2020  -     Folate; Future; Expected date: 09/10/2020    RTC 3mo.

## 2020-09-01 ENCOUNTER — TELEPHONE (OUTPATIENT)
Dept: ORTHOPEDICS | Facility: CLINIC | Age: 75
End: 2020-09-01

## 2020-09-01 NOTE — TELEPHONE ENCOUNTER
Returned the patient's phone call. Informed the patient that they have the soonest appointment with Dr. Whitaker on 09/10/20. Patient verbalized understanding. Patient was thankful for the call.FP          ----- Message from Jake Magana sent at 9/1/2020  1:28 PM CDT -----  Regarding: pt advice  Contact: Santy Simms called to consult with nurse about getting an injection in both knees. Please call pt back at [phone]. Thanks tp

## 2020-09-02 ENCOUNTER — TELEPHONE (OUTPATIENT)
Dept: FAMILY MEDICINE | Facility: CLINIC | Age: 75
End: 2020-09-02

## 2020-09-02 NOTE — TELEPHONE ENCOUNTER
S/w pt he states you gave him a name of a dentist for him to go and see some time back. He knows is medicare wouldn't cover it but the [facility] and post [employer] insurance will. Do you happen to remember who it was you referred him to? I told him to call his ins and find out who was covered but he states he does not want to do that.

## 2020-09-02 NOTE — TELEPHONE ENCOUNTER
I can't believe I have to tell him the dentist retired due to covid concerns, but I'm going to [facility] clinic myself.  SM

## 2020-09-02 NOTE — TELEPHONE ENCOUNTER
----- Message from Margo Gandara sent at 9/2/2020 12:01 PM CDT -----  Regarding: need assistant with finding a denist  Contact: pt  Caller is requesting a call back regarding assistant with finding a dentist.  Please call back at [phone] (gnpw).  Thanks,

## 2020-09-03 ENCOUNTER — TELEPHONE (OUTPATIENT)
Dept: FAMILY MEDICINE | Facility: CLINIC | Age: 75
End: 2020-09-03

## 2020-09-03 NOTE — TELEPHONE ENCOUNTER
S/w pt advised pt Dr indy Cosby for dentist. Gave phone number [phone] for pt to call. Pt verbalized understanding..

## 2020-09-03 NOTE — TELEPHONE ENCOUNTER
----- Message from Belkys Rodriguez sent at 9/3/2020  9:06 AM CDT -----  Regarding: missed call  Type:  Patient Returning Call    Who Called:pt  Who Left Message for Patient:staff  Does the patient know what this is regarding?:n/a  Would the patient rather a call back or a response via [facility]? Call back  Best Call Back Number:[phone]  Additional Information: Please call back.Thanks

## 2020-09-09 ENCOUNTER — PATIENT OUTREACH (OUTPATIENT)
Dept: ADMINISTRATIVE | Facility: OTHER | Age: 75
End: 2020-09-09

## 2020-09-09 NOTE — PROGRESS NOTES
Health Maintenance Due   Topic Date Due    Shingles Vaccine (1 of 2) 12/04/1997    Lipid Panel  03/11/2020    Influenza Vaccine (1) 08/01/2020     Updates were requested from care everywhere.  Chart was reviewed for overdue Proactive [facility] Encounters (TELLO) topics (CRS, Breast Cancer Screening, Eye exam)  Health Maintenance has been updated.  LINKS immunization registry triggered.  Immunizations were reconciled.

## 2020-09-10 ENCOUNTER — OFFICE VISIT (OUTPATIENT)
Dept: ORTHOPEDICS | Facility: CLINIC | Age: 75
End: 2020-09-10
Payer: MEDICARE

## 2020-09-10 ENCOUNTER — LAB VISIT (OUTPATIENT)
Dept: LAB | Facility: HOSPITAL | Age: 75
End: 2020-09-10
Attending: INTERNAL MEDICINE
Payer: MEDICARE

## 2020-09-10 ENCOUNTER — OFFICE VISIT (OUTPATIENT)
Dept: FAMILY MEDICINE | Facility: CLINIC | Age: 75
End: 2020-09-10
Payer: MEDICARE

## 2020-09-10 VITALS
SYSTOLIC BLOOD PRESSURE: 100 MMHG | DIASTOLIC BLOOD PRESSURE: 68 MMHG | BODY MASS INDEX: 18.07 KG/M2 | HEIGHT: 69 IN | HEART RATE: 108 BPM | WEIGHT: 122 LBS

## 2020-09-10 VITALS
SYSTOLIC BLOOD PRESSURE: 110 MMHG | DIASTOLIC BLOOD PRESSURE: 64 MMHG | OXYGEN SATURATION: 96 % | TEMPERATURE: 98 F | HEIGHT: 69 IN | BODY MASS INDEX: 17.11 KG/M2 | HEART RATE: 100 BPM | WEIGHT: 115.5 LBS

## 2020-09-10 DIAGNOSIS — M17.11 ARTHRITIS OF KNEE, RIGHT: Primary | ICD-10-CM

## 2020-09-10 DIAGNOSIS — I73.9 PERIPHERAL ARTERIAL DISEASE: ICD-10-CM

## 2020-09-10 DIAGNOSIS — M17.12 PRIMARY OSTEOARTHRITIS OF LEFT KNEE: Primary | ICD-10-CM

## 2020-09-10 DIAGNOSIS — R27.0 ATAXIA: ICD-10-CM

## 2020-09-10 DIAGNOSIS — M21.161 ACQUIRED VARUS DEFORMITY KNEE, RIGHT: ICD-10-CM

## 2020-09-10 DIAGNOSIS — R63.4 WEIGHT LOSS: Primary | ICD-10-CM

## 2020-09-10 DIAGNOSIS — M17.12 ARTHRITIS OF KNEE, LEFT: ICD-10-CM

## 2020-09-10 DIAGNOSIS — M94.262 CHONDROMALACIA, LEFT KNEE: ICD-10-CM

## 2020-09-10 DIAGNOSIS — C34.32 MALIGNANT NEOPLASM OF LOWER LOBE OF LEFT LUNG: ICD-10-CM

## 2020-09-10 DIAGNOSIS — E55.9 VITAMIN D DEFICIENCY: ICD-10-CM

## 2020-09-10 PROCEDURE — 82746 ASSAY OF FOLIC ACID SERUM: CPT

## 2020-09-10 PROCEDURE — 99214 PR OFFICE/OUTPT VISIT, EST, LEVL IV, 30-39 MIN: ICD-10-PCS | Mod: S$GLB,,, | Performed by: INTERNAL MEDICINE

## 2020-09-10 PROCEDURE — 99214 OFFICE O/P EST MOD 30 MIN: CPT | Mod: 25,S$PBB,, | Performed by: ORTHOPAEDIC SURGERY

## 2020-09-10 PROCEDURE — 99214 OFFICE O/P EST MOD 30 MIN: CPT | Mod: PBBFAC | Performed by: ORTHOPAEDIC SURGERY

## 2020-09-10 PROCEDURE — 36415 COLL VENOUS BLD VENIPUNCTURE: CPT | Mod: PO

## 2020-09-10 PROCEDURE — 99999 PR PBB SHADOW E&M-EST. PATIENT-LVL III: CPT | Mod: PBBFAC,,, | Performed by: INTERNAL MEDICINE

## 2020-09-10 PROCEDURE — 1101F PT FALLS ASSESS-DOCD LE1/YR: CPT | Mod: CPTII,S$GLB,, | Performed by: INTERNAL MEDICINE

## 2020-09-10 PROCEDURE — 99214 OFFICE O/P EST MOD 30 MIN: CPT | Mod: S$GLB,,, | Performed by: INTERNAL MEDICINE

## 2020-09-10 PROCEDURE — 1159F PR MEDICATION LIST DOCUMENTED IN MEDICAL RECORD: ICD-10-PCS | Mod: S$GLB,,, | Performed by: INTERNAL MEDICINE

## 2020-09-10 PROCEDURE — 99999 PR PBB SHADOW E&M-EST. PATIENT-LVL IV: CPT | Mod: PBBFAC,,, | Performed by: ORTHOPAEDIC SURGERY

## 2020-09-10 PROCEDURE — 99999 PR PBB SHADOW E&M-EST. PATIENT-LVL IV: ICD-10-PCS | Mod: PBBFAC,,, | Performed by: ORTHOPAEDIC SURGERY

## 2020-09-10 PROCEDURE — 1126F AMNT PAIN NOTED NONE PRSNT: CPT | Mod: S$GLB,,, | Performed by: INTERNAL MEDICINE

## 2020-09-10 PROCEDURE — 99999 PR PBB SHADOW E&M-EST. PATIENT-LVL III: ICD-10-PCS | Mod: PBBFAC,,, | Performed by: INTERNAL MEDICINE

## 2020-09-10 PROCEDURE — 99214 PR OFFICE/OUTPT VISIT, EST, LEVL IV, 30-39 MIN: ICD-10-PCS | Mod: 25,S$PBB,, | Performed by: ORTHOPAEDIC SURGERY

## 2020-09-10 PROCEDURE — 3008F PR BODY MASS INDEX (BMI) DOCUMENTED: ICD-10-PCS | Mod: CPTII,S$GLB,, | Performed by: INTERNAL MEDICINE

## 2020-09-10 PROCEDURE — 3008F BODY MASS INDEX DOCD: CPT | Mod: CPTII,S$GLB,, | Performed by: INTERNAL MEDICINE

## 2020-09-10 PROCEDURE — 20610 LARGE JOINT ASPIRATION/INJECTION: R KNEE: ICD-10-PCS | Mod: S$PBB,RT,, | Performed by: ORTHOPAEDIC SURGERY

## 2020-09-10 PROCEDURE — 82607 VITAMIN B-12: CPT

## 2020-09-10 PROCEDURE — 1159F MED LIST DOCD IN RCRD: CPT | Mod: S$GLB,,, | Performed by: INTERNAL MEDICINE

## 2020-09-10 PROCEDURE — 1101F PR PT FALLS ASSESS DOC 0-1 FALLS W/OUT INJ PAST YR: ICD-10-PCS | Mod: CPTII,S$GLB,, | Performed by: INTERNAL MEDICINE

## 2020-09-10 PROCEDURE — 20610 DRAIN/INJ JOINT/BURSA W/O US: CPT | Mod: PBBFAC | Performed by: ORTHOPAEDIC SURGERY

## 2020-09-10 PROCEDURE — 1126F PR PAIN SEVERITY QUANTIFIED, NO PAIN PRESENT: ICD-10-PCS | Mod: S$GLB,,, | Performed by: INTERNAL MEDICINE

## 2020-09-10 RX ADMIN — Medication 48 MG: at 08:09

## 2020-09-10 NOTE — PROCEDURES
Large Joint Aspiration/Injection: R knee    Date/Time: 9/10/2020 8:20 AM  Performed by: Naif Whitaker MD  Authorized by: Naif Whitaker MD     Consent Done?:  Yes (Verbal)  Site marked: the procedure site was marked    Timeout: prior to procedure the correct patient, procedure, and site was verified      Local anesthesia used?: Yes      Details:  Needle Size:  22 G  Ultrasonic Guidance for needle placement?: No    Approach:  Anterolateral  Location:  Knee  Site:  R knee  Medications:  48 mg hylan g-f 20 48 mg/6 mL  Patient tolerance:  Patient tolerated the procedure well with no immediate complications
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
## 2020-09-11 ENCOUNTER — PATIENT MESSAGE (OUTPATIENT)
Dept: INTERNAL MEDICINE | Facility: CLINIC | Age: 75
End: 2020-09-11

## 2020-09-11 LAB
FOLATE SERPL-MCNC: 3.7 NG/ML (ref 4–24)
VIT B12 SERPL-MCNC: 321 PG/ML (ref 210–950)

## 2020-09-11 RX ORDER — MAGNESIUM 200 MG
1 TABLET ORAL DAILY
Qty: 1000 TABLET | Refills: 6 | COMMUNITY
Start: 2020-09-11 | End: 2021-06-14 | Stop reason: SDUPTHER

## 2020-09-11 RX ORDER — FOLIC ACID 1 MG/1
1 TABLET ORAL DAILY
Qty: 100 TABLET | Refills: 6 | Status: SHIPPED | OUTPATIENT
Start: 2020-09-11 | End: 2021-04-26

## 2020-09-11 NOTE — TELEPHONE ENCOUNTER
Please tell pt folate level is a low, and B12 level is on the low end.  For his balance I would like him to get these higher.  Start Folic acid 1mg daily, and B12 1000mcg of sublingual tab every day.  SM

## 2020-09-11 NOTE — LETTER
September 16, 2020    Santy Anderson  [address] [place] [address]             [facility]  [address]  [place] [address]  Phone: [phone]  Fax: [phone] Dear Mr. Anderson:    Your folate level is a low, and B12 level is on the low end.  To balance I would like you to get these higher.    Start Folic acid 1mg daily, and B12 1000mcg of sublingual tab every day.    Both of these are available over the counter.    If you have any questions or concerns, please don't hesitate to call.    Sincerely,        MD Giovanna Ly LPN

## 2020-09-16 NOTE — TELEPHONE ENCOUNTER
Left several messages for pt to return call.  Letter mailed.  Will continue to try to contact pt via phone./rpr

## 2020-09-17 ENCOUNTER — TELEPHONE (OUTPATIENT)
Dept: RADIOLOGY | Facility: HOSPITAL | Age: 75
End: 2020-09-17

## 2020-09-21 ENCOUNTER — OFFICE VISIT (OUTPATIENT)
Dept: GASTROENTEROLOGY | Facility: CLINIC | Age: 75
End: 2020-09-21
Payer: MEDICARE

## 2020-09-21 ENCOUNTER — HOSPITAL ENCOUNTER (OUTPATIENT)
Dept: RADIOLOGY | Facility: HOSPITAL | Age: 75
Discharge: HOME | End: 2020-09-21
Attending: NURSE PRACTITIONER
Payer: MEDICARE

## 2020-09-21 VITALS
BODY MASS INDEX: 17.3 KG/M2 | HEIGHT: 70 IN | HEART RATE: 86 BPM | SYSTOLIC BLOOD PRESSURE: 112 MMHG | DIASTOLIC BLOOD PRESSURE: 64 MMHG | WEIGHT: 120.81 LBS

## 2020-09-21 DIAGNOSIS — K70.30 ALCOHOLIC CIRRHOSIS OF LIVER WITHOUT ASCITES: Primary | ICD-10-CM

## 2020-09-21 DIAGNOSIS — K70.30 ALCOHOLIC CIRRHOSIS OF LIVER WITHOUT ASCITES: ICD-10-CM

## 2020-09-21 DIAGNOSIS — K22.2 ESOPHAGEAL STENOSIS: ICD-10-CM

## 2020-09-21 DIAGNOSIS — R13.10 DYSPHAGIA, UNSPECIFIED TYPE: ICD-10-CM

## 2020-09-21 PROCEDURE — 99213 PR OFFICE/OUTPT VISIT, EST, LEVL III, 20-29 MIN: ICD-10-PCS | Mod: S$GLB,,, | Performed by: NURSE PRACTITIONER

## 2020-09-21 PROCEDURE — 76700 US ABDOMEN COMPLETE: ICD-10-PCS | Mod: 26,,, | Performed by: RADIOLOGY

## 2020-09-21 PROCEDURE — 76700 US EXAM ABDOM COMPLETE: CPT | Mod: TC

## 2020-09-21 PROCEDURE — 99999 PR PBB SHADOW E&M-EST. PATIENT-LVL IV: CPT | Mod: PBBFAC,,, | Performed by: NURSE PRACTITIONER

## 2020-09-21 PROCEDURE — 99213 OFFICE O/P EST LOW 20 MIN: CPT | Mod: S$GLB,,, | Performed by: NURSE PRACTITIONER

## 2020-09-21 PROCEDURE — 76700 US EXAM ABDOM COMPLETE: CPT | Mod: 26,,, | Performed by: RADIOLOGY

## 2020-09-21 PROCEDURE — 99999 PR PBB SHADOW E&M-EST. PATIENT-LVL IV: ICD-10-PCS | Mod: PBBFAC,,, | Performed by: NURSE PRACTITIONER

## 2020-09-21 PROCEDURE — 99214 OFFICE O/P EST MOD 30 MIN: CPT | Mod: PBBFAC,25 | Performed by: NURSE PRACTITIONER

## 2020-09-21 NOTE — PROGRESS NOTES
Clinic Follow Up:  [facility] Follow Up Note    Reason for Follow Up:  The primary encounter diagnosis was Alcoholic cirrhosis of liver without ascites. Diagnoses of Esophageal stenosis and Dysphagia, unspecified type were also pertinent to this visit.    PCP: Joanie Simms       HPI:  This is a 74 y.o. male here for follow up of the above  Pt states that since his last visit, he has been feeling overall stable  The dysphagia has resolved since his last EGD.  An esophageal stricutre was found and dilated.   Denies any abdominal pain.  No nausea or vomiting.  No change in bowel pattern.  No melena or hematochezia. Weight is stable  No upper GI bleeding.  No ascites or BLE.  No overt confusion.      Review of Systems   Constitutional: Negative for chills, fever, malaise/fatigue and weight loss.   Respiratory: Negative for cough.    Cardiovascular: Negative for chest pain.   Gastrointestinal:        Per HPI   Musculoskeletal: Negative for myalgias.   Skin: Negative for itching and rash.   Neurological: Negative for headaches.   Psychiatric/Behavioral: The patient is not nervous/anxious.        Medical History:  Past Medical History:   Diagnosis Date    Alcoholic cirrhosis of liver with ascites     Arthritis     Cataract     Gastric mass     GERD (gastroesophageal reflux disease)     Hx of colonic polyps 04/11/2017    per colonoscopy report in     Hyperlipidemia     Lung cancer     Primary open angle glaucoma (POAG) of both eyes, moderate stage 4/16/2019       Surgical History:   Past Surgical History:   Procedure Laterality Date    1 STENT Right 03/27/2019    DR. CLARK    ABDOMINAL SURGERY      CATARACT EXTRACTION      CATARACT EXTRACTION W/  INTRAOCULAR LENS IMPLANT Left 04/10/2019    istent inject     COLONOSCOPY N/A 4/11/2017    Procedure: COLONOSCOPY;  Surgeon: Brianne Wise MD;  Location: [facility];  Service: Endoscopy;  Laterality: N/A;    COLONOSCOPY N/A  2020    Procedure: COLONOSCOPY;  Surgeon: Rufina Correa MD;  Location: [facility];  Service: Endoscopy;  Laterality: N/A;    ESOPHAGOGASTRODUODENOSCOPY      ESOPHAGOGASTRODUODENOSCOPY N/A 2020    Procedure: EGD (ESOPHAGOGASTRODUODENOSCOPY);  Surgeon: Rufina Correa MD;  Location: [facility];  Service: Endoscopy;  Laterality: N/A;    ESOPHAGOGASTRODUODENOSCOPY N/A 2020    Procedure: EGD (ESOPHAGOGASTRODUODENOSCOPY) needs rapid;  Surgeon: Rufina Correa MD;  Location: [facility];  Service: Endoscopy;  Laterality: N/A;    FEMUR SURGERY Right     GASTRECTOMY      HAND SURGERY Left     KNEE SURGERY Right     laparotomy for trauma (negative)  1979    left lower lobectomy      PCIOL Right 2019    DR. CLARK       Family History:   Family History   Problem Relation Age of Onset    Diabetes Mother     Hypertension Mother     Colon cancer Neg Hx        Social History:   Social History     Tobacco Use    Smoking status: Former Smoker     Packs/day: 0.50     Years: 25.00     Pack years: 12.50     Quit date: 10/16/1988     Years since quittin.9    Smokeless tobacco: Never Used   Substance Use Topics    Alcohol use: Not Currently     Frequency: Never     Comment: quit 26 years ago    Drug use: No       Allergies: Reviewed    Home Medications:  Current Outpatient Medications on File Prior to Visit   Medication Sig Dispense Refill    CARAFATE 100 mg/mL suspension TK 10 MLS PO QID BEFORE MEALS AND QHS      cholecalciferol, vitamin D3, 2,000 unit Cap Take 1 capsule by mouth once daily. Patient states that he takes this medication 2 times per week      folic acid (FOLVITE) 1 MG tablet Take 1 mg by mouth once daily.      HYDROcodone-acetaminophen (NORCO) 7.5-325 mg per tablet       latanoprost 0.005 % ophthalmic solution PLACE 1 DROP INTO BOTH EYES EVERY EVENING. 2.5 mL 11    multivitamin capsule Take 1 capsule by mouth once daily.      cyanocobalamin, vitamin B-12, 1,000 mcg  "Subl Place 1 tablet under the tongue once daily. (Patient not taking: Reported on 9/21/2020) 1000 tablet 6    folic acid (FOLVITE) 1 MG tablet Take 1 tablet (1 mg total) by mouth once daily. (Patient not taking: Reported on 9/21/2020) 100 tablet 6    sildenafil (VIAGRA) 100 MG tablet Take 100 mg by mouth daily as needed.  11     Current Facility-Administered Medications on File Prior to Visit   Medication Dose Route Frequency Provider Last Rate Last Dose    hylan g-f 20 (SYNVISC ONE) 48 mg/6 mL injection 48 mg  48 mg Intra-articular 1 time in Clinic/HOD [place] Arzate PA-C        hylan g-f 20 (SYNVISC ONE) 48 mg/6 mL injection 48 mg  48 mg Intra-articular 1 time in Clinic/HOD Naif Whitaker MD           Physical Exam:  Vital Signs:  /64   Pulse 86   Ht 5' 9.5" (1.765 m)   Wt 54.8 kg (120 lb 13 oz)   BMI 17.59 kg/m²   Body mass index is 17.59 kg/m².  Physical Exam   Constitutional: He is oriented to person, place, and time. He appears well-developed.   HENT:   Head: Normocephalic.   Neck: Normal range of motion.   Cardiovascular: Normal rate and regular rhythm.   Pulmonary/Chest: Effort normal and breath sounds normal.   Abdominal: Soft. Bowel sounds are normal. He exhibits no distension. There is no abdominal tenderness.   Musculoskeletal: Normal range of motion.   Neurological: He is alert and oriented to person, place, and time.   Skin: Skin is warm and dry.   Psychiatric: He has a normal mood and affect.   Vitals reviewed.      Labs: Pertinent labs reviewed.  MELD-Na score: 7 at 3/9/2020  9:41 AM  MELD score: 7 at 3/9/2020  9:41 AM  Calculated from:  Serum Creatinine: 0.7 mg/dL (Rounded to 1 mg/dL) at 3/9/2020  9:41 AM  Serum Sodium: 146 mmol/L (Rounded to 137 mmol/L) at 3/9/2020  9:41 AM  Total Bilirubin: 1.2 mg/dL at 3/9/2020  9:41 AM  INR(ratio): 1.0 at 3/9/2020  9:41 AM  Age: 74 years 3 months  EV: EGD 7/2020 without EV  HCC: US 9/2020 without lesion or mass    Assessment:  1. " Alcoholic cirrhosis of liver without ascites    2. Esophageal stenosis    3. Dysphagia, unspecified type        Recommendations:  Stable without decompensation  - Dysphagia has resolved  - continue current medication  - continue with MELD labs and HCC surveillance every 6 months.       Return to Clinic:    6 months with pre-clinic labs and imaging.

## 2020-10-12 ENCOUNTER — PATIENT OUTREACH (OUTPATIENT)
Dept: ADMINISTRATIVE | Facility: OTHER | Age: 75
End: 2020-10-12

## 2020-10-12 ENCOUNTER — OFFICE VISIT (OUTPATIENT)
Dept: ORTHOPEDICS | Facility: CLINIC | Age: 75
End: 2020-10-12
Payer: MEDICARE

## 2020-10-12 VITALS
SYSTOLIC BLOOD PRESSURE: 112 MMHG | HEART RATE: 112 BPM | WEIGHT: 120 LBS | DIASTOLIC BLOOD PRESSURE: 75 MMHG | HEIGHT: 69 IN | BODY MASS INDEX: 17.77 KG/M2

## 2020-10-12 DIAGNOSIS — M17.12 ARTHRITIS OF LEFT KNEE: Primary | ICD-10-CM

## 2020-10-12 DIAGNOSIS — I73.9 PERIPHERAL ARTERIAL DISEASE: ICD-10-CM

## 2020-10-12 DIAGNOSIS — M21.161 ACQUIRED VARUS DEFORMITY KNEE, RIGHT: ICD-10-CM

## 2020-10-12 DIAGNOSIS — M17.11 ARTHRITIS OF KNEE, RIGHT: ICD-10-CM

## 2020-10-12 PROCEDURE — 99999 PR PBB SHADOW E&M-EST. PATIENT-LVL III: ICD-10-PCS | Mod: PBBFAC,,, | Performed by: ORTHOPAEDIC SURGERY

## 2020-10-12 PROCEDURE — 20610 LARGE JOINT ASPIRATION/INJECTION: L KNEE: ICD-10-PCS | Mod: LT,S$GLB,, | Performed by: ORTHOPAEDIC SURGERY

## 2020-10-12 PROCEDURE — 99999 PR PBB SHADOW E&M-EST. PATIENT-LVL III: CPT | Mod: PBBFAC,,, | Performed by: ORTHOPAEDIC SURGERY

## 2020-10-12 PROCEDURE — 20610 DRAIN/INJ JOINT/BURSA W/O US: CPT | Mod: LT,S$GLB,, | Performed by: ORTHOPAEDIC SURGERY
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
## 2020-10-12 NOTE — PATIENT INSTRUCTIONS
Right knee Synvisc-One injected 09/10/2020  Left knee Synvisc-One injected/rooster comb 10/12/2020  May take Tylenol 325 mg 2 tablets 3 times a day if needed for pain  Ice the knee next several days if it burns or swells up  You can have this Synvisc-One/rooster comb injections repeated once every 6 months  If the pain is severe in between you could have steroid shot  Return to see me in 8 weeks

## 2020-10-12 NOTE — PROCEDURES
Large Joint Aspiration/Injection: L knee    Date/Time: 10/12/2020 10:20 AM  Performed by: Naif Whitaker MD  Authorized by: Naif Whitaker MD     Consent Done?:  Yes (Verbal)  Site marked: the procedure site was marked    Timeout: prior to procedure the correct patient, procedure, and site was verified      Local anesthesia used?: Yes      Details:  Needle Size:  22 G  Ultrasonic Guidance for needle placement?: No    Approach:  Anterolateral  Location:  Knee  Site:  L knee  Medications:  48 mg hylan g-f 20 48 mg/6 mL  Patient tolerance:  Patient tolerated the procedure well with no immediate complications

## 2020-10-23 DIAGNOSIS — C34.90 MALIGNANT NEOPLASM OF UNSPECIFIED PART OF UNSPECIFIED BRONCHUS OR LUNG: ICD-10-CM

## 2020-11-03 ENCOUNTER — TELEPHONE (OUTPATIENT)
Dept: RADIOLOGY | Facility: HOSPITAL | Age: 75
End: 2020-11-03

## 2020-11-04 ENCOUNTER — HOSPITAL ENCOUNTER (OUTPATIENT)
Dept: RADIOLOGY | Facility: HOSPITAL | Age: 75
Discharge: HOME | End: 2020-11-04
Attending: INTERNAL MEDICINE
Payer: MEDICARE

## 2020-11-04 DIAGNOSIS — C34.90 MALIGNANT NEOPLASM OF UNSPECIFIED PART OF UNSPECIFIED BRONCHUS OR LUNG: ICD-10-CM

## 2020-11-04 PROCEDURE — 71250 CT CHEST WITHOUT CONTRAST: ICD-10-PCS | Mod: 26,,, | Performed by: RADIOLOGY

## 2020-11-04 PROCEDURE — 71250 CT THORAX DX C-: CPT | Mod: 26,,, | Performed by: RADIOLOGY

## 2020-11-04 PROCEDURE — 71250 CT THORAX DX C-: CPT | Mod: TC

## 2020-11-11 ENCOUNTER — OFFICE VISIT (OUTPATIENT)
Dept: HEMATOLOGY/ONCOLOGY | Facility: CLINIC | Age: 75
End: 2020-11-11
Payer: MEDICARE

## 2020-11-11 ENCOUNTER — LAB VISIT (OUTPATIENT)
Dept: LAB | Facility: HOSPITAL | Age: 75
End: 2020-11-11
Attending: INTERNAL MEDICINE
Payer: MEDICARE

## 2020-11-11 VITALS
BODY MASS INDEX: 17.86 KG/M2 | WEIGHT: 120.56 LBS | TEMPERATURE: 98 F | HEART RATE: 110 BPM | SYSTOLIC BLOOD PRESSURE: 102 MMHG | HEIGHT: 69 IN | DIASTOLIC BLOOD PRESSURE: 68 MMHG | OXYGEN SATURATION: 98 %

## 2020-11-11 DIAGNOSIS — C34.90 MALIGNANT NEOPLASM OF UNSPECIFIED PART OF UNSPECIFIED BRONCHUS OR LUNG: ICD-10-CM

## 2020-11-11 DIAGNOSIS — R94.5 ABNORMAL RESULTS OF LIVER FUNCTION STUDIES: ICD-10-CM

## 2020-11-11 DIAGNOSIS — C34.90 MALIGNANT NEOPLASM OF UNSPECIFIED PART OF UNSPECIFIED BRONCHUS OR LUNG: Primary | ICD-10-CM

## 2020-11-11 PROCEDURE — 1159F PR MEDICATION LIST DOCUMENTED IN MEDICAL RECORD: ICD-10-PCS | Mod: S$GLB,,, | Performed by: INTERNAL MEDICINE

## 2020-11-11 PROCEDURE — 3008F BODY MASS INDEX DOCD: CPT | Mod: CPTII,S$GLB,, | Performed by: INTERNAL MEDICINE

## 2020-11-11 PROCEDURE — 99999 PR PBB SHADOW E&M-EST. PATIENT-LVL III: ICD-10-PCS | Mod: PBBFAC,,, | Performed by: INTERNAL MEDICINE

## 2020-11-11 PROCEDURE — 99214 PR OFFICE/OUTPT VISIT, EST, LEVL IV, 30-39 MIN: ICD-10-PCS | Mod: S$GLB,,, | Performed by: INTERNAL MEDICINE

## 2020-11-11 PROCEDURE — 99214 OFFICE O/P EST MOD 30 MIN: CPT | Mod: S$GLB,,, | Performed by: INTERNAL MEDICINE

## 2020-11-11 PROCEDURE — 1101F PT FALLS ASSESS-DOCD LE1/YR: CPT | Mod: CPTII,S$GLB,, | Performed by: INTERNAL MEDICINE

## 2020-11-11 PROCEDURE — 99999 PR PBB SHADOW E&M-EST. PATIENT-LVL III: CPT | Mod: PBBFAC,,, | Performed by: INTERNAL MEDICINE

## 2020-11-11 PROCEDURE — 86703 HIV-1/HIV-2 1 RESULT ANTBDY: CPT

## 2020-11-11 PROCEDURE — 1101F PR PT FALLS ASSESS DOC 0-1 FALLS W/OUT INJ PAST YR: ICD-10-PCS | Mod: CPTII,S$GLB,, | Performed by: INTERNAL MEDICINE

## 2020-11-11 PROCEDURE — 80074 ACUTE HEPATITIS PANEL: CPT

## 2020-11-11 PROCEDURE — 1126F AMNT PAIN NOTED NONE PRSNT: CPT | Mod: S$GLB,,, | Performed by: INTERNAL MEDICINE

## 2020-11-11 PROCEDURE — 1126F PR PAIN SEVERITY QUANTIFIED, NO PAIN PRESENT: ICD-10-PCS | Mod: S$GLB,,, | Performed by: INTERNAL MEDICINE

## 2020-11-11 PROCEDURE — 36415 COLL VENOUS BLD VENIPUNCTURE: CPT

## 2020-11-11 PROCEDURE — 1159F MED LIST DOCD IN RCRD: CPT | Mod: S$GLB,,, | Performed by: INTERNAL MEDICINE

## 2020-11-11 PROCEDURE — 3008F PR BODY MASS INDEX (BMI) DOCUMENTED: ICD-10-PCS | Mod: CPTII,S$GLB,, | Performed by: INTERNAL MEDICINE

## 2020-11-11 NOTE — PROGRESS NOTES
Subjective:       Patient ID: Santy Anderson is a 74 y.o. male.    Chief Complaint: Results, Anemia, and Cancer    HPI 74-year-old male returns with CT chest history of lung carcinoma.  Patient has lost 40 lb he has had a number of dental issues only been able to we liquid diet patient returns for review    Past Medical History:   Diagnosis Date    Alcoholic cirrhosis of liver with ascites     Arthritis     Cataract     Gastric mass     GERD (gastroesophageal reflux disease)     Hx of colonic polyps 2017    per colonoscopy report in     Hyperlipidemia     Lung cancer     Primary open angle glaucoma (POAG) of both eyes, moderate stage 2019     Family History   Problem Relation Age of Onset    Diabetes Mother     Hypertension Mother     Colon cancer Neg Hx      Social History     Socioeconomic History    Marital status:      Spouse name: Not on file    Number of children: Not on file    Years of education: Not on file    Highest education level: Not on file   Occupational History    Not on file   Social Needs    Financial resource strain: Not on file    Food insecurity     Worry: Not on file     Inability: Not on file    Transportation needs     Medical: Not on file     Non-medical: Not on file   Tobacco Use    Smoking status: Former Smoker     Packs/day: 0.50     Years: 25.00     Pack years: 12.50     Quit date: 10/16/1988     Years since quittin.0    Smokeless tobacco: Never Used   Substance and Sexual Activity    Alcohol use: Not Currently     Frequency: Never     Comment: quit 26 years ago    Drug use: No    Sexual activity: Yes     Partners: Female   Lifestyle    Physical activity     Days per week: Not on file     Minutes per session: Not on file    Stress: Not on file   Relationships    Social connections     Talks on phone: Not on file     Gets together: Not on file     Attends Confucianist service: Not on file     Active member of club or organization: Not on  file     Attends meetings of clubs or organizations: Not on file     Relationship status: Not on file   Other Topics Concern    Not on file   Social History Narrative         Past Surgical History:   Procedure Laterality Date    1 STENT Right 03/27/2019    DR. CLARK    ABDOMINAL SURGERY      CATARACT EXTRACTION      CATARACT EXTRACTION W/  INTRAOCULAR LENS IMPLANT Left 04/10/2019    istent inject     COLONOSCOPY N/A 4/11/2017    Procedure: COLONOSCOPY;  Surgeon: Brianne Wise MD;  Location: [facility];  Service: Endoscopy;  Laterality: N/A;    COLONOSCOPY N/A 7/20/2020    Procedure: COLONOSCOPY;  Surgeon: Rufina Correa MD;  Location: [facility];  Service: Endoscopy;  Laterality: N/A;    ESOPHAGOGASTRODUODENOSCOPY      ESOPHAGOGASTRODUODENOSCOPY N/A 6/12/2020    Procedure: EGD (ESOPHAGOGASTRODUODENOSCOPY);  Surgeon: Rufina Correa MD;  Location: [facility];  Service: Endoscopy;  Laterality: N/A;    ESOPHAGOGASTRODUODENOSCOPY N/A 7/1/2020    Procedure: EGD (ESOPHAGOGASTRODUODENOSCOPY) needs rapid;  Surgeon: Rufina Correa MD;  Location: [facility];  Service: Endoscopy;  Laterality: N/A;    FEMUR SURGERY Right     GASTRECTOMY      HAND SURGERY Left     KNEE SURGERY Right     laparotomy for trauma (negative)  1979    left lower lobectomy      PCIOL Right 03/27/2019    DR. CLARK       Labs:  Lab Results   Component Value Date    WBC 7.79 11/04/2020    HGB 14.8 11/04/2020    HCT 44.8 11/04/2020     (H) 11/04/2020     11/04/2020     BMP  Lab Results   Component Value Date     11/04/2020    K 4.5 11/04/2020     (H) 11/04/2020    CO2 27 11/04/2020    BUN 6 (L) 11/04/2020    CREATININE 0.7 11/04/2020    CALCIUM 8.7 11/04/2020    ANIONGAP 7 (L) 11/04/2020    ESTGFRAFRICA >60 11/04/2020    EGFRNONAA >60 11/04/2020     Lab Results   Component Value Date    ALT 11 11/04/2020    AST 20 11/04/2020    ALKPHOS 97 11/04/2020    BILITOT 1.8 (H) 11/04/2020       Lab  Results   Component Value Date    IRON 156 11/04/2020    TIBC 192 (L) 11/04/2020    FERRITIN 316 (H) 11/04/2020     Lab Results   Component Value Date    IIXISUXU46 321 09/10/2020     Lab Results   Component Value Date    FOLATE 3.7 (L) 09/10/2020     Lab Results   Component Value Date    TSH 1.382 03/09/2020         Review of Systems   Constitutional: Positive for appetite change and unexpected weight change. Negative for activity change, chills, diaphoresis, fatigue and fever.   HENT: Negative for congestion, dental problem, drooling, ear discharge, ear pain, facial swelling, hearing loss, mouth sores, nosebleeds, postnasal drip, rhinorrhea, sinus pressure, sneezing, sore throat, tinnitus, trouble swallowing and voice change.    Eyes: Negative for photophobia, pain, discharge, redness, itching and visual disturbance.   Respiratory: Negative for apnea, cough, choking, chest tightness, shortness of breath, wheezing and stridor.    Cardiovascular: Negative for chest pain, palpitations and leg swelling.   Gastrointestinal: Negative for abdominal distention, abdominal pain, anal bleeding, blood in stool, constipation, diarrhea, nausea, rectal pain and vomiting.   Endocrine: Negative for cold intolerance, heat intolerance, polydipsia, polyphagia and polyuria.   Genitourinary: Negative for decreased urine volume, difficulty urinating, discharge, dysuria, enuresis, flank pain, frequency, genital sores, hematuria, penile pain, penile swelling, scrotal swelling, testicular pain and urgency.   Musculoskeletal: Negative for arthralgias, back pain, gait problem, joint swelling, myalgias, neck pain and neck stiffness.   Skin: Negative for color change, pallor, rash and wound.   Allergic/Immunologic: Negative for environmental allergies, food allergies and immunocompromised state.   Neurological: Positive for weakness. Negative for dizziness, tremors, seizures, syncope, facial asymmetry, speech difficulty, light-headedness,  numbness and headaches.   Hematological: Negative for adenopathy. Does not bruise/bleed easily.   Psychiatric/Behavioral: Positive for dysphoric mood. Negative for agitation, behavioral problems, confusion, decreased concentration, hallucinations, self-injury, sleep disturbance and suicidal ideas. The patient is nervous/anxious. The patient is not hyperactive.        Objective:      Physical Exam  Vitals signs reviewed.   Constitutional:       General: He is not in acute distress.     Appearance: He is well-developed and underweight. He is ill-appearing. He is not diaphoretic.   HENT:      Head: Normocephalic.      Right Ear: External ear normal.      Left Ear: External ear normal.      Nose: Nose normal.      Right Sinus: No maxillary sinus tenderness or frontal sinus tenderness.      Left Sinus: No maxillary sinus tenderness or frontal sinus tenderness.      Mouth/Throat:      Pharynx: No oropharyngeal exudate.   Eyes:      General: Lids are normal. No scleral icterus.        Right eye: No discharge.         Left eye: No discharge.      Extraocular Movements:      Right eye: Normal extraocular motion.      Left eye: Normal extraocular motion.      Conjunctiva/sclera:      Right eye: Right conjunctiva is not injected. No hemorrhage.     Left eye: Left conjunctiva is not injected. No hemorrhage.     Pupils: Pupils are equal, round, and reactive to light.   Neck:      Musculoskeletal: Normal range of motion and neck supple.      Thyroid: No thyromegaly.      Vascular: No JVD.      Trachea: No tracheal deviation.   Cardiovascular:      Rate and Rhythm: Normal rate.   Pulmonary:      Effort: Pulmonary effort is normal. No respiratory distress.      Breath sounds: No stridor.   Abdominal:      General: Bowel sounds are normal.      Palpations: Abdomen is soft. There is no hepatomegaly, splenomegaly or mass.      Tenderness: There is no abdominal tenderness.   Musculoskeletal: Normal range of motion.         General: No  tenderness.   Lymphadenopathy:      Head:      Right side of head: No posterior auricular or occipital adenopathy.      Left side of head: No posterior auricular or occipital adenopathy.      Cervical: No cervical adenopathy.      Right cervical: No superficial, deep or posterior cervical adenopathy.     Left cervical: No superficial, deep or posterior cervical adenopathy.      Upper Body:      Right upper body: No supraclavicular adenopathy.      Left upper body: No supraclavicular adenopathy.   Skin:     General: Skin is dry.      Findings: No erythema or rash.      Nails: There is no clubbing.     Neurological:      Mental Status: He is alert and oriented to person, place, and time.      Cranial Nerves: No cranial nerve deficit.      Coordination: Coordination normal.   Psychiatric:         Behavior: Behavior normal.         Thought Content: Thought content normal.         Judgment: Judgment normal.             Assessment:      1. Malignant neoplasm of unspecified part of unspecified bronchus or lung    2. Abnormal results of liver function studies            Plan:     Extensive review of records CT chest O evidence of new malignancy recent ultrasound of abdomen no abnormality noted laboratory studies unremarkable.  At this point will check acute hepatitis panel and HIV is well for weight loss patient does state that weight loss has been related to dental extractions.  I will see back in follow-up in 6 months for review 25 min face-to-face time coordination of care greater 50% time face-to-face with patient        Otto Laar Jr, MD FACP

## 2020-11-12 LAB
HAV IGM SERPL QL IA: NEGATIVE
HBV CORE IGM SERPL QL IA: NEGATIVE
HBV SURFACE AG SERPL QL IA: NEGATIVE
HCV AB SERPL QL IA: NEGATIVE
HIV 1+2 AB+HIV1 P24 AG SERPL QL IA: NEGATIVE

## 2020-11-30 NOTE — PROGRESS NOTES
"Subjective:      Patient ID: Santy Anderson is a 75 y.o. male.    Chief Complaint: Follow-up      HPI  Here for follow up of medical problems.  Eating better, up 7# of 23# lost.  Still waiting on his teeth.  No f/c/sw/cough.  No cp/sob/palp.  BMs normal.  Wakes up in night to urinate.    Updated/ annual due 5/21:  HM: ref fluvax, 2/19 HAV, 6/16 [id number], 11/18 [id number], 6/16 TDaP, 6/19 BMD rep 2y, 4/17 Cscope rep 3y, 6/16 HCV neg, Eye Dr. Zapata, 2/18 CXR clear, 11/17 Hep panel neg.     Review of Systems   Constitutional: Negative for chills, diaphoresis, fatigue and fever.   Respiratory: Negative for cough, chest tightness and shortness of breath.    Cardiovascular: Negative for chest pain, palpitations and leg swelling.   Gastrointestinal: Negative for blood in stool, constipation, diarrhea, nausea and vomiting.   Genitourinary: Negative for difficulty urinating and frequency.   Musculoskeletal: Negative for arthralgias.         Objective:   /70   Pulse 88   Temp 97.1 °F (36.2 °C)   Ht 5' 9" (1.753 m)   Wt 55.4 kg (122 lb 2.2 oz)   SpO2 98%   BMI 18.04 kg/m²     Physical Exam  Constitutional:       Appearance: He is well-developed.   Neck:      Musculoskeletal: Normal range of motion and neck supple.   Cardiovascular:      Rate and Rhythm: Normal rate and regular rhythm.      Heart sounds: No murmur. No friction rub. No gallop.    Pulmonary:      Effort: Pulmonary effort is normal.      Breath sounds: Normal breath sounds. No wheezing or rales.   Abdominal:      General: Bowel sounds are normal.      Palpations: Abdomen is soft. There is no mass.      Tenderness: There is no abdominal tenderness.   Lymphadenopathy:      Cervical: No cervical adenopathy.   Neurological:      Mental Status: He is alert and oriented to person, place, and time.             Assessment:       1. Weight loss    2. Malignant neoplasm of lower lobe of left lung    3. Iron deficiency anemia due to chronic blood loss    4. " Alcoholic cirrhosis of liver without ascites    5. Preventive measure    6. Vitamin D deficiency    7. Encounter for screening for malignant neoplasm of prostate     8. Frequency of micturition           Plan:     Weight loss, doing better, cont to follow.    Malignant neoplasm of lower lobe of left lung    Iron deficiency anemia due to chronic blood loss- check lab.    Alcoholic cirrhosis of liver without ascites  -     CBC Auto Differential; Future; Expected date: 12/10/2020  -     Comprehensive Metabolic Panel; Future; Expected date: 12/10/2020  -     TSH; Future; Expected date: 12/10/2020    Preventive measure- due in 5mo.  -     CBC Auto Differential; Future; Expected date: 12/10/2020  -     Comprehensive Metabolic Panel; Future; Expected date: 12/10/2020  -     TSH; Future; Expected date: 12/10/2020  -     Vitamin D; Future  -     PROSTATE SPECIFIC ANTIGEN, DIAGNOSTIC; Future; Expected date: 12/10/2020    Vitamin D deficiency- recheck 5mo.  -     Vitamin D; Future    Encounter for screening for malignant neoplasm of prostate   Frequency of micturition   -     PROSTATE SPECIFIC ANTIGEN, DIAGNOSTIC; Future; Expected date: 12/10/2020

## 2020-12-09 ENCOUNTER — PATIENT OUTREACH (OUTPATIENT)
Dept: ADMINISTRATIVE | Facility: OTHER | Age: 75
End: 2020-12-09

## 2020-12-10 ENCOUNTER — OFFICE VISIT (OUTPATIENT)
Dept: ORTHOPEDICS | Facility: CLINIC | Age: 75
End: 2020-12-10
Payer: MEDICARE

## 2020-12-10 ENCOUNTER — OFFICE VISIT (OUTPATIENT)
Dept: FAMILY MEDICINE | Facility: CLINIC | Age: 75
End: 2020-12-10
Payer: MEDICARE

## 2020-12-10 VITALS
HEART RATE: 88 BPM | DIASTOLIC BLOOD PRESSURE: 70 MMHG | TEMPERATURE: 97 F | BODY MASS INDEX: 18.09 KG/M2 | OXYGEN SATURATION: 98 % | SYSTOLIC BLOOD PRESSURE: 112 MMHG | WEIGHT: 122.13 LBS | HEIGHT: 69 IN

## 2020-12-10 VITALS
HEART RATE: 111 BPM | HEIGHT: 69 IN | BODY MASS INDEX: 17.77 KG/M2 | DIASTOLIC BLOOD PRESSURE: 64 MMHG | WEIGHT: 120 LBS | SYSTOLIC BLOOD PRESSURE: 114 MMHG

## 2020-12-10 DIAGNOSIS — Z29.9 PREVENTIVE MEASURE: ICD-10-CM

## 2020-12-10 DIAGNOSIS — Z12.5 ENCOUNTER FOR SCREENING FOR MALIGNANT NEOPLASM OF PROSTATE: ICD-10-CM

## 2020-12-10 DIAGNOSIS — M17.12 ARTHRITIS OF LEFT KNEE: ICD-10-CM

## 2020-12-10 DIAGNOSIS — K70.30 ALCOHOLIC CIRRHOSIS OF LIVER WITHOUT ASCITES: ICD-10-CM

## 2020-12-10 DIAGNOSIS — I73.9 PERIPHERAL ARTERIAL DISEASE: ICD-10-CM

## 2020-12-10 DIAGNOSIS — D50.0 IRON DEFICIENCY ANEMIA DUE TO CHRONIC BLOOD LOSS: ICD-10-CM

## 2020-12-10 DIAGNOSIS — E55.9 VITAMIN D DEFICIENCY: ICD-10-CM

## 2020-12-10 DIAGNOSIS — C34.32 MALIGNANT NEOPLASM OF LOWER LOBE OF LEFT LUNG: ICD-10-CM

## 2020-12-10 DIAGNOSIS — R63.4 WEIGHT LOSS: Primary | ICD-10-CM

## 2020-12-10 DIAGNOSIS — M21.161 ACQUIRED VARUS DEFORMITY KNEE, RIGHT: ICD-10-CM

## 2020-12-10 DIAGNOSIS — R35.0 FREQUENCY OF MICTURITION: ICD-10-CM

## 2020-12-10 DIAGNOSIS — M17.11 ARTHRITIS OF KNEE, RIGHT: Primary | ICD-10-CM

## 2020-12-10 PROBLEM — Z86.010 ENCOUNTER FOR COLONOSCOPY DUE TO HISTORY OF ADENOMATOUS COLONIC POLYPS: Status: RESOLVED | Noted: 2020-07-20 | Resolved: 2020-12-10

## 2020-12-10 PROBLEM — Z12.11 ENCOUNTER FOR COLONOSCOPY DUE TO HISTORY OF ADENOMATOUS COLONIC POLYPS: Status: RESOLVED | Noted: 2020-07-20 | Resolved: 2020-12-10

## 2020-12-10 PROCEDURE — 1101F PT FALLS ASSESS-DOCD LE1/YR: CPT | Mod: CPTII,S$GLB,, | Performed by: INTERNAL MEDICINE

## 2020-12-10 PROCEDURE — 99999 PR PBB SHADOW E&M-EST. PATIENT-LVL III: CPT | Mod: PBBFAC,,, | Performed by: ORTHOPAEDIC SURGERY

## 2020-12-10 PROCEDURE — 3008F BODY MASS INDEX DOCD: CPT | Mod: CPTII,S$GLB,, | Performed by: ORTHOPAEDIC SURGERY

## 2020-12-10 PROCEDURE — 1100F PTFALLS ASSESS-DOCD GE2>/YR: CPT | Mod: CPTII,S$GLB,, | Performed by: ORTHOPAEDIC SURGERY

## 2020-12-10 PROCEDURE — 1100F PR PT FALLS ASSESS DOC 2+ FALLS/FALL W/INJURY/YR: ICD-10-PCS | Mod: CPTII,S$GLB,, | Performed by: ORTHOPAEDIC SURGERY

## 2020-12-10 PROCEDURE — 3288F FALL RISK ASSESSMENT DOCD: CPT | Mod: CPTII,S$GLB,, | Performed by: ORTHOPAEDIC SURGERY

## 2020-12-10 PROCEDURE — 1125F PR PAIN SEVERITY QUANTIFIED, PAIN PRESENT: ICD-10-PCS | Mod: S$GLB,,, | Performed by: ORTHOPAEDIC SURGERY

## 2020-12-10 PROCEDURE — 1159F PR MEDICATION LIST DOCUMENTED IN MEDICAL RECORD: ICD-10-PCS | Mod: S$GLB,,, | Performed by: INTERNAL MEDICINE

## 2020-12-10 PROCEDURE — 1125F AMNT PAIN NOTED PAIN PRSNT: CPT | Mod: S$GLB,,, | Performed by: ORTHOPAEDIC SURGERY

## 2020-12-10 PROCEDURE — 3288F FALL RISK ASSESSMENT DOCD: CPT | Mod: CPTII,S$GLB,, | Performed by: INTERNAL MEDICINE

## 2020-12-10 PROCEDURE — 1159F MED LIST DOCD IN RCRD: CPT | Mod: S$GLB,,, | Performed by: ORTHOPAEDIC SURGERY

## 2020-12-10 PROCEDURE — 99213 PR OFFICE/OUTPT VISIT, EST, LEVL III, 20-29 MIN: ICD-10-PCS | Mod: S$GLB,,, | Performed by: ORTHOPAEDIC SURGERY

## 2020-12-10 PROCEDURE — 1126F PR PAIN SEVERITY QUANTIFIED, NO PAIN PRESENT: ICD-10-PCS | Mod: S$GLB,,, | Performed by: INTERNAL MEDICINE

## 2020-12-10 PROCEDURE — 99214 OFFICE O/P EST MOD 30 MIN: CPT | Mod: S$GLB,,, | Performed by: INTERNAL MEDICINE

## 2020-12-10 PROCEDURE — 1101F PR PT FALLS ASSESS DOC 0-1 FALLS W/OUT INJ PAST YR: ICD-10-PCS | Mod: CPTII,S$GLB,, | Performed by: INTERNAL MEDICINE

## 2020-12-10 PROCEDURE — 99999 PR PBB SHADOW E&M-EST. PATIENT-LVL III: ICD-10-PCS | Mod: PBBFAC,,, | Performed by: ORTHOPAEDIC SURGERY

## 2020-12-10 PROCEDURE — 1159F MED LIST DOCD IN RCRD: CPT | Mod: S$GLB,,, | Performed by: INTERNAL MEDICINE

## 2020-12-10 PROCEDURE — 3008F PR BODY MASS INDEX (BMI) DOCUMENTED: ICD-10-PCS | Mod: CPTII,S$GLB,, | Performed by: ORTHOPAEDIC SURGERY

## 2020-12-10 PROCEDURE — 99214 PR OFFICE/OUTPT VISIT, EST, LEVL IV, 30-39 MIN: ICD-10-PCS | Mod: S$GLB,,, | Performed by: INTERNAL MEDICINE

## 2020-12-10 PROCEDURE — 99999 PR PBB SHADOW E&M-EST. PATIENT-LVL III: ICD-10-PCS | Mod: PBBFAC,,, | Performed by: INTERNAL MEDICINE

## 2020-12-10 PROCEDURE — 3288F PR FALLS RISK ASSESSMENT DOCUMENTED: ICD-10-PCS | Mod: CPTII,S$GLB,, | Performed by: INTERNAL MEDICINE

## 2020-12-10 PROCEDURE — 3008F BODY MASS INDEX DOCD: CPT | Mod: CPTII,S$GLB,, | Performed by: INTERNAL MEDICINE

## 2020-12-10 PROCEDURE — 3008F PR BODY MASS INDEX (BMI) DOCUMENTED: ICD-10-PCS | Mod: CPTII,S$GLB,, | Performed by: INTERNAL MEDICINE

## 2020-12-10 PROCEDURE — 99213 OFFICE O/P EST LOW 20 MIN: CPT | Mod: S$GLB,,, | Performed by: ORTHOPAEDIC SURGERY

## 2020-12-10 PROCEDURE — 1126F AMNT PAIN NOTED NONE PRSNT: CPT | Mod: S$GLB,,, | Performed by: INTERNAL MEDICINE

## 2020-12-10 PROCEDURE — 99999 PR PBB SHADOW E&M-EST. PATIENT-LVL III: CPT | Mod: PBBFAC,,, | Performed by: INTERNAL MEDICINE

## 2020-12-10 PROCEDURE — 3288F PR FALLS RISK ASSESSMENT DOCUMENTED: ICD-10-PCS | Mod: CPTII,S$GLB,, | Performed by: ORTHOPAEDIC SURGERY

## 2020-12-10 PROCEDURE — 1159F PR MEDICATION LIST DOCUMENTED IN MEDICAL RECORD: ICD-10-PCS | Mod: S$GLB,,, | Performed by: ORTHOPAEDIC SURGERY

## 2020-12-10 NOTE — PATIENT INSTRUCTIONS
Continue with Tylenol 325 mg 2 tablets 3 times a day if needed for pain  The abrasion on the right knee keep covered and clean  Do not pull the scab on the left knee off  Return see me in 8 weeks if the pain in the knees reoccurred then we will inject steroid into the knees
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
## 2021-01-17 ENCOUNTER — IMMUNIZATION (OUTPATIENT)
Dept: INTERNAL MEDICINE | Facility: CLINIC | Age: 76
End: 2021-01-17
Payer: MEDICARE

## 2021-01-17 DIAGNOSIS — Z23 NEED FOR VACCINATION: Primary | ICD-10-CM

## 2021-01-17 PROCEDURE — 91300 COVID-19, MRNA, LNP-S, PF, 30 MCG/0.3 ML DOSE VACCINE: CPT | Mod: PBBFAC | Performed by: FAMILY MEDICINE

## 2021-02-07 ENCOUNTER — IMMUNIZATION (OUTPATIENT)
Dept: INTERNAL MEDICINE | Facility: CLINIC | Age: 76
End: 2021-02-07
Payer: MEDICARE

## 2021-02-07 DIAGNOSIS — Z23 NEED FOR VACCINATION: Primary | ICD-10-CM

## 2021-02-07 PROCEDURE — 91300 COVID-19, MRNA, LNP-S, PF, 30 MCG/0.3 ML DOSE VACCINE: CPT | Mod: PBBFAC | Performed by: FAMILY MEDICINE

## 2021-02-07 PROCEDURE — 0002A COVID-19, MRNA, LNP-S, PF, 30 MCG/0.3 ML DOSE VACCINE: CPT | Mod: PBBFAC | Performed by: FAMILY MEDICINE

## 2021-02-10 ENCOUNTER — PATIENT OUTREACH (OUTPATIENT)
Dept: ADMINISTRATIVE | Facility: OTHER | Age: 76
End: 2021-02-10

## 2021-02-11 ENCOUNTER — LAB VISIT (OUTPATIENT)
Dept: LAB | Facility: HOSPITAL | Age: 76
End: 2021-02-11
Attending: INTERNAL MEDICINE
Payer: MEDICARE

## 2021-02-11 ENCOUNTER — OFFICE VISIT (OUTPATIENT)
Dept: INTERNAL MEDICINE | Facility: CLINIC | Age: 76
End: 2021-02-11
Payer: MEDICARE

## 2021-02-11 ENCOUNTER — OFFICE VISIT (OUTPATIENT)
Dept: ORTHOPEDICS | Facility: CLINIC | Age: 76
End: 2021-02-11
Payer: MEDICARE

## 2021-02-11 ENCOUNTER — TELEPHONE (OUTPATIENT)
Dept: FAMILY MEDICINE | Facility: CLINIC | Age: 76
End: 2021-02-11

## 2021-02-11 VITALS
HEIGHT: 69 IN | BODY MASS INDEX: 18.09 KG/M2 | HEART RATE: 105 BPM | OXYGEN SATURATION: 99 % | SYSTOLIC BLOOD PRESSURE: 128 MMHG | WEIGHT: 122.13 LBS | TEMPERATURE: 97 F | DIASTOLIC BLOOD PRESSURE: 64 MMHG

## 2021-02-11 VITALS
BODY MASS INDEX: 18.09 KG/M2 | WEIGHT: 122.13 LBS | DIASTOLIC BLOOD PRESSURE: 80 MMHG | HEART RATE: 115 BPM | SYSTOLIC BLOOD PRESSURE: 110 MMHG | HEIGHT: 69 IN

## 2021-02-11 DIAGNOSIS — M17.12 ARTHRITIS OF LEFT KNEE: ICD-10-CM

## 2021-02-11 DIAGNOSIS — M17.31 POST-TRAUMATIC OSTEOARTHRITIS OF RIGHT KNEE: ICD-10-CM

## 2021-02-11 DIAGNOSIS — I73.9 PERIPHERAL ARTERIAL DISEASE: ICD-10-CM

## 2021-02-11 DIAGNOSIS — E87.6 HYPOKALEMIA: ICD-10-CM

## 2021-02-11 DIAGNOSIS — C34.32 MALIGNANT NEOPLASM OF LOWER LOBE OF LEFT LUNG: ICD-10-CM

## 2021-02-11 DIAGNOSIS — R42 LIGHTHEADEDNESS: Primary | ICD-10-CM

## 2021-02-11 DIAGNOSIS — R20.2 NUMBNESS AND TINGLING: Primary | ICD-10-CM

## 2021-02-11 DIAGNOSIS — M21.161 ACQUIRED VARUS DEFORMITY KNEE, RIGHT: ICD-10-CM

## 2021-02-11 DIAGNOSIS — Z29.9 PREVENTIVE MEASURE: Primary | ICD-10-CM

## 2021-02-11 DIAGNOSIS — K70.30 ALCOHOLIC CIRRHOSIS OF LIVER WITHOUT ASCITES: ICD-10-CM

## 2021-02-11 DIAGNOSIS — D02.0 CARCINOMA IN SITU OF LARYNX: ICD-10-CM

## 2021-02-11 DIAGNOSIS — M17.11 ARTHRITIS OF KNEE, RIGHT: Primary | ICD-10-CM

## 2021-02-11 DIAGNOSIS — Z29.9 PREVENTIVE MEASURE: ICD-10-CM

## 2021-02-11 DIAGNOSIS — E55.9 VITAMIN D DEFICIENCY: ICD-10-CM

## 2021-02-11 DIAGNOSIS — R20.0 NUMBNESS AND TINGLING: Primary | ICD-10-CM

## 2021-02-11 DIAGNOSIS — M47.816 LUMBAR SPONDYLOSIS: ICD-10-CM

## 2021-02-11 LAB
25(OH)D3+25(OH)D2 SERPL-MCNC: 18 NG/ML (ref 30–96)
ALBUMIN SERPL BCP-MCNC: 3.1 G/DL (ref 3.5–5.2)
ALP SERPL-CCNC: 67 U/L (ref 55–135)
ALT SERPL W/O P-5'-P-CCNC: 10 U/L (ref 10–44)
ANION GAP SERPL CALC-SCNC: 10 MMOL/L (ref 8–16)
AST SERPL-CCNC: 26 U/L (ref 10–40)
BASOPHILS # BLD AUTO: 0.04 K/UL (ref 0–0.2)
BASOPHILS NFR BLD: 0.9 % (ref 0–1.9)
BILIRUB SERPL-MCNC: 1.3 MG/DL (ref 0.1–1)
BUN SERPL-MCNC: 2 MG/DL (ref 8–23)
CALCIUM SERPL-MCNC: 8.8 MG/DL (ref 8.7–10.5)
CHLORIDE SERPL-SCNC: 108 MMOL/L (ref 95–110)
CO2 SERPL-SCNC: 26 MMOL/L (ref 23–29)
COMPLEXED PSA SERPL-MCNC: 0.54 NG/ML (ref 0–4)
CREAT SERPL-MCNC: 0.7 MG/DL (ref 0.5–1.4)
DIFFERENTIAL METHOD: ABNORMAL
EOSINOPHIL # BLD AUTO: 0.2 K/UL (ref 0–0.5)
EOSINOPHIL NFR BLD: 3.9 % (ref 0–8)
ERYTHROCYTE [DISTWIDTH] IN BLOOD BY AUTOMATED COUNT: 14.4 % (ref 11.5–14.5)
EST. GFR  (AFRICAN AMERICAN): >60 ML/MIN/1.73 M^2
EST. GFR  (NON AFRICAN AMERICAN): >60 ML/MIN/1.73 M^2
GLUCOSE SERPL-MCNC: 74 MG/DL (ref 70–110)
HCT VFR BLD AUTO: 45.5 % (ref 40–54)
HGB BLD-MCNC: 15 G/DL (ref 14–18)
IMM GRANULOCYTES # BLD AUTO: 0.01 K/UL (ref 0–0.04)
IMM GRANULOCYTES NFR BLD AUTO: 0.2 % (ref 0–0.5)
LYMPHOCYTES # BLD AUTO: 1.8 K/UL (ref 1–4.8)
LYMPHOCYTES NFR BLD: 40.2 % (ref 18–48)
MCH RBC QN AUTO: 36.9 PG (ref 27–31)
MCHC RBC AUTO-ENTMCNC: 33 G/DL (ref 32–36)
MCV RBC AUTO: 112 FL (ref 82–98)
MONOCYTES # BLD AUTO: 0.5 K/UL (ref 0.3–1)
MONOCYTES NFR BLD: 11.8 % (ref 4–15)
NEUTROPHILS # BLD AUTO: 1.9 K/UL (ref 1.8–7.7)
NEUTROPHILS NFR BLD: 43 % (ref 38–73)
NRBC BLD-RTO: 0 /100 WBC
PLATELET # BLD AUTO: 180 K/UL (ref 150–350)
PMV BLD AUTO: 9.8 FL (ref 9.2–12.9)
POTASSIUM SERPL-SCNC: 4.1 MMOL/L (ref 3.5–5.1)
PROT SERPL-MCNC: 6 G/DL (ref 6–8.4)
RBC # BLD AUTO: 4.06 M/UL (ref 4.6–6.2)
SODIUM SERPL-SCNC: 144 MMOL/L (ref 136–145)
TSH SERPL DL<=0.005 MIU/L-ACNC: 1.3 UIU/ML (ref 0.4–4)
WBC # BLD AUTO: 4.4 K/UL (ref 3.9–12.7)

## 2021-02-11 PROCEDURE — 99214 OFFICE O/P EST MOD 30 MIN: CPT | Mod: 25,S$GLB,, | Performed by: ORTHOPAEDIC SURGERY

## 2021-02-11 PROCEDURE — 82306 VITAMIN D 25 HYDROXY: CPT

## 2021-02-11 PROCEDURE — 20610 DRAIN/INJ JOINT/BURSA W/O US: CPT | Mod: 50,S$GLB,, | Performed by: ORTHOPAEDIC SURGERY

## 2021-02-11 PROCEDURE — 1100F PR PT FALLS ASSESS DOC 2+ FALLS/FALL W/INJURY/YR: ICD-10-PCS | Mod: CPTII,S$GLB,, | Performed by: ORTHOPAEDIC SURGERY

## 2021-02-11 PROCEDURE — 3008F PR BODY MASS INDEX (BMI) DOCUMENTED: ICD-10-PCS | Mod: CPTII,S$GLB,, | Performed by: FAMILY MEDICINE

## 2021-02-11 PROCEDURE — 1159F MED LIST DOCD IN RCRD: CPT | Mod: S$GLB,,, | Performed by: FAMILY MEDICINE

## 2021-02-11 PROCEDURE — 36415 COLL VENOUS BLD VENIPUNCTURE: CPT

## 2021-02-11 PROCEDURE — 99999 PR PBB SHADOW E&M-EST. PATIENT-LVL III: ICD-10-PCS | Mod: PBBFAC,,, | Performed by: FAMILY MEDICINE

## 2021-02-11 PROCEDURE — 3288F PR FALLS RISK ASSESSMENT DOCUMENTED: ICD-10-PCS | Mod: CPTII,S$GLB,, | Performed by: ORTHOPAEDIC SURGERY

## 2021-02-11 PROCEDURE — 1126F AMNT PAIN NOTED NONE PRSNT: CPT | Mod: S$GLB,,, | Performed by: ORTHOPAEDIC SURGERY

## 2021-02-11 PROCEDURE — 1101F PR PT FALLS ASSESS DOC 0-1 FALLS W/OUT INJ PAST YR: ICD-10-PCS | Mod: CPTII,S$GLB,, | Performed by: FAMILY MEDICINE

## 2021-02-11 PROCEDURE — 20610 LARGE JOINT ASPIRATION/INJECTION: BILATERAL KNEE: ICD-10-PCS | Mod: 50,S$GLB,, | Performed by: ORTHOPAEDIC SURGERY

## 2021-02-11 PROCEDURE — 84153 ASSAY OF PSA TOTAL: CPT

## 2021-02-11 PROCEDURE — 3288F PR FALLS RISK ASSESSMENT DOCUMENTED: ICD-10-PCS | Mod: CPTII,S$GLB,, | Performed by: FAMILY MEDICINE

## 2021-02-11 PROCEDURE — 99214 PR OFFICE/OUTPT VISIT, EST, LEVL IV, 30-39 MIN: ICD-10-PCS | Mod: 25,S$GLB,, | Performed by: ORTHOPAEDIC SURGERY

## 2021-02-11 PROCEDURE — 1100F PTFALLS ASSESS-DOCD GE2>/YR: CPT | Mod: CPTII,S$GLB,, | Performed by: ORTHOPAEDIC SURGERY

## 2021-02-11 PROCEDURE — 1126F AMNT PAIN NOTED NONE PRSNT: CPT | Mod: S$GLB,,, | Performed by: FAMILY MEDICINE

## 2021-02-11 PROCEDURE — 3008F PR BODY MASS INDEX (BMI) DOCUMENTED: ICD-10-PCS | Mod: CPTII,S$GLB,, | Performed by: ORTHOPAEDIC SURGERY

## 2021-02-11 PROCEDURE — 1126F PR PAIN SEVERITY QUANTIFIED, NO PAIN PRESENT: ICD-10-PCS | Mod: S$GLB,,, | Performed by: ORTHOPAEDIC SURGERY

## 2021-02-11 PROCEDURE — 3008F BODY MASS INDEX DOCD: CPT | Mod: CPTII,S$GLB,, | Performed by: FAMILY MEDICINE

## 2021-02-11 PROCEDURE — 1159F MED LIST DOCD IN RCRD: CPT | Mod: S$GLB,,, | Performed by: ORTHOPAEDIC SURGERY

## 2021-02-11 PROCEDURE — 99214 PR OFFICE/OUTPT VISIT, EST, LEVL IV, 30-39 MIN: ICD-10-PCS | Mod: S$GLB,,, | Performed by: FAMILY MEDICINE

## 2021-02-11 PROCEDURE — 99999 PR PBB SHADOW E&M-EST. PATIENT-LVL III: CPT | Mod: PBBFAC,,, | Performed by: FAMILY MEDICINE

## 2021-02-11 PROCEDURE — 1101F PT FALLS ASSESS-DOCD LE1/YR: CPT | Mod: CPTII,S$GLB,, | Performed by: FAMILY MEDICINE

## 2021-02-11 PROCEDURE — 85025 COMPLETE CBC W/AUTO DIFF WBC: CPT

## 2021-02-11 PROCEDURE — 3288F FALL RISK ASSESSMENT DOCD: CPT | Mod: CPTII,S$GLB,, | Performed by: ORTHOPAEDIC SURGERY

## 2021-02-11 PROCEDURE — 99999 PR PBB SHADOW E&M-EST. PATIENT-LVL III: ICD-10-PCS | Mod: PBBFAC,,, | Performed by: ORTHOPAEDIC SURGERY

## 2021-02-11 PROCEDURE — 99214 OFFICE O/P EST MOD 30 MIN: CPT | Mod: S$GLB,,, | Performed by: FAMILY MEDICINE

## 2021-02-11 PROCEDURE — 84443 ASSAY THYROID STIM HORMONE: CPT

## 2021-02-11 PROCEDURE — 1126F PR PAIN SEVERITY QUANTIFIED, NO PAIN PRESENT: ICD-10-PCS | Mod: S$GLB,,, | Performed by: FAMILY MEDICINE

## 2021-02-11 PROCEDURE — 3008F BODY MASS INDEX DOCD: CPT | Mod: CPTII,S$GLB,, | Performed by: ORTHOPAEDIC SURGERY

## 2021-02-11 PROCEDURE — 80053 COMPREHEN METABOLIC PANEL: CPT

## 2021-02-11 PROCEDURE — 1159F PR MEDICATION LIST DOCUMENTED IN MEDICAL RECORD: ICD-10-PCS | Mod: S$GLB,,, | Performed by: FAMILY MEDICINE

## 2021-02-11 PROCEDURE — 99999 PR PBB SHADOW E&M-EST. PATIENT-LVL III: CPT | Mod: PBBFAC,,, | Performed by: ORTHOPAEDIC SURGERY

## 2021-02-11 PROCEDURE — 1159F PR MEDICATION LIST DOCUMENTED IN MEDICAL RECORD: ICD-10-PCS | Mod: S$GLB,,, | Performed by: ORTHOPAEDIC SURGERY

## 2021-02-11 PROCEDURE — 3288F FALL RISK ASSESSMENT DOCD: CPT | Mod: CPTII,S$GLB,, | Performed by: FAMILY MEDICINE

## 2021-02-11 RX ORDER — METHYLPREDNISOLONE ACETATE 80 MG/ML
80 INJECTION, SUSPENSION INTRA-ARTICULAR; INTRALESIONAL; INTRAMUSCULAR; SOFT TISSUE
Status: DISCONTINUED | OUTPATIENT
Start: 2021-02-11 | End: 2021-02-11 | Stop reason: HOSPADM

## 2021-02-11 RX ADMIN — METHYLPREDNISOLONE ACETATE 80 MG: 80 INJECTION, SUSPENSION INTRA-ARTICULAR; INTRALESIONAL; INTRAMUSCULAR; SOFT TISSUE at 07:02

## 2021-03-17 ENCOUNTER — PATIENT OUTREACH (OUTPATIENT)
Dept: ADMINISTRATIVE | Facility: OTHER | Age: 76
End: 2021-03-17

## 2021-03-18 ENCOUNTER — OFFICE VISIT (OUTPATIENT)
Dept: PHYSICAL MEDICINE AND REHAB | Facility: CLINIC | Age: 76
End: 2021-03-18
Payer: MEDICARE

## 2021-03-18 VITALS
BODY MASS INDEX: 18.07 KG/M2 | WEIGHT: 122 LBS | HEART RATE: 112 BPM | SYSTOLIC BLOOD PRESSURE: 123 MMHG | DIASTOLIC BLOOD PRESSURE: 82 MMHG | RESPIRATION RATE: 12 BRPM | HEIGHT: 69 IN

## 2021-03-18 DIAGNOSIS — M54.16 LUMBAR RADICULOPATHY: Primary | ICD-10-CM

## 2021-03-18 DIAGNOSIS — G60.8 POLYNEUROPATHY, PERIPHERAL SENSORIMOTOR AXONAL: ICD-10-CM

## 2021-03-18 PROCEDURE — 95912 PR NERVE CONDUCTION STUDY; 11 -12 STUDIES: ICD-10-PCS | Mod: S$GLB,,, | Performed by: PHYSICAL MEDICINE & REHABILITATION

## 2021-03-18 PROCEDURE — 99999 PR PBB SHADOW E&M-EST. PATIENT-LVL III: ICD-10-PCS | Mod: PBBFAC,,, | Performed by: PHYSICAL MEDICINE & REHABILITATION

## 2021-03-18 PROCEDURE — 3008F BODY MASS INDEX DOCD: CPT | Mod: CPTII,S$GLB,, | Performed by: PHYSICAL MEDICINE & REHABILITATION

## 2021-03-18 PROCEDURE — 1159F MED LIST DOCD IN RCRD: CPT | Mod: S$GLB,,, | Performed by: PHYSICAL MEDICINE & REHABILITATION

## 2021-03-18 PROCEDURE — 1125F PR PAIN SEVERITY QUANTIFIED, PAIN PRESENT: ICD-10-PCS | Mod: S$GLB,,, | Performed by: PHYSICAL MEDICINE & REHABILITATION

## 2021-03-18 PROCEDURE — 95912 NRV CNDJ TEST 11-12 STUDIES: CPT | Mod: S$GLB,,, | Performed by: PHYSICAL MEDICINE & REHABILITATION

## 2021-03-18 PROCEDURE — 95886 MUSC TEST DONE W/N TEST COMP: CPT | Mod: S$GLB,,, | Performed by: PHYSICAL MEDICINE & REHABILITATION

## 2021-03-18 PROCEDURE — 99204 PR OFFICE/OUTPT VISIT, NEW, LEVL IV, 45-59 MIN: ICD-10-PCS | Mod: 25,S$GLB,, | Performed by: PHYSICAL MEDICINE & REHABILITATION

## 2021-03-18 PROCEDURE — 95886 PR EMG COMPLETE, W/ NERVE CONDUCTION STUDIES, 5+ MUSCLES: ICD-10-PCS | Mod: S$GLB,,, | Performed by: PHYSICAL MEDICINE & REHABILITATION

## 2021-03-18 PROCEDURE — 99999 PR PBB SHADOW E&M-EST. PATIENT-LVL III: CPT | Mod: PBBFAC,,, | Performed by: PHYSICAL MEDICINE & REHABILITATION

## 2021-03-18 PROCEDURE — 99204 OFFICE O/P NEW MOD 45 MIN: CPT | Mod: 25,S$GLB,, | Performed by: PHYSICAL MEDICINE & REHABILITATION

## 2021-03-18 PROCEDURE — 3008F PR BODY MASS INDEX (BMI) DOCUMENTED: ICD-10-PCS | Mod: CPTII,S$GLB,, | Performed by: PHYSICAL MEDICINE & REHABILITATION

## 2021-03-18 PROCEDURE — 1125F AMNT PAIN NOTED PAIN PRSNT: CPT | Mod: S$GLB,,, | Performed by: PHYSICAL MEDICINE & REHABILITATION

## 2021-03-18 PROCEDURE — 1159F PR MEDICATION LIST DOCUMENTED IN MEDICAL RECORD: ICD-10-PCS | Mod: S$GLB,,, | Performed by: PHYSICAL MEDICINE & REHABILITATION

## 2021-03-19 ENCOUNTER — TELEPHONE (OUTPATIENT)
Dept: RADIOLOGY | Facility: HOSPITAL | Age: 76
End: 2021-03-19

## 2021-03-20 ENCOUNTER — NURSE TRIAGE (OUTPATIENT)
Dept: ADMINISTRATIVE | Facility: CLINIC | Age: 76
End: 2021-03-20

## 2021-03-22 ENCOUNTER — TELEPHONE (OUTPATIENT)
Dept: FAMILY MEDICINE | Facility: CLINIC | Age: 76
End: 2021-03-22

## 2021-03-22 ENCOUNTER — HOSPITAL ENCOUNTER (OUTPATIENT)
Dept: RADIOLOGY | Facility: HOSPITAL | Age: 76
Discharge: HOME | End: 2021-03-22
Attending: NURSE PRACTITIONER
Payer: MEDICARE

## 2021-03-22 ENCOUNTER — OFFICE VISIT (OUTPATIENT)
Dept: HEPATOLOGY | Facility: CLINIC | Age: 76
End: 2021-03-22
Payer: MEDICARE

## 2021-03-22 VITALS
BODY MASS INDEX: 17.51 KG/M2 | HEIGHT: 69 IN | HEART RATE: 107 BPM | DIASTOLIC BLOOD PRESSURE: 62 MMHG | SYSTOLIC BLOOD PRESSURE: 120 MMHG | WEIGHT: 118.19 LBS

## 2021-03-22 DIAGNOSIS — K70.30 ALCOHOLIC CIRRHOSIS OF LIVER WITHOUT ASCITES: ICD-10-CM

## 2021-03-22 DIAGNOSIS — K70.30 ALCOHOLIC CIRRHOSIS OF LIVER WITHOUT ASCITES: Primary | ICD-10-CM

## 2021-03-22 DIAGNOSIS — R13.12 DYSPHAGIA, OROPHARYNGEAL PHASE: ICD-10-CM

## 2021-03-22 PROCEDURE — 1101F PT FALLS ASSESS-DOCD LE1/YR: CPT | Mod: CPTII,S$GLB,, | Performed by: NURSE PRACTITIONER

## 2021-03-22 PROCEDURE — 3288F PR FALLS RISK ASSESSMENT DOCUMENTED: ICD-10-PCS | Mod: CPTII,S$GLB,, | Performed by: NURSE PRACTITIONER

## 2021-03-22 PROCEDURE — 76700 US EXAM ABDOM COMPLETE: CPT | Mod: 26,,, | Performed by: RADIOLOGY

## 2021-03-22 PROCEDURE — 76700 US ABDOMEN COMPLETE: ICD-10-PCS | Mod: 26,,, | Performed by: RADIOLOGY

## 2021-03-22 PROCEDURE — 3008F BODY MASS INDEX DOCD: CPT | Mod: CPTII,S$GLB,, | Performed by: NURSE PRACTITIONER

## 2021-03-22 PROCEDURE — 1159F MED LIST DOCD IN RCRD: CPT | Mod: S$GLB,,, | Performed by: NURSE PRACTITIONER

## 2021-03-22 PROCEDURE — 76700 US EXAM ABDOM COMPLETE: CPT | Mod: TC

## 2021-03-22 PROCEDURE — 1159F PR MEDICATION LIST DOCUMENTED IN MEDICAL RECORD: ICD-10-PCS | Mod: S$GLB,,, | Performed by: NURSE PRACTITIONER

## 2021-03-22 PROCEDURE — 99214 PR OFFICE/OUTPT VISIT, EST, LEVL IV, 30-39 MIN: ICD-10-PCS | Mod: S$GLB,,, | Performed by: NURSE PRACTITIONER

## 2021-03-22 PROCEDURE — 1126F AMNT PAIN NOTED NONE PRSNT: CPT | Mod: S$GLB,,, | Performed by: NURSE PRACTITIONER

## 2021-03-22 PROCEDURE — 1126F PR PAIN SEVERITY QUANTIFIED, NO PAIN PRESENT: ICD-10-PCS | Mod: S$GLB,,, | Performed by: NURSE PRACTITIONER

## 2021-03-22 PROCEDURE — 3008F PR BODY MASS INDEX (BMI) DOCUMENTED: ICD-10-PCS | Mod: CPTII,S$GLB,, | Performed by: NURSE PRACTITIONER

## 2021-03-22 PROCEDURE — 99999 PR PBB SHADOW E&M-EST. PATIENT-LVL III: ICD-10-PCS | Mod: PBBFAC,,, | Performed by: NURSE PRACTITIONER

## 2021-03-22 PROCEDURE — 99214 OFFICE O/P EST MOD 30 MIN: CPT | Mod: S$GLB,,, | Performed by: NURSE PRACTITIONER

## 2021-03-22 PROCEDURE — 1101F PR PT FALLS ASSESS DOC 0-1 FALLS W/OUT INJ PAST YR: ICD-10-PCS | Mod: CPTII,S$GLB,, | Performed by: NURSE PRACTITIONER

## 2021-03-22 PROCEDURE — 3288F FALL RISK ASSESSMENT DOCD: CPT | Mod: CPTII,S$GLB,, | Performed by: NURSE PRACTITIONER

## 2021-03-22 PROCEDURE — 99999 PR PBB SHADOW E&M-EST. PATIENT-LVL III: CPT | Mod: PBBFAC,,, | Performed by: NURSE PRACTITIONER

## 2021-03-24 ENCOUNTER — TELEPHONE (OUTPATIENT)
Dept: ORTHOPEDICS | Facility: CLINIC | Age: 76
End: 2021-03-24

## 2021-03-25 ENCOUNTER — OFFICE VISIT (OUTPATIENT)
Dept: ORTHOPEDICS | Facility: CLINIC | Age: 76
End: 2021-03-25
Payer: MEDICARE

## 2021-03-25 VITALS
HEART RATE: 118 BPM | WEIGHT: 118 LBS | DIASTOLIC BLOOD PRESSURE: 67 MMHG | SYSTOLIC BLOOD PRESSURE: 103 MMHG | HEIGHT: 69 IN | BODY MASS INDEX: 17.48 KG/M2

## 2021-03-25 DIAGNOSIS — M54.42 CHRONIC MIDLINE LOW BACK PAIN WITH BILATERAL SCIATICA: ICD-10-CM

## 2021-03-25 DIAGNOSIS — M54.17 LUMBOSACRAL RADICULOPATHY AT S1: ICD-10-CM

## 2021-03-25 DIAGNOSIS — M54.17 LUMBOSACRAL RADICULOPATHY AT L5: Primary | ICD-10-CM

## 2021-03-25 DIAGNOSIS — G89.29 CHRONIC MIDLINE LOW BACK PAIN WITH BILATERAL SCIATICA: ICD-10-CM

## 2021-03-25 DIAGNOSIS — M17.12 ARTHRITIS OF LEFT KNEE: ICD-10-CM

## 2021-03-25 DIAGNOSIS — M21.161 ACQUIRED VARUS DEFORMITY KNEE, RIGHT: ICD-10-CM

## 2021-03-25 DIAGNOSIS — I73.9 PERIPHERAL ARTERIAL DISEASE: ICD-10-CM

## 2021-03-25 DIAGNOSIS — M17.11 ARTHRITIS OF KNEE, RIGHT: ICD-10-CM

## 2021-03-25 DIAGNOSIS — M54.41 CHRONIC MIDLINE LOW BACK PAIN WITH BILATERAL SCIATICA: ICD-10-CM

## 2021-03-25 PROCEDURE — 99999 PR PBB SHADOW E&M-EST. PATIENT-LVL III: CPT | Mod: PBBFAC,,, | Performed by: ORTHOPAEDIC SURGERY

## 2021-03-25 PROCEDURE — 1125F AMNT PAIN NOTED PAIN PRSNT: CPT | Mod: S$GLB,,, | Performed by: ORTHOPAEDIC SURGERY

## 2021-03-25 PROCEDURE — 3008F BODY MASS INDEX DOCD: CPT | Mod: CPTII,S$GLB,, | Performed by: ORTHOPAEDIC SURGERY

## 2021-03-25 PROCEDURE — 1159F PR MEDICATION LIST DOCUMENTED IN MEDICAL RECORD: ICD-10-PCS | Mod: S$GLB,,, | Performed by: ORTHOPAEDIC SURGERY

## 2021-03-25 PROCEDURE — 1159F MED LIST DOCD IN RCRD: CPT | Mod: S$GLB,,, | Performed by: ORTHOPAEDIC SURGERY

## 2021-03-25 PROCEDURE — 1125F PR PAIN SEVERITY QUANTIFIED, PAIN PRESENT: ICD-10-PCS | Mod: S$GLB,,, | Performed by: ORTHOPAEDIC SURGERY

## 2021-03-25 PROCEDURE — 3008F PR BODY MASS INDEX (BMI) DOCUMENTED: ICD-10-PCS | Mod: CPTII,S$GLB,, | Performed by: ORTHOPAEDIC SURGERY

## 2021-03-25 PROCEDURE — 99214 OFFICE O/P EST MOD 30 MIN: CPT | Mod: S$GLB,,, | Performed by: ORTHOPAEDIC SURGERY

## 2021-03-25 PROCEDURE — 99999 PR PBB SHADOW E&M-EST. PATIENT-LVL III: ICD-10-PCS | Mod: PBBFAC,,, | Performed by: ORTHOPAEDIC SURGERY

## 2021-03-25 PROCEDURE — 99214 PR OFFICE/OUTPT VISIT, EST, LEVL IV, 30-39 MIN: ICD-10-PCS | Mod: S$GLB,,, | Performed by: ORTHOPAEDIC SURGERY

## 2021-04-08 ENCOUNTER — TELEPHONE (OUTPATIENT)
Dept: FAMILY MEDICINE | Facility: CLINIC | Age: 76
End: 2021-04-08

## 2021-04-15 NOTE — TRANSFER OF CARE
Anesthesia Transfer of Care Note    Patient: Santy Anderson    Procedure(s) Performed: Procedure(s) (LRB):  ESOPHAGOGASTRODUODENOSCOPY (EGD) (N/A)    Patient location: Other: GI PACU    Anesthesia Type: MAC    Transport from OR: Transported from OR on room air with adequate spontaneous ventilation    Post pain: adequate analgesia    Post assessment: no apparent anesthetic complications    Post vital signs: stable    Level of consciousness: awake    Nausea/Vomiting: no nausea/vomiting    Complications: none    Transfer of care protocol was followed      Last vitals: There were no vitals taken for this visit.  
Discharged

## 2021-04-30 ENCOUNTER — EXTERNAL CHRONIC CARE MANAGEMENT (OUTPATIENT)
Dept: PRIMARY CARE CLINIC | Facility: CLINIC | Age: 76
End: 2021-04-30
Payer: MEDICARE

## 2021-04-30 PROCEDURE — 99487 PR COMPLX CHRON CARE MGMT, 1ST HR, PER MONTH: ICD-10-PCS | Mod: S$GLB,,, | Performed by: INTERNAL MEDICINE

## 2021-04-30 PROCEDURE — 99487 CPLX CHRNC CARE 1ST 60 MIN: CPT | Mod: S$GLB,,, | Performed by: INTERNAL MEDICINE

## 2021-04-30 PROCEDURE — 99489 PR COMPLX CHRON CARE MGMT, EA ADDTL 30 MIN, PER MONTH: ICD-10-PCS | Mod: S$GLB,,, | Performed by: INTERNAL MEDICINE

## 2021-04-30 PROCEDURE — 99489 CPLX CHRNC CARE EA ADDL 30: CPT | Mod: S$GLB,,, | Performed by: INTERNAL MEDICINE

## 2021-05-10 ENCOUNTER — LAB VISIT (OUTPATIENT)
Dept: LAB | Facility: HOSPITAL | Age: 76
End: 2021-05-10
Payer: MEDICARE

## 2021-05-10 ENCOUNTER — OFFICE VISIT (OUTPATIENT)
Dept: FAMILY MEDICINE | Facility: CLINIC | Age: 76
End: 2021-05-10
Payer: MEDICARE

## 2021-05-10 VITALS
HEART RATE: 106 BPM | TEMPERATURE: 97 F | HEIGHT: 69 IN | WEIGHT: 116.88 LBS | DIASTOLIC BLOOD PRESSURE: 62 MMHG | SYSTOLIC BLOOD PRESSURE: 108 MMHG | OXYGEN SATURATION: 96 % | BODY MASS INDEX: 17.31 KG/M2

## 2021-05-10 DIAGNOSIS — R20.2 PARESTHESIA: ICD-10-CM

## 2021-05-10 DIAGNOSIS — K70.30 ALCOHOLIC CIRRHOSIS OF LIVER WITHOUT ASCITES: ICD-10-CM

## 2021-05-10 DIAGNOSIS — R27.0 ATAXIA: ICD-10-CM

## 2021-05-10 DIAGNOSIS — R63.4 WEIGHT LOSS: ICD-10-CM

## 2021-05-10 DIAGNOSIS — C34.32 MALIGNANT NEOPLASM OF LOWER LOBE OF LEFT LUNG: ICD-10-CM

## 2021-05-10 DIAGNOSIS — M85.89 OSTEOPENIA OF MULTIPLE SITES: ICD-10-CM

## 2021-05-10 DIAGNOSIS — Z00.00 ENCOUNTER FOR PREVENTIVE HEALTH EXAMINATION: ICD-10-CM

## 2021-05-10 DIAGNOSIS — D50.0 IRON DEFICIENCY ANEMIA DUE TO CHRONIC BLOOD LOSS: Primary | ICD-10-CM

## 2021-05-10 DIAGNOSIS — E55.9 VITAMIN D DEFICIENCY: ICD-10-CM

## 2021-05-10 LAB — VIT B12 SERPL-MCNC: 482 PG/ML (ref 210–950)

## 2021-05-10 PROCEDURE — 82607 VITAMIN B-12: CPT | Performed by: INTERNAL MEDICINE

## 2021-05-10 PROCEDURE — 1159F MED LIST DOCD IN RCRD: CPT | Mod: S$GLB,,, | Performed by: INTERNAL MEDICINE

## 2021-05-10 PROCEDURE — 99999 PR PBB SHADOW E&M-EST. PATIENT-LVL IV: CPT | Mod: PBBFAC,,, | Performed by: INTERNAL MEDICINE

## 2021-05-10 PROCEDURE — 1126F AMNT PAIN NOTED NONE PRSNT: CPT | Mod: S$GLB,,, | Performed by: INTERNAL MEDICINE

## 2021-05-10 PROCEDURE — 3008F BODY MASS INDEX DOCD: CPT | Mod: CPTII,S$GLB,, | Performed by: INTERNAL MEDICINE

## 2021-05-10 PROCEDURE — 3288F PR FALLS RISK ASSESSMENT DOCUMENTED: ICD-10-PCS | Mod: CPTII,S$GLB,, | Performed by: INTERNAL MEDICINE

## 2021-05-10 PROCEDURE — 3288F FALL RISK ASSESSMENT DOCD: CPT | Mod: CPTII,S$GLB,, | Performed by: INTERNAL MEDICINE

## 2021-05-10 PROCEDURE — 99214 PR OFFICE/OUTPT VISIT, EST, LEVL IV, 30-39 MIN: ICD-10-PCS | Mod: S$GLB,,, | Performed by: INTERNAL MEDICINE

## 2021-05-10 PROCEDURE — 1159F PR MEDICATION LIST DOCUMENTED IN MEDICAL RECORD: ICD-10-PCS | Mod: S$GLB,,, | Performed by: INTERNAL MEDICINE

## 2021-05-10 PROCEDURE — 99999 PR PBB SHADOW E&M-EST. PATIENT-LVL IV: ICD-10-PCS | Mod: PBBFAC,,, | Performed by: INTERNAL MEDICINE

## 2021-05-10 PROCEDURE — 1101F PR PT FALLS ASSESS DOC 0-1 FALLS W/OUT INJ PAST YR: ICD-10-PCS | Mod: CPTII,S$GLB,, | Performed by: INTERNAL MEDICINE

## 2021-05-10 PROCEDURE — 1101F PT FALLS ASSESS-DOCD LE1/YR: CPT | Mod: CPTII,S$GLB,, | Performed by: INTERNAL MEDICINE

## 2021-05-10 PROCEDURE — 1126F PR PAIN SEVERITY QUANTIFIED, NO PAIN PRESENT: ICD-10-PCS | Mod: S$GLB,,, | Performed by: INTERNAL MEDICINE

## 2021-05-10 PROCEDURE — 36415 COLL VENOUS BLD VENIPUNCTURE: CPT | Mod: PO | Performed by: INTERNAL MEDICINE

## 2021-05-10 PROCEDURE — 84425 ASSAY OF VITAMIN B-1: CPT | Performed by: INTERNAL MEDICINE

## 2021-05-10 PROCEDURE — 99214 OFFICE O/P EST MOD 30 MIN: CPT | Mod: S$GLB,,, | Performed by: INTERNAL MEDICINE

## 2021-05-10 PROCEDURE — 3008F PR BODY MASS INDEX (BMI) DOCUMENTED: ICD-10-PCS | Mod: CPTII,S$GLB,, | Performed by: INTERNAL MEDICINE

## 2021-05-13 ENCOUNTER — PATIENT MESSAGE (OUTPATIENT)
Dept: FAMILY MEDICINE | Facility: CLINIC | Age: 76
End: 2021-05-13

## 2021-05-14 ENCOUNTER — PATIENT MESSAGE (OUTPATIENT)
Dept: INTERNAL MEDICINE | Facility: CLINIC | Age: 76
End: 2021-05-14

## 2021-05-14 LAB — VIT B1 BLD-MCNC: 49 UG/L (ref 38–122)

## 2021-05-25 ENCOUNTER — TELEPHONE (OUTPATIENT)
Dept: INTERNAL MEDICINE | Facility: CLINIC | Age: 76
End: 2021-05-25

## 2021-05-31 ENCOUNTER — EXTERNAL CHRONIC CARE MANAGEMENT (OUTPATIENT)
Dept: PRIMARY CARE CLINIC | Facility: CLINIC | Age: 76
End: 2021-05-31
Payer: MEDICARE

## 2021-05-31 PROCEDURE — 99490 PR CHRONIC CARE MGMT, 1ST 20 MIN: ICD-10-PCS | Mod: S$GLB,,, | Performed by: INTERNAL MEDICINE

## 2021-05-31 PROCEDURE — 99490 CHRNC CARE MGMT STAFF 1ST 20: CPT | Mod: S$GLB,,, | Performed by: INTERNAL MEDICINE

## 2021-06-07 ENCOUNTER — TELEPHONE (OUTPATIENT)
Dept: FAMILY MEDICINE | Facility: CLINIC | Age: 76
End: 2021-06-07

## 2021-06-14 ENCOUNTER — OFFICE VISIT (OUTPATIENT)
Dept: FAMILY MEDICINE | Facility: CLINIC | Age: 76
End: 2021-06-14
Payer: COMMERCIAL

## 2021-06-14 ENCOUNTER — LAB VISIT (OUTPATIENT)
Dept: LAB | Facility: HOSPITAL | Age: 76
End: 2021-06-14
Payer: MEDICARE

## 2021-06-14 VITALS
BODY MASS INDEX: 16.24 KG/M2 | WEIGHT: 109.69 LBS | HEART RATE: 117 BPM | SYSTOLIC BLOOD PRESSURE: 90 MMHG | HEIGHT: 69 IN | TEMPERATURE: 98 F | OXYGEN SATURATION: 94 % | DIASTOLIC BLOOD PRESSURE: 60 MMHG

## 2021-06-14 DIAGNOSIS — K70.30 ALCOHOLIC CIRRHOSIS OF LIVER WITHOUT ASCITES: ICD-10-CM

## 2021-06-14 DIAGNOSIS — R10.10 PAIN OF UPPER ABDOMEN: ICD-10-CM

## 2021-06-14 DIAGNOSIS — D02.0 CARCINOMA IN SITU OF LARYNX: ICD-10-CM

## 2021-06-14 DIAGNOSIS — R41.82 ALTERED MENTAL STATUS, UNSPECIFIED ALTERED MENTAL STATUS TYPE: ICD-10-CM

## 2021-06-14 DIAGNOSIS — R63.4 WEIGHT LOSS: ICD-10-CM

## 2021-06-14 DIAGNOSIS — R41.3 MEMORY LOSS: ICD-10-CM

## 2021-06-14 DIAGNOSIS — C34.32 MALIGNANT NEOPLASM OF LOWER LOBE OF LEFT LUNG: ICD-10-CM

## 2021-06-14 DIAGNOSIS — R41.82 ALTERED MENTAL STATUS, UNSPECIFIED ALTERED MENTAL STATUS TYPE: Primary | ICD-10-CM

## 2021-06-14 LAB
CREAT SERPL-MCNC: 0.7 MG/DL (ref 0.5–1.4)
EST. GFR  (AFRICAN AMERICAN): >60 ML/MIN/1.73 M^2
EST. GFR  (NON AFRICAN AMERICAN): >60 ML/MIN/1.73 M^2

## 2021-06-14 PROCEDURE — 99999 PR PBB SHADOW E&M-EST. PATIENT-LVL IV: CPT | Mod: PBBFAC,,, | Performed by: INTERNAL MEDICINE

## 2021-06-14 PROCEDURE — 36415 COLL VENOUS BLD VENIPUNCTURE: CPT | Mod: PO | Performed by: INTERNAL MEDICINE

## 2021-06-14 PROCEDURE — 1159F MED LIST DOCD IN RCRD: CPT | Mod: S$GLB,,, | Performed by: INTERNAL MEDICINE

## 2021-06-14 PROCEDURE — 99214 OFFICE O/P EST MOD 30 MIN: CPT | Mod: S$GLB,,, | Performed by: INTERNAL MEDICINE

## 2021-06-14 PROCEDURE — 99999 PR PBB SHADOW E&M-EST. PATIENT-LVL IV: ICD-10-PCS | Mod: PBBFAC,,, | Performed by: INTERNAL MEDICINE

## 2021-06-14 PROCEDURE — 99214 PR OFFICE/OUTPT VISIT, EST, LEVL IV, 30-39 MIN: ICD-10-PCS | Mod: S$GLB,,, | Performed by: INTERNAL MEDICINE

## 2021-06-14 PROCEDURE — 1159F PR MEDICATION LIST DOCUMENTED IN MEDICAL RECORD: ICD-10-PCS | Mod: S$GLB,,, | Performed by: INTERNAL MEDICINE

## 2021-06-14 PROCEDURE — 82565 ASSAY OF CREATININE: CPT | Performed by: INTERNAL MEDICINE

## 2021-06-14 RX ORDER — MAGNESIUM 200 MG
1 TABLET ORAL DAILY
Qty: 1000 TABLET | Refills: 6 | COMMUNITY
Start: 2021-06-14

## 2021-06-14 RX ORDER — ACETAMINOPHEN 500 MG
1 TABLET ORAL DAILY
Qty: 90 CAPSULE | Refills: 0 | Status: SHIPPED | OUTPATIENT
Start: 2021-06-14

## 2021-06-14 RX ORDER — LANSOPRAZOLE 30 MG/1
30 TABLET, ORALLY DISINTEGRATING, DELAYED RELEASE ORAL DAILY
Qty: 30 TABLET | Refills: 11 | Status: SHIPPED | OUTPATIENT
Start: 2021-06-14 | End: 2022-06-14

## 2021-06-17 ENCOUNTER — TELEPHONE (OUTPATIENT)
Dept: RADIOLOGY | Facility: HOSPITAL | Age: 76
End: 2021-06-17

## 2021-06-21 ENCOUNTER — TELEPHONE (OUTPATIENT)
Dept: FAMILY MEDICINE | Facility: CLINIC | Age: 76
End: 2021-06-21

## 2021-06-23 ENCOUNTER — TELEPHONE (OUTPATIENT)
Dept: FAMILY MEDICINE | Facility: CLINIC | Age: 76
End: 2021-06-23

## 2021-06-30 ENCOUNTER — EXTERNAL CHRONIC CARE MANAGEMENT (OUTPATIENT)
Dept: PRIMARY CARE CLINIC | Facility: CLINIC | Age: 76
End: 2021-06-30
Payer: MEDICARE

## 2021-06-30 PROCEDURE — 99490 CHRNC CARE MGMT STAFF 1ST 20: CPT | Mod: S$GLB,,, | Performed by: INTERNAL MEDICINE

## 2021-06-30 PROCEDURE — 99490 PR CHRONIC CARE MGMT, 1ST 20 MIN: ICD-10-PCS | Mod: S$GLB,,, | Performed by: INTERNAL MEDICINE

## 2021-07-08 ENCOUNTER — TELEPHONE (OUTPATIENT)
Dept: HEMATOLOGY/ONCOLOGY | Facility: CLINIC | Age: 76
End: 2021-07-08

## 2021-07-08 ENCOUNTER — DOCUMENTATION ONLY (OUTPATIENT)
Dept: HEMATOLOGY/ONCOLOGY | Facility: CLINIC | Age: 76
End: 2021-07-08

## 2023-02-25 NOTE — ED NOTES
Lab called about UDS not running on the machine, MD notified. ERT assisting pt to get another urine sample.    no

## 2023-08-21 NOTE — DISCHARGE SUMMARY
[facility] Hospital Medicine  Discharge Summary      Patient Name: Santy Anderson  MRN: [id number]  Admission Date: 2/26/2018  Hospital Length of Stay: 0 days  Discharge Date and Time:  02/27/2018 12:29 PM  Attending Physician: No att. providers found   Discharging Provider: JULIA Reid  Primary Care Provider: Joanie Simms MD      HPI:   Santy Anderson is a 72 year old male with alcoholic cirrhosis who presetned to the ED with complaints of nausea, vomiting and abdominal pain that began 3 days ago. The patient describes his abdominal pain as both burning and a fullness. The patient is located diffusely, but worse in his epigastric region. There is associated difficulty swallowing, heartburn, nausea and vomiting. The patient reports that he feels as if something is stuck in his throat. When he swallows, it takes some time before liquids and solids pass the area of his stuck sensation. He reports that after his food goes down, he is having nausea and vomiting. He states that since the onset of symptoms 3 days ago, he has been unable to keep liquids or solids down. He also reports not having a bowel movement since the onset of symptoms. The patient denies fever, chills, chest pain and SOB. He admits to drinking three beers daily. His last beer was approximately 3 days ago due to his nausea and vomiting. Code status was discussed with the patient. He is a full code. His significant other, Anika Hernández, is his surrogate medical decision maker.      Procedure(s) (LRB):  ESOPHAGOGASTRODUODENOSCOPY (EGD) (N/A)      Hospital Course:   Patient was kept for OBS with Intractable vomiting with nausea under the care of Hospital Medicine. Patient was given antiemetics and GI was consulted. Patient had EGD that showed severe esophagitis. Pt started on IV PPI BID and monitored overnight. He will have repeat EGD in 8 weeks. Patient was seen and examined today and deemed stable for discharge home with  PO PPI.     Consults:   Consults         Status Ordering Provider     Inpatient consult to Gastroenterology  Once     Provider:  Danielle Tejeda MD    Completed MILAGRO EGAN JR          No new Assessment & Plan notes have been filed under this hospital service since the last note was generated.  Service: Hospital Medicine    Final Active Diagnoses:    Diagnosis Date Noted POA    PRINCIPAL PROBLEM:  Intractable vomiting with nausea [R11.2] 02/26/2018 Yes    Pain of upper abdomen [R10.10] 02/26/2018 Yes    Abnormal CT scan of the esophagus with esophageal dysphagia [R93.3] 02/26/2018 Yes    Calculus of gallbladder without cholecystitis without obstruction [K80.20] 02/26/2018 Unknown    Alcohol abuse [F10.10] 02/26/2018 Yes    Elevated liver enzymes [R74.8] 02/26/2018 Unknown    Esophagitis [K20.9] 02/26/2018 Yes    Alcoholic cirrhosis of liver without ascites [K70.30] 12/21/2017 Yes      Problems Resolved During this Admission:    Diagnosis Date Noted Date Resolved POA       Discharged Condition: stable    Disposition: Home or Self Care    Follow Up:  Follow-up Information     Joanie Simms MD In 3 days.    Specialty:  Internal Medicine  Why:  hospital follow up  Contact information:  [address]  [place] [address] [phone]             Danielle Tejeda MD In 6 weeks.    Specialties:  Gastroenterology, Hepatology  Why:  hospital  Contact information:  [address]  [place] [address] [phone]                 Patient Instructions:     Diet Adult Regular     Activity as tolerated         Significant Diagnostic Studies: Labs:   BMP:   Recent Labs  Lab 02/26/18  0831 02/27/18  0641   GLU 83 66*    137   K 4.5 3.5   CL 98 101   CO2 23 22*   BUN 5* 5*   CREATININE 0.7 0.6   CALCIUM 8.8 7.9*   MG 2.1 1.4*   , CMP   Recent Labs  Lab 02/26/18  0831 02/27/18  0641    137   K 4.5 3.5   CL 98 101   CO2 23 22*   GLU 83 66*   BUN 5* 5*   CREATININE 0.7 0.6   CALCIUM 8.8 7.9*   PROT 8.0 6.2    ALBUMIN 3.5 2.9*   BILITOT 2.5* 3.1*   ALKPHOS 84 67   AST 71* 57*   ALT 18 16   ANIONGAP 17* 14   ESTGFRAFRICA >60 >60   EGFRNONAA >60 >60   , CBC   Recent Labs  Lab 02/26/18  0831 02/27/18  0641   WBC 8.27 8.23   HGB 14.9 12.5*   HCT 41.8 36.9*   PLT 72* 51*    and All labs within the past 24 hours have been reviewed    Pending Diagnostic Studies:     None         Medications:  Reconciled Home Medications:   Discharge Medication List as of 2/27/2018 10:24 AM      START taking these medications    Details   pantoprazole (PROTONIX) 40 MG tablet Take 1 tablet (40 mg total) by mouth 2 (two) times daily., Starting Tue 2/27/2018, Until Wed 2/27/2019, Print         CONTINUE these medications which have NOT CHANGED    Details   ergocalciferol (ERGOCALCIFEROL) 50,000 unit Cap Take 1 capsule (50,000 Units total) by mouth 3 (three) times a week., Starting Fri 1/26/2018, Normal         STOP taking these medications       hydrocodone-acetaminophen 10-325mg (NORCO)  mg Tab Comments:   Reason for Stopping:         meloxicam (MOBIC) 15 MG tablet Comments:   Reason for Stopping:         omeprazole (PRILOSEC) 20 MG capsule Comments:   Reason for Stopping:               Indwelling Lines/Drains at time of discharge:   Lines/Drains/Airways          No matching active lines, drains, or airways          Time spent on the discharge of patient: 45 minutes  Patient was seen and examined on the date of discharge and determined to be suitable for discharge.         JULIA Reid  Department of Hospital Medicine  [facility]   no hearing difficulty/no sinus symptoms/no nasal congestion

## (undated) DEVICE — PAD CAST SPECIALIST STRL 6

## (undated) DEVICE — SUT VICRYL 2-0 36 CT-1

## (undated) DEVICE — SEE MEDLINE ITEM 152523

## (undated) DEVICE — APPLICATOR CHLORAPREP ORN 26ML

## (undated) DEVICE — BANDAGE ACE DOUBLE STER 6IN

## (undated) DEVICE — SUT VICRYL PLUS 0 CT1 36IN

## (undated) DEVICE — SEE MEDLINE ITEM 157131

## (undated) DEVICE — ELECTRODE REM PLYHSV RETURN 9

## (undated) DEVICE — DRESSING XEROFORM GAUZE 5X9

## (undated) DEVICE — SEE MEDLINE ITEM 157027

## (undated) DEVICE — SUT VICRYL BR 1 GEN 27 CT-1

## (undated) DEVICE — SEE MEDLINE ITEM 152622

## (undated) DEVICE — SYR 10CC LUER LOCK

## (undated) DEVICE — GLOVE BIOGEL PI ORTHO PRO SZ7

## (undated) DEVICE — GAUZE SPONGE 4X4 12PLY

## (undated) DEVICE — DRESSING XEROFORM FOIL PK 1X8

## (undated) DEVICE — COTTON ROLL ABSORBENT 1 LB ST

## (undated) DEVICE — DRAPE EXTREMITY W/ABC NON-SLIP

## (undated) DEVICE — STAPLER SKIN PROXIMATE WIDE

## (undated) DEVICE — MANIFOLD 4 PORT

## (undated) DEVICE — TOURNIQUET SB QC DP 34X4IN

## (undated) DEVICE — GLOVE SURGICAL LATEX SZ 6.5

## (undated) DEVICE — PAD CAST SPECIALIST STRL 4

## (undated) DEVICE — SEE MEDLINE ITEM 146231

## (undated) DEVICE — NDL SAFETY 25G X 1.5 ECLIPSE

## (undated) DEVICE — SEE MEDLINE ITEM 152529

## (undated) DEVICE — SEE MEDLINE ITEM 157216

## (undated) DEVICE — COVER OVERHEAD SURG LT BLUE